# Patient Record
Sex: MALE | Race: BLACK OR AFRICAN AMERICAN | Employment: OTHER | URBAN - METROPOLITAN AREA
[De-identification: names, ages, dates, MRNs, and addresses within clinical notes are randomized per-mention and may not be internally consistent; named-entity substitution may affect disease eponyms.]

---

## 2021-02-24 ENCOUNTER — TRANSCRIBE ORDER (OUTPATIENT)
Dept: SCHEDULING | Age: 56
End: 2021-02-24

## 2021-02-24 DIAGNOSIS — Z12.2 ENCOUNTER FOR SCREENING FOR MALIGNANT NEOPLASM OF RESPIRATORY ORGANS: Primary | ICD-10-CM

## 2021-03-12 ENCOUNTER — HOSPITAL ENCOUNTER (OUTPATIENT)
Dept: CT IMAGING | Age: 56
Discharge: HOME | End: 2021-03-12
Payer: COMMERCIAL

## 2021-03-12 VITALS — WEIGHT: 266 LBS | BODY MASS INDEX: 35.25 KG/M2 | HEIGHT: 73 IN

## 2021-03-12 DIAGNOSIS — Z12.2 ENCOUNTER FOR SCREENING FOR MALIGNANT NEOPLASM OF RESPIRATORY ORGANS: ICD-10-CM

## 2021-03-12 PROCEDURE — 71271 CT THORAX LUNG CANCER SCR C-: CPT

## 2021-03-15 ENCOUNTER — OFFICE VISIT (OUTPATIENT)
Dept: SURGERY | Age: 56
End: 2021-03-15
Payer: COMMERCIAL

## 2021-03-15 VITALS
OXYGEN SATURATION: 95 % | BODY MASS INDEX: 35.52 KG/M2 | DIASTOLIC BLOOD PRESSURE: 74 MMHG | SYSTOLIC BLOOD PRESSURE: 103 MMHG | TEMPERATURE: 97.5 F | WEIGHT: 268 LBS | HEART RATE: 99 BPM | HEIGHT: 73 IN

## 2021-03-15 DIAGNOSIS — M79.606 PAIN OF LOWER EXTREMITY, UNSPECIFIED LATERALITY: Primary | ICD-10-CM

## 2021-03-15 DIAGNOSIS — I73.9 PERIPHERAL VASCULAR DISEASE (HCC): ICD-10-CM

## 2021-03-15 PROCEDURE — 99203 OFFICE O/P NEW LOW 30 MIN: CPT | Performed by: SURGERY

## 2021-03-15 RX ORDER — LISINOPRIL 10 MG/1
TABLET ORAL
COMMUNITY
Start: 2021-02-24 | End: 2021-06-17

## 2021-03-15 RX ORDER — ATORVASTATIN CALCIUM 40 MG/1
40 TABLET, FILM COATED ORAL DAILY
COMMUNITY
Start: 2021-02-24 | End: 2022-10-07 | Stop reason: ALTCHOICE

## 2021-03-15 RX ORDER — CLOPIDOGREL BISULFATE 75 MG/1
75 TABLET ORAL DAILY
COMMUNITY
Start: 2021-02-24

## 2021-03-15 RX ORDER — FAMOTIDINE 20 MG/1
TABLET, FILM COATED ORAL
COMMUNITY
Start: 2021-02-24 | End: 2021-07-02 | Stop reason: ALTCHOICE

## 2021-03-15 RX ORDER — EMTRICITABINE, RILPIVIRINE HYDROCHLORIDE, AND TENOFOVIR ALAFENAMIDE 200; 25; 25 MG/1; MG/1; MG/1
1 TABLET ORAL DAILY
COMMUNITY
Start: 2021-02-17

## 2021-03-15 RX ORDER — GUAIFENESIN 100 MG/5ML
81 LIQUID (ML) ORAL DAILY
COMMUNITY
Start: 2021-01-05

## 2021-03-15 RX ORDER — PENTOXIFYLLINE 400 MG/1
TABLET, EXTENDED RELEASE ORAL
COMMUNITY
Start: 2021-01-05 | End: 2021-04-05 | Stop reason: SDUPTHER

## 2021-03-15 RX ORDER — HYDROCHLOROTHIAZIDE 25 MG/1
TABLET ORAL
COMMUNITY
Start: 2021-02-24 | End: 2021-06-17

## 2021-03-15 RX ORDER — DOLUTEGRAVIR SODIUM 50 MG/1
50 TABLET, FILM COATED ORAL DAILY
COMMUNITY
Start: 2021-02-17

## 2021-03-15 RX ORDER — METFORMIN HYDROCHLORIDE 500 MG/1
TABLET ORAL 2 TIMES DAILY WITH MEALS
COMMUNITY
Start: 2021-03-02 | End: 2021-05-03 | Stop reason: SDUPTHER

## 2021-03-15 RX ORDER — INSULIN GLARGINE 100 [IU]/ML
50 INJECTION, SOLUTION SUBCUTANEOUS
COMMUNITY
End: 2021-05-03 | Stop reason: SDUPTHER

## 2021-03-15 NOTE — PROGRESS NOTES
VASCULAR history and physical      Subjective:   CHIEF COMPLAINTS:  Return of both feet numbness and discomfort. PRESENTATION OF ILLNESS:  Mr. Malgorzata Argueta is a very pleasant 64-year-old gentleman with a longstanding history of peripheral vascular disease. He says that he had right leg bypass surgery had left leg stent placement done. He has previously denied any major vascular radial conditions. Patient has a new onset pain discomfort following with foot both side for some time now. He denies any chest pain shortness breath. Past Medical History:   Diagnosis Date    Diabetes ([address]     Hypercholesterolemia     Hypertension       History reviewed. No pertinent surgical history. History reviewed. No pertinent family history. Social History     Tobacco Use    Smoking status: Current Every Day Smoker     Packs/day: 1.00     Years: 45.00     Pack years: 45.00     Types: Cigarettes    Smokeless tobacco: Never Used   Substance Use Topics    Alcohol use: Never     Frequency: Never       Prior to Admission medications    Medication Sig Start Date End Date Taking?  Authorizing Provider   aspirin 81 mg chewable tablet  1/5/21  Yes Provider, Historical   atorvastatin (LIPITOR) 40 mg tablet  2/24/21  Yes Provider, Historical   clopidogreL (PLAVIX) 75 mg tab  2/24/21  Yes Provider, Historical   Tivicay 50 mg tab tablet  2/17/21  Yes Provider, Historical   Odefsey 200-25-25 mg tab  2/17/21  Yes Provider, Historical   famotidine (PEPCID) 20 mg tablet  2/24/21  Yes Provider, Historical   hydroCHLOROthiazide (HYDRODIURIL) 25 mg tablet  2/24/21  Yes Provider, Historical   lisinopriL (PRINIVIL, ZESTRIL) 10 mg tablet  2/24/21  Yes Provider, Historical   metFORMIN (GLUCOPHAGE) 500 mg tablet  3/2/21  Yes Provider, Historical   pentoxifylline CR (TRENTAL) 400 mg CR tablet  1/5/21  Yes Provider, Historical   insulin glargine (Lantus Solostar U-100 Insulin) 100 unit/mL (3 mL) inpn 50 Units by SubCUTAneous route. Yes Provider, Historical     No Known Allergies     Review of Systems:  I reviewed the rest of organ systems personally and they were negative signed by Dr. Figueroa Friday    Objective:     Visit Vitals  /74 (BP 1 Location: Left upper arm, BP Patient Position: Sitting)   Pulse 99   Temp 97.5 °F (36.4 °C) (Temporal)   Ht 6' 1\" (1.854 m)   Wt 268 lb (121.6 kg)   SpO2 95%   BMI 35.36 kg/m²     VITAL SIGNS REVIEWED. Physical Exam:  Patient is well-nourished pleasant in conversation is appropriate. Head and neck examination atraumatic, normocephalic. Gaze appropriate. Conversation appropriate. Neck examination shows supple. No mass. No obvious carotid bruit. Chest examination shows lungs are clear bilaterally well-expanded, no crackles or wheezes. Cardiovascular system regular rate, no obvious murmur. Skin warm to touch  and moist, no skin lesions. Abdomen is soft ,not tender or distended bowel sounds present. No palpable mass. Neurological examinations, no focal neuro deficits moving all 4 extremities. Cranial nerves intact. Sensation is intact as well. Hematologic: No obvious bruise or swelling or obvious lymphadenopathy. Psychosocial: Appropriate. Has good effect. Musculoskeletal system: No muscle wasting, appropriate movements upper and lower extremity. Vascular examination is nonpalpable pulse. Doppler signal present which are biphasic. Both PT and DP. I reviewed the outside imaging studies done. Those images reviewed. Data Review:   No visits with results within 1 Month(s) from this visit. Latest known visit with results is:   No results found for any previous visit.         Assessment:     Problem List Items Addressed This Visit        Circulatory    RESOLVED: Peripheral vascular disease (HCC)    Relevant Medications    aspirin 81 mg chewable tablet    atorvastatin (LIPITOR) 40 mg tablet    clopidogreL (PLAVIX) 75 mg tab    pentoxifylline CR (TRENTAL) 400 mg CR tablet Other    Pain of lower extremity - Primary    Relevant Orders    LOWER EXT ART PVR MULT LEVEL SEG PRESSURES              Plan:     Patient will need BRIGIDO examination for follow-up. If BRIGIDO results show moderate to severe peripheral vascular disease I will offer the patient possible endovascular examination of the right side. Followed by possible repeat angiographic examination left leg as well.         Gopi Parish MD

## 2021-03-26 ENCOUNTER — HOSPITAL ENCOUNTER (OUTPATIENT)
Dept: NON INVASIVE DIAGNOSTICS | Age: 56
Discharge: HOME | End: 2021-03-26
Attending: SURGERY
Payer: COMMERCIAL

## 2021-03-26 DIAGNOSIS — M79.606 PAIN OF LOWER EXTREMITY, UNSPECIFIED LATERALITY: ICD-10-CM

## 2021-03-26 PROCEDURE — 93922 UPR/L XTREMITY ART 2 LEVELS: CPT

## 2021-04-05 ENCOUNTER — OFFICE VISIT (OUTPATIENT)
Dept: SURGERY | Age: 56
End: 2021-04-05
Payer: COMMERCIAL

## 2021-04-05 VITALS
BODY MASS INDEX: 35.41 KG/M2 | TEMPERATURE: 97.8 F | SYSTOLIC BLOOD PRESSURE: 115 MMHG | HEIGHT: 73 IN | DIASTOLIC BLOOD PRESSURE: 83 MMHG | HEART RATE: 119 BPM | WEIGHT: 267.2 LBS | OXYGEN SATURATION: 96 %

## 2021-04-05 DIAGNOSIS — M79.606 PAIN OF LOWER EXTREMITY, UNSPECIFIED LATERALITY: Primary | ICD-10-CM

## 2021-04-05 PROCEDURE — 99213 OFFICE O/P EST LOW 20 MIN: CPT | Performed by: SURGERY

## 2021-04-05 RX ORDER — PENTOXIFYLLINE 400 MG/1
400 TABLET, EXTENDED RELEASE ORAL 2 TIMES DAILY
Qty: 60 TAB | Refills: 3 | Status: SHIPPED | OUTPATIENT
Start: 2021-04-05 | End: 2021-11-19 | Stop reason: SDUPTHER

## 2021-04-08 NOTE — PROGRESS NOTES
VASCULAR FOLLOW UP      Subjective:   CHIEF COMPLAINTS:  Bilateral leg pain. PRESENTATION OF ILLNESS:    Mr. Andie Tran is here today in follow-up today. He had BRIGIDO examination done as well right ankle index shows 1.1 left ankle index 0.9. Patient currently on 4 different types of the antiplatelet therapy including aspirin, Plavix and Trental and cilostazol. Patient apparently had some bleeding issues. I suggested discontinue cilostazol. Patient is also currently smoking. Patient has not checked her cholesterol levels with some years. Past Medical History:   Diagnosis Date    Diabetes ([address]     Hypercholesterolemia     Hypertension       No past surgical history on file. No family history on file. Social History     Tobacco Use    Smoking status: Current Every Day Smoker     Packs/day: 1.00     Years: 45.00     Pack years: 45.00     Types: Cigarettes    Smokeless tobacco: Never Used   Substance Use Topics    Alcohol use: Never     Frequency: Never       Prior to Admission medications    Medication Sig Start Date End Date Taking? Authorizing Provider   pentoxifylline CR (TRENTAL) 400 mg CR tablet Take 1 Tab by mouth two (2) times a day. 4/5/21  Yes CheleAden MD   aspirin 81 mg chewable tablet  1/5/21  Yes Provider, Historical   atorvastatin (LIPITOR) 40 mg tablet  2/24/21  Yes Provider, Historical   clopidogreL (PLAVIX) 75 mg tab  2/24/21  Yes Provider, Historical   Tivicay 50 mg tab tablet  2/17/21  Yes Provider, Historical   Odefsey 200-25-25 mg tab  2/17/21  Yes Provider, Historical   famotidine (PEPCID) 20 mg tablet  2/24/21  Yes Provider, Historical   hydroCHLOROthiazide (HYDRODIURIL) 25 mg tablet  2/24/21  Yes Provider, Historical   lisinopriL (PRINIVIL, ZESTRIL) 10 mg tablet  2/24/21  Yes Provider, Historical   metFORMIN (GLUCOPHAGE) 500 mg tablet  3/2/21  Yes Provider, Historical   insulin glargine (Lantus Solostar U-100 Insulin) 100 unit/mL (3 mL) inpn 50 Units by SubCUTAneous route.    Yes Provider, Historical     No Known Allergies     Review of Systems:  I reviewed the rest of organ systems personally and they were negative signed by Dr. Frank Tello    Objective:     Visit Vitals  /83 (BP 1 Location: Right upper arm, BP Patient Position: Sitting, BP Cuff Size: Adult)   Pulse (!) 119   Temp 97.8 °F (36.6 °C) (Temporal)   Ht 6' 1\" (1.854 m)   Wt 267 lb 3.2 oz (121.2 kg)   SpO2 96%   BMI 35.25 kg/m²     VITAL SIGNS REVIEWED. Physical Exam:  Patient is well-nourished pleasant in conversation is appropriate. Head and neck examination atraumatic, normocephalic. Gaze appropriate. Conversation appropriate. Neck examination shows supple. No mass. No obvious carotid bruit. Chest examination shows lungs are clear bilaterally well-expanded, no crackles or wheezes. Cardiovascular system regular rate, no obvious murmur. Skin warm to touch  and moist, no skin lesions. Abdomen is soft ,not tender or distended bowel sounds present. No palpable mass. Neurological examinations, no focal neuro deficits moving all 4 extremities. Cranial nerves intact. Sensation is intact as well. Hematologic: No obvious bruise or swelling or obvious lymphadenopathy. Psychosocial: Appropriate. Has good effect. Musculoskeletal system: No muscle wasting, appropriate movements upper and lower extremity. Vascular examination: Vascular examination has not changed. Patient has nonpalpable pedal pulse. But strong Doppler signal noted. Data Review:   No visits with results within 1 Month(s) from this visit. Latest known visit with results is:   No results found for any previous visit. Assessment:     Problem List Items Addressed This Visit        Other    Pain of lower extremity - Primary              Plan:     Patient was encouraged to continue quit smoking. Check cholesterol level checked this year with the primary care physician. Patient blood sugar has been hyperglycemic.   I suggest the patient get a insulin pump for better control. I will reevaluate him again surveillance vascular examination in 4 months follow-up.         Domingo Rush MD

## 2021-05-03 ENCOUNTER — OFFICE VISIT (OUTPATIENT)
Dept: ENDOCRINOLOGY | Age: 56
End: 2021-05-03
Payer: COMMERCIAL

## 2021-05-03 VITALS
RESPIRATION RATE: 24 BRPM | HEART RATE: 82 BPM | SYSTOLIC BLOOD PRESSURE: 128 MMHG | WEIGHT: 262.2 LBS | DIASTOLIC BLOOD PRESSURE: 80 MMHG | BODY MASS INDEX: 34.75 KG/M2 | HEIGHT: 73 IN | OXYGEN SATURATION: 96 %

## 2021-05-03 DIAGNOSIS — I10 ESSENTIAL HYPERTENSION: ICD-10-CM

## 2021-05-03 DIAGNOSIS — E78.2 MIXED HYPERLIPIDEMIA: ICD-10-CM

## 2021-05-03 DIAGNOSIS — E11.65 UNCONTROLLED TYPE 2 DIABETES MELLITUS WITH HYPERGLYCEMIA (HCC): Primary | ICD-10-CM

## 2021-05-03 LAB
ALBUMIN SERPL-MCNC: 4.1 G/DL (ref 3.5–5)
ALBUMIN/GLOB SERPL: 1.1 {RATIO} (ref 1.1–2.2)
ALP SERPL-CCNC: 130 U/L (ref 45–117)
ALT SERPL-CCNC: 24 U/L (ref 12–78)
ANION GAP SERPL CALC-SCNC: 6 MMOL/L (ref 5–15)
AST SERPL-CCNC: 15 U/L (ref 15–37)
BILIRUB SERPL-MCNC: 0.7 MG/DL (ref 0.2–1)
BUN SERPL-MCNC: 20 MG/DL (ref 6–20)
BUN/CREAT SERPL: 17 (ref 12–20)
CALCIUM SERPL-MCNC: 9.6 MG/DL (ref 8.5–10.1)
CHLORIDE SERPL-SCNC: 101 MMOL/L (ref 97–108)
CHOLEST SERPL-MCNC: 127 MG/DL
CO2 SERPL-SCNC: 27 MMOL/L (ref 21–32)
CREAT SERPL-MCNC: 1.19 MG/DL (ref 0.7–1.3)
EST. AVERAGE GLUCOSE BLD GHB EST-MCNC: 246 MG/DL
GLOBULIN SER CALC-MCNC: 3.6 G/DL (ref 2–4)
GLUCOSE SERPL-MCNC: 272 MG/DL (ref 65–100)
HBA1C MFR BLD: 10.2 % (ref 4–5.6)
HDLC SERPL-MCNC: 39 MG/DL
HDLC SERPL: 3.3 {RATIO} (ref 0–5)
LDLC SERPL CALC-MCNC: 71 MG/DL (ref 0–100)
LIPID PROFILE,FLP: NORMAL
POTASSIUM SERPL-SCNC: 4.2 MMOL/L (ref 3.5–5.1)
PROT SERPL-MCNC: 7.7 G/DL (ref 6.4–8.2)
SODIUM SERPL-SCNC: 134 MMOL/L (ref 136–145)
TRIGL SERPL-MCNC: 85 MG/DL (ref ?–150)
VLDLC SERPL CALC-MCNC: 17 MG/DL

## 2021-05-03 PROCEDURE — 99204 OFFICE O/P NEW MOD 45 MIN: CPT | Performed by: INTERNAL MEDICINE

## 2021-05-03 RX ORDER — INSULIN GLARGINE 100 [IU]/ML
50 INJECTION, SOLUTION SUBCUTANEOUS
Qty: 15 ML | Refills: 6 | Status: SHIPPED | OUTPATIENT
Start: 2021-05-03 | End: 2021-08-05 | Stop reason: SDUPTHER

## 2021-05-03 RX ORDER — PEN NEEDLE, DIABETIC 31 GX3/16"
NEEDLE, DISPOSABLE MISCELLANEOUS
Qty: 100 PEN NEEDLE | Refills: 3 | Status: SHIPPED | OUTPATIENT
Start: 2021-05-03 | End: 2021-06-17

## 2021-05-03 RX ORDER — EXENATIDE 2 MG/.65ML
INJECTION, SUSPENSION, EXTENDED RELEASE SUBCUTANEOUS
COMMUNITY
Start: 2021-03-17 | End: 2021-05-03 | Stop reason: ALTCHOICE

## 2021-05-03 RX ORDER — METFORMIN HYDROCHLORIDE 500 MG/1
1000 TABLET ORAL 2 TIMES DAILY WITH MEALS
Qty: 120 TAB | Refills: 6 | Status: SHIPPED | OUTPATIENT
Start: 2021-05-03 | End: 2021-06-17

## 2021-05-03 RX ORDER — DULAGLUTIDE 1.5 MG/.5ML
1.5 INJECTION, SOLUTION SUBCUTANEOUS
Qty: 4 SYRINGE | Refills: 6 | Status: SHIPPED | OUTPATIENT
Start: 2021-05-03 | End: 2021-05-04 | Stop reason: ALTCHOICE

## 2021-05-03 RX ORDER — REPAGLINIDE 2 MG/1
2 TABLET ORAL
Qty: 90 TAB | Refills: 6 | Status: SHIPPED | OUTPATIENT
Start: 2021-05-03 | End: 2021-08-05 | Stop reason: ALTCHOICE

## 2021-05-03 NOTE — PATIENT INSTRUCTIONS
SPECIFIC INSTRUCTIONS BELOW         DRINK water   Do not skip meals  Do not eat in between meals    Reduce carbs- pasta, rice, potatoes, bread   Try to avoid processed bread products like BISCUITS, CROISSANTS, MUFFINS    Do not drink juices or sodas, even if they are calorie zero or diet drinks and especially avoid using powders like crystalloids , JULIETTE-AIDS     Do not eat peanut butter     Do not eat sugar free cookies and cakes   Do not eat peaches, oranges, pineapples, raisins, grapes , canteloupe , honey dew, mangoes , watermelon  and fruit medleys      ----------------------------------------------------------------------------------------------    Check blood sugars only twice a day by rotation as follows    First day - before b-fast and - before lunch  Second day- before dinner and  before bed time    After 2 days go back to same rotation    -------------------------------------------------------------------------------------------------------      STAY ON LANTUS  50 UNITS AT NIGHT       STOP BYDUREON   START  ON TRULICITY  1.5 MG A WEEK ( VOCAROLE AND  PA)       START ON PRANDIN 2 MG RIGHT BEFORE EACH MEAL     Do not take it if you are not eating   Cut the pill to half if eating lesser than normal   Do not avoid lunches       STAY ON METFORMIN 500 MG 2 PILLS TWICE A DAY       PAY ATTENTION TO THESE GENERAL INSTRUCTIONS     -ANY tests other than blood work, which you opt to do  outside [facility] imaging facilities, you are responsible for prior authorizations if  required   - HEALTH MAINTENANCE IS NOT GOING TO BE UP TO DATE ON YOUR AVS- PLEASE IGNORE   - YOUR MED LIST IS NOT UP TO DATE AS SOME CHANGES ARE BEING MADE AFTER THE VISIT - FOLLOW SPECIFIC INSTRUCTIONS  ABOVE     Results     *Normal results will not be notified by a phone call starting January 1 2021   *If you have an upcoming visit, the results will be discussed at the visit   *Please sign up for MY CHART if you want access to your lab and test results  *Abnormal results which require immediate attention will be notified by phone call   *Abnormal results which do not require immediate assistance will be notified in 1-2 weeks       Refills    -    have your pharmacy send us a refill request  Phone calls  -  Allow  24 hrs.  for non-urgent calls to be returned  Prior authorization - It may take 2-4 weeks to process  Forms  -  FMLA, DMV etc., will take up to 2 weeks to process  Cancellations - please notify the office 2 days in advance   Samples  - will only be dispensed at visits     --------------------------------------------------------------------------------------------

## 2021-05-03 NOTE — LETTER
5/11/2021 Patient: Caitlin Griffiths YOB: 1965 Date of Visit: 5/3/2021 Lm Fink NP 
[address] [place] [address] [phone] Via Fax: [phone] Dear Lm Fink NP, Thank you for referring Mr. Armando Lara to [address] [place] [address] for evaluation. My notes for this consultation are attached. If you have questions, please do not hesitate to call me. I look forward to following your patient along with you. Sincerely, Kenton Dao MD

## 2021-05-03 NOTE — PROGRESS NOTES
[facility]  Macie Landaverde MD, Jacob Yee is a 60 y.o. male presents today as a new DM Patient. HPI    Patient here for initial visit of Type 2 diabetes mellitus . Referred : by  [facility]     H/o diabetes for 15  years     Current A1C is 11.6 %    From feb 2021  and symptoms/problems include none     Pt moved to [address]  From [place]   In oct 2020   He stayed without meds for good 10 months , HE HAD FEW MEDS REFILLED on his visit in feb 2021   Per their notes, he used to be on actos 45 and nesina 25 mg    Current diabetic medications include  Metformin, bydureon, lantus 50 units . bydureon was started 2 months ago- not being covered     Current monitoring regimen: home blood tests - NOT INTERESTED   Home blood sugar records: no  Any episodes of hypoglycemia? no    Weight trend: decreasing rapidly  Prior visit with dietician: no  Current diet: well balanced  Current exercise: weightlifting    Known diabetic complications: retinopathy and peripheral neuropathy  Cardiovascular risk factors: dyslipidemia, diabetes mellitus, obesity, male gender, hypertension, stress    Eye exam current (within one year): yes  BRIGIDO: unknown     Past Medical History:   Diagnosis Date    Diabetes ([facility]     HIV (human immunodeficiency virus infection) ([facility]     Hypercholesterolemia     Hypertension      History reviewed. No pertinent surgical history. Current Outpatient Medications   Medication Sig    Bydureon 2 mg/0.65 mL pnij     OneTouch Ultra Blue Test Strip strip     pentoxifylline CR (TRENTAL) 400 mg CR tablet Take 1 Tab by mouth two (2) times a day.     aspirin 81 mg chewable tablet     atorvastatin (LIPITOR) 40 mg tablet     clopidogreL (PLAVIX) 75 mg tab     Tivicay 50 mg tab tablet     Odefsey 200-25-25 mg tab     famotidine (PEPCID) 20 mg tablet     hydroCHLOROthiazide (HYDRODIURIL) 25 mg tablet     lisinopriL (PRINIVIL, ZESTRIL) 10 mg tablet     metFORMIN (GLUCOPHAGE) 500 mg tablet two (2) times daily (with meals). 2 tabs bid    insulin glargine (Lantus Solostar U-100 Insulin) 100 unit/mL (3 mL) inpn 50 Units by SubCUTAneous route. No current facility-administered medications for this visit. Review of Systems   Constitutional: Negative. HENT: Negative. Eyes: Negative. Respiratory: Negative. Cardiovascular: Negative. Gastrointestinal: Negative. Genitourinary: Negative. Musculoskeletal: Negative. Skin: Negative. Neurological: Negative. Endo/Heme/Allergies: Negative. Psychiatric/Behavioral: Negative. Vitals:    05/03/21 0856   BP: 128/80   Pulse: 82   Resp: 24   SpO2: 96%   Weight: 262 lb 3.2 oz (118.9 kg)   Height: 6' 1\" (1.854 m)        Physical Exam  Constitutional:       Appearance: He is well-developed. HENT:      Head: Normocephalic. Eyes:      Conjunctiva/sclera: Conjunctivae normal.      Pupils: Pupils are equal, round, and reactive to light. Neck:      Musculoskeletal: Normal range of motion and neck supple. Cardiovascular:      Rate and Rhythm: Normal rate and regular rhythm. Pulmonary:      Effort: Pulmonary effort is normal.      Breath sounds: Normal breath sounds. Abdominal:      General: Bowel sounds are normal.      Palpations: Abdomen is soft. Musculoskeletal: Normal range of motion. Skin:     General: Skin is warm and dry. Neurological:      Mental Status: He is alert and oriented to person, place, and time. Deep Tendon Reflexes: Reflexes are normal and symmetric. [x] Recent labs have been reviewed from referring provider. From feb 24 2021     [] Recent labs were not available at time of visit. ASSESSMENT AND  PLAN     1.  Type 2 DM uncontrolled :  a1c is 10.2 %    From  Today by POC    Compared to 11.6 %    From feb 2021     Not in control as he had not gotten med refills for 6 plus months .moved here during [place] times     Discussed patho-physiology of diabetes   Stay on lantus ,   Will try trulicity   Start on prandin and he will be on metformin     Patient is advised to check blood sugars 3 times daily by rotation method. reviewed medications and side effects in detail  lab results and schedule of future lab studies reviewed with patient    specific diabetic recommendations: diabetic diet discussed in detail, written exchange diet given, home glucose monitoring emphasized, home glucose monitoring demonstrated and taught and use and side effects of insulin is taught    2. Hypoglycemia :  Educated on treating the hypoglycemia. 3. HTN : continue current care. Patient is educated about importance of compliance with anti-hypertensives especially ARB/ACEI    4. Dyslipidemia : continue current meds. Patient is educated about benefits and adverse effects of statins and explained how benefits outweigh risk. 5. On Trental  And  plavix     6. Diabetic complications :     A. Retinopathy-YES   educated on this complication,  regular f/u with ophthalmologist encouraged    B. Nephropathy - yes, proteinuria noticeable      educated on this disease and indicated stages and its prognosis. C. Peripheral Neuropathy - NO  ,   educated on this disease and indicated improvement with good and stable glycemic control      7. High Hematocrit , and  High hgb  :  Rule out sleep apnea , copd,       8.  HSV 2 positive - saw ID according to [facility] notes       Reviewed results with patient and discussed the labs being ordered today/bnv  Patient voiced understanding of plan of care

## 2021-05-17 ENCOUNTER — HOSPITAL ENCOUNTER (OUTPATIENT)
Age: 56
Discharge: HOME | End: 2021-05-17
Attending: INTERNAL MEDICINE | Admitting: INTERNAL MEDICINE
Payer: COMMERCIAL

## 2021-05-17 VITALS
HEIGHT: 73 IN | BODY MASS INDEX: 35.25 KG/M2 | SYSTOLIC BLOOD PRESSURE: 115 MMHG | HEART RATE: 73 BPM | OXYGEN SATURATION: 96 % | WEIGHT: 266 LBS | TEMPERATURE: 98 F | DIASTOLIC BLOOD PRESSURE: 74 MMHG | RESPIRATION RATE: 18 BRPM

## 2021-05-17 DIAGNOSIS — R94.39 ABNORMAL STRESS TEST: ICD-10-CM

## 2021-05-17 LAB
ALBUMIN SERPL-MCNC: 4 G/DL (ref 3.5–5)
ALBUMIN/GLOB SERPL: 0.9 {RATIO} (ref 1.1–2.2)
ALP SERPL-CCNC: 127 U/L (ref 45–117)
ALT SERPL-CCNC: 30 U/L (ref 12–78)
ANION GAP SERPL CALC-SCNC: 6 MMOL/L (ref 5–15)
APTT PPP: 27.2 SEC (ref 21.2–34.1)
AST SERPL W P-5'-P-CCNC: 17 U/L (ref 15–37)
BILIRUB SERPL-MCNC: 0.9 MG/DL (ref 0.2–1)
BUN SERPL-MCNC: 16 MG/DL (ref 6–20)
BUN/CREAT SERPL: 13 (ref 12–20)
CA-I BLD-MCNC: 9.5 MG/DL (ref 8.5–10.1)
CHLORIDE SERPL-SCNC: 100 MMOL/L (ref 97–108)
CO2 SERPL-SCNC: 26 MMOL/L (ref 21–32)
CREAT SERPL-MCNC: 1.25 MG/DL (ref 0.7–1.3)
ERYTHROCYTE [DISTWIDTH] IN BLOOD BY AUTOMATED COUNT: 13 % (ref 11.5–14.5)
GLOBULIN SER CALC-MCNC: 4.4 G/DL (ref 2–4)
GLUCOSE BLD STRIP.AUTO-MCNC: 205 MG/DL (ref 65–117)
GLUCOSE BLD STRIP.AUTO-MCNC: 262 MG/DL (ref 65–117)
GLUCOSE SERPL-MCNC: 288 MG/DL (ref 65–100)
HCT VFR BLD AUTO: 54 % (ref 36.6–50.3)
HGB BLD-MCNC: 17.9 G/DL (ref 12.1–17)
INR PPP: 0.9 (ref 0.9–1.1)
MCH RBC QN AUTO: 29.2 PG (ref 26–34)
MCHC RBC AUTO-ENTMCNC: 33.1 G/DL (ref 30–36.5)
MCV RBC AUTO: 87.9 FL (ref 80–99)
NRBC # BLD: 0 K/UL (ref 0–0.01)
NRBC BLD-RTO: 0 PER 100 WBC
PERFORMED BY, TECHID: ABNORMAL
PERFORMED BY, TECHID: ABNORMAL
PLATELET # BLD AUTO: 332 K/UL (ref 150–400)
PMV BLD AUTO: 9.5 FL (ref 8.9–12.9)
POTASSIUM SERPL-SCNC: 4 MMOL/L (ref 3.5–5.1)
PROT SERPL-MCNC: 8.4 G/DL (ref 6.4–8.2)
PROTHROMBIN TIME: 12.3 SEC (ref 11.9–14.7)
RBC # BLD AUTO: 6.14 M/UL (ref 4.1–5.7)
SODIUM SERPL-SCNC: 132 MMOL/L (ref 136–145)
THERAPEUTIC RANGE,PTTT: NORMAL SEC (ref 82–109)
WBC # BLD AUTO: 7.2 K/UL (ref 4.1–11.1)

## 2021-05-17 PROCEDURE — 77030029997 HC DEV COM RDL R BND TELE -B: Performed by: INTERNAL MEDICINE

## 2021-05-17 PROCEDURE — 82962 GLUCOSE BLOOD TEST: CPT

## 2021-05-17 PROCEDURE — 80053 COMPREHEN METABOLIC PANEL: CPT

## 2021-05-17 PROCEDURE — C1769 GUIDE WIRE: HCPCS | Performed by: INTERNAL MEDICINE

## 2021-05-17 PROCEDURE — 77030016700 HC CATH ANGI DX INFN2 CARD -B: Performed by: INTERNAL MEDICINE

## 2021-05-17 PROCEDURE — 74011250636 HC RX REV CODE- 250/636: Performed by: INTERNAL MEDICINE

## 2021-05-17 PROCEDURE — 36415 COLL VENOUS BLD VENIPUNCTURE: CPT

## 2021-05-17 PROCEDURE — 93458 L HRT ARTERY/VENTRICLE ANGIO: CPT | Performed by: INTERNAL MEDICINE

## 2021-05-17 PROCEDURE — 85027 COMPLETE CBC AUTOMATED: CPT

## 2021-05-17 PROCEDURE — 85730 THROMBOPLASTIN TIME PARTIAL: CPT

## 2021-05-17 PROCEDURE — 77030003394 HC NDL ART COOK -A: Performed by: INTERNAL MEDICINE

## 2021-05-17 PROCEDURE — C1894 INTRO/SHEATH, NON-LASER: HCPCS | Performed by: INTERNAL MEDICINE

## 2021-05-17 PROCEDURE — 99152 MOD SED SAME PHYS/QHP 5/>YRS: CPT | Performed by: INTERNAL MEDICINE

## 2021-05-17 PROCEDURE — 76210000016 HC OR PH I REC 1 TO 1.5 HR: Performed by: INTERNAL MEDICINE

## 2021-05-17 PROCEDURE — 85610 PROTHROMBIN TIME: CPT

## 2021-05-17 PROCEDURE — 99153 MOD SED SAME PHYS/QHP EA: CPT | Performed by: INTERNAL MEDICINE

## 2021-05-17 PROCEDURE — 74011000636 HC RX REV CODE- 636: Performed by: INTERNAL MEDICINE

## 2021-05-17 PROCEDURE — 77030019698 HC SYR ANGI MDLON MRTM -A: Performed by: INTERNAL MEDICINE

## 2021-05-17 PROCEDURE — 2709999900 HC NON-CHARGEABLE SUPPLY: Performed by: INTERNAL MEDICINE

## 2021-05-17 PROCEDURE — 76210000023 HC REC RM PH II 2 TO 2.5 HR: Performed by: INTERNAL MEDICINE

## 2021-05-17 PROCEDURE — 74011000250 HC RX REV CODE- 250: Performed by: INTERNAL MEDICINE

## 2021-05-17 RX ORDER — LIDOCAINE HYDROCHLORIDE 10 MG/ML
INJECTION INFILTRATION; PERINEURAL AS NEEDED
Status: DISCONTINUED | OUTPATIENT
Start: 2021-05-17 | End: 2021-05-17 | Stop reason: HOSPADM

## 2021-05-17 RX ORDER — SODIUM CHLORIDE 0.9 % (FLUSH) 0.9 %
5-40 SYRINGE (ML) INJECTION AS NEEDED
Status: DISCONTINUED | OUTPATIENT
Start: 2021-05-17 | End: 2021-05-17 | Stop reason: HOSPADM

## 2021-05-17 RX ORDER — MIDAZOLAM HYDROCHLORIDE 1 MG/ML
INJECTION INTRAMUSCULAR; INTRAVENOUS AS NEEDED
Status: DISCONTINUED | OUTPATIENT
Start: 2021-05-17 | End: 2021-05-17 | Stop reason: HOSPADM

## 2021-05-17 RX ORDER — HEPARIN SODIUM 200 [USP'U]/100ML
INJECTION, SOLUTION INTRAVENOUS
Status: COMPLETED | OUTPATIENT
Start: 2021-05-17 | End: 2021-05-17

## 2021-05-17 RX ORDER — HEPARIN SODIUM 1000 [USP'U]/ML
INJECTION, SOLUTION INTRAVENOUS; SUBCUTANEOUS AS NEEDED
Status: DISCONTINUED | OUTPATIENT
Start: 2021-05-17 | End: 2021-05-17 | Stop reason: HOSPADM

## 2021-05-17 RX ORDER — SODIUM CHLORIDE 9 MG/ML
75 INJECTION, SOLUTION INTRAVENOUS CONTINUOUS
Status: DISCONTINUED | OUTPATIENT
Start: 2021-05-17 | End: 2021-05-17 | Stop reason: HOSPADM

## 2021-05-17 RX ORDER — FENTANYL CITRATE 50 UG/ML
INJECTION, SOLUTION INTRAMUSCULAR; INTRAVENOUS AS NEEDED
Status: DISCONTINUED | OUTPATIENT
Start: 2021-05-17 | End: 2021-05-17 | Stop reason: HOSPADM

## 2021-05-17 RX ORDER — SODIUM CHLORIDE 0.9 % (FLUSH) 0.9 %
5-40 SYRINGE (ML) INJECTION EVERY 8 HOURS
Status: DISCONTINUED | OUTPATIENT
Start: 2021-05-17 | End: 2021-05-17 | Stop reason: HOSPADM

## 2021-05-17 NOTE — Clinical Note
TRANSFER - OUT REPORT:     Verbal report given to: Dorothea. Report consisted of patient's Situation, Background, Assessment and   Recommendations(SBAR). Opportunity for questions and clarification was provided. Patient transported with a Registered Nurse.

## 2021-05-17 NOTE — PROGRESS NOTES
Patient IV out at [phone], and no signs of infection, and catheter tip intact. Patient given discharge education verbally and gave back verbal understanding.  Patient earneste, sister Kristina Reyna picked up patient at front of hospital.

## 2021-06-05 ENCOUNTER — APPOINTMENT (OUTPATIENT)
Dept: CT IMAGING | Age: 56
DRG: 442 | End: 2021-06-05
Attending: PHYSICIAN ASSISTANT
Payer: COMMERCIAL

## 2021-06-05 ENCOUNTER — HOSPITAL ENCOUNTER (INPATIENT)
Age: 56
LOS: 11 days | Discharge: HOME HEALTH | DRG: 442 | End: 2021-06-17
Attending: INTERNAL MEDICINE | Admitting: INTERNAL MEDICINE
Payer: COMMERCIAL

## 2021-06-05 DIAGNOSIS — N17.9 AKI (ACUTE KIDNEY INJURY) (HCC): ICD-10-CM

## 2021-06-05 DIAGNOSIS — R31.0 GROSS HEMATURIA: ICD-10-CM

## 2021-06-05 DIAGNOSIS — N13.30 HYDRONEPHROSIS, LEFT: ICD-10-CM

## 2021-06-05 DIAGNOSIS — N28.89 LEFT RENAL MASS: ICD-10-CM

## 2021-06-05 DIAGNOSIS — N28.89 RENAL MASS: ICD-10-CM

## 2021-06-05 DIAGNOSIS — N13.39 OTHER HYDRONEPHROSIS: ICD-10-CM

## 2021-06-05 DIAGNOSIS — R10.9 INTRACTABLE ABDOMINAL PAIN: Primary | ICD-10-CM

## 2021-06-05 DIAGNOSIS — Z79.01 CHRONIC ANTICOAGULATION: ICD-10-CM

## 2021-06-05 LAB
ALBUMIN SERPL-MCNC: 3.6 G/DL (ref 3.5–5)
ALBUMIN/GLOB SERPL: 0.8 {RATIO} (ref 1.1–2.2)
ALP SERPL-CCNC: 103 U/L (ref 45–117)
ALT SERPL-CCNC: 37 U/L (ref 12–78)
ANION GAP SERPL CALC-SCNC: 9 MMOL/L (ref 5–15)
APPEARANCE UR: ABNORMAL
AST SERPL W P-5'-P-CCNC: 26 U/L (ref 15–37)
BACTERIA URNS QL MICRO: NEGATIVE /HPF
BASOPHILS # BLD: 0.1 K/UL (ref 0–0.1)
BASOPHILS NFR BLD: 1 % (ref 0–1)
BILIRUB SERPL-MCNC: 0.5 MG/DL (ref 0.2–1)
BILIRUB UR QL: NEGATIVE
BUN SERPL-MCNC: 21 MG/DL (ref 6–20)
BUN/CREAT SERPL: 13 (ref 12–20)
CA-I BLD-MCNC: 9.3 MG/DL (ref 8.5–10.1)
CHLORIDE SERPL-SCNC: 101 MMOL/L (ref 97–108)
CO2 SERPL-SCNC: 22 MMOL/L (ref 21–32)
COLOR UR: ABNORMAL
CREAT SERPL-MCNC: 1.64 MG/DL (ref 0.7–1.3)
DIFFERENTIAL METHOD BLD: ABNORMAL
EOSINOPHIL # BLD: 0 K/UL (ref 0–0.4)
EOSINOPHIL NFR BLD: 0 % (ref 0–7)
ERYTHROCYTE [DISTWIDTH] IN BLOOD BY AUTOMATED COUNT: 13 % (ref 11.5–14.5)
GLOBULIN SER CALC-MCNC: 4.7 G/DL (ref 2–4)
GLUCOSE SERPL-MCNC: 348 MG/DL (ref 65–100)
GLUCOSE UR STRIP.AUTO-MCNC: >300 MG/DL
HCT VFR BLD AUTO: 52.4 % (ref 36.6–50.3)
HGB BLD-MCNC: 17 G/DL (ref 12.1–17)
HGB UR QL STRIP: ABNORMAL
IMM GRANULOCYTES # BLD AUTO: 0.1 K/UL (ref 0–0.04)
IMM GRANULOCYTES NFR BLD AUTO: 1 % (ref 0–0.5)
KETONES UR QL STRIP.AUTO: NEGATIVE MG/DL
LACTATE SERPL-SCNC: 1.8 MMOL/L (ref 0.4–2)
LEUKOCYTE ESTERASE UR QL STRIP.AUTO: ABNORMAL
LIPASE SERPL-CCNC: 146 U/L (ref 73–393)
LYMPHOCYTES # BLD: 1.6 K/UL (ref 0.8–3.5)
LYMPHOCYTES NFR BLD: 14 % (ref 12–49)
MCH RBC QN AUTO: 28.9 PG (ref 26–34)
MCHC RBC AUTO-ENTMCNC: 32.4 G/DL (ref 30–36.5)
MCV RBC AUTO: 89 FL (ref 80–99)
MONOCYTES # BLD: 1.1 K/UL (ref 0–1)
MONOCYTES NFR BLD: 9 % (ref 5–13)
NEUTS SEG # BLD: 8.5 K/UL (ref 1.8–8)
NEUTS SEG NFR BLD: 75 % (ref 32–75)
NITRITE UR QL STRIP.AUTO: NEGATIVE
NRBC # BLD: 0 K/UL (ref 0–0.01)
NRBC BLD-RTO: 0 PER 100 WBC
PH UR STRIP: 5 [PH] (ref 5–8)
PLATELET # BLD AUTO: 329 K/UL (ref 150–400)
PMV BLD AUTO: 10 FL (ref 8.9–12.9)
POTASSIUM SERPL-SCNC: 3.9 MMOL/L (ref 3.5–5.1)
PROT SERPL-MCNC: 8.3 G/DL (ref 6.4–8.2)
PROT UR STRIP-MCNC: 30 MG/DL
RBC # BLD AUTO: 5.89 M/UL (ref 4.1–5.7)
RBC #/AREA URNS HPF: >100 /HPF (ref 0–5)
SODIUM SERPL-SCNC: 132 MMOL/L (ref 136–145)
SP GR UR REFRACTOMETRY: 1.01 (ref 1–1.03)
TROPONIN I SERPL-MCNC: <0.05 NG/ML
UA: UC IF INDICATED,UAUC: ABNORMAL
UROBILINOGEN UR QL STRIP.AUTO: 0.1 EU/DL (ref 0.1–1)
WBC # BLD AUTO: 11.6 K/UL (ref 4.1–11.1)
WBC URNS QL MICRO: ABNORMAL /HPF (ref 0–4)

## 2021-06-05 PROCEDURE — 93005 ELECTROCARDIOGRAM TRACING: CPT

## 2021-06-05 PROCEDURE — 84484 ASSAY OF TROPONIN QUANT: CPT

## 2021-06-05 PROCEDURE — 74011000636 HC RX REV CODE- 636: Performed by: PHYSICIAN ASSISTANT

## 2021-06-05 PROCEDURE — 74011250636 HC RX REV CODE- 250/636: Performed by: PHYSICIAN ASSISTANT

## 2021-06-05 PROCEDURE — 83690 ASSAY OF LIPASE: CPT

## 2021-06-05 PROCEDURE — 96361 HYDRATE IV INFUSION ADD-ON: CPT

## 2021-06-05 PROCEDURE — 74178 CT ABD&PLV WO CNTR FLWD CNTR: CPT

## 2021-06-05 PROCEDURE — 96374 THER/PROPH/DIAG INJ IV PUSH: CPT

## 2021-06-05 PROCEDURE — 85025 COMPLETE CBC W/AUTO DIFF WBC: CPT

## 2021-06-05 PROCEDURE — 81001 URINALYSIS AUTO W/SCOPE: CPT

## 2021-06-05 PROCEDURE — 99285 EMERGENCY DEPT VISIT HI MDM: CPT

## 2021-06-05 PROCEDURE — 83605 ASSAY OF LACTIC ACID: CPT

## 2021-06-05 PROCEDURE — 80053 COMPREHEN METABOLIC PANEL: CPT

## 2021-06-05 PROCEDURE — 96375 TX/PRO/DX INJ NEW DRUG ADDON: CPT

## 2021-06-05 RX ORDER — MORPHINE SULFATE 4 MG/ML
4 INJECTION INTRAVENOUS ONCE
Status: COMPLETED | OUTPATIENT
Start: 2021-06-05 | End: 2021-06-05

## 2021-06-05 RX ORDER — HYDROMORPHONE HYDROCHLORIDE 1 MG/ML
0.5 INJECTION, SOLUTION INTRAMUSCULAR; INTRAVENOUS; SUBCUTANEOUS ONCE
Status: COMPLETED | OUTPATIENT
Start: 2021-06-05 | End: 2021-06-05

## 2021-06-05 RX ORDER — ONDANSETRON 2 MG/ML
4 INJECTION INTRAMUSCULAR; INTRAVENOUS
Status: COMPLETED | OUTPATIENT
Start: 2021-06-05 | End: 2021-06-05

## 2021-06-05 RX ADMIN — ONDANSETRON 4 MG: 2 INJECTION INTRAMUSCULAR; INTRAVENOUS at 21:44

## 2021-06-05 RX ADMIN — IOPAMIDOL 100 ML: 755 INJECTION, SOLUTION INTRAVENOUS at 23:55

## 2021-06-05 RX ADMIN — HYDROMORPHONE HYDROCHLORIDE 0.5 MG: 1 INJECTION, SOLUTION INTRAMUSCULAR; INTRAVENOUS; SUBCUTANEOUS at 22:09

## 2021-06-05 RX ADMIN — MORPHINE SULFATE 4 MG: 4 INJECTION, SOLUTION INTRAMUSCULAR; INTRAVENOUS at 21:43

## 2021-06-06 ENCOUNTER — ANESTHESIA EVENT (OUTPATIENT)
Dept: SURGERY | Age: 56
DRG: 442 | End: 2021-06-06
Payer: COMMERCIAL

## 2021-06-06 ENCOUNTER — ANESTHESIA (OUTPATIENT)
Dept: SURGERY | Age: 56
DRG: 442 | End: 2021-06-06
Payer: COMMERCIAL

## 2021-06-06 ENCOUNTER — APPOINTMENT (OUTPATIENT)
Dept: GENERAL RADIOLOGY | Age: 56
DRG: 442 | End: 2021-06-06
Attending: UROLOGY
Payer: COMMERCIAL

## 2021-06-06 PROBLEM — N28.89 RENAL MASS: Status: ACTIVE | Noted: 2021-06-06

## 2021-06-06 PROBLEM — R31.9 HEMATURIA: Status: ACTIVE | Noted: 2021-06-06

## 2021-06-06 PROBLEM — N13.30 HYDRONEPHROSIS: Status: ACTIVE | Noted: 2021-06-06

## 2021-06-06 LAB
GLUCOSE BLD STRIP.AUTO-MCNC: 235 MG/DL (ref 65–117)
GLUCOSE BLD STRIP.AUTO-MCNC: 248 MG/DL (ref 65–117)
GLUCOSE BLD STRIP.AUTO-MCNC: 277 MG/DL (ref 65–117)
GLUCOSE BLD STRIP.AUTO-MCNC: 335 MG/DL (ref 65–117)
PERFORMED BY, TECHID: ABNORMAL

## 2021-06-06 PROCEDURE — 76000 FLUOROSCOPY <1 HR PHYS/QHP: CPT

## 2021-06-06 PROCEDURE — 74011000636 HC RX REV CODE- 636: Performed by: UROLOGY

## 2021-06-06 PROCEDURE — 0T778DZ DILATION OF LEFT URETER WITH INTRALUMINAL DEVICE, VIA NATURAL OR ARTIFICIAL OPENING ENDOSCOPIC: ICD-10-PCS | Performed by: UROLOGY

## 2021-06-06 PROCEDURE — 76010000138 HC OR TIME 0.5 TO 1 HR: Performed by: UROLOGY

## 2021-06-06 PROCEDURE — 2709999900 HC NON-CHARGEABLE SUPPLY: Performed by: UROLOGY

## 2021-06-06 PROCEDURE — 74011636637 HC RX REV CODE- 636/637: Performed by: INTERNAL MEDICINE

## 2021-06-06 PROCEDURE — 77030010545: Performed by: UROLOGY

## 2021-06-06 PROCEDURE — 76210000063 HC OR PH I REC FIRST 0.5 HR: Performed by: UROLOGY

## 2021-06-06 PROCEDURE — 99222 1ST HOSP IP/OBS MODERATE 55: CPT | Performed by: UROLOGY

## 2021-06-06 PROCEDURE — 74011250636 HC RX REV CODE- 250/636: Performed by: NURSE ANESTHETIST, CERTIFIED REGISTERED

## 2021-06-06 PROCEDURE — 77030020849 HC CATH URETH FOL 3 WAY  BARD -B: Performed by: UROLOGY

## 2021-06-06 PROCEDURE — 0TCB8ZZ EXTIRPATION OF MATTER FROM BLADDER, VIA NATURAL OR ARTIFICIAL OPENING ENDOSCOPIC: ICD-10-PCS | Performed by: UROLOGY

## 2021-06-06 PROCEDURE — 82962 GLUCOSE BLOOD TEST: CPT

## 2021-06-06 PROCEDURE — 74011250636 HC RX REV CODE- 250/636: Performed by: INTERNAL MEDICINE

## 2021-06-06 PROCEDURE — 76060000032 HC ANESTHESIA 0.5 TO 1 HR: Performed by: UROLOGY

## 2021-06-06 PROCEDURE — 74011250637 HC RX REV CODE- 250/637: Performed by: INTERNAL MEDICINE

## 2021-06-06 PROCEDURE — 96375 TX/PRO/DX INJ NEW DRUG ADDON: CPT

## 2021-06-06 PROCEDURE — 74420 UROGRAPHY RTRGR +-KUB: CPT | Performed by: UROLOGY

## 2021-06-06 PROCEDURE — 74011636637 HC RX REV CODE- 636/637: Performed by: STUDENT IN AN ORGANIZED HEALTH CARE EDUCATION/TRAINING PROGRAM

## 2021-06-06 PROCEDURE — 96376 TX/PRO/DX INJ SAME DRUG ADON: CPT

## 2021-06-06 PROCEDURE — 74011000250 HC RX REV CODE- 250: Performed by: NURSE ANESTHETIST, CERTIFIED REGISTERED

## 2021-06-06 PROCEDURE — 65270000029 HC RM PRIVATE

## 2021-06-06 PROCEDURE — 74011250636 HC RX REV CODE- 250/636: Performed by: PHYSICIAN ASSISTANT

## 2021-06-06 PROCEDURE — C2617 STENT, NON-COR, TEM W/O DEL: HCPCS | Performed by: UROLOGY

## 2021-06-06 PROCEDURE — 52001 CYSTO W/IRRG&EVAC MLT CLOTS: CPT | Performed by: UROLOGY

## 2021-06-06 RX ORDER — SODIUM CHLORIDE 0.9 % (FLUSH) 0.9 %
5-40 SYRINGE (ML) INJECTION EVERY 8 HOURS
Status: DISCONTINUED | OUTPATIENT
Start: 2021-06-06 | End: 2021-06-17 | Stop reason: HOSPADM

## 2021-06-06 RX ORDER — DEXMEDETOMIDINE HYDROCHLORIDE 100 UG/ML
INJECTION, SOLUTION INTRAVENOUS AS NEEDED
Status: DISCONTINUED | OUTPATIENT
Start: 2021-06-06 | End: 2021-06-06 | Stop reason: HOSPADM

## 2021-06-06 RX ORDER — FENTANYL CITRATE 50 UG/ML
25 INJECTION, SOLUTION INTRAMUSCULAR; INTRAVENOUS
Status: DISCONTINUED | OUTPATIENT
Start: 2021-06-06 | End: 2021-06-06

## 2021-06-06 RX ORDER — CEFAZOLIN SODIUM 1 G/3ML
INJECTION, POWDER, FOR SOLUTION INTRAMUSCULAR; INTRAVENOUS AS NEEDED
Status: DISCONTINUED | OUTPATIENT
Start: 2021-06-06 | End: 2021-06-06 | Stop reason: HOSPADM

## 2021-06-06 RX ORDER — HYDROMORPHONE HYDROCHLORIDE 1 MG/ML
0.5 INJECTION, SOLUTION INTRAMUSCULAR; INTRAVENOUS; SUBCUTANEOUS ONCE
Status: COMPLETED | OUTPATIENT
Start: 2021-06-06 | End: 2021-06-06

## 2021-06-06 RX ORDER — FENTANYL CITRATE 50 UG/ML
50 INJECTION, SOLUTION INTRAMUSCULAR; INTRAVENOUS
Status: DISCONTINUED | OUTPATIENT
Start: 2021-06-06 | End: 2021-06-06 | Stop reason: HOSPADM

## 2021-06-06 RX ORDER — LIDOCAINE HYDROCHLORIDE 10 MG/ML
0.1 INJECTION, SOLUTION EPIDURAL; INFILTRATION; INTRACAUDAL; PERINEURAL AS NEEDED
Status: DISCONTINUED | OUTPATIENT
Start: 2021-06-06 | End: 2021-06-06 | Stop reason: HOSPADM

## 2021-06-06 RX ORDER — MIDAZOLAM HYDROCHLORIDE 1 MG/ML
INJECTION, SOLUTION INTRAMUSCULAR; INTRAVENOUS AS NEEDED
Status: DISCONTINUED | OUTPATIENT
Start: 2021-06-06 | End: 2021-06-06 | Stop reason: HOSPADM

## 2021-06-06 RX ORDER — HYDROCHLOROTHIAZIDE 25 MG/1
25 TABLET ORAL DAILY
Status: DISCONTINUED | OUTPATIENT
Start: 2021-06-06 | End: 2021-06-17 | Stop reason: HOSPADM

## 2021-06-06 RX ORDER — SODIUM CHLORIDE 0.9 % (FLUSH) 0.9 %
5-40 SYRINGE (ML) INJECTION EVERY 8 HOURS
Status: DISCONTINUED | OUTPATIENT
Start: 2021-06-06 | End: 2021-06-06 | Stop reason: HOSPADM

## 2021-06-06 RX ORDER — ONDANSETRON 4 MG/1
4 TABLET, ORALLY DISINTEGRATING ORAL
Status: DISCONTINUED | OUTPATIENT
Start: 2021-06-06 | End: 2021-06-17 | Stop reason: HOSPADM

## 2021-06-06 RX ORDER — ACETAMINOPHEN 325 MG/1
650 TABLET ORAL
Status: DISCONTINUED | OUTPATIENT
Start: 2021-06-06 | End: 2021-06-17 | Stop reason: HOSPADM

## 2021-06-06 RX ORDER — ATORVASTATIN CALCIUM 40 MG/1
40 TABLET, FILM COATED ORAL DAILY
Status: DISCONTINUED | OUTPATIENT
Start: 2021-06-06 | End: 2021-06-17 | Stop reason: HOSPADM

## 2021-06-06 RX ORDER — PENTOXIFYLLINE 400 MG/1
400 TABLET, EXTENDED RELEASE ORAL 2 TIMES DAILY
Status: DISCONTINUED | OUTPATIENT
Start: 2021-06-06 | End: 2021-06-17 | Stop reason: HOSPADM

## 2021-06-06 RX ORDER — LIDOCAINE HYDROCHLORIDE 20 MG/ML
INJECTION, SOLUTION EPIDURAL; INFILTRATION; INTRACAUDAL; PERINEURAL AS NEEDED
Status: DISCONTINUED | OUTPATIENT
Start: 2021-06-06 | End: 2021-06-06 | Stop reason: HOSPADM

## 2021-06-06 RX ORDER — SODIUM CHLORIDE 9 MG/ML
INJECTION, SOLUTION INTRAVENOUS
Status: DISCONTINUED | OUTPATIENT
Start: 2021-06-06 | End: 2021-06-06 | Stop reason: HOSPADM

## 2021-06-06 RX ORDER — INSULIN GLARGINE 100 [IU]/ML
50 INJECTION, SOLUTION SUBCUTANEOUS
Status: DISCONTINUED | OUTPATIENT
Start: 2021-06-06 | End: 2021-06-17 | Stop reason: HOSPADM

## 2021-06-06 RX ORDER — SODIUM CHLORIDE 0.9 % (FLUSH) 0.9 %
5-40 SYRINGE (ML) INJECTION AS NEEDED
Status: DISCONTINUED | OUTPATIENT
Start: 2021-06-06 | End: 2021-06-17 | Stop reason: HOSPADM

## 2021-06-06 RX ORDER — FENTANYL CITRATE 50 UG/ML
50 INJECTION, SOLUTION INTRAMUSCULAR; INTRAVENOUS AS NEEDED
Status: DISCONTINUED | OUTPATIENT
Start: 2021-06-06 | End: 2021-06-06 | Stop reason: HOSPADM

## 2021-06-06 RX ORDER — DIPHENHYDRAMINE HYDROCHLORIDE 50 MG/ML
12.5 INJECTION, SOLUTION INTRAMUSCULAR; INTRAVENOUS AS NEEDED
Status: DISCONTINUED | OUTPATIENT
Start: 2021-06-06 | End: 2021-06-06 | Stop reason: HOSPADM

## 2021-06-06 RX ORDER — PROPOFOL 10 MG/ML
INJECTION, EMULSION INTRAVENOUS AS NEEDED
Status: DISCONTINUED | OUTPATIENT
Start: 2021-06-06 | End: 2021-06-06 | Stop reason: HOSPADM

## 2021-06-06 RX ORDER — HYDROMORPHONE HYDROCHLORIDE 1 MG/ML
0.4 INJECTION, SOLUTION INTRAMUSCULAR; INTRAVENOUS; SUBCUTANEOUS
Status: DISCONTINUED | OUTPATIENT
Start: 2021-06-06 | End: 2021-06-06 | Stop reason: HOSPADM

## 2021-06-06 RX ORDER — GUAIFENESIN 100 MG/5ML
81 LIQUID (ML) ORAL DAILY
Status: DISCONTINUED | OUTPATIENT
Start: 2021-06-06 | End: 2021-06-17 | Stop reason: HOSPADM

## 2021-06-06 RX ORDER — ONDANSETRON 2 MG/ML
4 INJECTION INTRAMUSCULAR; INTRAVENOUS
Status: DISCONTINUED | OUTPATIENT
Start: 2021-06-06 | End: 2021-06-17 | Stop reason: HOSPADM

## 2021-06-06 RX ORDER — FAMOTIDINE 20 MG/1
20 TABLET, FILM COATED ORAL DAILY
Status: DISCONTINUED | OUTPATIENT
Start: 2021-06-06 | End: 2021-06-17 | Stop reason: HOSPADM

## 2021-06-06 RX ORDER — DEXAMETHASONE SODIUM PHOSPHATE 4 MG/ML
INJECTION, SOLUTION INTRA-ARTICULAR; INTRALESIONAL; INTRAMUSCULAR; INTRAVENOUS; SOFT TISSUE AS NEEDED
Status: DISCONTINUED | OUTPATIENT
Start: 2021-06-06 | End: 2021-06-06 | Stop reason: HOSPADM

## 2021-06-06 RX ORDER — HYDROMORPHONE HYDROCHLORIDE 1 MG/ML
1 INJECTION, SOLUTION INTRAMUSCULAR; INTRAVENOUS; SUBCUTANEOUS
Status: DISPENSED | OUTPATIENT
Start: 2021-06-06 | End: 2021-06-08

## 2021-06-06 RX ORDER — MAGNESIUM SULFATE 100 %
4 CRYSTALS MISCELLANEOUS AS NEEDED
Status: DISCONTINUED | OUTPATIENT
Start: 2021-06-06 | End: 2021-06-17 | Stop reason: HOSPADM

## 2021-06-06 RX ORDER — REPAGLINIDE 2 MG/1
2 TABLET ORAL
Status: DISCONTINUED | OUTPATIENT
Start: 2021-06-06 | End: 2021-06-17 | Stop reason: HOSPADM

## 2021-06-06 RX ORDER — SODIUM CHLORIDE 0.9 % (FLUSH) 0.9 %
5-40 SYRINGE (ML) INJECTION AS NEEDED
Status: DISCONTINUED | OUTPATIENT
Start: 2021-06-06 | End: 2021-06-06 | Stop reason: HOSPADM

## 2021-06-06 RX ORDER — INSULIN LISPRO 100 [IU]/ML
INJECTION, SOLUTION INTRAVENOUS; SUBCUTANEOUS
Status: DISCONTINUED | OUTPATIENT
Start: 2021-06-06 | End: 2021-06-17 | Stop reason: HOSPADM

## 2021-06-06 RX ORDER — CLOPIDOGREL BISULFATE 75 MG/1
75 TABLET ORAL DAILY
Status: DISCONTINUED | OUTPATIENT
Start: 2021-06-06 | End: 2021-06-17 | Stop reason: HOSPADM

## 2021-06-06 RX ORDER — DEXTROSE 50 % IN WATER (D50W) INTRAVENOUS SYRINGE
25-50 AS NEEDED
Status: DISCONTINUED | OUTPATIENT
Start: 2021-06-06 | End: 2021-06-17 | Stop reason: HOSPADM

## 2021-06-06 RX ORDER — LISINOPRIL 10 MG/1
10 TABLET ORAL DAILY
Status: DISCONTINUED | OUTPATIENT
Start: 2021-06-06 | End: 2021-06-17 | Stop reason: HOSPADM

## 2021-06-06 RX ORDER — ACETAMINOPHEN 650 MG/1
650 SUPPOSITORY RECTAL
Status: DISCONTINUED | OUTPATIENT
Start: 2021-06-06 | End: 2021-06-17 | Stop reason: HOSPADM

## 2021-06-06 RX ORDER — ONDANSETRON 2 MG/ML
INJECTION INTRAMUSCULAR; INTRAVENOUS AS NEEDED
Status: DISCONTINUED | OUTPATIENT
Start: 2021-06-06 | End: 2021-06-06 | Stop reason: HOSPADM

## 2021-06-06 RX ORDER — POLYETHYLENE GLYCOL 3350 17 G/17G
17 POWDER, FOR SOLUTION ORAL DAILY PRN
Status: DISCONTINUED | OUTPATIENT
Start: 2021-06-06 | End: 2021-06-17 | Stop reason: HOSPADM

## 2021-06-06 RX ORDER — FENTANYL CITRATE 50 UG/ML
INJECTION, SOLUTION INTRAMUSCULAR; INTRAVENOUS AS NEEDED
Status: DISCONTINUED | OUTPATIENT
Start: 2021-06-06 | End: 2021-06-06 | Stop reason: HOSPADM

## 2021-06-06 RX ORDER — NORETHINDRONE AND ETHINYL ESTRADIOL 0.5-0.035
5 KIT ORAL AS NEEDED
Status: DISCONTINUED | OUTPATIENT
Start: 2021-06-06 | End: 2021-06-06 | Stop reason: HOSPADM

## 2021-06-06 RX ADMIN — REPAGLINIDE 2 MG: 2 TABLET ORAL at 15:50

## 2021-06-06 RX ADMIN — REPAGLINIDE 2 MG: 2 TABLET ORAL at 12:03

## 2021-06-06 RX ADMIN — SODIUM CHLORIDE: 9 INJECTION, SOLUTION INTRAVENOUS at 09:51

## 2021-06-06 RX ADMIN — REPAGLINIDE 2 MG: 2 TABLET ORAL at 08:14

## 2021-06-06 RX ADMIN — FENTANYL CITRATE 25 MCG: 0.05 INJECTION, SOLUTION INTRAMUSCULAR; INTRAVENOUS at 09:58

## 2021-06-06 RX ADMIN — LISINOPRIL 10 MG: 10 TABLET ORAL at 12:03

## 2021-06-06 RX ADMIN — FENTANYL CITRATE 25 MCG: 0.05 INJECTION, SOLUTION INTRAMUSCULAR; INTRAVENOUS at 10:09

## 2021-06-06 RX ADMIN — DOLUTEGRAVIR SODIUM 50 MG: 50 TABLET, FILM COATED ORAL at 08:14

## 2021-06-06 RX ADMIN — MIDAZOLAM HYDROCHLORIDE 2 MG: 2 INJECTION, SOLUTION INTRAMUSCULAR; INTRAVENOUS at 10:00

## 2021-06-06 RX ADMIN — SODIUM CHLORIDE 1000 ML: 9 INJECTION, SOLUTION INTRAVENOUS at 00:02

## 2021-06-06 RX ADMIN — Medication 10 ML: at 13:46

## 2021-06-06 RX ADMIN — CEFAZOLIN SODIUM 2 G: 1 INJECTION, POWDER, FOR SOLUTION INTRAMUSCULAR; INTRAVENOUS at 10:12

## 2021-06-06 RX ADMIN — HYDROMORPHONE HYDROCHLORIDE 1 MG: 1 INJECTION, SOLUTION INTRAMUSCULAR; INTRAVENOUS; SUBCUTANEOUS at 08:14

## 2021-06-06 RX ADMIN — INSULIN LISPRO 8 UNITS: 100 INJECTION, SOLUTION INTRAVENOUS; SUBCUTANEOUS at 15:50

## 2021-06-06 RX ADMIN — HYDROCHLOROTHIAZIDE 25 MG: 25 TABLET ORAL at 12:03

## 2021-06-06 RX ADMIN — HYDROMORPHONE HYDROCHLORIDE 1 MG: 1 INJECTION, SOLUTION INTRAMUSCULAR; INTRAVENOUS; SUBCUTANEOUS at 03:33

## 2021-06-06 RX ADMIN — DEXAMETHASONE SODIUM PHOSPHATE 4 MG: 4 INJECTION, SOLUTION INTRA-ARTICULAR; INTRALESIONAL; INTRAMUSCULAR; INTRAVENOUS; SOFT TISSUE at 10:09

## 2021-06-06 RX ADMIN — FENTANYL CITRATE 25 MCG: 0.05 INJECTION, SOLUTION INTRAMUSCULAR; INTRAVENOUS at 10:05

## 2021-06-06 RX ADMIN — PHENYLEPHRINE HYDROCHLORIDE 200 MCG: 10 INJECTION INTRAVENOUS at 10:15

## 2021-06-06 RX ADMIN — HYDROMORPHONE HYDROCHLORIDE 0.5 MG: 1 INJECTION, SOLUTION INTRAMUSCULAR; INTRAVENOUS; SUBCUTANEOUS at 02:01

## 2021-06-06 RX ADMIN — Medication 10 ML: at 21:44

## 2021-06-06 RX ADMIN — PROPOFOL 200 MG: 10 INJECTION, EMULSION INTRAVENOUS at 10:03

## 2021-06-06 RX ADMIN — ATORVASTATIN CALCIUM 40 MG: 40 TABLET, FILM COATED ORAL at 08:14

## 2021-06-06 RX ADMIN — DEXMEDETOMIDINE HYDROCHLORIDE 10 MCG: 100 INJECTION, SOLUTION INTRAVENOUS at 10:10

## 2021-06-06 RX ADMIN — LIDOCAINE HYDROCHLORIDE 100 MG: 20 INJECTION, SOLUTION EPIDURAL; INFILTRATION; INTRACAUDAL; PERINEURAL at 10:03

## 2021-06-06 RX ADMIN — INSULIN GLARGINE 50 UNITS: 100 INJECTION, SOLUTION SUBCUTANEOUS at 21:41

## 2021-06-06 RX ADMIN — ONDANSETRON 4 MG: 2 INJECTION INTRAMUSCULAR; INTRAVENOUS at 10:14

## 2021-06-06 RX ADMIN — FENTANYL CITRATE 25 MCG: 0.05 INJECTION, SOLUTION INTRAMUSCULAR; INTRAVENOUS at 09:53

## 2021-06-06 RX ADMIN — INSULIN LISPRO 4 UNITS: 100 INJECTION, SOLUTION INTRAVENOUS; SUBCUTANEOUS at 21:41

## 2021-06-06 NOTE — PROGRESS NOTES
Skin assessment done by this RN with Lisette Mendoza. Patient has very dry skin on bilateral heels. Otherwise, no skin issues noted.

## 2021-06-06 NOTE — OP NOTES
UROLOGY OPERATIVE NOTE    Patient: Mike Maloney MRN: [id number]  SSN: [id number]    YOB: 1965  Age: 60 y.o. Sex: male          Pre-operative Diagnosis:  Hydronephrosis, renal mass, hematuria, frequency  Post-operative Diagnosis: same, clot retention  Procedure: Cystoscopy, Cystoscopy, retrograde pyelograms  LEFT  ureteral stent placement  clot evacuation    Surgeon: Adithya Patel MD  Assistant: none    Anesthesia:  General  Findings: Interpretation of left retrograde pyelogram:   The ureter appeared mildly dilated along its entire length without focal stricturing. There was filling defect in the upper and mid infundibula high with dilation of these. This was suspicious for clot. Estimated Blood Loss:  scant  Drains: 6F x 26cm JJ left ureteral stent, 18F 3W 30cc barrow  Specimens: none  Complications: none           Procedure Details: The patient was seen in the pre-operative area. The risks, benefits, complications, alternative treatment options, and expected outcomes were again discussed with the patient. The possibilities of reaction to medication, pain, infection, bleeding, major cardiovascular event, death, damage to surrounding structures were specifically addressed. Informed consent was obtained. Upon arrival to the operative suite, the patient, procedure, and side were confirmed via a pre-operative \"time-out\". All were in agreement. The patient was carefully positioned and anesthesia was undertaken. Sterile prep and drape was accomplished. The patient was in the lithotomy position. Using a 21 [place] cystoscope with 30 and 70 degree lenses complete cystoscopy was performed. The urethra was unremarkable. Bladder with moderate clot and frankly bloody urine. The bladder mucosa was normal in appearance   without tumors, lesions or trabeculation seen. The ureteral orifices were seen on either side. There was bloody efflux from the left ureteral orifice.      Using a syringe, the clot was aspirated. The bladder was repeatedly irrigated and aspirated to clear. Using an open-ended catheter a gentle left retrograde pyelogram was performed. The findings were as above. Due to the ongoing hematuria and probable upper tract clot I decided to place a ureteral stent. A 0.035 guidewire was passed up to the renal pelvis and the catheter was removed. Over the wire a 6 [place] by 26 cm double-J ureteral stent was passed with a curl in the renal pelvis and the bladder on fluoroscopy and direct visualization. There was continuous bloody drainage from the left ureter stent. An 25 Khmer three-way [place] catheter was placed with 30 cc in the balloon. Continuous flow irrigation started in the operating room and continued. The patient was transported in stable condition to recovery.      Amber Morgan MD

## 2021-06-06 NOTE — CONSULTS
Consult    Subjective:     Caitlin Griffiths is a 60 y.o.  male who is being seen for left renal mass. Pt admitted with abdominal pain and hematuria. CT abdomen and pelvis showed an 7 cm mass in the upper pole of left kidney laterally. Mild left sided hydroureteronephrosis. Small nonobstructive calcifications in the right kidney. Patient seen by urology . S/p Cystoscopy, retrograde pyelograms. S/p left ureteral stent placement and clot evacuation. ureteral stent placement and clot evacuation. Low dose CT chest on 3/12/21 negative. Past smoker. Pt quit recently. No SOB or night sweats.     Past Medical History:   Diagnosis Date    CAD (coronary artery disease)     [facility] [address]     HIV (human immunodeficiency virus infection) ([facility] [address]     Hypercholesterolemia     Hypertension     PVD (peripheral vascular disease) ([facility] [address]       Past Surgical History:   Procedure Laterality Date    VASCULAR SURGERY PROCEDURE UNLIST      right leg bypass     Family History   Problem Relation Age of Onset    Hypertension Mother     Cancer Mother     Hypertension Father     Heart Disease Father       Social History     Tobacco Use    Smoking status: Former Smoker     Packs/day: 1.00     Years: 45.00     Pack years: 45.00     Types: Cigarettes    Smokeless tobacco: Never Used    Tobacco comment: recently quit   Substance Use Topics    Alcohol use: Never       Current Facility-Administered Medications   Medication Dose Route Frequency Provider Last Rate Last Admin    [Held by provider] aspirin chewable tablet 81 mg  81 mg Oral DAILY Dede Ramsey MD        atorvastatin (LIPITOR) tablet 40 mg  40 mg Oral DAILY Dede Ramsey MD   40 mg at 06/06/21 8330    [Held by provider] clopidogreL (PLAVIX) tablet 75 mg  75 mg Oral DAILY Major Freeman MD        famotidine (PEPCID) tablet 20 mg  20 mg Oral DAILY Dede Ramsey MD        hydroCHLOROthiazide (HYDRODIURIL) tablet 25 mg  25 mg Oral DAILY Major Freeman MD   25 mg at 06/06/21 1203    insulin glargine (LANTUS) injection 50 Units  50 Units SubCUTAneous QHS Mary Kay Ramsey MD        lisinopriL (PRINIVIL, ZESTRIL) tablet 10 mg  10 mg Oral DAILY Dede Ramsey MD   10 mg at 06/06/21 1203    [START ON 6/7/2021] [id number]-plvnoc ala [id number] mg tab 1 Tablet (Patient Supplied)  1 Christianne Talamantes MD        [Held by provider] pentoxifylline CR (TRENTAL) tablet 400 mg  400 mg Oral BID Major Freeman MD        repaglinide [facility]) tablet 2 mg  2 mg Oral Calvert Dakins, Ahmed Julene Hoh, MD   2 mg at 06/06/21 1550    dolutegravir (TIVICAY) tablet 50 mg  50 mg Oral DAILY Mary Kay Ramsey MD   50 mg at 06/06/21 5609    sodium chloride (NS) flush 5-40 mL  5-40 mL IntraVENous Pat Mary Kay Connell MD   10 mL at 06/06/21 1346    sodium chloride (NS) flush 5-40 mL  5-40 mL IntraVENous PRN Major Freeman MD        acetaminophen (TYLENOL) tablet 650 mg  650 mg Oral Q6H PRN Major Freeman MD        Or    acetaminophen (TYLENOL) suppository 650 mg  650 mg Rectal Q6H PRN Major Freeman MD        polyethylene glycol [facility]) packet 17 g  17 g Oral DAILY PRN Major Freeman MD        ondansetron (ZOFRAN ODT) tablet 4 mg  4 mg Oral Q8H PRN Major Freeman MD        Or    ondansetron [facility]) injection 4 mg  4 mg IntraVENous Q6H PRGIULIANA Freeman MD        HYDROmorphone (DILAUDID) injection 1 mg  1 mg IntraVENous Q4H PRN Major Freeman MD   1 mg at 06/06/21 3330    insulin lispro (HUMALOG) injection   SubCUTAneous AC&HS Lul Fish PA-C   8 Units at 06/06/21 1550    glucose chewable tablet 16 g  4 Tablet Oral PRN Lul Fish PA-C        glucagon (GLUCAGEN) injection 1 mg  1 mg IntraMUSCular PRN Lul Fish PA-C        dextrose (D50W) injection syrg 12.5-25 g  25-50 mL IntraVENous PRN Romana Likens, PA-C            No Known Allergies     Review of Systems:  Other than mentioned in HPI 12 point review of systems negative    Objective: Intake and Output:    06/06 0701 - 06/06 1900  In: 61777 [P.O.:800; I.V.:400]  Out: 53303 [Urine:59094]  06/04 1901 - 06/06 0700  In: 1000 [I.V.:1000]  Out: -   Blood pressure 131/82, pulse 80, temperature 97.8 °F (36.6 °C), resp. rate 18, height 6' 1\" (1.854 m), weight 115.7 kg (255 lb), SpO2 97 %. Physical Exam:   General:  Alert, cooperative, no distress, appears stated age. Lungs:   Clear to auscultation bilaterally. Chest wall:  No tenderness or deformity. Heart:  Regular rate and rhythm, S1, S2 normal   Abdomen:   Soft, non-tender. Bowel sounds normal.    Extremities: Extremities normal, atraumatic, no cyanosis or edema. Pulses: 2+ and symmetric all extremities. Skin: Skin color, texture, turgor normal. No rashes or lesions   Neurologic: CNII-XII intact. No gross sensory or motor deficits       Images:   XR FLUOROSCOPY UNDER 60 MINUTES   Final Result      CT ABD PELV W WO CONT   Final Result      Left renal mass is worrisome for malignancy. Left-sided hydroureteronephrosis   without stone. Apparent urinary bladder wall thickening. Recommend clinical   correlation and follow-up. Please see full report.                Data Review:   Recent Results (from the past 24 hour(s))   URINALYSIS W/ REFLEX CULTURE    Collection Time: 06/05/21  8:52 PM    Specimen: Urine   Result Value Ref Range    Color Red      Appearance Turbid (A) Clear      Specific gravity 1.006 1.003 - 1.030      pH (UA) 5.0 5.0 - 8.0      Protein 30 (A) Negative mg/dL    Glucose >300 (A) Negative mg/dL    Ketone Negative Negative mg/dL    Bilirubin Negative Negative      Blood Large (A) Negative      Urobilinogen 0.1 0.1 - 1.0 EU/dL    Nitrites Negative Negative      Leukocyte Esterase Small (A) Negative UA: UC IF INDICATED AUTOMATED      WBC 20-50 0 - 4 /hpf    RBC >100 (H) 0 - 5 /hpf    Bacteria Negative Negative /hpf   LACTIC ACID    Collection Time: 06/05/21  9:00 PM   Result Value Ref Range    Lactic acid 1.8 0.4 - 2.0 mmol/L   LIPASE    Collection Time: 06/05/21  9:00 PM   Result Value Ref Range    Lipase 146 73 - 393 U/L   TROPONIN I    Collection Time: 06/05/21  9:15 PM   Result Value Ref Range    Troponin-I, Qt. <0.05 <0.05 ng/mL   CBC WITH AUTOMATED DIFF    Collection Time: 06/05/21  9:30 PM   Result Value Ref Range    WBC 11.6 (H) 4.1 - 11.1 K/uL    RBC 5.89 (H) 4.10 - 5.70 M/uL    HGB 17.0 12.1 - 17.0 g/dL    HCT 52.4 (H) 36.6 - 50.3 %    MCV 89.0 80.0 - 99.0 FL    MCH 28.9 26.0 - 34.0 PG    MCHC 32.4 30.0 - 36.5 g/dL    RDW 13.0 11.5 - 14.5 %    PLATELET 710 318 - 477 K/uL    MPV 10.0 8.9 - 12.9 FL    NRBC 0.0 0.0  WBC    ABSOLUTE NRBC 0.00 0.00 - 0.01 K/uL    NEUTROPHILS 75 32 - 75 %    LYMPHOCYTES 14 12 - 49 %    MONOCYTES 9 5 - 13 %    EOSINOPHILS 0 0 - 7 %    BASOPHILS 1 0 - 1 %    IMMATURE GRANULOCYTES 1 (H) 0 - 0.5 %    ABS. NEUTROPHILS 8.5 (H) 1.8 - 8.0 K/UL    ABS. LYMPHOCYTES 1.6 0.8 - 3.5 K/UL    ABS. MONOCYTES 1.1 (H) 0.0 - 1.0 K/UL    ABS. EOSINOPHILS 0.0 0.0 - 0.4 K/UL    ABS. BASOPHILS 0.1 0.0 - 0.1 K/UL    ABS. IMM. GRANS. 0.1 (H) 0.00 - 0.04 K/UL    DF AUTOMATED     METABOLIC PANEL, COMPREHENSIVE    Collection Time: 06/05/21  9:45 PM   Result Value Ref Range    Sodium 132 (L) 136 - 145 mmol/L    Potassium 3.9 3.5 - 5.1 mmol/L    Chloride 101 97 - 108 mmol/L    CO2 22 21 - 32 mmol/L    Anion gap 9 5 - 15 mmol/L    Glucose 348 (H) 65 - 100 mg/dL    BUN 21 (H) 6 - 20 mg/dL    Creatinine 1.64 (H) 0.70 - 1.30 mg/dL    BUN/Creatinine ratio 13 12 - 20      GFR est AA 53 (L) >60 ml/min/1.73m2    GFR est non-AA 44 (L) >60 ml/min/1.73m2    Calcium 9.3 8.5 - 10.1 mg/dL    Bilirubin, total 0.5 0.2 - 1.0 mg/dL    AST (SGOT) 26 15 - 37 U/L    ALT (SGPT) 37 12 - 78 U/L    Alk.  phosphatase 103 45 - 117 U/L    Protein, total 8.3 (H) 6.4 - 8.2 g/dL    Albumin 3.6 3.5 - 5.0 g/dL    Globulin 4.7 (H) 2.0 - 4.0 g/dL    A-G Ratio 0.8 (L) 1.1 - 2.2     GLUCOSE, POC    Collection Time: 06/06/21  8:18 AM   Result Value Ref Range    Glucose (POC) 248 (H) 65 - 117 mg/dL    Performed by REYES SANDRA    GLUCOSE, POC    Collection Time: 06/06/21 12:47 PM   Result Value Ref Range    Glucose (POC) 277 (H) 65 - 117 mg/dL    Performed by Gayatri Dodd    GLUCOSE, POC    Collection Time: 06/06/21  3:45 PM   Result Value Ref Range    Glucose (POC) 335 (H) 65 - 117 mg/dL    Performed by Elva Sandoval         Assessment:   Left renal mass:Highly suspicious for primary renal cell carcinoma. Pt seen by urology. No obvious metastases. Left hydronephrosis:Pt had cystoscopy . S/p stent placement. PHILIP:Monitor. Continue IV fluids. s/p left ureteral stent placement     Erythrocytosis:mild. Most likely secondary. Pt has renal mass. Past smoker. Hyponatremia:Possible dehydration. R/o SIADH. HIV    Leukocytosis:mild. Possible reactive. R/o infection. Plan:     Discussed with pt in detail. Urology consult noted. Left renal mass very superior abutting the splenic vessels and pancreas. Will discuss with urology. After creatinine better consider MRI abdomen and repeat CT chest with IV contrast.    Check Epo level. Monitor H/H. Thank you for the consult.

## 2021-06-06 NOTE — ED TRIAGE NOTES
2 days of hematuria and abd pain. Hx of frequent uti. Pt endorses n/v/d. Decreased appetite x 3 days.

## 2021-06-06 NOTE — CONSULTS
UROLOGY CONSULT    Melo Hung, [id number] [facility] Office    Patient: Tressa Bowling MRN: [id number]  SSN: [id number]    YOB: 1965  Age: 60 y.o. Sex: male          Date of Encounter:  June 6, 2021  ADMITTED: 6/5/2021 to Ken Hurlye MD by Gracy Neumann MD for Hematuria [R31.9]; Hydronephrosis [N13.30]; Renal mass [N28.89]  Chief Complaint:  Abdominal pain  Reason for consult: hematuria, retention           History of Present Illness:  Patient is a 60 y.o. male admitted 6/5/2021 to the hospital for Hematuria [R31.9]; Hydronephrosis [N13.30]; Renal mass [N28.89]. His past medical history includes coronary artery disease, peripheral vascular disease, diabetes, hypertension, HIV and dyslipidemia. He is on anticoagulation for his peripheral artery disease. He notes he has had gross hematuria for over 1 year. Has been intermittent. He has been on multiple blood thinners. He has had some urgency and small volume voiding. He could not characterize his urine flow. He had ignored his hematuria. He was told he had a urine infection at one time. He has had intermittent left testicular pains. He presented  To the emergency room with gradual worsening of lower abdominal pain in the midline suprapubic and left area. He had associated nausea. He denies fevers chills vomiting dizziness chest pain palpitations shortness of breath or leg pain. He denies focal weakness or numbness. He had a CT scan of the abdomen pelvis which revealed a left upper pole renal mass, measuring 7 cm. I reviewed the images. There are some slight fullness of the adrenal gland. The renal vein appears patent. The renal mass is very superior, abutting the splenic vessels and pancreas, and partially exophytic. He has left hydroureter and hydronephrosis with poor uptake of contrast in the left kidney. His pain is controlled at this time. His diabetes not well controlled.   Last month his hemoglobin A1c was 10.2. Creatinine is 1.6 up from 1.2 at baseline. Past Medical History:  No Known Allergies   Prior to Admission medications    Medication Sig Start Date End Date Taking? Authorizing Provider   liraglutide (VICTOZA) 0.6 mg/0.1 mL (18 mg/3 mL) pnij Inject 1.2mg daily. Stop Bydureon/Trulicity 6/3/34  Yes Tasha Larios MD   OneTouch Ultra Blue Test Strip strip  4/18/21  Yes Provider, Historical   insulin glargine (Lantus Solostar U-100 Insulin) 100 unit/mL (3 mL) inpn 50 Units by SubCUTAneous route nightly. 5/3/21  Yes Tasha Larios MD   repaglinide (PRANDIN) 2 mg tablet Take 1 Tab by mouth Before breakfast, lunch, and dinner. 5/3/21  Yes Tasha Larios MD   metFORMIN (GLUCOPHAGE) 500 mg tablet Take 2 Tabs by mouth two (2) times daily (with meals). 5/3/21  Yes Tasha Larios MD   Insulin Needles, Disposable, (Funmilayo Pen Needle) 32 gauge x 5/32\" ndle Use to inject insulin at bedtime. Dx.COde E11.65 5/3/21  Yes Tasha Larios MD   pentoxifylline CR (TRENTAL) 400 mg CR tablet Take 1 Tab by mouth two (2) times a day. 4/5/21  Yes Diana Elaine MD   aspirin 81 mg chewable tablet  1/5/21  Yes Provider, Historical   atorvastatin (LIPITOR) 40 mg tablet  2/24/21  Yes Provider, Historical   clopidogreL (PLAVIX) 75 mg tab  2/24/21  Yes Provider, Historical   Tivicay 50 mg tab tablet  2/17/21  Yes Provider, Historical   Odefsey 200-25-25 mg tab  2/17/21  Yes Provider, Historical   famotidine (PEPCID) 20 mg tablet  2/24/21  Yes Provider, Historical   hydroCHLOROthiazide (HYDRODIURIL) 25 mg tablet  2/24/21  Yes Provider, Historical   lisinopriL (PRINIVIL, ZESTRIL) 10 mg tablet  2/24/21  Yes Provider, Historical      PMHx:  has a past medical history of CAD (coronary artery disease), Diabetes ([facility] [address] HIV (human immunodeficiency virus infection) ([facility] [address] Hypercholesterolemia, Hypertension, and PVD (peripheral vascular disease) ([facility] [address]    PSurgHx:  has a past surgical history that includes vascular surgery procedure unlist.  PSocHx:  reports that he has quit smoking. His smoking use included cigarettes. He has a 45.00 pack-year smoking history. He has never used smokeless tobacco. He reports that he does not drink alcohol and does not use drugs. ROS:  Admission ROS by Melissa Mensah MD from 2021 were reviewed with the patient and changes (other than per HPI) include: none. All 12 systems were reviewed and were otherwise negative. Physical Exam:            Vitals[de-identified]    Temp (24hrs), Av.2 °F (36.8 °C), Min:97.9 °F (36.6 °C), Max:98.7 °F (37.1 °C)   Blood pressure 108/69, pulse 94, temperature 98.1 °F (36.7 °C), resp. rate 20, height 6' 1\" (1.854 m), weight 255 lb (115.7 kg), SpO2 98 %. Estimated body mass index is 33.64 kg/m² as calculated from the following:    Height as of this encounter: 6' 1\" (1.854 m). Weight as of this encounter: 255 lb (115.7 kg). I&O's:    No intake/output data recorded.    General Well developed, in NAD   Conjunctiva/Lids Normal without gross defects   Pupil/Iris Pupils equal, round, reactive, anicteric   External Ears/Nose No lesions or deformities   Hearing  Grossly intact   Neck Supple without obvious, masses   Lymphatic No obvious supraclavicular or cervical adenopathy   Respiratory Effort Breathing easily, no audible wheezing, rhonchi, stridor   CV RRR   Abdomen / Flank Soft, non tender without guarding or rebound, without obvious masses, no CVA tenderness   Digits/ Nails No clubbing, cyanosis, petechiae     mild suprapubic discomfort     Skin Inspection Warm and dry, no obvious rashes   Neurologic Grossly normal without focal deficits   Judgement / Insight intact   Mood / Affect normal       Lab Results   Component Value Date/Time    WBC 11.6 (H) 2021 09:30 PM    HCT 52.4 (H) 2021 09:30 PM    PLATELET 713  09:30 PM    Sodium 132 (L) 2021 09:45 PM    Potassium 3.9 2021 09:45 PM    Chloride 101 2021 09:45 PM CO2 22 06/05/2021 09:45 PM    BUN 21 (H) 06/05/2021 09:45 PM    Creatinine 1.64 (H) 06/05/2021 09:45 PM    Glucose 348 (H) 06/05/2021 09:45 PM    Calcium 9.3 06/05/2021 09:45 PM    INR 0.9 05/17/2021 10:06 AM       UA:   Lab Results   Component Value Date/Time    Color Red 06/05/2021 08:52 PM    Appearance Turbid (A) 06/05/2021 08:52 PM    Specific gravity 1.006 06/05/2021 08:52 PM    pH (UA) 5.0 06/05/2021 08:52 PM    Protein 30 (A) 06/05/2021 08:52 PM    Glucose >300 (A) 06/05/2021 08:52 PM    Ketone Negative 06/05/2021 08:52 PM    Bilirubin Negative 06/05/2021 08:52 PM    Urobilinogen 0.1 06/05/2021 08:52 PM    Nitrites Negative 06/05/2021 08:52 PM    Leukocyte Esterase Small (A) 06/05/2021 08:52 PM    Bacteria Negative 06/05/2021 08:52 PM    WBC 20-50 06/05/2021 08:52 PM    RBC >100 (H) 06/05/2021 08:52 PM       Xrays:       Diagnoses and all orders for this visit:    1. Intractable abdominal pain    2. Gross hematuria    3. Left renal mass    4. Hydronephrosis, left    5.  PHILIP (acute kidney injury) ([facility] [address]    Other orders  -     URINALYSIS W/ REFLEX CULTURE; Standing  -     CBC WITH AUTOMATED DIFF; Standing  -     LACTIC ACID; Standing  -     LIPASE; Standing  -     TROPONIN I; Standing  -     EKG, 12 LEAD, INITIAL; Standing  -     morphine injection 4 mg  -     ondansetron (ZOFRAN) injection 4 mg  -     METABOLIC PANEL, COMPREHENSIVE; Standing  -     HYDROmorphone (DILAUDID) syringe 0.5 mg  -     sodium chloride 0.9 % bolus infusion 1,000 mL  -     CT ABD PELV W WO CONT; Standing  -     iopamidoL (ISOVUE-370) 76 % injection 100 mL  -     IP CONSULT TO UROLOGY; Standing  -     HYDROmorphone (DILAUDID) syringe 0.5 mg  -     IP CONSULT TO HOSPITALIST; Standing  -     INITIAL PHYSICIAN ORDER: INPATIENT; Standing  -     aspirin chewable tablet 81 mg  -     atorvastatin (LIPITOR) tablet 40 mg  -     clopidogreL (PLAVIX) tablet 75 mg  -     famotidine (PEPCID) tablet 20 mg  -     hydroCHLOROthiazide (HYDRODIURIL) tablet 25 mg  -     . PHARMACY TO SUBSTITUTE PER PROTOCOL (Reordered from: insulin glargine (Lantus Solostar U-100 Insulin) 100 unit/mL (3 mL) inpn)  -     . PHARMACY TO SUBSTITUTE PER PROTOCOL (Reordered from: liraglutide (VICTOZA) 0.6 mg/0.1 mL (18 mg/3 mL) pnij)  -     lisinopriL (PRINIVIL, ZESTRIL) tablet 10 mg  -     . PHARMACY TO SUBSTITUTE PER PROTOCOL (Reordered from: Odefsey 200-25-25 mg tab)  -     pentoxifylline CR (TRENTAL) tablet 400 mg  -     repaglinide (PRANDIN) tablet 2 mg  -     dolutegravir (TIVICAY) tablet 50 mg  -     VITAL SIGNS; Standing  -     sodium chloride (NS) flush 5-40 mL  -     sodium chloride (NS) flush 5-40 mL  -     acetaminophen (TYLENOL) tablet 650 mg  -     acetaminophen (TYLENOL) suppository 650 mg  -     polyethylene glycol (MIRALAX) packet 17 g  -     ondansetron (ZOFRAN ODT) tablet 4 mg  -     ondansetron (ZOFRAN) injection 4 mg  -     MECHANICAL PROPHYLAXIS IS CONTRAINDICATED; Standing  -     FULL CODE; Standing  -     NOTIFY PROVIDER: SPECIFY; Standing  -     ACTIVITY AS TOLERATED W/ASSIST; Standing  -     DIET NPO; Standing  -     WEIGH PATIENT; Standing  -     INTAKE AND OUTPUT; Standing  -     ELEVATE HEAD OF BED; Standing  -     METABOLIC PANEL, BASIC; Standing  -     MAGNESIUM; Standing  -     CBC WITH AUTOMATED DIFF; Standing  -     HYDROmorphone (DILAUDID) injection 1 mg  -     IP CONSULT TO ONCOLOGY; Standing  -     GLUCOSE, POC; Standing             Assessment/Plan:  Abdominal pain, left hydronephrosis, left renal tumor, gross hematuria, urgency and frequency. Unclear if he has chronic clot retention. Diabetes can certainly exacerbate his urgency. Hydronephrosis and acute kidney injury    He appears to have a left renal mass, at least T2. Most likely a renal cell carcinoma. No obvious visceral or pulmonary metastases on imaging so far. He has hydronephrosis and likely hematuria from this mass, exacerbated by anticoagulation.   With acute kidney injury, acute pain and hydronephrosis, he has an urgent need for intervention. We discussed cystoscopy and ureteral stenting with clot evacuation to help manage this.      Surgery planned: Cystourethroscopy  LEFT  ureteral stent placement  clot evacuation    Signed By: Brendon Boo MD  - June 6, 2021

## 2021-06-06 NOTE — ANESTHESIA PREPROCEDURE EVALUATION
Relevant Problems   RENAL FAILURE   (+) Hydronephrosis       Anesthetic History   No history of anesthetic complications            Review of Systems / Medical History  Patient summary reviewed, nursing notes reviewed and pertinent labs reviewed    Pulmonary        Sleep apnea (??)  Smoker         Neuro/Psych             Comments: ?? Cardiovascular    Hypertension          CAD, PAD (S/P STENTS IN LEG. ) and hyperlipidemia         GI/Hepatic/Renal               Comments: PAIN ABDOMEN. HIV Endo/Other    Diabetes (BUN/Cr 21/1.64)    Obesity     Other Findings   Comments: HYDRONEPHROSIS  RENAL MASS  HEMATURIA  PLAVIX: LAST DOSE ON 6-5-21  Ch. BACK PAIN. Physical Exam    Airway  Mallampati: IV  TM Distance: > 6 cm  Neck ROM: normal range of motion   Mouth opening: Normal    Comments: LARGE MANDIBLE.   Cardiovascular    Rhythm: regular  Rate: normal         Dental    Dentition: Poor dentition     Pulmonary      Decreased breath sounds           Abdominal         Other Findings            Anesthetic Plan    ASA: 3, emergent  Anesthesia type: general          Induction: Intravenous  Anesthetic plan and risks discussed with: Patient
Normal rate, regular rhythm, normal S1, S2 heart sounds heard.

## 2021-06-06 NOTE — ED PROVIDER NOTES
EMERGENCY DEPARTMENT HISTORY AND PHYSICAL EXAM      Date: 6/5/2021  Patient Name: Robin Najjar    History of Presenting Illness     Chief Complaint   Patient presents with    Abdominal Pain    Blood in Urine       History Provided By: Patient    HPI: Robin Najjar, 60 y.o. male with a past medical history significant for diabetes, HIV, CAD, hypertension, hyperlipidemia who presents to the ED with cc of gradual onset/worsening, constant suprapubic abd pain x 2 days. Associated symptoms include dark red hematuria, nausea, 1 episode of emesis, multiple episodes of diarrhea, decreased appetite x 3 days as well. Symptoms exacerbated to palpation and movement. No alleviating factors. History of frequent UTIs and states current symptoms feel similar. Patient denies fever, chills, chest pain, shortness of breath, urinary frequency. There are no other complaints, changes, or physical findings at this time. PCP: Ajay Meyer, NP    No current facility-administered medications on file prior to encounter. Current Outpatient Medications on File Prior to Encounter   Medication Sig Dispense Refill    liraglutide (VICTOZA) 0.6 mg/0.1 mL (18 mg/3 mL) pnij Inject 1.2mg daily. Stop Bydureon/Trulicity 6 mL 6    OneTouch Ultra Blue Test Strip strip       insulin glargine (Lantus Solostar U-100 Insulin) 100 unit/mL (3 mL) inpn 50 Units by SubCUTAneous route nightly. 15 mL 6    repaglinide (PRANDIN) 2 mg tablet Take 1 Tab by mouth Before breakfast, lunch, and dinner. 90 Tab 6    metFORMIN (GLUCOPHAGE) 500 mg tablet Take 2 Tabs by mouth two (2) times daily (with meals). 120 Tab 6    Insulin Needles, Disposable, (Funmilayo Pen Needle) 32 gauge x 5/32\" ndle Use to inject insulin at bedtime. Dx.COde E11.65 100 Pen Needle 3    pentoxifylline CR (TRENTAL) 400 mg CR tablet Take 1 Tab by mouth two (2) times a day.  60 Tab 3    aspirin 81 mg chewable tablet       atorvastatin (LIPITOR) 40 mg tablet       clopidogreL (PLAVIX) 75 mg tab       Tivicay 50 mg tab tablet       Odefsey 200-25-25 mg tab       famotidine (PEPCID) 20 mg tablet       hydroCHLOROthiazide (HYDRODIURIL) 25 mg tablet       lisinopriL (PRINIVIL, ZESTRIL) 10 mg tablet          Past History     Past Medical History:  Past Medical History:   Diagnosis Date    CAD (coronary artery disease)     Diabetes ([facility] [address]     [facility]     Hypercholesterolemia     Hypertension     PVD [facility]peripheral vascular disease) ([facility]        Past Surgical History:  Past Surgical History:   Procedure Laterality Date    VASCULAR SURGERY PROCEDURE UNLIST      right leg bypass       Family History:  Family History   Problem Relation Age of Onset    Hypertension Mother     Cancer Mother     Hypertension Father     Heart Disease Father        Social History:  Social History     Tobacco Use    Smoking status: Former Smoker     Packs/day: 1.00     Years: 45.00     Pack years: 45.00     Types: Cigarettes    Smokeless tobacco: Never Used    Tobacco comment: recently quit   Vaping Use    Vaping Use: Never used   Substance Use Topics    Alcohol use: Never    Drug use: Never       Allergies:  No Known Allergies      Review of Systems     Review of Systems   Constitutional: Negative for chills, fatigue and fever. HENT: Negative. Respiratory: Negative for cough, chest tightness, shortness of breath and wheezing. Cardiovascular: Negative for chest pain and palpitations. Gastrointestinal: Positive for abdominal pain (suprapubic), diarrhea, nausea and vomiting. Genitourinary: Positive for hematuria. Negative for decreased urine volume, difficulty urinating, discharge, dysuria, enuresis, flank pain, frequency, genital sores, penile pain, penile swelling, scrotal swelling, testicular pain and urgency. Musculoskeletal: Negative for back pain, neck pain and neck stiffness. Skin: Negative for rash.    Neurological: Negative for dizziness, weakness, light-headedness and headaches. Psychiatric/Behavioral: Negative. All other systems reviewed and are negative. Physical Exam     Physical Exam  Vitals and nursing note reviewed. Constitutional:       General: He is not in acute distress. Appearance: Normal appearance. He is well-developed. He is obese. He is diaphoretic. He is not ill-appearing or toxic-appearing. HENT:      Head: Normocephalic and atraumatic. Nose: Nose normal. No congestion or rhinorrhea. Mouth/Throat:      Mouth: Mucous membranes are moist.      Pharynx: Oropharynx is clear. No oropharyngeal exudate or posterior oropharyngeal erythema. Eyes:      General: No scleral icterus. Conjunctiva/sclera: Conjunctivae normal.      Pupils: Pupils are equal, round, and reactive to light. Cardiovascular:      Rate and Rhythm: Regular rhythm. Tachycardia present. Pulses:           Radial pulses are 2+ on the right side and 2+ on the left side. Dorsalis pedis pulses are 2+ on the right side and 2+ on the left side. Heart sounds: No murmur heard. No friction rub. No gallop. Pulmonary:      Effort: Pulmonary effort is normal. No tachypnea, accessory muscle usage, respiratory distress or retractions. Breath sounds: Normal breath sounds. No stridor. No decreased breath sounds, wheezing, rhonchi or rales. Chest:      Chest wall: No tenderness. Abdominal:      General: Bowel sounds are normal. There is no distension. Palpations: Abdomen is soft. There is no mass. Tenderness: There is abdominal tenderness in the suprapubic area. There is guarding. There is no right CVA tenderness, left CVA tenderness or rebound. Musculoskeletal:         General: No deformity. Normal range of motion. Cervical back: Normal range of motion and neck supple. No rigidity. No muscular tenderness. Right lower leg: No edema. Left lower leg: No edema. Skin:     General: Skin is warm. Capillary Refill: Capillary refill takes less than 2 seconds. Coloration: Skin is not jaundiced or pale. Findings: No bruising, erythema or rash. Neurological:      General: No focal deficit present. Mental Status: He is alert and oriented to person, place, and time. Mental status is at baseline. Sensory: Sensation is intact. Motor: Motor function is intact. Psychiatric:         Mood and Affect: Mood normal.         Behavior: Behavior normal. Behavior is cooperative. Thought Content:  Thought content normal.         Judgment: Judgment normal.         Lab and Diagnostic Study Results     Labs -     Recent Results (from the past 12 hour(s))   URINALYSIS W/ REFLEX CULTURE    Collection Time: 06/05/21  8:52 PM    Specimen: Urine   Result Value Ref Range    Color Red      Appearance Turbid (A) Clear      Specific gravity 1.006 1.003 - 1.030      pH (UA) 5.0 5.0 - 8.0      Protein 30 (A) Negative mg/dL    Glucose >300 (A) Negative mg/dL    Ketone Negative Negative mg/dL    Bilirubin Negative Negative      Blood Large (A) Negative      Urobilinogen 0.1 0.1 - 1.0 EU/dL    Nitrites Negative Negative      Leukocyte Esterase Small (A) Negative      UA:UC IF INDICATED AUTOMATED      WBC 20-50 0 - 4 /hpf    RBC >100 (H) 0 - 5 /hpf    Bacteria Negative Negative /hpf   LACTIC ACID    Collection Time: 06/05/21  9:00 PM   Result Value Ref Range    Lactic acid 1.8 0.4 - 2.0 mmol/L   LIPASE    Collection Time: 06/05/21  9:00 PM   Result Value Ref Range    Lipase 146 73 - 393 U/L   TROPONIN I    Collection Time: 06/05/21  9:15 PM   Result Value Ref Range    Troponin-I, Qt. <0.05 <0.05 ng/mL   CBC WITH AUTOMATED DIFF    Collection Time: 06/05/21  9:30 PM   Result Value Ref Range    WBC 11.6 (H) 4.1 - 11.1 K/uL    RBC 5.89 (H) 4.10 - 5.70 M/uL    HGB 17.0 12.1 - 17.0 g/dL    HCT 52.4 (H) 36.6 - 50.3 %    MCV 89.0 80.0 - 99.0 FL    MCH 28.9 26.0 - 34.0 PG    MCHC 32.4 30.0 - 36.5 g/dL    RDW 13.0 11.5 - 14.5 %    PLATELET 282 319 - 195 K/uL    MPV 10.0 8.9 - 12.9 FL    NRBC 0.0 0.0  WBC    ABSOLUTE NRBC 0.00 0.00 - 0.01 K/uL    NEUTROPHILS 75 32 - 75 %    LYMPHOCYTES 14 12 - 49 %    MONOCYTES 9 5 - 13 %    EOSINOPHILS 0 0 - 7 %    BASOPHILS 1 0 - 1 %    IMMATURE GRANULOCYTES 1 (H) 0 - 0.5 %    ABS. NEUTROPHILS 8.5 (H) 1.8 - 8.0 K/UL    ABS. LYMPHOCYTES 1.6 0.8 - 3.5 K/UL    ABS. MONOCYTES 1.1 (H) 0.0 - 1.0 K/UL    ABS. EOSINOPHILS 0.0 0.0 - 0.4 K/UL    ABS. BASOPHILS 0.1 0.0 - 0.1 K/UL    ABS. IMM. GRANS. 0.1 (H) 0.00 - 0.04 K/UL    DF AUTOMATED     METABOLIC PANEL, COMPREHENSIVE    Collection Time: 06/05/21  9:45 PM   Result Value Ref Range    Sodium 132 (L) 136 - 145 mmol/L    Potassium 3.9 3.5 - 5.1 mmol/L    Chloride 101 97 - 108 mmol/L    CO2 22 21 - 32 mmol/L    Anion gap 9 5 - 15 mmol/L    Glucose 348 (H) 65 - 100 mg/dL    BUN 21 (H) 6 - 20 mg/dL    Creatinine 1.64 (H) 0.70 - 1.30 mg/dL    BUN/Creatinine ratio 13 12 - 20      GFR est AA 53 (L) >60 ml/min/1.73m2    GFR est non-AA 44 (L) >60 ml/min/1.73m2    Calcium 9.3 8.5 - 10.1 mg/dL    Bilirubin, total 0.5 0.2 - 1.0 mg/dL    AST (SGOT) 26 15 - 37 U/L    ALT (SGPT) 37 12 - 78 U/L    Alk. phosphatase 103 45 - 117 U/L    Protein, total 8.3 (H) 6.4 - 8.2 g/dL    Albumin 3.6 3.5 - 5.0 g/dL    Globulin 4.7 (H) 2.0 - 4.0 g/dL    A-G Ratio 0.8 (L) 1.1 - 2.2         Radiologic Studies -   CT Results  (Last 48 hours)               06/05/21 2354  CT ABD PELV W WO CONT Final result    Impression:      Left renal mass is worrisome for malignancy. Left-sided hydroureteronephrosis   without stone. Apparent urinary bladder wall thickening. Recommend clinical   correlation and follow-up. Please see full report.        Narrative:  CT abdomen and pelvis without and with intravenous contrast, 100 cc [id number]       Dose reduction: All CT scans at this facility are performed using dose reduction   optimization techniques as appropriate to a performed exam including the following: Automated exposure control, adjustments of the mA and/or kV according   to patient size, or use of iterative reconstruction technique. INDICATION: Abdominal pain, hematuria       FINDINGS:       No airspace disease in the lung bases. Liver, spleen and pancreas are   unremarkable. Gallbladder is present without pericholecystic stranding. . No   aneurysm of abdominal aorta. Atherosclerosis. There is an approximately 7 cm mass in the upper pole of left kidney laterally. Mild left sided hydroureteronephrosis down to the level of urinary bladder   without stone. Small nonobstructive calcifications in the right kidney. Excretion of contrast is seen from the right kidney on the delayed images. No   contrast excretion is identified from the left kidney. Mild thickening of the   left adrenal gland. No bowel obstruction. Colonic stool and underdistention limit the evaluation for   lesion/mass. Colonic diverticula without acute diverticulitis. Normal appendix. No abscess. No free intraperitoneal air. Prostate measures about 4 x 3.5 cm in   diameter. No free fluid in the pelvis. Apparent urinary bladder wall thickening   may be due to underdistention, cystitis or other. Right femoral stent. No acute   fracture in the visualized bony structures. Degenerative changes spine. Areas of   central canal and neural foraminal narrowing in the lumbar spine. Medical Decision Making   - I am the first provider for this patient. - I reviewed the vital signs, available nursing notes, past medical history, past surgical history, family history and social history. - Initial assessment performed. The patients presenting problems have been discussed, and they are in agreement with the care plan formulated and outlined with them. I have encouraged them to ask questions as they arise throughout their visit. Vital Signs-Reviewed the patient's vital signs.   Patient Vitals for the past 12 hrs:   Temp Pulse Resp BP SpO2   06/05/21 2212  (!) 101 27 117/74 94 %   06/05/21 2055     97 %   06/05/21 2021 98.7 °F (37.1 °C) (!) 101 22 (!) 142/90 97 %       EKG interpretation: (Preliminary) 2106  Rhythm: sinus tachycardia; and regular . Rate (approx.): 103; Axis: LAD; P wave: normal; QRS interval: normal ; ST/T wave: normal; , QTc 453      Records Reviewed: Nursing Notes and Old Medical Records    The patient presents with suprapubic abd pain, hematuria with a differential diagnosis of UTI, acute abdomen, anemia      ED Course:     ED Course as of Jun 06 0225   Sun Jun 06, 2021   0131 Discussed with radiology delay in CT scan read. They state they are having issues with PACS and images crossing over to radiologist and are working on it currently. [NO]   [id number] CONSULT NOTE:  Consultant: Dr. Flores Edcouch  Specialty: Urology  Discussed pt's history, disposition, and available diagnostic and imaging results. Reviewed care plans. Consultant would like patient NPO for stent placement, hospitalist admission. QUIRINO Jim    [NO]   [id number] CONSULT NOTE:  Consultant: Dr. Marsha James  Specialty: Hospitalist  Discussed pt's history, disposition, and available diagnostic and imaging results. Reviewed care plans. Patient will be admitted to the hospital for further management. QUIRINO Jim    [NO]      ED Course User Index  [NO] QUIRINO An       Provider Notes (Medical Decision Making):     MDM  Number of Diagnoses or Management Options  PHILIP (acute kidney injury) ([facility]  Gross hematuria  Hydronephrosis, left  Intractable abdominal pain  Left renal mass  Diagnosis management comments:     57-year-old male with gross hematuria and suprapubic abdominal pain. CBC within normal limits. CMP with creatinine elevated 1.64 compared to 1.252 weeks ago and 1.191-month ago. Troponin negative. Lactic acid within normal limits.   UA with gross blood and small leukocyte esterase otherwise negative for bacteria. CT scan abdomen with left renal mass which patient was already aware of but also with hydronephrosis. Patient having intractable pain throughout ED course. Discussed with urology who recommended keeping patient n.p.o. for stent placement tomorrow. Discussed with hospitalist for admission. Patient agreeable plan of care. Amount and/or Complexity of Data Reviewed  Clinical lab tests: ordered and reviewed  Tests in the radiology section of CPT®: ordered and reviewed  Review and summarize past medical records: yes  Discuss the patient with other providers: yes    Patient Progress  Patient progress: stable             Disposition   Disposition: Admit to hospital with urology consultation    Admitted      Diagnosis     Clinical Impression:   1. Intractable abdominal pain    2. Gross hematuria    3. Left renal mass    4. Hydronephrosis, left    5. PHILIP (acute kidney injury) ([facility] [address]        Attestations:    QUIRINO Swartz    Please note that this dictation was completed with Boxed, the computer voice recognition software. Quite often unanticipated grammatical, syntax, homophones, and other interpretive errors are inadvertently transcribed by the computer software. Please disregard these errors. Please excuse any errors that have escaped final proofreading. Thank you.

## 2021-06-06 NOTE — ED NOTES
Assumed care of patient. A&Ox4; no acute distress; no respiratory distress. Patient c/o abdominal pain and is requesting more pain medication.

## 2021-06-06 NOTE — PROGRESS NOTES
Problem: Falls - Risk of  Goal: *Absence of Falls  Description: Document Authur Senters Fall Risk and appropriate interventions in the flowsheet.   Outcome: Progressing Towards Goal  Note: Fall Risk Interventions:            Medication Interventions: Bed/chair exit alarm, Patient to call before getting OOB, Teach patient to arise slowly         History of Falls Interventions: Bed/chair exit alarm

## 2021-06-06 NOTE — ED NOTES
Bedside shift change report given to Clayton Guillory RN (oncoming nurse) by Winston Tyler RN (offgoing nurse). Report included the following information SBAR, Kardex, ED Summary, [facility] and Recent Results.

## 2021-06-06 NOTE — H&P
GENERAL GENERIC H&P/CONSULT  Presenting complaint: Abdominal pain/hematuria    Subjective: 49-year-old male with past medical history multiple comorbidities presents [facility] with complaints of abdominal pain. Patient states he was in his usual state of health until a few days back when he started experiencing gradual onset persistent progressive lower abdominal pain associated with hematuria as well as nausea following which patient presented to the ED. Patient denies any fevers chills vomiting lightheadedness dizziness dyspnea orthopnea paroxysmal nocturnal dyspnea chest pain palpitations headache focal weakness loss sensation auditory or visual symptoms stool complaints or any other associated symptoms. Past Medical History:   Diagnosis Date    CAD (coronary artery disease)     Diabetes ([facility] [address]     [facility]     Hypercholesterolemia     Hypertension     [facility]peripheral vascular disease[facility]       Past Surgical History:   Procedure Laterality Date    VASCULAR SURGERY PROCEDURE UNLIST      right leg bypass      Prior to Admission medications    Medication Sig Start Date End Date Taking? Authorizing Provider   liraglutide (VICTOZA) 0.6 mg/0.1 mL (18 mg/3 mL) pnij Inject 1.2mg daily. Stop Bydureon/Trulicity 6/0/92  Yes Eduard Javier MD   OneTouch Ultra Blue Test Strip strip  4/18/21  Yes Provider, Historical   insulin glargine (Lantus Solostar U-100 Insulin) 100 unit/mL (3 mL) inpn 50 Units by SubCUTAneous route nightly. 5/3/21  Yes Eduard Javier MD   repaglinide (PRANDIN) 2 mg tablet Take 1 Tab by mouth Before breakfast, lunch, and dinner. 5/3/21  Yes Eduard Javier MD   metFORMIN (GLUCOPHAGE) 500 mg tablet Take 2 Tabs by mouth two (2) times daily (with meals). 5/3/21  Yes Eduard Javier MD   Insulin Needles, Disposable, (Funmilayo Pen Needle) 32 gauge x 5/32\" ndle Use to inject insulin at bedtime.  Dx.COde E11.65 5/3/21  Yes Mary Chema Ron MD   pentoxifylline CR (TRENTAL) 400 mg CR tablet Take 1 Tab by mouth two (2) times a day. 4/5/21  Yes Chele, Lakeshia Enriquez MD   aspirin 81 mg chewable tablet  1/5/21  Yes Provider, Historical   atorvastatin (LIPITOR) 40 mg tablet  2/24/21  Yes Provider, Historical   clopidogreL (PLAVIX) 75 mg tab  2/24/21  Yes Provider, Historical   Tivicay 50 mg tab tablet  2/17/21  Yes Provider, Historical   Odefsey 200-25-25 mg tab  2/17/21  Yes Provider, Historical   famotidine (PEPCID) 20 mg tablet  2/24/21  Yes Provider, Historical   hydroCHLOROthiazide (HYDRODIURIL) 25 mg tablet  2/24/21  Yes Provider, Historical   lisinopriL (PRINIVIL, ZESTRIL) 10 mg tablet  2/24/21  Yes Provider, Historical     No Known Allergies   Social History     Tobacco Use    Smoking status: Former Smoker     Packs/day: 1.00     Years: 45.00     Pack years: 45.00     Types: Cigarettes    Smokeless tobacco: Never Used    Tobacco comment: recently quit   Substance Use Topics    Alcohol use: Never      Family History   Problem Relation Age of Onset    Hypertension Mother     Cancer Mother     Hypertension Father     Heart Disease Father       Review of Systems   Constitutional: Positive for activity change, appetite change and fatigue. Negative for chills, diaphoresis, fever and unexpected weight change. HENT: Negative for congestion, dental problem, drooling, ear discharge, ear pain, facial swelling, hearing loss, mouth sores, nosebleeds, postnasal drip, rhinorrhea, sinus pressure, sinus pain, sneezing, sore throat, tinnitus, trouble swallowing and voice change. Eyes: Negative for photophobia, pain, discharge, redness, itching and visual disturbance. Respiratory: Negative for apnea, cough, choking, chest tightness, shortness of breath, wheezing and stridor. Cardiovascular: Negative for chest pain, palpitations and leg swelling. Gastrointestinal: Positive for abdominal distention, abdominal pain and nausea.  Negative for anal bleeding, blood in stool, constipation, diarrhea, rectal pain and vomiting. Endocrine: Negative for cold intolerance, heat intolerance, polydipsia, polyphagia and polyuria. Genitourinary: Positive for hematuria. Negative for decreased urine volume, difficulty urinating, discharge, dysuria, enuresis, flank pain, frequency, genital sores, penile pain, penile swelling, scrotal swelling, testicular pain and urgency. Musculoskeletal: Negative for arthralgias, back pain, gait problem, joint swelling, myalgias, neck pain and neck stiffness. Skin: Negative for color change, pallor, rash and wound. Allergic/Immunologic: Negative for environmental allergies, food allergies and immunocompromised state. Neurological: Positive for weakness. Negative for dizziness, tremors, seizures, syncope, facial asymmetry, speech difficulty, light-headedness, numbness and headaches. Hematological: Negative for adenopathy. Does not bruise/bleed easily. Psychiatric/Behavioral: Negative for agitation, behavioral problems, confusion, decreased concentration, dysphoric mood, hallucinations, self-injury, sleep disturbance and suicidal ideas. The patient is not nervous/anxious and is not hyperactive. Objective:    06/05 1901 - 06/06 0700  In: 1000 [I.V.:1000]  Out: -   No intake/output data recorded. Patient Vitals for the past 8 hrs:   BP Temp Pulse Resp SpO2 Height Weight   06/05/21 2212 117/74  (!) 101 27 94 %     06/05/21 2055     97 %     06/05/21 2021 (!) 142/90 98.7 °F (37.1 °C) (!) 101 22 97 % 6' 1\" (1.854 m) 115.7 kg (255 lb)     Physical Exam  Vitals reviewed. Constitutional:       General: He is not in acute distress. Appearance: Normal appearance. He is normal weight. He is ill-appearing. He is not toxic-appearing or diaphoretic. HENT:      Head: Normocephalic and atraumatic. Nose: Nose normal. No congestion or rhinorrhea.       Mouth/Throat:      Mouth: Mucous membranes are moist. Pharynx: Oropharynx is clear. No oropharyngeal exudate or posterior oropharyngeal erythema. Eyes:      General: No scleral icterus. Right eye: No discharge. Left eye: No discharge. Extraocular Movements: Extraocular movements intact. Conjunctiva/sclera: Conjunctivae normal.      Pupils: Pupils are equal, round, and reactive to light. Neck:      Vascular: No carotid bruit. Cardiovascular:      Rate and Rhythm: Normal rate and regular rhythm. Pulses: Normal pulses. Heart sounds: Normal heart sounds. No murmur heard. No friction rub. No gallop. Pulmonary:      Effort: Pulmonary effort is normal. No respiratory distress. Breath sounds: Normal breath sounds. No stridor. No wheezing, rhonchi or rales. Chest:      Chest wall: No tenderness. Abdominal:      General: Abdomen is flat. Bowel sounds are normal. There is no distension. Palpations: Abdomen is soft. There is no mass. Tenderness: There is abdominal tenderness. There is no right CVA tenderness, left CVA tenderness, guarding or rebound. Hernia: No hernia is present. Musculoskeletal:         General: No swelling, tenderness, deformity or signs of injury. Normal range of motion. Cervical back: Normal range of motion and neck supple. No rigidity or tenderness. Right lower leg: No edema. Left lower leg: No edema. Lymphadenopathy:      Cervical: No cervical adenopathy. Skin:     General: Skin is warm. Capillary Refill: Capillary refill takes less than 2 seconds. Coloration: Skin is not jaundiced or pale. Findings: No bruising, erythema, lesion or rash. Neurological:      General: No focal deficit present. Mental Status: He is alert and oriented to person, place, and time. Mental status is at baseline. Cranial Nerves: No cranial nerve deficit. Sensory: No sensory deficit. Motor: No weakness.       Coordination: Coordination normal.      Gait: Gait normal.      Deep Tendon Reflexes: Reflexes normal.   Psychiatric:         Mood and Affect: Mood normal.         Behavior: Behavior normal.         Thought Content: Thought content normal.         Judgment: Judgment normal.          Labs:    Recent Results (from the past 24 hour(s))   URINALYSIS W/ REFLEX CULTURE    Collection Time: 06/05/21  8:52 PM    Specimen: Urine   Result Value Ref Range    Color Red      Appearance Turbid (A) Clear      Specific gravity 1.006 1.003 - 1.030      pH (UA) 5.0 5.0 - 8.0      Protein 30 (A) Negative mg/dL    Glucose >300 (A) Negative mg/dL    Ketone Negative Negative mg/dL    Bilirubin Negative Negative      Blood Large (A) Negative      Urobilinogen 0.1 0.1 - 1.0 EU/dL    Nitrites Negative Negative      Leukocyte Esterase Small (A) Negative      UA:UC IF INDICATED AUTOMATED      WBC 20-50 0 - 4 /hpf    RBC >100 (H) 0 - 5 /hpf    Bacteria Negative Negative /hpf   LACTIC ACID    Collection Time: 06/05/21  9:00 PM   Result Value Ref Range    Lactic acid 1.8 0.4 - 2.0 mmol/L   LIPASE    Collection Time: 06/05/21  9:00 PM   Result Value Ref Range    Lipase 146 73 - 393 U/L   TROPONIN I    Collection Time: 06/05/21  9:15 PM   Result Value Ref Range    Troponin-I, Qt. <0.05 <0.05 ng/mL   CBC WITH AUTOMATED DIFF    Collection Time: 06/05/21  9:30 PM   Result Value Ref Range    WBC 11.6 (H) 4.1 - 11.1 K/uL    RBC 5.89 (H) 4.10 - 5.70 M/uL    HGB 17.0 12.1 - 17.0 g/dL    HCT 52.4 (H) 36.6 - 50.3 %    MCV 89.0 80.0 - 99.0 FL    MCH 28.9 26.0 - 34.0 PG    MCHC 32.4 30.0 - 36.5 g/dL    RDW 13.0 11.5 - 14.5 %    PLATELET 695 545 - 511 K/uL    MPV 10.0 8.9 - 12.9 FL    NRBC 0.0 0.0  WBC    ABSOLUTE NRBC 0.00 0.00 - 0.01 K/uL    NEUTROPHILS 75 32 - 75 %    LYMPHOCYTES 14 12 - 49 %    MONOCYTES 9 5 - 13 %    EOSINOPHILS 0 0 - 7 %    BASOPHILS 1 0 - 1 %    IMMATURE GRANULOCYTES 1 (H) 0 - 0.5 %    ABS. NEUTROPHILS 8.5 (H) 1.8 - 8.0 K/UL    ABS. LYMPHOCYTES 1.6 0.8 - 3.5 K/UL    ABS. MONOCYTES 1.1 (H) 0.0 - 1.0 K/UL    ABS. EOSINOPHILS 0.0 0.0 - 0.4 K/UL    ABS. BASOPHILS 0.1 0.0 - 0.1 K/UL    ABS. IMM. GRANS. 0.1 (H) 0.00 - 0.04 K/UL    DF AUTOMATED     METABOLIC PANEL, COMPREHENSIVE    Collection Time: 06/05/21  9:45 PM   Result Value Ref Range    Sodium 132 (L) 136 - 145 mmol/L    Potassium 3.9 3.5 - 5.1 mmol/L    Chloride 101 97 - 108 mmol/L    CO2 22 21 - 32 mmol/L    Anion gap 9 5 - 15 mmol/L    Glucose 348 (H) 65 - 100 mg/dL    BUN 21 (H) 6 - 20 mg/dL    Creatinine 1.64 (H) 0.70 - 1.30 mg/dL    BUN/Creatinine ratio 13 12 - 20      GFR est AA 53 (L) >60 ml/min/1.73m2    GFR est non-AA 44 (L) >60 ml/min/1.73m2    Calcium 9.3 8.5 - 10.1 mg/dL    Bilirubin, total 0.5 0.2 - 1.0 mg/dL    AST (SGOT) 26 15 - 37 U/L    ALT (SGPT) 37 12 - 78 U/L    Alk. phosphatase 103 45 - 117 U/L    Protein, total 8.3 (H) 6.4 - 8.2 g/dL    Albumin 3.6 3.5 - 5.0 g/dL    Globulin 4.7 (H) 2.0 - 4.0 g/dL    A-G Ratio 0.8 (L) 1.1 - 2.2         ECG: nonspecific ST and T waves changes   CT abdomen/pelvis:IMPRESSION     Left renal mass is worrisome for malignancy. Left-sided hydroureteronephrosis  without stone. Apparent urinary bladder wall thickening. Recommend clinical  correlation and follow-up. Please see full report. Assessment:  Active Problems:    Hydronephrosis (6/6/2021)      Renal mass (6/6/2021)      Hematuria (6/6/2021)        Plan:    Left-sided hydronephrosispatient presents with above-mentioned symptomatology and based on patient CT abdomen pelvis concerns for left-sided hydronephrosis, could be secondary to left renal mass, remains hemodynamically stable at this time  Keep n.p.o. Obtain preoperative labs  Urology has been consulted, patient will likely go for cystoscopy in a.m.     Hematuria/renal masspatient presents with above-mentioned symptomatology with hematuria found to have a left renal mass concerning for malignancy  Dilaudid as needed for pain relief at this time  Follow-up neurology recommendations  Obtain oncology consult further evaluation    Hypertensioncontinue home antihypertensive medications    History of HIVcontinue home medications    Hyperlipidemiacontinue statin    ProphylaxisSCDs  FENn.p.o., replete potassium and magnesium  Full code, will clarify about surrogate decision-maker, admitted to surgical unit further management    Signed:   Anthony Shah MD 6/6/2021

## 2021-06-06 NOTE — ROUTINE PROCESS
TRANSFER - OUT REPORT: 
 
Verbal report given to Dacia Wise RN on Anahi Jones  being transferred to [address] [place] [address] for routine progression of care Report consisted of patients Situation, Background, Assessment and  
Recommendations(SBAR). Information from the following report(s) SBAR and ED Summary was reviewed with the receiving nurse. Lines:  
Peripheral IV 06/05/21 Anterior;Left;Proximal Forearm (Active) Opportunity for questions and clarification was provided. Patient transported with: 
 [employer]

## 2021-06-06 NOTE — PROGRESS NOTES
Hospitalist Progress Note    Subjective:   Daily Progress Note: 6/6/2021 9:16 AM    Hospital Course:  Natalie Chowdhury is a 61-year-old obese -American male with a past medical history of CAD, hypertension, hypercholesterolemia, diabetes mellitus, PVD, and HIV who is presenting to the emergency department with a primary complaint of abdominal pain. Patient reports she was in her normal state of health until few days ago when he started to experience gradually worsening lower abdominal pain. He reports associated hematuria and nausea. He denies any fevers, chills, vomiting, dizziness, chest pain, palpitations, shortness of breath, swelling of extremities, or focal weakness. In the ED, vitals temp 98.7, pulse 101 bpm, /90, and SPO2 97% on room air. Lab work-up showing WBC 11.6, glucose 348, BUN 21, creatinine 1.64. CT of abdomen/pelvis showing left renal mass concerning for malignancy, also noted left-sided hydroureteronephrosis and urinary bladder wall thickening. Urology and oncology consult. NPO. Patient admitted to hospitalist services for hydroureteronephrosis. Subjective:    Patient seen and examined at bedside. He reports improvement in abdominal pain. Asking for food. No new complaints at this time. Current Facility-Administered Medications   Medication Dose Route Frequency    aspirin chewable tablet 81 mg  81 mg Oral DAILY    atorvastatin (LIPITOR) tablet 40 mg  40 mg Oral DAILY    clopidogreL (PLAVIX) tablet 75 mg  75 mg Oral DAILY    famotidine (PEPCID) tablet 20 mg  20 mg Oral DAILY    hydroCHLOROthiazide (HYDRODIURIL) tablet 25 mg  25 mg Oral DAILY    . PHARMACY TO SUBSTITUTE PER PROTOCOL (Reordered from: insulin glargine (Lantus Solostar U-100 Insulin) 100 unit/mL (3 mL) inpn)    Per Protocol    . PHARMACY TO SUBSTITUTE PER PROTOCOL (Reordered from: liraglutide (VICTOZA) 0.6 mg/0.1 mL (18 mg/3 mL) pnij)    Per Protocol    lisinopriL (PRINIVIL, ZESTRIL) tablet 10 mg  10 mg Oral DAILY    . PHARMACY TO SUBSTITUTE PER PROTOCOL (Reordered from: Simran Johnson 200-25-25 mg tab)    Per Protocol    pentoxifylline CR (TRENTAL) tablet 400 mg  400 mg Oral BID    repaglinide (PRANDIN) tablet 2 mg  2 mg Oral TIDAC    dolutegravir (TIVICAY) tablet 50 mg  50 mg Oral DAILY    sodium chloride (NS) flush 5-40 mL  5-40 mL IntraVENous Q8H    sodium chloride (NS) flush 5-40 mL  5-40 mL IntraVENous PRN    acetaminophen (TYLENOL) tablet 650 mg  650 mg Oral Q6H PRN    Or    acetaminophen (TYLENOL) suppository 650 mg  650 mg Rectal Q6H PRN    polyethylene glycol (MIRALAX) packet 17 g  17 g Oral DAILY PRN    ondansetron (ZOFRAN ODT) tablet 4 mg  4 mg Oral Q8H PRN    Or    ondansetron (ZOFRAN) injection 4 mg  4 mg IntraVENous Q6H PRN    HYDROmorphone (DILAUDID) injection 1 mg  1 mg IntraVENous Q4H PRN        Review of Systems  Constitutional: Positive for activity change, appetite change and fatigue. No fevers, No chills. Respiratory: No Shortness of Breath, No cough, No wheezing  Cardiovascular: No chest pain, No palpitations, No extremity edema  Gastrointestinal: No nausea, No vomiting, No diarrhea, No abdominal pain  Genitourinary: Positive for hematuria. No frequency. Integument/breast: No skin lesion(s)   Neurological: Positive for weakness. No Confusion, No headaches, No dizziness      Objective:     Visit Vitals  /69 (BP 1 Location: Right lower arm, BP Patient Position: At rest)   Pulse 94   Temp 98.1 °F (36.7 °C)   Resp 20   Ht 6' 1\" (1.854 m)   Wt 115.7 kg (255 lb)   SpO2 98%   BMI 33.64 kg/m²      O2 Device: None (Room air)    Temp (24hrs), Av.2 °F (36.8 °C), Min:97.9 °F (36.6 °C), Max:98.7 °F (37.1 °C)      No intake/output data recorded.  1901 -  0700  In: 1000 [I.V.:1000]  Out: -     PHYSICAL EXAM:  Constitutional: Obese -American male. No acute distress  Skin: Extremities and face reveal no rashes. HEENT: Sclerae anicteric. Extra-occular muscles are intact.  No oral ulcers. The neck is supple and no masses. Cardiovascular: Regular rate and rhythm. Normal S1/S2. No murmur, gallop, click, rub. No JVD. Respiratory:  Clear breath sounds bilaterally with no wheezes, rales, or rhonchi. GI: Abdomen mildly distended. Positive for generalized abdominal tenderness. No CVA tenderness. No guarding or rebound. . Normal active bowel sounds. Rectal: Deferred   Musculoskeletal: No pitting edema of the lower legs. Able to move all ext  Neurological:  Patient is alert and oriented.  Cranial nerves II-XII grossly intact  Psychiatric: Mood appears appropriate       Data Review    Recent Results (from the past 24 hour(s))   URINALYSIS W/ REFLEX CULTURE    Collection Time: 06/05/21  8:52 PM    Specimen: Urine   Result Value Ref Range    Color Red      Appearance Turbid (A) Clear      Specific gravity 1.006 1.003 - 1.030      pH (UA) 5.0 5.0 - 8.0      Protein 30 (A) Negative mg/dL    Glucose >300 (A) Negative mg/dL    Ketone Negative Negative mg/dL    Bilirubin Negative Negative      Blood Large (A) Negative      Urobilinogen 0.1 0.1 - 1.0 EU/dL    Nitrites Negative Negative      Leukocyte Esterase Small (A) Negative      UA:UC IF INDICATED AUTOMATED      WBC 20-50 0 - 4 /hpf    RBC >100 (H) 0 - 5 /hpf    Bacteria Negative Negative /hpf   LACTIC ACID    Collection Time: 06/05/21  9:00 PM   Result Value Ref Range    Lactic acid 1.8 0.4 - 2.0 mmol/L   LIPASE    Collection Time: 06/05/21  9:00 PM   Result Value Ref Range    Lipase 146 73 - 393 U/L   TROPONIN I    Collection Time: 06/05/21  9:15 PM   Result Value Ref Range    Troponin-I, Qt. <0.05 <0.05 ng/mL   CBC WITH AUTOMATED DIFF    Collection Time: 06/05/21  9:30 PM   Result Value Ref Range    WBC 11.6 (H) 4.1 - 11.1 K/uL    RBC 5.89 (H) 4.10 - 5.70 M/uL    HGB 17.0 12.1 - 17.0 g/dL    HCT 52.4 (H) 36.6 - 50.3 %    MCV 89.0 80.0 - 99.0 FL    MCH 28.9 26.0 - 34.0 PG    MCHC 32.4 30.0 - 36.5 g/dL    RDW 13.0 11.5 - 14.5 %    PLATELET 329 150 - 400 K/uL    MPV 10.0 8.9 - 12.9 FL    NRBC 0.0 0.0  WBC    ABSOLUTE NRBC 0.00 0.00 - 0.01 K/uL    NEUTROPHILS 75 32 - 75 %    LYMPHOCYTES 14 12 - 49 %    MONOCYTES 9 5 - 13 %    EOSINOPHILS 0 0 - 7 %    BASOPHILS 1 0 - 1 %    IMMATURE GRANULOCYTES 1 (H) 0 - 0.5 %    ABS. NEUTROPHILS 8.5 (H) 1.8 - 8.0 K/UL    ABS. LYMPHOCYTES 1.6 0.8 - 3.5 K/UL    ABS. MONOCYTES 1.1 (H) 0.0 - 1.0 K/UL    ABS. EOSINOPHILS 0.0 0.0 - 0.4 K/UL    ABS. BASOPHILS 0.1 0.0 - 0.1 K/UL    ABS. IMM. GRANS. 0.1 (H) 0.00 - 0.04 K/UL    DF AUTOMATED     METABOLIC PANEL, COMPREHENSIVE    Collection Time: 06/05/21  9:45 PM   Result Value Ref Range    Sodium 132 (L) 136 - 145 mmol/L    Potassium 3.9 3.5 - 5.1 mmol/L    Chloride 101 97 - 108 mmol/L    CO2 22 21 - 32 mmol/L    Anion gap 9 5 - 15 mmol/L    Glucose 348 (H) 65 - 100 mg/dL    BUN 21 (H) 6 - 20 mg/dL    Creatinine 1.64 (H) 0.70 - 1.30 mg/dL    BUN/Creatinine ratio 13 12 - 20      GFR est AA 53 (L) >60 ml/min/1.73m2    GFR est non-AA 44 (L) >60 ml/min/1.73m2    Calcium 9.3 8.5 - 10.1 mg/dL    Bilirubin, total 0.5 0.2 - 1.0 mg/dL    AST (SGOT) 26 15 - 37 U/L    ALT (SGPT) 37 12 - 78 U/L    Alk. phosphatase 103 45 - 117 U/L    Protein, total 8.3 (H) 6.4 - 8.2 g/dL    Albumin 3.6 3.5 - 5.0 g/dL    Globulin 4.7 (H) 2.0 - 4.0 g/dL    A-G Ratio 0.8 (L) 1.1 - 2.2     GLUCOSE, POC    Collection Time: 06/06/21  8:18 AM   Result Value Ref Range    Glucose (POC) 248 (H) 65 - 117 mg/dL    Performed by Adarsh Carrillo        CT ABD PELV W WO CONT   Final Result      Left renal mass is worrisome for malignancy. Left-sided hydroureteronephrosis   without stone. Apparent urinary bladder wall thickening. Recommend clinical   correlation and follow-up. Please see full report.           Active Problems:    Hydronephrosis (6/6/2021)      Renal mass (6/6/2021)      Hematuria (6/6/2021)        Assessment/Plan:     Left-sided hydronephrosis     CT of abdomen/pelvis showing left renal mass concerning for malignancy, also noted left-sided hydroureteronephrosis and urinary bladder wall thickening.    - Urology consult. May go for cystoscopy in the AM.  - Oncology consult. - Keep n.p.o.  - Obtain preoperative labs     2. Hematuria/renal mass   Sx hematuria found to have a left renal mass concerning for malignancy  - Dilaudid as needed for pain relief at this time  - Follow-up urology recommendations  - Obtain oncology consult further evaluation     3. Hypertension continue home antihypertensive medications     4. History of HIV continue home medications     5. Hyperlipidemia continue statin      DVT Prophylaxis: SCDs  Code Status: Full  POA:    Care Plan discussed with: patient and nursing    Total time spent with patient: >35 minutes.

## 2021-06-07 ENCOUNTER — APPOINTMENT (OUTPATIENT)
Dept: CT IMAGING | Age: 56
DRG: 442 | End: 2021-06-07
Attending: INTERNAL MEDICINE
Payer: COMMERCIAL

## 2021-06-07 LAB
ANION GAP SERPL CALC-SCNC: 7 MMOL/L (ref 5–15)
BASOPHILS # BLD: 0.1 K/UL (ref 0–0.1)
BASOPHILS NFR BLD: 0 % (ref 0–1)
BUN SERPL-MCNC: 21 MG/DL (ref 6–20)
BUN/CREAT SERPL: 15 (ref 12–20)
CA-I BLD-MCNC: 8.5 MG/DL (ref 8.5–10.1)
CEA SERPL-MCNC: 2.2 NG/ML
CHLORIDE SERPL-SCNC: 101 MMOL/L (ref 97–108)
CO2 SERPL-SCNC: 26 MMOL/L (ref 21–32)
CREAT SERPL-MCNC: 1.4 MG/DL (ref 0.7–1.3)
DIFFERENTIAL METHOD BLD: ABNORMAL
EOSINOPHIL # BLD: 0 K/UL (ref 0–0.4)
EOSINOPHIL NFR BLD: 0 % (ref 0–7)
ERYTHROCYTE [DISTWIDTH] IN BLOOD BY AUTOMATED COUNT: 13.2 % (ref 11.5–14.5)
GLUCOSE BLD STRIP.AUTO-MCNC: 152 MG/DL (ref 65–117)
GLUCOSE BLD STRIP.AUTO-MCNC: 169 MG/DL (ref 65–117)
GLUCOSE BLD STRIP.AUTO-MCNC: 175 MG/DL (ref 65–117)
GLUCOSE BLD STRIP.AUTO-MCNC: 272 MG/DL (ref 65–117)
GLUCOSE SERPL-MCNC: 194 MG/DL (ref 65–100)
HCT VFR BLD AUTO: 45.1 % (ref 36.6–50.3)
HGB BLD-MCNC: 15.1 G/DL (ref 12.1–17)
IMM GRANULOCYTES # BLD AUTO: 0.1 K/UL (ref 0–0.04)
IMM GRANULOCYTES NFR BLD AUTO: 1 % (ref 0–0.5)
LYMPHOCYTES # BLD: 1.9 K/UL (ref 0.8–3.5)
LYMPHOCYTES NFR BLD: 14 % (ref 12–49)
MAGNESIUM SERPL-MCNC: 1.7 MG/DL (ref 1.6–2.4)
MCH RBC QN AUTO: 29.2 PG (ref 26–34)
MCHC RBC AUTO-ENTMCNC: 33.5 G/DL (ref 30–36.5)
MCV RBC AUTO: 87.1 FL (ref 80–99)
MONOCYTES # BLD: 1.2 K/UL (ref 0–1)
MONOCYTES NFR BLD: 9 % (ref 5–13)
NEUTS SEG # BLD: 10.4 K/UL (ref 1.8–8)
NEUTS SEG NFR BLD: 76 % (ref 32–75)
NRBC # BLD: 0 K/UL (ref 0–0.01)
NRBC BLD-RTO: 0 PER 100 WBC
PERFORMED BY, TECHID: ABNORMAL
PLATELET # BLD AUTO: 330 K/UL (ref 150–400)
PMV BLD AUTO: 9.4 FL (ref 8.9–12.9)
POTASSIUM SERPL-SCNC: 3.6 MMOL/L (ref 3.5–5.1)
RBC # BLD AUTO: 5.18 M/UL (ref 4.1–5.7)
SODIUM SERPL-SCNC: 134 MMOL/L (ref 136–145)
WBC # BLD AUTO: 13.6 K/UL (ref 4.1–11.1)

## 2021-06-07 PROCEDURE — 74011250637 HC RX REV CODE- 250/637: Performed by: INTERNAL MEDICINE

## 2021-06-07 PROCEDURE — 71250 CT THORAX DX C-: CPT

## 2021-06-07 PROCEDURE — 80048 BASIC METABOLIC PNL TOTAL CA: CPT

## 2021-06-07 PROCEDURE — 99024 POSTOP FOLLOW-UP VISIT: CPT | Performed by: UROLOGY

## 2021-06-07 PROCEDURE — 85025 COMPLETE CBC W/AUTO DIFF WBC: CPT

## 2021-06-07 PROCEDURE — 82668 ASSAY OF ERYTHROPOIETIN: CPT

## 2021-06-07 PROCEDURE — 83735 ASSAY OF MAGNESIUM: CPT

## 2021-06-07 PROCEDURE — 74011636637 HC RX REV CODE- 636/637: Performed by: INTERNAL MEDICINE

## 2021-06-07 PROCEDURE — 82962 GLUCOSE BLOOD TEST: CPT

## 2021-06-07 PROCEDURE — 65270000029 HC RM PRIVATE

## 2021-06-07 PROCEDURE — 36415 COLL VENOUS BLD VENIPUNCTURE: CPT

## 2021-06-07 PROCEDURE — 74011636637 HC RX REV CODE- 636/637: Performed by: STUDENT IN AN ORGANIZED HEALTH CARE EDUCATION/TRAINING PROGRAM

## 2021-06-07 PROCEDURE — 82378 CARCINOEMBRYONIC ANTIGEN: CPT

## 2021-06-07 RX ADMIN — ACETAMINOPHEN 650 MG: 325 TABLET ORAL at 22:58

## 2021-06-07 RX ADMIN — INSULIN LISPRO 2 UNITS: 100 INJECTION, SOLUTION INTRAVENOUS; SUBCUTANEOUS at 12:16

## 2021-06-07 RX ADMIN — INSULIN LISPRO 2 UNITS: 100 INJECTION, SOLUTION INTRAVENOUS; SUBCUTANEOUS at 15:55

## 2021-06-07 RX ADMIN — POLYETHYLENE GLYCOL 3350 17 G: 17 POWDER, FOR SOLUTION ORAL at 12:08

## 2021-06-07 RX ADMIN — HYDROCHLOROTHIAZIDE 25 MG: 25 TABLET ORAL at 12:08

## 2021-06-07 RX ADMIN — DOLUTEGRAVIR SODIUM 50 MG: 50 TABLET, FILM COATED ORAL at 09:03

## 2021-06-07 RX ADMIN — Medication 10 ML: at 13:41

## 2021-06-07 RX ADMIN — Medication 10 ML: at 13:40

## 2021-06-07 RX ADMIN — REPAGLINIDE 2 MG: 2 TABLET ORAL at 09:03

## 2021-06-07 RX ADMIN — Medication 10 ML: at 22:59

## 2021-06-07 RX ADMIN — INSULIN GLARGINE 50 UNITS: 100 INJECTION, SOLUTION SUBCUTANEOUS at 22:58

## 2021-06-07 RX ADMIN — INSULIN LISPRO 6 UNITS: 100 INJECTION, SOLUTION INTRAVENOUS; SUBCUTANEOUS at 09:03

## 2021-06-07 RX ADMIN — ATORVASTATIN CALCIUM 40 MG: 40 TABLET, FILM COATED ORAL at 09:03

## 2021-06-07 RX ADMIN — REPAGLINIDE 2 MG: 2 TABLET ORAL at 16:30

## 2021-06-07 RX ADMIN — LISINOPRIL 10 MG: 10 TABLET ORAL at 12:08

## 2021-06-07 NOTE — PROGRESS NOTES
Spiritual Care Assessment/Progress Note  [facility]      NAME: Ricardo Wang      MRN: [id number]  AGE: 60 y.o.  SEX: male  Anabaptism Affiliation: No preference   Language: English     6/7/2021     Total Time (in minutes): 30     Spiritual Assessment begun in [facility] through conversation with:         [x]Patient        [] Family    [] Friend(s)        Reason for Consult: Request by staff, Initial/Spiritual assessment, patient floor     Spiritual beliefs: (Please include comment if needed)     [] Identifies with a romulo tradition:         [] Supported by a romulo community:            [] Claims no spiritual orientation:           [] Seeking spiritual identity:                [x] Adheres to an individual form of spirituality:           [] Not able to assess:                           Identified resources for coping:      [x] Prayer                               [] Music                  [x] Guided Imagery     [x] Family/friends                 [] Pet visits     [] Devotional reading                         [] Unknown     [] Other:                                              Interventions offered during this visit: (See comments for more details)    Patient Interventions: Affirmation of emotions/emotional suffering, Affirmation of romulo, Catharsis/review of pertinent events in supportive environment, Bridging, Coping skills reviewed/reinforced, Guidance concerning next steps/process to be expected, Iconic (affirming the presence of God/Higher Power), Initial/Spiritual assessment, patient floor, Issues around forgiveness/closure, Prayer (assurance of), Reframing, Anabaptism beliefs/image of God discussed           Plan of Care:     [] Support spiritual and/or cultural needs    [] Support AMD and/or advance care planning process      [] Support grieving process   [] Coordinate Rites and/or Rituals    [] Coordination with community clergy   [] No spiritual needs identified at this time   [] Detailed Plan of Care below (See Comments)  [] Make referral to Music Therapy  [] Make referral to Pet Therapy     [] Make referral to Addiction services  [] Make referral to [facility]  [] Make referral to Spiritual Care Partner  [] No future visits requested        [x] Follow up upon further referrals     Comments: The purpose of the visit was in response to a request from the patient's nurse. The patient was resting in bed, and being attended to by his nurse however he welcomed the visit. He mentioned being in despair over the death of his wife, mother, and daughter. The patient expressed remorse and grief over the death of his loved ones. He mentioned that he wondered werther or not he his life had purpose or meaning. He expressed feeling remorse about his life since even now his health falling. The  provided the ministry of presence and the comfort of [facility]. [address] [facility] [address] [place].    can be reached by calling the  at [facility]  [phone]

## 2021-06-07 NOTE — PROGRESS NOTES
UROLOGY Progress Note         [phone]      Daily Progress Note: 6/7/2021      Subjective: The patient is seen for UROLOGIC follow up for hydronephrosis, renal mass, hematuria, urinary frequency. He is s/p cystourethroscopy, clot evacuation, bilateral RPG, and left ureteral stent placement. The ureter appeared mildly dilated along its entire length without focal stricturing. There was filling defect in the upper and mid infundibula high with dilation of these. This was suspicious for clot. CBI ongoing, slow flow rate. Patient notes slight increase in suprapubic pain since rate was slowed. However, urine remains clear, yellow. He is concerned with his genital warts.   Thinks they are worse and is wondering if they are contributory to his current state of health.  Cassandra Imonomi has had them for years    Problem List:  Patient Active Problem List   Diagnosis Code    Pain of lower extremity M79.606    Abnormal stress test R94.39    Hydronephrosis N13.30    Renal mass N28.89    Hematuria R31.9         Medications reviewed  Current Facility-Administered Medications   Medication Dose Route Frequency    [Held by provider] aspirin chewable tablet 81 mg  81 mg Oral DAILY    atorvastatin (LIPITOR) tablet 40 mg  40 mg Oral DAILY    [Held by provider] clopidogreL (PLAVIX) tablet 75 mg  75 mg Oral DAILY    famotidine (PEPCID) tablet 20 mg  20 mg Oral DAILY    hydroCHLOROthiazide (HYDRODIURIL) tablet 25 mg  25 mg Oral DAILY    insulin glargine (LANTUS) injection 50 Units  50 Units SubCUTAneous QHS    lisinopriL (PRINIVIL, ZESTRIL) tablet 10 mg  10 mg Oral DAILY    xyujruwqbf-rxjucklt-wzoqhn ala 200-25-25 mg tab 1 Tablet (Patient Supplied)  1 Tablet Oral DAILY    [Held by provider] pentoxifylline CR (TRENTAL) tablet 400 mg  400 mg Oral BID    repaglinide (PRANDIN) tablet 2 mg  2 mg Oral TIDAC    dolutegravir (TIVICAY) tablet 50 mg  50 mg Oral DAILY    sodium chloride (NS) flush 5-40 mL  5-40 mL IntraVENous Q8H    sodium chloride (NS) flush 5-40 mL  5-40 mL IntraVENous PRN    acetaminophen (TYLENOL) tablet 650 mg  650 mg Oral Q6H PRN    Or    acetaminophen (TYLENOL) suppository 650 mg  650 mg Rectal Q6H PRN    polyethylene glycol (MIRALAX) packet 17 g  17 g Oral DAILY PRN    ondansetron (ZOFRAN ODT) tablet 4 mg  4 mg Oral Q8H PRN    Or    ondansetron (ZOFRAN) injection 4 mg  4 mg IntraVENous Q6H PRN    HYDROmorphone (DILAUDID) injection 1 mg  1 mg IntraVENous Q4H PRN    insulin lispro (HUMALOG) injection   SubCUTAneous AC&HS    glucose chewable tablet 16 g  4 Tablet Oral PRN    glucagon (GLUCAGEN) injection 1 mg  1 mg IntraMUSCular PRN    dextrose (D50W) injection syrg 12.5-25 g  25-50 mL IntraVENous PRN       Review of Systems:   Review of Systems   Constitutional: Negative for chills and fever. Gastrointestinal: Positive for abdominal pain. Negative for nausea and vomiting. Suprapubic discomfort, mild   Genitourinary: Positive for hematuria. Negative for dysuria. Hematuria improved, on CBI           Objective:   Physical Exam  Vitals and nursing note reviewed. Constitutional:       General: He is not in acute distress. HENT:      Head: Normocephalic and atraumatic. Eyes:      Extraocular Movements: Extraocular movements intact. Cardiovascular:      Rate and Rhythm: Normal rate. Pulmonary:      Effort: Pulmonary effort is normal. No respiratory distress. Breath sounds: No wheezing. Abdominal:      Palpations: Abdomen is soft. Genitourinary:     Comments: CBI; clear yellow urine draining with irrigant. Small palpable bumps along the left side of the shaft of the penis. Flattened, c/w condyloma  Left high groin with two polypoid skin tags 8-10mm each    Musculoskeletal:      Cervical back: Normal range of motion. Skin:     General: Skin is warm and dry. Neurological:      Mental Status: He is alert and oriented to person, place, and time.    Psychiatric: Behavior: Behavior normal.          Visit Vitals  /66 (BP 1 Location: Right lower arm, BP Patient Position: At rest)   Pulse (!) 101   Temp 99.1 °F (37.3 °C)   Resp 18   Ht 6' 1\" (1.854 m)   Wt 255 lb (115.7 kg)   SpO2 95%   BMI 33.64 kg/m²         Data Review:       Recent Days:  Recent Labs     06/07/21 0227 06/05/21  2130   WBC 13.6* 11.6*   HGB 15.1 17.0   HCT 45.1 52.4*    329     Recent Labs     06/07/21 0227 06/05/21  2145   * 132*   K 3.6 3.9    101   CO2 26 22   * 348*   BUN 21* 21*   CREA 1.40* 1.64*   CA 8.5 9.3   MG 1.7  --    ALB  --  3.6   TBILI  --  0.5   ALT  --  37       Assessment/     Patient Active Problem List   Diagnosis Code    Pain of lower extremity M79.606    Abnormal stress test R94.39    Hydronephrosis N13.30    Renal mass N28.89    Hematuria R31.9       Plan:  LEFT SIDED HYDRONEPHROSIS: he is s/p left ureteral stent placement on 6/6/21. Creatinine improved at 1.40 from 1.64. RETAINED URETERAL STENT: left ureteral stent placed on 6/6/21. LEFT RENAL MASS: Seen on admission CT scan, at least T2, likely a renal cell carcinoma. No obvious metastasis on imaging to this point. Anticoagulation on hold at this time. Will need left nephrectomy. Will plan this on Wed evening. The patient was counseled on the risks, benefits and expected course of surgery. Surgery has risks of bleeding, infection, injury, pain, death or other consequences. Perioperative medications and antibiotic use were discussed including the potential for reactions and side effects. Due to recent anticoagulation he is at increased risk of hemorrhage. GROSS HEMATURIA: Possibly long term clot retention, likely from the renal mass and exacerbated by anticoagulation (currently on hold). Continue CBI as there was notable bloody efflux from the left ureteral orifice. LUTS: Frequency and urgency. Now with CBI infusion in place. Keep slow rate to prevent clots.      Genital skin warts - chronic. Topical therapy recommended as an outpatient. I personally had a face to face encounter with the patient and performed the history, physical, assessment and plan.    MD Marjan Garcia NP

## 2021-06-07 NOTE — PROGRESS NOTES
Physician Progress Note      Peri Dalton  [facility] #:                  [id number]  :                       1965  ADMIT DATE:       2021 8:23 PM  [address] [place] DATE:  RESPONDING  PROVIDER #:        Jhonny Rogers MD          QUERY TEXT:    Patient admitted with gross hematuria. Noted documentation of Acute Kidney Injury in [place] [address] dated  and [address]avier  dated . In order to support the diagnosis of PHILIP, please include additional clinical indicators in your documentation. Or please document if the diagnosis of PHILIP has been ruled out after further study. The medical record reflects the following:  Risk Factors: renal call carcinoma    Clinical Indicators:    Cr 1.64-> 1.40 (Cr 1.25 on  and Cr 1.19 on 5/3)  GFR 53-> >60  BUN 21-> 21 (BUN 16 on  and BUN 20 on 5/3)    Taisha  -  He has hydronephrosis and likely hematuria from this mass, exacerbated by anticoagulation. With acute kidney injury, acute pain and hydronephrosis, he has an urgent need for intervention    Donhal  -  PHILIP:Monitor. Continue IV fluids. s/p left ureteral stent placement    Treatment: Urology consult; ureteral stent placement; 0.9% NS 1L bolus      Defined by Kidney Disease Improving Global Outcomes (KDIGO) clinical practice guideline for acute kidney injury:  -Increase in SCr by greater than or equal to 0.3 mg/dl within 48 hours; or  -Increase or decrease in SCr to greater than or equal to 1.5 times baseline, which is known or presumed to have occurred within the prior 7 days; or  -Urine volume < 0.5ml/kg/h for 6 hours      Thank you,  Hammad Tee, RN, BSN, [place], [place]  Clinical Documentation  [phone]  Options provided:  -- Acute kidney injury evidenced by, Please document evidence as well as baseline creatinine, if known. -- Currently resolved acute kidney injury was evidenced by, Please document evidence as well as baseline creatinine, if known.   -- PHILIP ruled out  -- Other - I will add my own diagnosis  -- Disagree - Not applicable / Not valid  -- Disagree - Clinically unable to determine / Unknown  -- Refer to Clinical Documentation Reviewer    PROVIDER RESPONSE TEXT:    This patient has an acute kidney injury as evidenced by rise in creatinine    Query created by: Chelsie Arellano on 6/7/2021 10:41 AM      Electronically signed by:  Jenelle Gregg MD 6/7/2021 10:55 AM

## 2021-06-07 NOTE — PROGRESS NOTES
Hematology Oncology Progress Note     Interval History :  60 yr old admitted with abdominal pain and hematuria with hydrouretero nephrosis. CT scans  Showed 7cmmass in the left upper pole of left kidney. S/p cystoscopy and retrograde pyelograms. Hydroureteronephrosis is due to clot retention from hematuria in bladder and ureters. S/p evacuation and stents. Subjective:     HE is feeling better now. [place]  Has mild blood tinged urine clearing to yellow now. Reports pain is improved. Surgery is discussed by Dr. Winnie Floyd and he is anxious about it. No other complaints.      Current Facility-Administered Medications   Medication Dose Route Frequency    [Held by provider] aspirin chewable tablet 81 mg  81 mg Oral DAILY    atorvastatin (LIPITOR) tablet 40 mg  40 mg Oral DAILY    [Held by provider] clopidogreL (PLAVIX) tablet 75 mg  75 mg Oral DAILY    famotidine (PEPCID) tablet 20 mg  20 mg Oral DAILY    hydroCHLOROthiazide (HYDRODIURIL) tablet 25 mg  25 mg Oral DAILY    insulin glargine (LANTUS) injection 50 Units  50 Units SubCUTAneous QHS    lisinopriL (PRINIVIL, ZESTRIL) tablet 10 mg  10 mg Oral DAILY    qgjmwsextq-kvfnjokd-cckeat ala 200-25-25 mg tab 1 Tablet (Patient Supplied)  1 Tablet Oral DAILY    [Held by provider] pentoxifylline CR (TRENTAL) tablet 400 mg  400 mg Oral BID    repaglinide (PRANDIN) tablet 2 mg  2 mg Oral TIDAC    dolutegravir (TIVICAY) tablet 50 mg  50 mg Oral DAILY    sodium chloride (NS) flush 5-40 mL  5-40 mL IntraVENous Q8H    sodium chloride (NS) flush 5-40 mL  5-40 mL IntraVENous PRN    acetaminophen (TYLENOL) tablet 650 mg  650 mg Oral Q6H PRN    Or    acetaminophen (TYLENOL) suppository 650 mg  650 mg Rectal Q6H PRN    polyethylene glycol (MIRALAX) packet 17 g  17 g Oral DAILY PRN    ondansetron (ZOFRAN ODT) tablet 4 mg  4 mg Oral Q8H PRN    Or    ondansetron (ZOFRAN) injection 4 mg  4 mg IntraVENous Q6H PRN    HYDROmorphone (DILAUDID) injection 1 mg  1 mg IntraVENous Q4H PRN    insulin lispro (HUMALOG) injection   SubCUTAneous AC&HS    glucose chewable tablet 16 g  4 Tablet Oral PRN    glucagon (GLUCAGEN) injection 1 mg  1 mg IntraMUSCular PRN    dextrose (D50W) injection syrg 12.5-25 g  25-50 mL IntraVENous PRN        Review of Systems:    Constitutional No fevers, chills, night sweats, excessive fatigue or weight loss. Allergic/Immunologic No recent allergic reactions   Eyes No significant visual difficulties. No diplopia. ENMT No problems with hearing, no sore throat, no sinus drainage. Endocrine No hot flashes or night sweats. No cold intolerance, polyuria, or polydipsia   Hematologic/Lymphatic No easy bruising or bleeding. The patient denies any tender or palpable lymph nodes   Breasts No abnormal masses of breast, nipple discharge or pain. Respiratory No dyspnea on exertion, orthopnea, chest pain, cough or hemoptysis. Cardiovascular No anginal chest pain, irregular heart beat, tachycardia, palpitations or orthopnea. Gastrointestinal No nausea, vomiting, diarrhea, constipation, cramping, dysphagia, reflux, heartburn, GI bleeding, or early satiety. No change in bowel habits. Genitourinary (M) + hematuria, dysuria, increased frequency, urgency, hesitancy. Musculoskeletal No joint pain, swelling or redness. No decreased range of motion. Integumentary No chronic rashes, inflammation, ulcerations, pruritus, petechiae, purpura, ecchymoses, or skin changes. Neurologic No headache, blurred vision, and no areas of focal weakness or numbness. Normal gait. No sensory problems. Psychiatric No insomnia, depression, sharla or mood swings. No psychotropic drugs.      Objective:     Patient Vitals for the past 8 hrs:   BP Temp Pulse Resp SpO2   21 1205 123/78 98.5 °F (36.9 °C) (!) 103 18 98 %   21 0740 100/66 99.1 °F (37.3 °C) (!) 101 18 95 %       Temp (24hrs), Av.5 °F (36.9 °C), Min:97.4 °F (36.3 °C), Max:100.3 °F (37.9 °C)      Physical Exam:    Constitutional  Bertha male Alert, cooperative, oriented. Mood and affect appropriate. Appears close to chronological age. Well nourished. Well developed. Head Normocephalic; no scars   Eyes Conjunctivae and sclerae are clear and without icterus. Pupils are reactive and equal.   ENMT Sinuses are nontender. No oral exudates, ulcers, masses, thrush or mucositis. Oropharynx clear. Tongue normal.   Neck Supple without masses or thyromegaly. No jugular venous distension. Hematologic/Lymphatic No petechiae or purpura. No tender or palpable lymph nodes in the cervical, supraclavicular, axillary or inguinal area. Respiratory Lungs are clear to auscultation without rhonchi or wheezing. Cardiovascular Regular rate and rhythm of heart without murmurs, gallops or rubs. Chest / Line Site Chest is symmetric with no chest wall deformities. Abdomen Non-tender, non-distended, no masses, ascites or hepatosplenomegaly. Good bowel sounds. No guarding or rebound tenderness. No pulsatile masses. Musculoskeletal No tenderness or swelling, normal range of motion without obvious weakness. Extremities No visible deformities, no cyanosis, clubbing or edema. Skin No rashes, scars, or lesions suggestive of malignancy. No petechiae, purpura, or ecchymoses. No excoriations. Neurologic No sensory or motor deficits, normal cerebellar function, normal gait, cranial nerves intact. Psychiatric Alert and oriented times three. Coherent speech. Verbalizes understanding of our discussions today.      Lab/Data Review:  Recent Labs     06/07/21 0227 06/05/21  2130   WBC 13.6* 11.6*   HGB 15.1 17.0   HCT 45.1 52.4*    329     Recent Labs     06/07/21 0227 06/05/21  2145   * 132*   K 3.6 3.9    101   CO2 26 22   * 348*   BUN 21* 21*   CREA 1.40* 1.64*   CA 8.5 9.3   MG 1.7  --    ALB  --  3.6   TBILI  --  0.5   ALT  --  37     No results for input(s): PH, PCO2, PO2, HCO3, FIO2 in the last 72 hours. Radiology:   CT ABD PELV W WO CONT    Result Date: 6/6/2021  Left renal mass is worrisome for malignancy. Left-sided hydroureteronephrosis without stone. Apparent urinary bladder wall thickening. Recommend clinical correlation and follow-up. Please see full report. Assessment /Plan:     Active Problems:    Hydronephrosis (6/6/2021)      Renal mass (6/6/2021)      Hematuria (6/6/2021)      1) 60 yr old male with PMH of HIV, hypertension , pvd, hyperlipidemia ,DM and cad with h/o left leg bypass. 2) Left renal mass: 7cm mass seen in the upper pole of the left kidney. Highly suspicious for primary renal cell carcinoma. No other obvious mets. -3/2021- low dose lung ct negative for mets. But would repeat a non contrast ct chest now.   -Noted plans by dr. Melissa Jones for surgery later this week. 3) Hydroureteronephrosis: secondary to clot retention. Creatinine is improving today. 4) luecocytosis: wbc coutn of 13.6k. monitor. 5) hematuria is subsided. Aspirin and plavix held now. H/h is ok.            Bennett Hernandez MD  6/7/2021

## 2021-06-07 NOTE — PROGRESS NOTES
Hospitalist Progress Note    Subjective:   Daily Progress Note: 6/7/2021 9:16 AM    Hospital Course:  Ida Schmidt is a 47-year-old obese -American male with a past medical history of CAD, hypertension, hypercholesterolemia, diabetes mellitus, PVD, and HIV who is presenting to the emergency department with a primary complaint of abdominal pain. Patient reports she was in her normal state of health until few days ago when he started to experience gradually worsening lower abdominal pain. He reports associated hematuria and nausea. He denies any fevers, chills, vomiting, dizziness, chest pain, palpitations, shortness of breath, swelling of extremities, or focal weakness. In the ED, vitals temp 98.7, pulse 101 bpm, /90, and SPO2 97% on room air. Lab work-up showing WBC 11.6, glucose 348, BUN 21, creatinine 1.64. CT of abdomen/pelvis showing left renal mass concerning for malignancy, also noted left-sided hydroureteronephrosis and urinary bladder wall thickening. Urology and oncology consult. NPO. Patient admitted to hospitalist services for hydroureteronephrosis. Subjective:    Patient seen and examined at bedside. He reports improvement in abdominal pain. Postoperative day #1 status post cystoscopy with left ureteral stent placement.   No further hematuria    Current Facility-Administered Medications   Medication Dose Route Frequency    [Held by provider] aspirin chewable tablet 81 mg  81 mg Oral DAILY    atorvastatin (LIPITOR) tablet 40 mg  40 mg Oral DAILY    [Held by provider] clopidogreL (PLAVIX) tablet 75 mg  75 mg Oral DAILY    famotidine (PEPCID) tablet 20 mg  20 mg Oral DAILY    hydroCHLOROthiazide (HYDRODIURIL) tablet 25 mg  25 mg Oral DAILY    insulin glargine (LANTUS) injection 50 Units  50 Units SubCUTAneous QHS    lisinopriL (PRINIVIL, ZESTRIL) tablet 10 mg  10 mg Oral DAILY    jmftuaxfvd-zjbqwdir-bybylh ala 200-25-25 mg tab 1 Tablet (Patient Supplied)  1 Tablet Oral DAILY  [Held by provider] pentoxifylline CR (TRENTAL) tablet 400 mg  400 mg Oral BID    repaglinide (PRANDIN) tablet 2 mg  2 mg Oral TIDAC    dolutegravir (TIVICAY) tablet 50 mg  50 mg Oral DAILY    sodium chloride (NS) flush 5-40 mL  5-40 mL IntraVENous Q8H    sodium chloride (NS) flush 5-40 mL  5-40 mL IntraVENous PRN    acetaminophen (TYLENOL) tablet 650 mg  650 mg Oral Q6H PRN    Or    acetaminophen (TYLENOL) suppository 650 mg  650 mg Rectal Q6H PRN    polyethylene glycol (MIRALAX) packet 17 g  17 g Oral DAILY PRN    ondansetron (ZOFRAN ODT) tablet 4 mg  4 mg Oral Q8H PRN    Or    ondansetron (ZOFRAN) injection 4 mg  4 mg IntraVENous Q6H PRN    HYDROmorphone (DILAUDID) injection 1 mg  1 mg IntraVENous Q4H PRN    insulin lispro (HUMALOG) injection   SubCUTAneous AC&HS    glucose chewable tablet 16 g  4 Tablet Oral PRN    glucagon (GLUCAGEN) injection 1 mg  1 mg IntraMUSCular PRN    dextrose (D50W) injection syrg 12.5-25 g  25-50 mL IntraVENous PRN        Review of Systems  Constitutional: Positive for activity change, appetite change and fatigue. No fevers, No chills. Respiratory: No Shortness of Breath, No cough, No wheezing  Cardiovascular: No chest pain, No palpitations, No extremity edema  Gastrointestinal: No nausea, No vomiting, No diarrhea, No abdominal pain  Genitourinary:  No frequency. Integument/breast: No skin lesion(s)   Neurological: Positive for weakness. No Confusion, No headaches, No dizziness      Objective:     Visit Vitals  /66 (BP 1 Location: Right lower arm, BP Patient Position: At rest)   Pulse (!) 101   Temp 99.1 °F (37.3 °C)   Resp 18   Ht 6' 1\" (1.854 m)   Wt 115.7 kg (255 lb)   SpO2 95%   BMI 33.64 kg/m²      O2 Device: None (Room air)    Temp (24hrs), Av.1 °F (36.7 °C), Min:97.4 °F (36.3 °C), Max:100.3 °F (37.9 °C)      701 - 1900  In: 3400 [P.O.:400]  Out: 2300 [Urine:2300]  1901 - 700  In: 75300 [P.O.:800;  I.V.:1400]  Out: 66048 [ADZR]    PHYSICAL EXAM:  Constitutional: Obese -American male. No acute distress  Skin: Extremities and face reveal no rashes. HEENT: Sclerae anicteric. Extra-occular muscles are intact. No oral ulcers. The neck is supple and no masses. Cardiovascular: Regular rate and rhythm. Normal S1/S2. No murmur, gallop, click, rub. No JVD. Respiratory:  Clear breath sounds bilaterally with no wheezes, rales, or rhonchi. GI: Abdomen mildly distended. Positive for generalized abdominal tenderness. No CVA tenderness. No guarding or rebound. . Normal active bowel sounds. Rectal: Deferred   Musculoskeletal: No pitting edema of the lower legs. Able to move all ext  Neurological:  Patient is alert and oriented.  Cranial nerves II-XII grossly intact  Psychiatric: Mood appears appropriate       Data Review    Recent Results (from the past 24 hour(s))   GLUCOSE, POC    Collection Time: 21 12:47 PM   Result Value Ref Range    Glucose (POC) 277 (H) 65 - 117 mg/dL    Performed by Jose Elias Jeff    GLUCOSE, POC    Collection Time: 21  3:45 PM   Result Value Ref Range    Glucose (POC) 335 (H) 65 - 117 mg/dL    Performed by Sonal Khan    GLUCOSE, POC    Collection Time: 21  8:04 PM   Result Value Ref Range    Glucose (POC) 235 (H) 65 - 117 mg/dL    Performed by Lorne Smith    METABOLIC PANEL, BASIC    Collection Time: 21  2:27 AM   Result Value Ref Range    Sodium 134 (L) 136 - 145 mmol/L    Potassium 3.6 3.5 - 5.1 mmol/L    Chloride 101 97 - 108 mmol/L    CO2 26 21 - 32 mmol/L    Anion gap 7 5 - 15 mmol/L    Glucose 194 (H) 65 - 100 mg/dL    BUN 21 (H) 6 - 20 mg/dL    Creatinine 1.40 (H) 0.70 - 1.30 mg/dL    BUN/Creatinine ratio 15 12 - 20      GFR est AA >60 >60 ml/min/1.73m2    GFR est non-AA 52 (L) >60 ml/min/1.73m2    Calcium 8.5 8.5 - 10.1 mg/dL   MAGNESIUM    Collection Time: 21  2:27 AM   Result Value Ref Range    Magnesium 1.7 1.6 - 2.4 mg/dL   CBC WITH AUTOMATED DIFF Collection Time: 06/07/21  2:27 AM   Result Value Ref Range    WBC 13.6 (H) 4.1 - 11.1 K/uL    RBC 5.18 4.10 - 5.70 M/uL    HGB 15.1 12.1 - 17.0 g/dL    HCT 45.1 36.6 - 50.3 %    MCV 87.1 80.0 - 99.0 FL    MCH 29.2 26.0 - 34.0 PG    MCHC 33.5 30.0 - 36.5 g/dL    RDW 13.2 11.5 - 14.5 %    PLATELET 180 337 - 335 K/uL    MPV 9.4 8.9 - 12.9 FL    NRBC 0.0 0.0  WBC    ABSOLUTE NRBC 0.00 0.00 - 0.01 K/uL    NEUTROPHILS 76 (H) 32 - 75 %    LYMPHOCYTES 14 12 - 49 %    MONOCYTES 9 5 - 13 %    EOSINOPHILS 0 0 - 7 %    BASOPHILS 0 0 - 1 %    IMMATURE GRANULOCYTES 1 (H) 0 - 0.5 %    ABS. NEUTROPHILS 10.4 (H) 1.8 - 8.0 K/UL    ABS. LYMPHOCYTES 1.9 0.8 - 3.5 K/UL    ABS. MONOCYTES 1.2 (H) 0.0 - 1.0 K/UL    ABS. EOSINOPHILS 0.0 0.0 - 0.4 K/UL    ABS. BASOPHILS 0.1 0.0 - 0.1 K/UL    ABS. IMM. GRANS. 0.1 (H) 0.00 - 0.04 K/UL    DF AUTOMATED     GLUCOSE, POC    Collection Time: 06/07/21  8:54 AM   Result Value Ref Range    Glucose (POC) 272 (H) 65 - 117 mg/dL    Performed by Ila Hodge        XR FLUOROSCOPY UNDER 60 MINUTES   Final Result      CT ABD PELV W WO CONT   Final Result      Left renal mass is worrisome for malignancy. Left-sided hydroureteronephrosis   without stone. Apparent urinary bladder wall thickening. Recommend clinical   correlation and follow-up. Please see full report. Active Problems:    Hydronephrosis (6/6/2021)      Renal mass (6/6/2021)      Hematuria (6/6/2021)        Assessment/Plan:     Left-sided hydronephrosis     CT of abdomen/pelvis showing left renal mass concerning for malignancy, also noted left-sided hydroureteronephrosis and urinary bladder wall thickening.    -Status post left-sided ureteral stent placement    2. Hematuria/renal mass   Sx hematuria found to have a left renal mass concerning for malignancy  - Dilaudid as needed for pain relief at this time  - Follow-up urology recommendations     3. Hypertension continue home antihypertensive medications     4.  History of HIV continue home medications     5. Hyperlipidemia continue statin      DVT Prophylaxis: SCDs  Code Status: Full  POA:    Care Plan discussed with: patient and nursing    Total time spent with patient: >35 minutes.

## 2021-06-07 NOTE — PROGRESS NOTES
Problem: Falls - Risk of  Goal: *Absence of Falls  Description: Document Wiliam Guzman Fall Risk and appropriate interventions in the flowsheet.   Outcome: Progressing Towards Goal  Note: Fall Risk Interventions:  Mobility Interventions: Patient to call before getting OOB, Bed/chair exit alarm         Medication Interventions: Bed/chair exit alarm, Patient to call before getting OOB, Teach patient to arise slowly    Elimination Interventions: Call light in reach, Bed/chair exit alarm, Patient to call for help with toileting needs    History of Falls Interventions: Bed/chair exit alarm, Door open when patient unattended

## 2021-06-08 ENCOUNTER — APPOINTMENT (OUTPATIENT)
Dept: GENERAL RADIOLOGY | Age: 56
DRG: 442 | End: 2021-06-08
Attending: PHYSICIAN ASSISTANT
Payer: COMMERCIAL

## 2021-06-08 ENCOUNTER — ANESTHESIA EVENT (OUTPATIENT)
Dept: SURGERY | Age: 56
DRG: 442 | End: 2021-06-08
Payer: COMMERCIAL

## 2021-06-08 ENCOUNTER — APPOINTMENT (OUTPATIENT)
Dept: ULTRASOUND IMAGING | Age: 56
DRG: 442 | End: 2021-06-08
Attending: PHYSICIAN ASSISTANT
Payer: COMMERCIAL

## 2021-06-08 LAB
APPEARANCE UR: CLEAR
BACTERIA URNS QL MICRO: NEGATIVE /HPF
BILIRUB UR QL: NEGATIVE
COLOR UR: ABNORMAL
EPO SERPL-ACNC: 12.6 MIU/ML (ref 2.6–18.5)
GLUCOSE BLD STRIP.AUTO-MCNC: 195 MG/DL (ref 65–117)
GLUCOSE BLD STRIP.AUTO-MCNC: 267 MG/DL (ref 65–117)
GLUCOSE BLD STRIP.AUTO-MCNC: 315 MG/DL (ref 65–117)
GLUCOSE BLD STRIP.AUTO-MCNC: 339 MG/DL (ref 65–117)
GLUCOSE UR STRIP.AUTO-MCNC: NEGATIVE MG/DL
HGB UR QL STRIP: ABNORMAL
KETONES UR QL STRIP.AUTO: NEGATIVE MG/DL
LEUKOCYTE ESTERASE UR QL STRIP.AUTO: ABNORMAL
MUCOUS THREADS URNS QL MICRO: ABNORMAL /LPF
NITRITE UR QL STRIP.AUTO: NEGATIVE
PERFORMED BY, TECHID: ABNORMAL
PH UR STRIP: 5 [PH] (ref 5–8)
PROT UR STRIP-MCNC: NEGATIVE MG/DL
RBC #/AREA URNS HPF: >100 /HPF (ref 0–5)
SP GR UR REFRACTOMETRY: 1 (ref 1–1.03)
UA: UC IF INDICATED,UAUC: ABNORMAL
UROBILINOGEN UR QL STRIP.AUTO: 0.1 EU/DL (ref 0.1–1)
WBC URNS QL MICRO: ABNORMAL /HPF (ref 0–4)

## 2021-06-08 PROCEDURE — 74011000250 HC RX REV CODE- 250: Performed by: PHYSICIAN ASSISTANT

## 2021-06-08 PROCEDURE — 87186 SC STD MICRODIL/AGAR DIL: CPT

## 2021-06-08 PROCEDURE — 65270000029 HC RM PRIVATE

## 2021-06-08 PROCEDURE — 81001 URINALYSIS AUTO W/SCOPE: CPT

## 2021-06-08 PROCEDURE — 74011636637 HC RX REV CODE- 636/637: Performed by: STUDENT IN AN ORGANIZED HEALTH CARE EDUCATION/TRAINING PROGRAM

## 2021-06-08 PROCEDURE — 82962 GLUCOSE BLOOD TEST: CPT

## 2021-06-08 PROCEDURE — 87040 BLOOD CULTURE FOR BACTERIA: CPT

## 2021-06-08 PROCEDURE — 74011250637 HC RX REV CODE- 250/637: Performed by: INTERNAL MEDICINE

## 2021-06-08 PROCEDURE — 87086 URINE CULTURE/COLONY COUNT: CPT

## 2021-06-08 PROCEDURE — 36415 COLL VENOUS BLD VENIPUNCTURE: CPT

## 2021-06-08 PROCEDURE — 76870 US EXAM SCROTUM: CPT

## 2021-06-08 PROCEDURE — 87077 CULTURE AEROBIC IDENTIFY: CPT

## 2021-06-08 PROCEDURE — 74011250636 HC RX REV CODE- 250/636: Performed by: PHYSICIAN ASSISTANT

## 2021-06-08 PROCEDURE — 71045 X-RAY EXAM CHEST 1 VIEW: CPT

## 2021-06-08 RX ADMIN — AZITHROMYCIN DIHYDRATE 500 MG: 500 INJECTION, POWDER, LYOPHILIZED, FOR SOLUTION INTRAVENOUS at 15:59

## 2021-06-08 RX ADMIN — ATORVASTATIN CALCIUM 40 MG: 40 TABLET, FILM COATED ORAL at 09:44

## 2021-06-08 RX ADMIN — ACETAMINOPHEN 650 MG: 325 TABLET ORAL at 21:12

## 2021-06-08 RX ADMIN — INSULIN LISPRO 8 UNITS: 100 INJECTION, SOLUTION INTRAVENOUS; SUBCUTANEOUS at 13:45

## 2021-06-08 RX ADMIN — REPAGLINIDE 2 MG: 2 TABLET ORAL at 15:59

## 2021-06-08 RX ADMIN — REPAGLINIDE 2 MG: 2 TABLET ORAL at 12:11

## 2021-06-08 RX ADMIN — Medication 10 ML: at 13:52

## 2021-06-08 RX ADMIN — INSULIN LISPRO 6 UNITS: 100 INJECTION, SOLUTION INTRAVENOUS; SUBCUTANEOUS at 15:59

## 2021-06-08 RX ADMIN — Medication 10 ML: at 21:12

## 2021-06-08 RX ADMIN — REPAGLINIDE 2 MG: 2 TABLET ORAL at 09:44

## 2021-06-08 RX ADMIN — CEFTRIAXONE SODIUM 1 G: 1 INJECTION, POWDER, FOR SOLUTION INTRAMUSCULAR; INTRAVENOUS at 16:00

## 2021-06-08 RX ADMIN — DOLUTEGRAVIR SODIUM 50 MG: 50 TABLET, FILM COATED ORAL at 09:46

## 2021-06-08 RX ADMIN — INSULIN LISPRO 8 UNITS: 100 INJECTION, SOLUTION INTRAVENOUS; SUBCUTANEOUS at 09:44

## 2021-06-08 RX ADMIN — INSULIN LISPRO 2 UNITS: 100 INJECTION, SOLUTION INTRAVENOUS; SUBCUTANEOUS at 21:19

## 2021-06-08 NOTE — PROGRESS NOTES
Hematology Oncology Progress Note     Interval History :  60 yr old admitted with abdominal pain and hematuria with hydrouretero nephrosis. CT scans  Showed 7cmmass in the left upper pole of left kidney. S/p cystoscopy and retrograde pyelograms. Hydroureteronephrosis is due to clot retention from hematuria in bladder and ureters. S/p evacuation and stents. Subjective:     HE is feeling better now. [place]  Has mild blood tinged urine clearing to yellow now. Reports pain is improved. He had ct chest for staging and it shows small foci of cortical disruption  Of several lower ribs with soft tissue components,? Early bone mets.       Current Facility-Administered Medications   Medication Dose Route Frequency    cefTRIAXone (ROCEPHIN) 1 g in sterile water (preservative free) 10 mL IV syringe  1 g IntraVENous Q24H    azithromycin (ZITHROMAX) 500 mg in 0.9% sodium chloride 250 mL (VIAL-MATE)  500 mg IntraVENous Q24H    [Held by provider] aspirin chewable tablet 81 mg  81 mg Oral DAILY    atorvastatin (LIPITOR) tablet 40 mg  40 mg Oral DAILY    [Held by provider] clopidogreL (PLAVIX) tablet 75 mg  75 mg Oral DAILY    famotidine (PEPCID) tablet 20 mg  20 mg Oral DAILY    hydroCHLOROthiazide (HYDRODIURIL) tablet 25 mg  25 mg Oral DAILY    insulin glargine (LANTUS) injection 50 Units  50 Units SubCUTAneous QHS    lisinopriL (PRINIVIL, ZESTRIL) tablet 10 mg  10 mg Oral DAILY    nfubytnagx-mtskoeln-ezaioq ala 200-25-25 mg tab 1 Tablet (Patient Supplied)  1 Tablet Oral DAILY    [Held by provider] pentoxifylline CR (TRENTAL) tablet 400 mg  400 mg Oral BID    repaglinide (PRANDIN) tablet 2 mg  2 mg Oral TIDAC    dolutegravir (TIVICAY) tablet 50 mg  50 mg Oral DAILY    sodium chloride (NS) flush 5-40 mL  5-40 mL IntraVENous Q8H    sodium chloride (NS) flush 5-40 mL  5-40 mL IntraVENous PRN    acetaminophen (TYLENOL) tablet 650 mg  650 mg Oral Q6H PRN    Or    acetaminophen (TYLENOL) suppository 650 mg  650 mg Rectal Q6H PRN    polyethylene glycol (MIRALAX) packet 17 g  17 g Oral DAILY PRN    ondansetron (ZOFRAN ODT) tablet 4 mg  4 mg Oral Q8H PRN    Or    ondansetron (ZOFRAN) injection 4 mg  4 mg IntraVENous Q6H PRN    insulin lispro (HUMALOG) injection   SubCUTAneous AC&HS    glucose chewable tablet 16 g  4 Tablet Oral PRN    glucagon (GLUCAGEN) injection 1 mg  1 mg IntraMUSCular PRN    dextrose (D50W) injection syrg 12.5-25 g  25-50 mL IntraVENous PRN        Review of Systems:    Constitutional No fevers, chills, night sweats, excessive fatigue or weight loss. Allergic/Immunologic No recent allergic reactions   Eyes No significant visual difficulties. No diplopia. ENMT No problems with hearing, no sore throat, no sinus drainage. Endocrine No hot flashes or night sweats. No cold intolerance, polyuria, or polydipsia   Hematologic/Lymphatic No easy bruising or bleeding. The patient denies any tender or palpable lymph nodes   Breasts No abnormal masses of breast, nipple discharge or pain. Respiratory No dyspnea on exertion, orthopnea, chest pain, cough or hemoptysis. Cardiovascular No anginal chest pain, irregular heart beat, tachycardia, palpitations or orthopnea. Gastrointestinal No nausea, vomiting, diarrhea, constipation, cramping, dysphagia, reflux, heartburn, GI bleeding, or early satiety. No change in bowel habits. Genitourinary (M) + hematuria, dysuria, increased frequency, urgency, hesitancy. Musculoskeletal No joint pain, swelling or redness. No decreased range of motion. Integumentary No chronic rashes, inflammation, ulcerations, pruritus, petechiae, purpura, ecchymoses, or skin changes. Neurologic No headache, blurred vision, and no areas of focal weakness or numbness. Normal gait. No sensory problems. Psychiatric No insomnia, depression, sharla or mood swings. No psychotropic drugs.      Objective:     Patient Vitals for the past 8 hrs:   BP Temp Pulse Resp SpO2   06/08/21 1527 113/70 98.5 °F (36.9 °C) 97 18 98 %   21 1157 93/67 100 °F (37.8 °C) (!) 108 18 96 %       Temp (24hrs), Av.4 °F (37.4 °C), Min:98.5 °F (36.9 °C), Max:100.6 °F (38.1 °C)      Physical Exam:    Constitutional  [place] male Alert, cooperative, oriented. Mood and affect appropriate. Appears close to chronological age. Well nourished. Well developed. Head Normocephalic; no scars   Eyes Conjunctivae and sclerae are clear and without icterus. Pupils are reactive and equal.   ENMT Sinuses are nontender. No oral exudates, ulcers, masses, thrush or mucositis. Oropharynx clear. Tongue normal.   Neck Supple without masses or thyromegaly. No jugular venous distension. Hematologic/Lymphatic No petechiae or purpura. No tender or palpable lymph nodes in the cervical, supraclavicular, axillary or inguinal area. Respiratory Lungs are clear to auscultation without rhonchi or wheezing. Cardiovascular Regular rate and rhythm of heart without murmurs, gallops or rubs. Chest / Line Site Chest is symmetric with no chest wall deformities. Abdomen Non-tender, non-distended, no masses, ascites or hepatosplenomegaly. Good bowel sounds. No guarding or rebound tenderness. No pulsatile masses. Musculoskeletal No tenderness or swelling, normal range of motion without obvious weakness. Extremities No visible deformities, no cyanosis, clubbing or edema. Skin No rashes, scars, or lesions suggestive of malignancy. No petechiae, purpura, or ecchymoses. No excoriations. Neurologic No sensory or motor deficits, normal cerebellar function, normal gait, cranial nerves intact. Psychiatric Alert and oriented times three. Coherent speech. Verbalizes understanding of our discussions today.      Lab/Data Review:  Recent Labs     21   WBC 13.6* 11.6*   HGB 15.1 17.0   HCT 45.1 52.4*    329     Recent Labs     215   * 132*   K 3.6 3.9    101   CO2 26 22   * 348*   BUN 21* 21*   CREA 1.40* 1.64*   CA 8.5 9.3   MG 1.7  --    ALB  --  3.6   TBILI  --  0.5   ALT  --  37     No results for input(s): PH, PCO2, PO2, HCO3, FIO2 in the last 72 hours. Radiology:   CT CHEST WO CONT    Result Date: 6/7/2021  Early bone metastases. No evidence of lung metastasis    CT ABD PELV W WO CONT    Result Date: 6/6/2021  Left renal mass is worrisome for malignancy. Left-sided hydroureteronephrosis without stone. Apparent urinary bladder wall thickening. Recommend clinical correlation and follow-up. Please see full report. XR CHEST PORT    Result Date: 6/8/2021  The cardiomediastinal silhouette is appropriate for age, technique, and lung expansion. Pulmonary vasculature is not congested. The lungs are essentially clear. No effusion or pneumothorax is seen. US SCROTUM/TESTICLES W DOPPLER    Result Date: 6/8/2021  Negative for torsion. Assessment /Plan:     Active Problems:    Hydronephrosis (6/6/2021)      Renal mass (6/6/2021)      Hematuria (6/6/2021)      1) 60 yr old male with PMH of HIV, hypertension , pvd, hyperlipidemia ,DM and cad with h/o left leg bypass. 2) Left renal mass: 7cm mass seen in the upper pole of the left kidney. Highly suspicious for primary renal cell carcinoma. No other obvious mets in the abdomen. - non contrast ct chest shows some rib lesions suspcious for early mets, but need to r/o other etiologies . Lungs do not show mets. He will need bone scan/pet scan. Check labs. -Noted plans by dr. Manuel De León for surgery later this week. D/w urology about ct findiings. 3) Hydroureteronephrosis: secondary to clot retention. Creatinine is improving today. 4) luecocytosis: wbc coutn of 13.6k. monitor. 5) hematuria is subsided. On CBI. Aspirin and plavix held now. H/h is ok.            Agnieszka Menendez MD  6/8/2021

## 2021-06-08 NOTE — PROGRESS NOTES
Problem: Falls - Risk of  Goal: *Absence of Falls  Description: Document Wiliam Guzman Fall Risk and appropriate interventions in the flowsheet.   Outcome: Progressing Towards Goal  Note: Fall Risk Interventions:  Mobility Interventions: Bed/chair exit alarm, Patient to call before getting OOB, Utilize walker, cane, or other assistive device         Medication Interventions: Bed/chair exit alarm, Patient to call before getting OOB, Teach patient to arise slowly    Elimination Interventions: Bed/chair exit alarm, Call light in reach, Patient to call for help with toileting needs    History of Falls Interventions: Assess for delayed presentation/identification of injury for 48 hrs (comment for end date)

## 2021-06-08 NOTE — PROGRESS NOTES
Hospitalist Progress Note    Subjective:   Daily Progress Note: 6/8/2021     Hospital Course:  Armando Lara is a 57-year-old obese -American male with a past medical history of CAD, hypertension, hypercholesterolemia, diabetes mellitus, PVD, and HIV who is presenting to the emergency department with a primary complaint of abdominal pain. Patient reports she was in her normal state of health until few days ago when he started to experience gradually worsening lower abdominal pain. He reports associated hematuria and nausea. He denies any fevers, chills, vomiting, dizziness, chest pain, palpitations, shortness of breath, swelling of extremities, or focal weakness. In the ED, vitals temp 98.7, pulse 101 bpm, /90, and SPO2 97% on room air. Lab work-up showing WBC 11.6, glucose 348, BUN 21, creatinine 1.64. CT of abdomen/pelvis showing left renal mass concerning for malignancy, also noted left-sided hydroureteronephrosis and urinary bladder wall thickening. Urology and oncology consult. .  Patient admitted to hospitalist services for hydroureteronephrosis. Scheduled for left nephrectomy on 6/9/2021. Left testicular pain and ultrasound pending. Prophylactically started Rocephin and azithromycin    Subjective:    Patient seen and examined at bedside. He reports improvement in abdominal pain.    Left testicular pain    Current Facility-Administered Medications   Medication Dose Route Frequency    cefTRIAXone (ROCEPHIN) 1 g in sterile water (preservative free) 10 mL IV syringe  1 g IntraVENous Q24H    azithromycin (ZITHROMAX) 500 mg in 0.9% sodium chloride 250 mL (VIAL-MATE)  500 mg IntraVENous Q24H    [Held by provider] aspirin chewable tablet 81 mg  81 mg Oral DAILY    atorvastatin (LIPITOR) tablet 40 mg  40 mg Oral DAILY    [Held by provider] clopidogreL (PLAVIX) tablet 75 mg  75 mg Oral DAILY    famotidine (PEPCID) tablet 20 mg  20 mg Oral DAILY    hydroCHLOROthiazide (HYDRODIURIL) tablet 25 mg  25 mg Oral DAILY    insulin glargine (LANTUS) injection 50 Units  50 Units SubCUTAneous QHS    lisinopriL (PRINIVIL, ZESTRIL) tablet 10 mg  10 mg Oral DAILY    hunjwbnqed-plxemfgu-wgdlht ala 200-25-25 mg tab 1 Tablet (Patient Supplied)  1 Tablet Oral DAILY    [Held by provider] pentoxifylline CR (TRENTAL) tablet 400 mg  400 mg Oral BID    repaglinide (PRANDIN) tablet 2 mg  2 mg Oral TIDAC    dolutegravir (TIVICAY) tablet 50 mg  50 mg Oral DAILY    sodium chloride (NS) flush 5-40 mL  5-40 mL IntraVENous Q8H    sodium chloride (NS) flush 5-40 mL  5-40 mL IntraVENous PRN    acetaminophen (TYLENOL) tablet 650 mg  650 mg Oral Q6H PRN    Or    acetaminophen (TYLENOL) suppository 650 mg  650 mg Rectal Q6H PRN    polyethylene glycol (MIRALAX) packet 17 g  17 g Oral DAILY PRN    ondansetron (ZOFRAN ODT) tablet 4 mg  4 mg Oral Q8H PRN    Or    ondansetron (ZOFRAN) injection 4 mg  4 mg IntraVENous Q6H PRN    insulin lispro (HUMALOG) injection   SubCUTAneous AC&HS    glucose chewable tablet 16 g  4 Tablet Oral PRN    glucagon (GLUCAGEN) injection 1 mg  1 mg IntraMUSCular PRN    dextrose (D50W) injection syrg 12.5-25 g  25-50 mL IntraVENous PRN        Review of Systems  Review of Systems   Constitutional: Positive for fever. Negative for chills. Eyes: Negative. Respiratory: Negative for cough and shortness of breath. Cardiovascular: Negative for chest pain and leg swelling. Gastrointestinal: Negative for abdominal pain, nausea and vomiting. Genitourinary:        Left testicular pain   Musculoskeletal: Negative. Skin: Negative. Neurological: Negative. Psychiatric/Behavioral: Negative.           Objective:     Visit Vitals  BP 93/67   Pulse (!) 108   Temp 100 °F (37.8 °C)   Resp 18   Ht 6' 1\" (1.854 m)   Wt 115.7 kg (255 lb)   SpO2 96%   BMI 33.64 kg/m²      O2 Device: None (Room air)    Temp (24hrs), Av.5 °F (37.5 °C), Min:98.6 °F (37 °C), Max:100.6 °F (38.1 °C)      701 -  1900  In: 3000   Out: 3300 [Urine:3300]  06/06 1901 - 06/08 0700  In: 99901 [P.O.:2040]  Out: 97408 [Urine:20581]    PHYSICAL EXAM:  Physical Exam  Vitals reviewed. Constitutional:       General: He is not in acute distress. Appearance: He is not ill-appearing. HENT:      Head: Normocephalic and atraumatic. Mouth/Throat:      Mouth: Mucous membranes are moist.      Pharynx: Oropharynx is clear. Eyes:      Conjunctiva/sclera: Conjunctivae normal.   Cardiovascular:      Rate and Rhythm: Normal rate and regular rhythm. Heart sounds: Normal heart sounds. Pulmonary:      Effort: Pulmonary effort is normal.      Breath sounds: Normal breath sounds. Abdominal:      General: Abdomen is flat. Bowel sounds are normal.      Palpations: Abdomen is soft. Genitourinary:     Comments: Left testicular pain possible epididymitis  Musculoskeletal:         General: Normal range of motion. Cervical back: Normal range of motion and neck supple. Skin:     General: Skin is warm and dry. Neurological:      General: No focal deficit present. Mental Status: He is alert and oriented to person, place, and time. Psychiatric:         Mood and Affect: Mood normal.           Data Review    Recent Results (from the past 24 hour(s))   GLUCOSE, POC    Collection Time: 06/07/21  3:00 PM   Result Value Ref Range    Glucose (POC) 169 (H) 65 - 117 mg/dL    Performed by [facility], POC    Collection Time: 06/07/21 10:47 PM   Result Value Ref Range    Glucose (POC) 175 (H) 65 - 117 mg/dL    Performed by [address] [place] [address], POC    Collection Time: 06/08/21  8:19 AM   Result Value Ref Range    Glucose (POC) 315 (H) 65 - 117 mg/dL    Performed by Katherine Chen    GLUCOSE, POC    Collection Time: 06/08/21 12:45 PM   Result Value Ref Range    Glucose (POC) 339 (H) 65 - 117 mg/dL    Performed by Jerry Higgins        CT CHEST WO CONT   Final Result   Early bone metastases.  No evidence of lung metastasis      XR FLUOROSCOPY UNDER 60 MINUTES   Final Result      CT ABD PELV W WO CONT   Final Result      Left renal mass is worrisome for malignancy. Left-sided hydroureteronephrosis   without stone. Apparent urinary bladder wall thickening. Recommend clinical   correlation and follow-up. Please see full report. Myron    (Results Pending)       Active Problems:    Hydronephrosis (6/6/2021)      Renal mass (6/6/2021)      Hematuria (6/6/2021)        Assessment/Plan:     1. Left-sided hydronephrosis     CT of abdomen/pelvis showing left renal mass concerning for malignancy, also noted left-sided hydroureteronephrosis and urinary bladder wall thickening.    -Status post left-sided ureteral stent placement    2. Hematuria/renal mass   Sx hematuria found to have a left renal mass concerning for malignancy  - Dilaudid as needed for pain relief at this time  - Follow-up urology recommendations  -Left nephrectomy scheduled for 6/9/2021     3. Hypertension continue home antihypertensive medications     4. History of HIV continue home medications     5. Hyperlipidemia continue statin    6. Left testicular pain  Ultrasound of the scrotum pending  Prophylactically started Rocephin and azithromycin    7. Febrile illness  Chest x-ray  Blood cultures  urinalysis  Urine culture  Procal in AM      DVT Prophylaxis: SCDs  Code Status: Full  POA:    Care Plan discussed with: patient and nursing    Total time spent with patient: >35 minutes.

## 2021-06-08 NOTE — PROGRESS NOTES
Nutrition Education    · Verbally reviewed information with Patient  · Educated on CHF/DM MNT  · Written educational materials provided-Heart failure nutrition therapy, Carb counting for ppl with DM, MyPlate handout  · Contact name and number provided. · Refer to Patient Education activity for more details. RD spoke to pt at bedside. Pt reports trying to follow DM diet PTA. Reports he skips breakfast L- salads, meat D- meat, fruits/vegetables. Reports he wants to limit his meat intake and is interested in vegetarianism. Reports checking BG PTA and was running roughly 220 mg/dL. Current -335 mg/dL. Reports he has lost 80 lbs over the past year. Reports eats SFM since his stomach has been bothering him. RD discussed importance of following DM diet to manage BG and reduce risk of neuropathy. RD reviewed cho foods and BG impacts. Reviewed protein impacts on BG and sources. Discussed importance of pairing protein and cho with all meals for glucose management. Encouraged pt to look into CGM and check BG daily once d/c. Reviewed importance of listening to body's cues to for signs of hypo/hyperglycemia. Encouraged pt to have OJ w/ hypoglycemia and wait to have protein rich foods until BG improves. RD reviewed foods to increase and limit for optimal BG. Additionally advised pt to have more lean proteins (reviewed sources), low fat dairy, healthy fats (fish, nuts/seeds, olive oil, avocados), variety of f/v etc. Reviewed foods to limit including: whole fat dairy, SSB to no more than 8oz/d (preferably sugar free), processed and refined goods, cured/deli meats etc. Reviewed balanced meals as evidenced by MyPlate. Discussed having each meal contain half plate filled with f/v and other half lean proteins and complex chos. Told pt to increase whole grains for fullness and optimal BG control, discussed food sources. Encouraged pt to limit fruit juice and pick whole fruit/foods instead.  RD gave pt contact info and encouraged to reach out with further questions or concerns. RD reviewed heart healthy nutrition therapy. Discussed importance of sodium restriction and food sources. Reviewed importance of weighing self daily to assess weight changes and be aware of possible fluid retention. If edema present once d/c encouraged to contact PCP. Reviewed heart healthy foods to increase including: whole grains, low fat dairy, lean proteins, fruits/veggies, healthy fats (olive oil, nuts/seeds, fish, avocados) etc. Discussed fiber and heart health impacts/benefits as well as decreased constipation incidence. Reviewed foods to avoid for heart health including: fried foods, whole fat dairy, cured/deli meats, trans and sat fats, ultra-processed goods, refined grains and sugar etc. Discussed health impacts of daily activity for heart. RD gave pt contact info and encouraged to reach out with further questions or concerns.     Lab Results   Component Value Date/Time    Hemoglobin A1c 10.2 (H) 05/03/2021 10:05 AM           Electronically signed by Amanda Wilder RD on 6/8/2021 at 12:43 PM    Contact Number: Ext [phone]

## 2021-06-08 NOTE — ROUTINE PROCESS
Bedside shift report given by primary care nurse, Jennifer Ken RN to oncoming nurse, Janelle De Leon RN. Report to include SBAR, plan of care, recent results, discharge planning, and patient's questions and concerns.

## 2021-06-08 NOTE — PROGRESS NOTES
UROLOGY Progress Note         [phone]      Daily Progress Note: 6/8/2021      Subjective: The patient is seen for UROLOGIC follow up for hydronephrosis, renal mass, hematuria, urinary frequency. He is s/p cystourethroscopy, clot evacuation, bilateral RPG, and left ureteral stent placement. The ureter appeared mildly dilated along its entire length without focal stricturing. There was filling defect in the upper and mid infundibula high with dilation of these. This was suspicious for clot. CBI ongoing, slow flow rate. Urine remains clear, yellow. He has scrotal pain, L>R. He tells me it's been like that for 7-8 years. Dull ache, mildly bothersome. No abnormality on visual inspection or palpation.     Problem List:  Patient Active Problem List   Diagnosis Code    Pain of lower extremity M79.606    Abnormal stress test R94.39    Hydronephrosis N13.30    Renal mass N28.89    Hematuria R31.9         Medications reviewed  Current Facility-Administered Medications   Medication Dose Route Frequency    [Held by provider] aspirin chewable tablet 81 mg  81 mg Oral DAILY    atorvastatin (LIPITOR) tablet 40 mg  40 mg Oral DAILY    [Held by provider] clopidogreL (PLAVIX) tablet 75 mg  75 mg Oral DAILY    famotidine (PEPCID) tablet 20 mg  20 mg Oral DAILY    hydroCHLOROthiazide (HYDRODIURIL) tablet 25 mg  25 mg Oral DAILY    insulin glargine (LANTUS) injection 50 Units  50 Units SubCUTAneous QHS    lisinopriL (PRINIVIL, ZESTRIL) tablet 10 mg  10 mg Oral DAILY    ktffzampkk-bqnxwiso-eoytsd ala 200-25-25 mg tab 1 Tablet (Patient Supplied)  1 Tablet Oral DAILY    [Held by provider] pentoxifylline CR (TRENTAL) tablet 400 mg  400 mg Oral BID    repaglinide (PRANDIN) tablet 2 mg  2 mg Oral TIDAC    dolutegravir (TIVICAY) tablet 50 mg  50 mg Oral DAILY    sodium chloride (NS) flush 5-40 mL  5-40 mL IntraVENous Q8H    sodium chloride (NS) flush 5-40 mL  5-40 mL IntraVENous PRN    acetaminophen (TYLENOL) tablet 650 mg  650 mg Oral Q6H PRN    Or    acetaminophen (TYLENOL) suppository 650 mg  650 mg Rectal Q6H PRN    polyethylene glycol (MIRALAX) packet 17 g  17 g Oral DAILY PRN    ondansetron (ZOFRAN ODT) tablet 4 mg  4 mg Oral Q8H PRN    Or    ondansetron (ZOFRAN) injection 4 mg  4 mg IntraVENous Q6H PRN    HYDROmorphone (DILAUDID) injection 1 mg  1 mg IntraVENous Q4H PRN    insulin lispro (HUMALOG) injection   SubCUTAneous AC&HS    glucose chewable tablet 16 g  4 Tablet Oral PRN    glucagon (GLUCAGEN) injection 1 mg  1 mg IntraMUSCular PRN    dextrose (D50W) injection syrg 12.5-25 g  25-50 mL IntraVENous PRN       Review of Systems:   Review of Systems   Constitutional: Negative for chills and fever. Gastrointestinal: Positive for abdominal pain. Negative for nausea and vomiting. Suprapubic discomfort, mild   Genitourinary: Positive for hematuria. Negative for dysuria. Hematuria improved, on CBI           Objective:   Physical Exam  Vitals and nursing note reviewed. Constitutional:       General: He is not in acute distress. HENT:      Head: Normocephalic and atraumatic. Eyes:      Extraocular Movements: Extraocular movements intact. Cardiovascular:      Rate and Rhythm: Normal rate. Pulmonary:      Effort: Pulmonary effort is normal. No respiratory distress. Breath sounds: No wheezing. Abdominal:      Palpations: Abdomen is soft. Genitourinary:     Comments: CBI; clear yellow urine draining with irrigant. Small palpable bumps along the left side of the shaft of the penis. Flattened, c/w condyloma  Left high groin with two polypoid skin tags 8-10mm each    Musculoskeletal:      Cervical back: Normal range of motion. Skin:     General: Skin is warm and dry. Neurological:      Mental Status: He is alert and oriented to person, place, and time.    Psychiatric:         Behavior: Behavior normal.          Visit Vitals  BP 97/60 (BP 1 Location: Right upper arm, BP Patient Position: At rest)   Pulse 91   Temp 99.1 °F (37.3 °C)   Resp 18   Ht 6' 1\" (1.854 m)   Wt 255 lb (115.7 kg)   SpO2 96%   BMI 33.64 kg/m²         Data Review:       Recent Days:  Recent Labs     06/07/21 0227 06/05/21  2130   WBC 13.6* 11.6*   HGB 15.1 17.0   HCT 45.1 52.4*    329     Recent Labs     06/07/21 0227 06/05/21  2145   * 132*   K 3.6 3.9    101   CO2 26 22   * 348*   BUN 21* 21*   CREA 1.40* 1.64*   CA 8.5 9.3   MG 1.7  --    ALB  --  3.6   TBILI  --  0.5   ALT  --  37       Assessment/     Patient Active Problem List   Diagnosis Code    Pain of lower extremity M79.606    Abnormal stress test R94.39    Hydronephrosis N13.30    Renal mass N28.89    Hematuria R31.9       Plan:  LEFT SIDED HYDRONEPHROSIS: he is s/p left ureteral stent placement on 6/6/21. Creatinine improved at 1.40 from 1.64. RETAINED URETERAL STENT: left ureteral stent placed on 6/6/21. Tolerating without discomfort. LEFT RENAL MASS: Seen on admission CT scan, at least T2, likely a renal cell carcinoma. No obvious metastasis on imaging to this point. Anticoagulation on hold at this time. Will need left nephrectomy. Will plan this on Wed evening. The patient was counseled on the risks, benefits and expected course of surgery. Surgery has risks of bleeding, infection, injury, pain, death or other consequences. Perioperative medications and antibiotic use were discussed including the potential for reactions and side effects. Due to recent anticoagulation he is at increased risk of hemorrhage. NPO p MIDNIGHT    GROSS HEMATURIA: Possibly long term clot retention, likely from the renal mass and exacerbated by anticoagulation (currently on hold). Continue CBI as there was notable bloody efflux from the left ureteral orifice. LUTS: Frequency and urgency. Now with CBI infusion in place. Keep slow rate to prevent clots. GENITAL WARTS: chronic.   Topical therapy recommended as an outpatient. SCROTAL/TESTICULAR PAIN: chronic. No abnormalities on examination. Mildly bothersome. Scrotal US if pain continues or worsens. I personally had a face to face encounter with the patient and performed the history, physical, assessment and plan.    MD Sakina Caldwell, NP

## 2021-06-08 NOTE — ANESTHESIA PREPROCEDURE EVALUATION
Relevant Problems   RENAL FAILURE   (+) Hydronephrosis       Anesthetic History   No history of anesthetic complications            Review of Systems / Medical History  Patient summary reviewed, nursing notes reviewed and pertinent labs reviewed    Pulmonary        Sleep apnea           Neuro/Psych   Within defined limits           Cardiovascular    Hypertension          CAD and hyperlipidemia      Comments: Pt states recent cardiac clearance for a different procedure  No Hx of CABG or cardiac stents  H/O peripheral vascular disease with stents in legs   GI/Hepatic/Renal         Renal disease: CRI       Endo/Other    Diabetes: poorly controlled, type 2, using insulin    Obesity     Other Findings   Comments: Allergies  No Known Allergies  Ht: 6' 1\" (185.4 cm)  Weight: 115.7 kg (255 lb)  BMI: 33.64 kg/m²  ASA:   Watch meds found  CrCl:   77.6 mL/min (A)  Procedure  LAPAROSCOPIC LEFT RADICAL NEPHRECTOMY-Left   In [facility] MAIN OR       Medical History  Diabetes ([facility] [address]  Hypertension  Hypercholesterolemia  HIV (human immunodeficiency virus infection) ([facility] [address]  PVD (peripheral vascular disease) ([facility] [address]  CAD (coronary artery disease)         Physical Exam    Airway  Mallampati: IV  TM Distance: 4 - 6 cm  Neck ROM: normal range of motion   Mouth opening: Normal     Cardiovascular  Regular rate and rhythm,  S1 and S2 normal,  no murmur, click, rub, or gallop             Dental    Dentition: Poor dentition  Comments: Multiple missing   Pulmonary  Breath sounds clear to auscultation               Abdominal         Other Findings   Comments: Results for Raul Barreto (MRN [id number]) as of 6/9/2021 16:05    6/7/2021 02:27  Sodium: 134 (L)  Potassium: 3.6  Chloride: 101  CO2: 26  Anion gap: 7  Glucose: 194 (H)  BUN: 21 (H)  Creatinine: 1.40 (H)  BUN/Creatinine ratio: 15  Calcium: 8.5  Magnesium: 1.7  GFR est non-AA: 52 (L)  GFR est AA: >60    Results for Raul Barreto (MRN [id number]) as of 6/9/2021 16:05    6/5/2021 21:30  WBC: 11.6 (H)  NRBC: 0.0  RBC: 5.89 (H)  HGB: 17.0  HCT: 52.4 (H)  MCV: 89.0  MCH: 28.9  MCHC: 32.4  RDW: 13.0  PLATELET: 410  MPV: 89.3  NEUTROPHILS: 75  LYMPHOCYTES: 14  MONOCYTES: 9  EOSINOPHILS: 0  BASOPHILS: 1  IMMATURE GRANULOCYTES: 1 (H)  DF: AUTOMATED  ABSOLUTE NRBC: 0.00  ABS. NEUTROPHILS: 8.5 (H)  ABS. IMM. GRANS.: 0.1 (H)  ABS. LYMPHOCYTES: 1.6  ABS. MONOCYTES: 1.1 (H)  ABS. EOSINOPHILS: 0.0  ABS.  BASOPHILS: 0.1           Anesthetic Plan    ASA: 3  Anesthesia type: general          Induction: Intravenous  Anesthetic plan and risks discussed with: Patient

## 2021-06-08 NOTE — PROGRESS NOTES
Reason for Admission:  Hydronephrosis                      RUR Score:   12%                  Plan for utilizing home health:      No need    PCP: First and Last name:  Chadwick Ruby NP     Name of Practice:    Are you a current patient: Yes/No: yes   Approximate date of last visit: \"last month\"   Can you participate in a virtual visit with your PCP: yes                    Current Advanced Directive/Advance Care Plan: Full Code      Healthcare Decision Maker:  Pati Garrison, [phone]  Click here to [address] including selection of the Healthcare Decision Maker Relationship (ie \"Primary\")                             Transition of Care Plan:                  Return home self care     CM met with patient in room to complete DCP assessment. Patient reports that he rents a room in a house on the first floor and has a roommate, however he is not particularly close to his roommate. Patient was completely independent in his ADL's and IADL's prior to admission and reports no issues with getting to doctors appts (utilizes insurance transport) or obtaining his medications. Patient has a brother and sister in the area, however they do work and patient is unsure how much help they will be able to provide to him on d/c.  CM discussed CM role, patient reports no needs at this time.     CM continues to follow, current DCP is for patient to return home self care

## 2021-06-09 ENCOUNTER — ANESTHESIA (OUTPATIENT)
Dept: SURGERY | Age: 56
DRG: 442 | End: 2021-06-09
Payer: COMMERCIAL

## 2021-06-09 LAB
CEA SERPL-MCNC: 2.2 NG/ML
ERYTHROCYTE [SEDIMENTATION RATE] IN BLOOD: 55 MM/HR
GLUCOSE BLD STRIP.AUTO-MCNC: 167 MG/DL (ref 65–117)
GLUCOSE BLD STRIP.AUTO-MCNC: 213 MG/DL (ref 65–117)
GLUCOSE BLD STRIP.AUTO-MCNC: 246 MG/DL (ref 65–117)
GLUCOSE BLD STRIP.AUTO-MCNC: 280 MG/DL (ref 65–117)
IGA SERPL-MCNC: 401 MG/DL (ref 70–400)
IGG SERPL-MCNC: 1070 MG/DL (ref 700–1600)
IGM SERPL-MCNC: 137 MG/DL (ref 40–230)
LDH SERPL L TO P-CCNC: 294 U/L (ref 85–241)
PERFORMED BY, TECHID: ABNORMAL
PROCALCITONIN SERPL-MCNC: 0.34 NG/ML
PSA SERPL-MCNC: 1.5 NG/ML (ref 0.01–4)

## 2021-06-09 PROCEDURE — 76060000035 HC ANESTHESIA 2 TO 2.5 HR: Performed by: UROLOGY

## 2021-06-09 PROCEDURE — 82378 CARCINOEMBRYONIC ANTIGEN: CPT

## 2021-06-09 PROCEDURE — 74011250636 HC RX REV CODE- 250/636: Performed by: NURSE ANESTHETIST, CERTIFIED REGISTERED

## 2021-06-09 PROCEDURE — 85652 RBC SED RATE AUTOMATED: CPT

## 2021-06-09 PROCEDURE — 74011250636 HC RX REV CODE- 250/636: Performed by: ANESTHESIOLOGY

## 2021-06-09 PROCEDURE — 2709999900 HC NON-CHARGEABLE SUPPLY: Performed by: UROLOGY

## 2021-06-09 PROCEDURE — 83615 LACTATE (LD) (LDH) ENZYME: CPT

## 2021-06-09 PROCEDURE — 74011000250 HC RX REV CODE- 250: Performed by: NURSE ANESTHETIST, CERTIFIED REGISTERED

## 2021-06-09 PROCEDURE — 77030038160 HC SHR COAG HARM J&J -F: Performed by: UROLOGY

## 2021-06-09 PROCEDURE — 83883 ASSAY NEPHELOMETRY NOT SPEC: CPT

## 2021-06-09 PROCEDURE — 77030040361 HC SLV COMPR DVT MDII -B: Performed by: UROLOGY

## 2021-06-09 PROCEDURE — 82784 ASSAY IGA/IGD/IGG/IGM EACH: CPT

## 2021-06-09 PROCEDURE — 74011636637 HC RX REV CODE- 636/637: Performed by: STUDENT IN AN ORGANIZED HEALTH CARE EDUCATION/TRAINING PROGRAM

## 2021-06-09 PROCEDURE — 77030002996 HC SUT SLK J&J -A: Performed by: UROLOGY

## 2021-06-09 PROCEDURE — 77030016151 HC PROTCTR LNS DFOG COVD -B: Performed by: UROLOGY

## 2021-06-09 PROCEDURE — 77030018842 HC SOL IRR SOD CL 9% BAXT -A: Performed by: UROLOGY

## 2021-06-09 PROCEDURE — 74011250636 HC RX REV CODE- 250/636: Performed by: PHYSICIAN ASSISTANT

## 2021-06-09 PROCEDURE — 84153 ASSAY OF PSA TOTAL: CPT

## 2021-06-09 PROCEDURE — 86301 IMMUNOASSAY TUMOR CA 19-9: CPT

## 2021-06-09 PROCEDURE — 77030002966 HC SUT PDS J&J -A: Performed by: UROLOGY

## 2021-06-09 PROCEDURE — 88307 TISSUE EXAM BY PATHOLOGIST: CPT

## 2021-06-09 PROCEDURE — 77030008518 HC TBNG INSUF ENDO STRY -B: Performed by: UROLOGY

## 2021-06-09 PROCEDURE — 77030037834: Performed by: UROLOGY

## 2021-06-09 PROCEDURE — 84145 PROCALCITONIN (PCT): CPT

## 2021-06-09 PROCEDURE — 77030008606 HC TRCR ENDOSC KII AMR -B: Performed by: UROLOGY

## 2021-06-09 PROCEDURE — 77030002933 HC SUT MCRYL J&J -A: Performed by: UROLOGY

## 2021-06-09 PROCEDURE — 77030010935 HC CLP LIG ABSRB TELE -B: Performed by: UROLOGY

## 2021-06-09 PROCEDURE — 36415 COLL VENOUS BLD VENIPUNCTURE: CPT

## 2021-06-09 PROCEDURE — 52310 CYSTOSCOPY AND TREATMENT: CPT | Performed by: UROLOGY

## 2021-06-09 PROCEDURE — 0TP98DZ REMOVAL OF INTRALUMINAL DEVICE FROM URETER, VIA NATURAL OR ARTIFICIAL OPENING ENDOSCOPIC: ICD-10-PCS | Performed by: UROLOGY

## 2021-06-09 PROCEDURE — 74011000250 HC RX REV CODE- 250: Performed by: PHYSICIAN ASSISTANT

## 2021-06-09 PROCEDURE — 77030010507 HC ADH SKN DERMBND J&J -B: Performed by: UROLOGY

## 2021-06-09 PROCEDURE — 74011250637 HC RX REV CODE- 250/637: Performed by: INTERNAL MEDICINE

## 2021-06-09 PROCEDURE — 77030005515 HC CATH URETH FOL14 BARD -B: Performed by: UROLOGY

## 2021-06-09 PROCEDURE — 77030009527 HC GEL PRT SYS AMR -E: Performed by: UROLOGY

## 2021-06-09 PROCEDURE — 77030009967 HC RELD STPLR ENDOSC J&J -C: Performed by: UROLOGY

## 2021-06-09 PROCEDURE — 77030011810 HC STPLR ENDOSC J&J -G: Performed by: UROLOGY

## 2021-06-09 PROCEDURE — 82962 GLUCOSE BLOOD TEST: CPT

## 2021-06-09 PROCEDURE — 77030008756 HC TU IRR SUC STRY -B: Performed by: UROLOGY

## 2021-06-09 PROCEDURE — 84155 ASSAY OF PROTEIN SERUM: CPT

## 2021-06-09 PROCEDURE — 74011000250 HC RX REV CODE- 250: Performed by: UROLOGY

## 2021-06-09 PROCEDURE — 0TT14ZZ RESECTION OF LEFT KIDNEY, PERCUTANEOUS ENDOSCOPIC APPROACH: ICD-10-PCS | Performed by: UROLOGY

## 2021-06-09 PROCEDURE — 77030018813 HC SCIS LAPSCP EPIX DISP AMR -B: Performed by: UROLOGY

## 2021-06-09 PROCEDURE — 99232 SBSQ HOSP IP/OBS MODERATE 35: CPT | Performed by: UROLOGY

## 2021-06-09 PROCEDURE — 76210000016 HC OR PH I REC 1 TO 1.5 HR: Performed by: UROLOGY

## 2021-06-09 PROCEDURE — 65270000029 HC RM PRIVATE

## 2021-06-09 PROCEDURE — 76010000131 HC OR TIME 2 TO 2.5 HR: Performed by: UROLOGY

## 2021-06-09 PROCEDURE — 50545 LAPARO RADICAL NEPHRECTOMY: CPT | Performed by: UROLOGY

## 2021-06-09 DEVICE — CLIP LIG ABSRB HEM-LOK LG PUR --: Type: IMPLANTABLE DEVICE | Status: FUNCTIONAL

## 2021-06-09 RX ORDER — NEOSTIGMINE METHYLSULFATE 1 MG/ML
INJECTION INTRAVENOUS AS NEEDED
Status: DISCONTINUED | OUTPATIENT
Start: 2021-06-09 | End: 2021-06-09 | Stop reason: HOSPADM

## 2021-06-09 RX ORDER — MIDAZOLAM HYDROCHLORIDE 1 MG/ML
INJECTION, SOLUTION INTRAMUSCULAR; INTRAVENOUS AS NEEDED
Status: DISCONTINUED | OUTPATIENT
Start: 2021-06-09 | End: 2021-06-09 | Stop reason: HOSPADM

## 2021-06-09 RX ORDER — SODIUM CHLORIDE, SODIUM LACTATE, POTASSIUM CHLORIDE, CALCIUM CHLORIDE 600; 310; 30; 20 MG/100ML; MG/100ML; MG/100ML; MG/100ML
INJECTION, SOLUTION INTRAVENOUS
Status: DISCONTINUED | OUTPATIENT
Start: 2021-06-09 | End: 2021-06-09 | Stop reason: HOSPADM

## 2021-06-09 RX ORDER — METOCLOPRAMIDE HYDROCHLORIDE 5 MG/ML
INJECTION INTRAMUSCULAR; INTRAVENOUS AS NEEDED
Status: DISCONTINUED | OUTPATIENT
Start: 2021-06-09 | End: 2021-06-09 | Stop reason: HOSPADM

## 2021-06-09 RX ORDER — SODIUM CHLORIDE 0.9 % (FLUSH) 0.9 %
5-40 SYRINGE (ML) INJECTION EVERY 8 HOURS
Status: CANCELLED | OUTPATIENT
Start: 2021-06-09

## 2021-06-09 RX ORDER — HYDROMORPHONE HYDROCHLORIDE 1 MG/ML
0.2 INJECTION, SOLUTION INTRAMUSCULAR; INTRAVENOUS; SUBCUTANEOUS
Status: DISCONTINUED | OUTPATIENT
Start: 2021-06-09 | End: 2021-06-09 | Stop reason: HOSPADM

## 2021-06-09 RX ORDER — SODIUM CHLORIDE 0.9 % (FLUSH) 0.9 %
5-40 SYRINGE (ML) INJECTION AS NEEDED
Status: CANCELLED | OUTPATIENT
Start: 2021-06-09

## 2021-06-09 RX ORDER — FENTANYL CITRATE 50 UG/ML
INJECTION, SOLUTION INTRAMUSCULAR; INTRAVENOUS AS NEEDED
Status: DISCONTINUED | OUTPATIENT
Start: 2021-06-09 | End: 2021-06-09 | Stop reason: HOSPADM

## 2021-06-09 RX ORDER — DEXAMETHASONE SODIUM PHOSPHATE 4 MG/ML
INJECTION, SOLUTION INTRA-ARTICULAR; INTRALESIONAL; INTRAMUSCULAR; INTRAVENOUS; SOFT TISSUE AS NEEDED
Status: DISCONTINUED | OUTPATIENT
Start: 2021-06-09 | End: 2021-06-09 | Stop reason: HOSPADM

## 2021-06-09 RX ORDER — SODIUM CHLORIDE 0.9 % (FLUSH) 0.9 %
5-40 SYRINGE (ML) INJECTION AS NEEDED
Status: DISCONTINUED | OUTPATIENT
Start: 2021-06-09 | End: 2021-06-09 | Stop reason: HOSPADM

## 2021-06-09 RX ORDER — KETAMINE HYDROCHLORIDE 10 MG/ML
INJECTION, SOLUTION INTRAMUSCULAR; INTRAVENOUS AS NEEDED
Status: DISCONTINUED | OUTPATIENT
Start: 2021-06-09 | End: 2021-06-09 | Stop reason: HOSPADM

## 2021-06-09 RX ORDER — HYDROMORPHONE HYDROCHLORIDE 2 MG/ML
INJECTION, SOLUTION INTRAMUSCULAR; INTRAVENOUS; SUBCUTANEOUS AS NEEDED
Status: DISCONTINUED | OUTPATIENT
Start: 2021-06-09 | End: 2021-06-09 | Stop reason: HOSPADM

## 2021-06-09 RX ORDER — DEXMEDETOMIDINE HYDROCHLORIDE 100 UG/ML
INJECTION, SOLUTION INTRAVENOUS AS NEEDED
Status: DISCONTINUED | OUTPATIENT
Start: 2021-06-09 | End: 2021-06-09 | Stop reason: HOSPADM

## 2021-06-09 RX ORDER — PROPOFOL 10 MG/ML
INJECTION, EMULSION INTRAVENOUS AS NEEDED
Status: DISCONTINUED | OUTPATIENT
Start: 2021-06-09 | End: 2021-06-09 | Stop reason: HOSPADM

## 2021-06-09 RX ORDER — LIDOCAINE HYDROCHLORIDE 20 MG/ML
INJECTION, SOLUTION EPIDURAL; INFILTRATION; INTRACAUDAL; PERINEURAL AS NEEDED
Status: DISCONTINUED | OUTPATIENT
Start: 2021-06-09 | End: 2021-06-09 | Stop reason: HOSPADM

## 2021-06-09 RX ORDER — SODIUM CHLORIDE 0.9 % (FLUSH) 0.9 %
5-40 SYRINGE (ML) INJECTION EVERY 8 HOURS
Status: DISCONTINUED | OUTPATIENT
Start: 2021-06-09 | End: 2021-06-09 | Stop reason: HOSPADM

## 2021-06-09 RX ORDER — BUPIVACAINE HYDROCHLORIDE 2.5 MG/ML
INJECTION, SOLUTION EPIDURAL; INFILTRATION; INTRACAUDAL AS NEEDED
Status: DISCONTINUED | OUTPATIENT
Start: 2021-06-09 | End: 2021-06-09 | Stop reason: HOSPADM

## 2021-06-09 RX ORDER — GLYCOPYRROLATE 0.2 MG/ML
INJECTION INTRAMUSCULAR; INTRAVENOUS AS NEEDED
Status: DISCONTINUED | OUTPATIENT
Start: 2021-06-09 | End: 2021-06-09 | Stop reason: HOSPADM

## 2021-06-09 RX ORDER — ROCURONIUM BROMIDE 10 MG/ML
INJECTION, SOLUTION INTRAVENOUS AS NEEDED
Status: DISCONTINUED | OUTPATIENT
Start: 2021-06-09 | End: 2021-06-09 | Stop reason: HOSPADM

## 2021-06-09 RX ORDER — SUCCINYLCHOLINE CHLORIDE 20 MG/ML
INJECTION INTRAMUSCULAR; INTRAVENOUS AS NEEDED
Status: DISCONTINUED | OUTPATIENT
Start: 2021-06-09 | End: 2021-06-09 | Stop reason: HOSPADM

## 2021-06-09 RX ORDER — ONDANSETRON 2 MG/ML
4 INJECTION INTRAMUSCULAR; INTRAVENOUS AS NEEDED
Status: DISCONTINUED | OUTPATIENT
Start: 2021-06-09 | End: 2021-06-09 | Stop reason: HOSPADM

## 2021-06-09 RX ORDER — FENTANYL CITRATE 50 UG/ML
25 INJECTION, SOLUTION INTRAMUSCULAR; INTRAVENOUS
Status: DISCONTINUED | OUTPATIENT
Start: 2021-06-09 | End: 2021-06-09 | Stop reason: HOSPADM

## 2021-06-09 RX ADMIN — METOCLOPRAMIDE HYDROCHLORIDE 10 MG: 5 INJECTION INTRAMUSCULAR; INTRAVENOUS at 19:17

## 2021-06-09 RX ADMIN — DEXAMETHASONE SODIUM PHOSPHATE 8 MG: 4 INJECTION, SOLUTION INTRA-ARTICULAR; INTRALESIONAL; INTRAMUSCULAR; INTRAVENOUS; SOFT TISSUE at 18:21

## 2021-06-09 RX ADMIN — KETAMINE HYDROCHLORIDE 50 MG: 10 INJECTION INTRAMUSCULAR; INTRAVENOUS at 18:56

## 2021-06-09 RX ADMIN — PHENYLEPHRINE HYDROCHLORIDE 200 MCG: 10 INJECTION INTRAVENOUS at 18:36

## 2021-06-09 RX ADMIN — LIDOCAINE HYDROCHLORIDE 100 MG: 20 INJECTION, SOLUTION EPIDURAL; INFILTRATION; INTRACAUDAL; PERINEURAL at 18:16

## 2021-06-09 RX ADMIN — PROPOFOL 200 MG: 10 INJECTION, EMULSION INTRAVENOUS at 18:16

## 2021-06-09 RX ADMIN — HYDROMORPHONE HYDROCHLORIDE 0.2 MG: 1 INJECTION, SOLUTION INTRAMUSCULAR; INTRAVENOUS; SUBCUTANEOUS at 21:02

## 2021-06-09 RX ADMIN — DEXMEDETOMIDINE HYDROCHLORIDE 10 MCG: 100 INJECTION, SOLUTION INTRAVENOUS at 18:16

## 2021-06-09 RX ADMIN — SUCCINYLCHOLINE CHLORIDE 200 MG: 20 INJECTION, SOLUTION INTRAMUSCULAR; INTRAVENOUS at 18:17

## 2021-06-09 RX ADMIN — Medication 10 ML: at 22:17

## 2021-06-09 RX ADMIN — Medication 10 ML: at 13:44

## 2021-06-09 RX ADMIN — FENTANYL CITRATE 50 MCG: 0.05 INJECTION, SOLUTION INTRAMUSCULAR; INTRAVENOUS at 18:16

## 2021-06-09 RX ADMIN — HYDROMORPHONE HYDROCHLORIDE 0.5 MG: 2 INJECTION INTRAMUSCULAR; INTRAVENOUS; SUBCUTANEOUS at 20:25

## 2021-06-09 RX ADMIN — HYDROCHLOROTHIAZIDE 25 MG: 25 TABLET ORAL at 12:40

## 2021-06-09 RX ADMIN — KETAMINE HYDROCHLORIDE 50 MG: 10 INJECTION INTRAMUSCULAR; INTRAVENOUS at 18:24

## 2021-06-09 RX ADMIN — LISINOPRIL 10 MG: 10 TABLET ORAL at 15:59

## 2021-06-09 RX ADMIN — GLYCOPYRROLATE 0.6 MG: 0.2 INJECTION, SOLUTION INTRAMUSCULAR; INTRAVENOUS at 19:56

## 2021-06-09 RX ADMIN — HYDROMORPHONE HYDROCHLORIDE 0.2 MG: 1 INJECTION, SOLUTION INTRAMUSCULAR; INTRAVENOUS; SUBCUTANEOUS at 21:24

## 2021-06-09 RX ADMIN — DOLUTEGRAVIR SODIUM 50 MG: 50 TABLET, FILM COATED ORAL at 09:00

## 2021-06-09 RX ADMIN — HYDROMORPHONE HYDROCHLORIDE 0.2 MG: 1 INJECTION, SOLUTION INTRAMUSCULAR; INTRAVENOUS; SUBCUTANEOUS at 21:37

## 2021-06-09 RX ADMIN — PHENYLEPHRINE HYDROCHLORIDE 100 MCG: 10 INJECTION INTRAVENOUS at 18:41

## 2021-06-09 RX ADMIN — SODIUM CHLORIDE, POTASSIUM CHLORIDE, SODIUM LACTATE AND CALCIUM CHLORIDE: 600; 310; 30; 20 INJECTION, SOLUTION INTRAVENOUS at 17:30

## 2021-06-09 RX ADMIN — DEXMEDETOMIDINE HYDROCHLORIDE 10 MCG: 100 INJECTION, SOLUTION INTRAVENOUS at 18:29

## 2021-06-09 RX ADMIN — DEXMEDETOMIDINE HYDROCHLORIDE 10 MCG: 100 INJECTION, SOLUTION INTRAVENOUS at 18:21

## 2021-06-09 RX ADMIN — SUGAMMADEX 230 MG: 100 INJECTION, SOLUTION INTRAVENOUS at 20:12

## 2021-06-09 RX ADMIN — PHENYLEPHRINE HYDROCHLORIDE 100 MCG: 10 INJECTION INTRAVENOUS at 18:43

## 2021-06-09 RX ADMIN — SODIUM CHLORIDE, POTASSIUM CHLORIDE, SODIUM LACTATE AND CALCIUM CHLORIDE: 600; 310; 30; 20 INJECTION, SOLUTION INTRAVENOUS at 18:39

## 2021-06-09 RX ADMIN — INSULIN LISPRO 6 UNITS: 100 INJECTION, SOLUTION INTRAVENOUS; SUBCUTANEOUS at 12:40

## 2021-06-09 RX ADMIN — Medication 10 ML: at 04:20

## 2021-06-09 RX ADMIN — MIDAZOLAM HYDROCHLORIDE 2 MG: 2 INJECTION, SOLUTION INTRAMUSCULAR; INTRAVENOUS at 18:10

## 2021-06-09 RX ADMIN — FENTANYL CITRATE 100 MCG: 0.05 INJECTION, SOLUTION INTRAMUSCULAR; INTRAVENOUS at 19:24

## 2021-06-09 RX ADMIN — PHENYLEPHRINE HYDROCHLORIDE 200 MCG: 10 INJECTION INTRAVENOUS at 19:58

## 2021-06-09 RX ADMIN — CEFTRIAXONE SODIUM 1 G: 1 INJECTION, POWDER, FOR SOLUTION INTRAMUSCULAR; INTRAVENOUS at 15:00

## 2021-06-09 RX ADMIN — Medication 10 ML: at 05:16

## 2021-06-09 RX ADMIN — NEOSTIGMINE METHYLSULFATE 5 MG: 1 INJECTION INTRAVENOUS at 19:56

## 2021-06-09 RX ADMIN — ROCURONIUM BROMIDE 40 MG: 50 INJECTION, SOLUTION INTRAVENOUS at 18:21

## 2021-06-09 RX ADMIN — REPAGLINIDE 2 MG: 2 TABLET ORAL at 08:47

## 2021-06-09 RX ADMIN — ROCURONIUM BROMIDE 30 MG: 50 INJECTION, SOLUTION INTRAVENOUS at 19:01

## 2021-06-09 RX ADMIN — FENTANYL CITRATE 50 MCG: 0.05 INJECTION, SOLUTION INTRAMUSCULAR; INTRAVENOUS at 19:12

## 2021-06-09 RX ADMIN — ATORVASTATIN CALCIUM 40 MG: 40 TABLET, FILM COATED ORAL at 08:47

## 2021-06-09 RX ADMIN — AZITHROMYCIN DIHYDRATE 500 MG: 500 INJECTION, POWDER, LYOPHILIZED, FOR SOLUTION INTRAVENOUS at 16:01

## 2021-06-09 NOTE — PROGRESS NOTES
A dual skin assessment was performed by primary nurse, Gregoria Preciado RN and Yousuf Bob RN, the following were noted: dry skin to both feet and surgical scar below left axilla.

## 2021-06-09 NOTE — PROGRESS NOTES
Problem: Falls - Risk of  Goal: *Absence of Falls  Description: Document Manjula Sparks Fall Risk and appropriate interventions in the flowsheet.   Outcome: Progressing Towards Goal  Note: Fall Risk Interventions:  Mobility Interventions: Patient to call before getting OOB, Utilize walker, cane, or other assistive device         Medication Interventions: Patient to call before getting OOB, Teach patient to arise slowly    Elimination Interventions: Call light in reach, Patient to call for help with toileting needs    History of Falls Interventions: Vital signs minimum Q4HRs X 24 hrs (comment for end date)

## 2021-06-09 NOTE — ROUTINE PROCESS
Bedside shift report given to Sandy Reyes, RN  (oncoming nurse) by Lauren Collazo RN (off going nurse).  Report to include SBAR, plan of care, recent results, discharge planning, and patient's questions and concerns.

## 2021-06-09 NOTE — PROGRESS NOTES
Hospitalist Progress Note    Subjective:   Daily Progress Note: 6/9/2021     Hospital Course:  Yoselyn Balderas is a 68-year-old obese -American male with a past medical history of CAD, hypertension, hypercholesterolemia, diabetes mellitus, PVD, and HIV who is presenting to the emergency department with a primary complaint of abdominal pain. Patient reports she was in her normal state of health until few days ago when he started to experience gradually worsening lower abdominal pain. He reports associated hematuria and nausea. He denies any fevers, chills, vomiting, dizziness, chest pain, palpitations, shortness of breath, swelling of extremities, or focal weakness. In the ED, vitals temp 98.7, pulse 101 bpm, /90, and SPO2 97% on room air. Lab work-up showing WBC 11.6, glucose 348, BUN 21, creatinine 1.64. CT of abdomen/pelvis showing left renal mass concerning for malignancy, also noted left-sided hydroureteronephrosis and urinary bladder wall thickening. Urology and oncology consult. .  Patient admitted to hospitalist services for hydroureteronephrosis. Scheduled for left nephrectomy on 6/9/2021. Left testicular pain and ultrasound pending. Prophylactically started Rocephin and azithromycin    Subjective:    Patient seen and examined at bedside. He reports improvement in abdominal pain.    Left testicular pain    Current Facility-Administered Medications   Medication Dose Route Frequency    cefTRIAXone (ROCEPHIN) 1 g in sterile water (preservative free) 10 mL IV syringe  1 g IntraVENous Q24H    azithromycin (ZITHROMAX) 500 mg in 0.9% sodium chloride 250 mL (VIAL-MATE)  500 mg IntraVENous Q24H    [Held by provider] aspirin chewable tablet 81 mg  81 mg Oral DAILY    atorvastatin (LIPITOR) tablet 40 mg  40 mg Oral DAILY    [Held by provider] clopidogreL (PLAVIX) tablet 75 mg  75 mg Oral DAILY    famotidine (PEPCID) tablet 20 mg  20 mg Oral DAILY    hydroCHLOROthiazide (HYDRODIURIL) tablet 25 mg  25 mg Oral DAILY    insulin glargine (LANTUS) injection 50 Units  50 Units SubCUTAneous QHS    lisinopriL (PRINIVIL, ZESTRIL) tablet 10 mg  10 mg Oral DAILY    bgffxyirdw-evkhaivq-znxldm ala 200-25-25 mg tab 1 Tablet (Patient Supplied)  1 Tablet Oral DAILY    [Held by provider] pentoxifylline CR (TRENTAL) tablet 400 mg  400 mg Oral BID    repaglinide (PRANDIN) tablet 2 mg  2 mg Oral TIDAC    dolutegravir (TIVICAY) tablet 50 mg  50 mg Oral DAILY    sodium chloride (NS) flush 5-40 mL  5-40 mL IntraVENous Q8H    sodium chloride (NS) flush 5-40 mL  5-40 mL IntraVENous PRN    acetaminophen (TYLENOL) tablet 650 mg  650 mg Oral Q6H PRN    Or    acetaminophen (TYLENOL) suppository 650 mg  650 mg Rectal Q6H PRN    polyethylene glycol (MIRALAX) packet 17 g  17 g Oral DAILY PRN    ondansetron (ZOFRAN ODT) tablet 4 mg  4 mg Oral Q8H PRN    Or    ondansetron (ZOFRAN) injection 4 mg  4 mg IntraVENous Q6H PRN    insulin lispro (HUMALOG) injection   SubCUTAneous AC&HS    glucose chewable tablet 16 g  4 Tablet Oral PRN    glucagon (GLUCAGEN) injection 1 mg  1 mg IntraMUSCular PRN    dextrose (D50W) injection syrg 12.5-25 g  25-50 mL IntraVENous PRN        Review of Systems  Review of Systems   Constitutional: Positive for fever. Negative for chills. Eyes: Negative. Respiratory: Negative for cough and shortness of breath. Cardiovascular: Negative for chest pain and leg swelling. Gastrointestinal: Negative for abdominal pain, nausea and vomiting. Genitourinary:        Left testicular pain improved     Musculoskeletal: Negative. Skin: Negative. Neurological: Negative. Psychiatric/Behavioral: Negative.           Objective:     Visit Vitals  /75 (BP 1 Location: Right upper arm, BP Patient Position: At rest)   Pulse 85   Temp 98.1 °F (36.7 °C)   Resp 18   Ht 6' 1\" (1.854 m)   Wt 115.7 kg (255 lb)   SpO2 96%   BMI 33.64 kg/m²      O2 Device: None (Room air)    Temp (24hrs), Av.3 °F (36.8 °C), Min:97.8 °F (36.6 °C), Max:98.9 °F (37.2 °C)      06/09 0701 - 06/09 1900  In: 6000   Out: Alyson Escalera [[facility]:[id number]]  06/07 1901 - 06/09 0700  In: 18082 [P.O.:1740; I.V.:250]  Out: [facility] [Urine:[id number]]    PHYSICAL EXAM:  Physical Exam  Vitals reviewed. Constitutional:       General: He is not in acute distress. Appearance: He is not ill-appearing. HENT:      Head: Normocephalic and atraumatic. Mouth/Throat:      Mouth: Mucous membranes are moist.      Pharynx: Oropharynx is clear. Eyes:      Conjunctiva/sclera: Conjunctivae normal.   Cardiovascular:      Rate and Rhythm: Normal rate and regular rhythm. Heart sounds: Normal heart sounds. Pulmonary:      Effort: Pulmonary effort is normal.      Breath sounds: Normal breath sounds. Abdominal:      General: Abdomen is flat. Bowel sounds are normal.      Palpations: Abdomen is soft. Genitourinary:     Comments: Left testicular pain improved    Musculoskeletal:         General: Normal range of motion. Cervical back: Normal range of motion and neck supple. Skin:     General: Skin is warm and dry. Neurological:      General: No focal deficit present. Mental Status: He is alert and oriented to person, place, and time.    Psychiatric:         Mood and Affect: Mood normal.           Data Review    Recent Results (from the past 24 hour(s))   GLUCOSE, POC    Collection Time: 06/08/21  3:36 PM   Result Value Ref Range    Glucose (POC) 267 (H) 65 - 117 mg/dL    Performed by [address] [place] [address], BLOOD    Collection Time: 06/08/21  4:50 PM    Specimen: Blood   Result Value Ref Range    Special Requests: No Special Requests      Culture result: No growth after 1 hour     URINALYSIS W/ REFLEX CULTURE    Collection Time: 06/08/21  6:00 PM    Specimen: Urine   Result Value Ref Range    Color Yellow/Straw      Appearance Clear Clear      Specific gravity 1.005 1.003 - 1.030      pH (UA) 5.0 5.0 - 8.0      Protein Negative Negative mg/dL    Glucose Negative Negative mg/dL    Ketone Negative Negative mg/dL    Bilirubin Negative Negative      Blood Large (A) Negative      Urobilinogen 0.1 0.1 - 1.0 EU/dL    Nitrites Negative Negative      Leukocyte Esterase Moderate (A) Negative      UA:UC IF INDICATED Urine Culture Ordered (A) Culture not indicated by UA result      WBC 20-50 0 - 4 /hpf    RBC >100 (H) 0 - 5 /hpf    Bacteria Negative Negative /hpf    Mucus Trace /lpf   GLUCOSE, POC    Collection Time: 06/08/21  9:11 PM   Result Value Ref Range    Glucose (POC) 195 (H) 65 - 117 mg/dL    Performed by Isela Diaz ([place])    PROCALCITONIN    Collection Time: 06/09/21  5:07 AM   Result Value Ref Range    Procalcitonin 0.34 (H) 0 ng/mL   IMMUNOGLOBULINS, G/A/M, QT. Collection Time: 06/09/21  5:07 AM   Result Value Ref Range    Immunoglobulin A 401 (H) 70 - 400 mg/dL    Immunoglobulin G 1,070 700 - 1,600 mg/dL    Immunoglobulin M 137 40 - 230 mg/dL   LD    Collection Time: 06/09/21  5:07 AM   Result Value Ref Range     (H) 85 - 241 U/L   CEA    Collection Time: 06/09/21  5:07 AM   Result Value Ref Range    CEA 2.2 ng/mL   SED RATE (ESR)    Collection Time: 06/09/21  5:07 AM   Result Value Ref Range    Sed rate, automated 55 mm/hr   PSA SCREENING (SCREENING)    Collection Time: 06/09/21  5:07 AM   Result Value Ref Range    Prostate Specific Ag 1.5 0.01 - 4.0 ng/mL   GLUCOSE, POC    Collection Time: 06/09/21  8:17 AM   Result Value Ref Range    Glucose (POC) 246 (H) 65 - 117 mg/dL    Performed by Ai Stokes, POC    Collection Time: 06/09/21 11:42 AM   Result Value Ref Range    Glucose (POC) 280 (H) 65 - 117 mg/dL    Performed by Master Guerra   Final Result   Negative for torsion. XR CHEST PORT   Final Result   The cardiomediastinal silhouette is appropriate for age, technique,   and lung expansion. Pulmonary vasculature is not congested. The lungs are   essentially clear.  No effusion or pneumothorax is seen. CT CHEST WO CONT   Final Result   Early bone metastases. No evidence of lung metastasis      XR FLUOROSCOPY UNDER 60 MINUTES   Final Result      CT ABD PELV W WO CONT   Final Result      Left renal mass is worrisome for malignancy. Left-sided hydroureteronephrosis   without stone. Apparent urinary bladder wall thickening. Recommend clinical   correlation and follow-up. Please see full report. Active Problems:    Hydronephrosis (6/6/2021)      Renal mass (6/6/2021)      Hematuria (6/6/2021)        Assessment/Plan:     1. Left-sided hydronephrosis     CT of abdomen/pelvis showing left renal mass concerning for malignancy, also noted left-sided hydroureteronephrosis and urinary bladder wall thickening.    -Status post left-sided ureteral stent placement    2. Hematuria/renal mass   Sx hematuria found to have a left renal mass concerning for malignancy  - Dilaudid as needed for pain relief at this time  - Follow-up urology recommendations  -Left nephrectomy scheduled for 6/9/2021     3. Hypertension continue home antihypertensive medications     4. History of HIV continue home medications     5. Hyperlipidemia continue statin    6. Left testicular pain  Ultrasound of the scrotum normal, small hydrocele  Prophylactically started Rocephin and azithromycin    7. Febrile illness  Chest x-ray normal  Blood cultures  urinalysis  Urine culture pending  Procal slightly elevated    Discharge disposition: Left nephrectomy    DVT Prophylaxis: SCDs  Code Status: Full    Care Plan discussed with: patient and nursing    Total time spent with patient: >35 minutes.

## 2021-06-09 NOTE — PROGRESS NOTES
UROLOGY Progress Note         [phone]      Daily Progress Note: 6/9/2021      Subjective: The patient is seen for UROLOGIC follow up for hydronephrosis, renal mass, hematuria, urinary frequency. He is s/p cystourethroscopy, clot evacuation, bilateral RPG, and left ureteral stent placement. The ureter appeared mildly dilated along its entire length without focal stricturing. There was filling defect in the upper and mid infundibula high with dilation of these. This was suspicious for clot. CBI ongoing, slow flow rate. Urine remains clear, yellow. He notes some leakage when he was having a BM this morning.       Problem List:  Patient Active Problem List   Diagnosis Code    Pain of lower extremity M79.606    Abnormal stress test R94.39    Hydronephrosis N13.30    Renal mass N28.89    Hematuria R31.9         Medications reviewed  Current Facility-Administered Medications   Medication Dose Route Frequency    cefTRIAXone (ROCEPHIN) 1 g in sterile water (preservative free) 10 mL IV syringe  1 g IntraVENous Q24H    azithromycin (ZITHROMAX) 500 mg in 0.9% sodium chloride 250 mL (VIAL-MATE)  500 mg IntraVENous Q24H    [Held by provider] aspirin chewable tablet 81 mg  81 mg Oral DAILY    atorvastatin (LIPITOR) tablet 40 mg  40 mg Oral DAILY    [Held by provider] clopidogreL (PLAVIX) tablet 75 mg  75 mg Oral DAILY    famotidine (PEPCID) tablet 20 mg  20 mg Oral DAILY    hydroCHLOROthiazide (HYDRODIURIL) tablet 25 mg  25 mg Oral DAILY    insulin glargine (LANTUS) injection 50 Units  50 Units SubCUTAneous QHS    lisinopriL (PRINIVIL, ZESTRIL) tablet 10 mg  10 mg Oral DAILY    qtbanxsehj-ewcjvbgd-mhyzfa ala 200-25-25 mg tab 1 Tablet (Patient Supplied)  1 Tablet Oral DAILY    [Held by provider] pentoxifylline CR (TRENTAL) tablet 400 mg  400 mg Oral BID    repaglinide (PRANDIN) tablet 2 mg  2 mg Oral TIDAC    dolutegravir (TIVICAY) tablet 50 mg  50 mg Oral DAILY    sodium chloride (NS) flush 5-40 mL  5-40 mL IntraVENous Q8H    sodium chloride (NS) flush 5-40 mL  5-40 mL IntraVENous PRN    acetaminophen (TYLENOL) tablet 650 mg  650 mg Oral Q6H PRN    Or    acetaminophen (TYLENOL) suppository 650 mg  650 mg Rectal Q6H PRN    polyethylene glycol (MIRALAX) packet 17 g  17 g Oral DAILY PRN    ondansetron (ZOFRAN ODT) tablet 4 mg  4 mg Oral Q8H PRN    Or    ondansetron (ZOFRAN) injection 4 mg  4 mg IntraVENous Q6H PRN    insulin lispro (HUMALOG) injection   SubCUTAneous AC&HS    glucose chewable tablet 16 g  4 Tablet Oral PRN    glucagon (GLUCAGEN) injection 1 mg  1 mg IntraMUSCular PRN    dextrose (D50W) injection syrg 12.5-25 g  25-50 mL IntraVENous PRN       Review of Systems:   Review of Systems   Constitutional: Negative for chills and fever. Gastrointestinal: Positive for abdominal pain. Negative for nausea and vomiting. Suprapubic discomfort, mild   Genitourinary: Negative for dysuria. Hematuria improved, on CBI           Objective:   Physical Exam  Vitals and nursing note reviewed. Constitutional:       General: He is not in acute distress. HENT:      Head: Normocephalic and atraumatic. Eyes:      Extraocular Movements: Extraocular movements intact. Cardiovascular:      Rate and Rhythm: Normal rate. Pulmonary:      Effort: Pulmonary effort is normal. No respiratory distress. Breath sounds: No wheezing. Abdominal:      Palpations: Abdomen is soft. Genitourinary:     Comments: CBI; clear yellow urine draining with irrigant. Small palpable bumps along the left side of the shaft of the penis. Flattened, c/w condyloma  Left high groin with two polypoid skin tags 8-10mm each    Musculoskeletal:      Cervical back: Normal range of motion. Skin:     General: Skin is warm and dry. Neurological:      Mental Status: He is alert and oriented to person, place, and time.    Psychiatric:         Behavior: Behavior normal.          Visit Vitals  /74 (BP 1 Location: Right upper arm, BP Patient Position: At rest;Lying;Supine)   Pulse 93   Temp 98.9 °F (37.2 °C)   Resp 20   Ht 6' 1\" (1.854 m)   Wt 255 lb (115.7 kg)   SpO2 95%   BMI 33.64 kg/m²         Data Review:       Recent Days:  Recent Labs     06/07/21 0227   WBC 13.6*   HGB 15.1   HCT 45.1        Recent Labs     06/07/21 0227   *   K 3.6      CO2 26   *   BUN 21*   CREA 1.40*   CA 8.5   MG 1.7       Assessment/     Patient Active Problem List   Diagnosis Code    Pain of lower extremity M79.606    Abnormal stress test R94.39    Hydronephrosis N13.30    Renal mass N28.89    Hematuria R31.9       Plan:  LEFT SIDED HYDRONEPHROSIS: he is s/p left ureteral stent placement on 6/6/21. Creatinine improved at 1.40 from 1.64. RETAINED URETERAL STENT: left ureteral stent placed on 6/6/21. Tolerating without discomfort. LEFT RENAL MASS: Seen on admission CT scan, at least T2, likely a renal cell carcinoma. Anticoagulation on hold at this time. Left nephrectomy today/evening for bleeding and tumor. Due to recent anticoagulation he is at increased risk of postop hemorrhage. Oncology recommends PET scan. Renal carcinoma may not be evident on PET. GROSS HEMATURIA:  Improved off Plavix. CBI clear for now. LUTS: Frequency and urgency. Now with CBI infusion in place. Keep slow rate to prevent clots. GENITAL WARTS: chronic. Topical therapy recommended as an outpatient. SCROTAL/TESTICULAR PAIN: chronic. No abnormalities on examination. Mildly bothersome. Small left hydrocele on US, otherwise normal imaging. I personally had a face to face encounter with the patient and performed the history, physical, assessment and plan.    MD Sean Mendoza NP

## 2021-06-09 NOTE — PROGRESS NOTES
Hematology Oncology Progress Note     Interval History :  60 yr old admitted with abdominal pain and hematuria with hydrouretero nephrosis. CT scans  Showed 7cmmass in the left upper pole of left kidney. S/p cystoscopy and retrograde pyelograms. Hydroureteronephrosis is due to clot retention from hematuria in bladder and ureters. S/p evacuation and stents. Subjective:     HE is feeling better now. On CBI with clear urine output. He is scheduled for left nephrectomy today. He had ct chest for staging and it shows small foci of cortical disruption  Of several lower ribs with soft tissue components,? Early bone mets vs. Other etiology.       Current Facility-Administered Medications   Medication Dose Route Frequency    cefTRIAXone (ROCEPHIN) 1 g in sterile water (preservative free) 10 mL IV syringe  1 g IntraVENous Q24H    azithromycin (ZITHROMAX) 500 mg in 0.9% sodium chloride 250 mL (VIAL-MATE)  500 mg IntraVENous Q24H    [Held by provider] aspirin chewable tablet 81 mg  81 mg Oral DAILY    atorvastatin (LIPITOR) tablet 40 mg  40 mg Oral DAILY    [Held by provider] clopidogreL (PLAVIX) tablet 75 mg  75 mg Oral DAILY    famotidine (PEPCID) tablet 20 mg  20 mg Oral DAILY    hydroCHLOROthiazide (HYDRODIURIL) tablet 25 mg  25 mg Oral DAILY    insulin glargine (LANTUS) injection 50 Units  50 Units SubCUTAneous QHS    lisinopriL (PRINIVIL, ZESTRIL) tablet 10 mg  10 mg Oral DAILY    cjgprhyehs-mmczmpza-oqohrh ala 200-25-25 mg tab 1 Tablet (Patient Supplied)  1 Tablet Oral DAILY    [Held by provider] pentoxifylline CR (TRENTAL) tablet 400 mg  400 mg Oral BID    repaglinide (PRANDIN) tablet 2 mg  2 mg Oral TIDAC    dolutegravir (TIVICAY) tablet 50 mg  50 mg Oral DAILY    sodium chloride (NS) flush 5-40 mL  5-40 mL IntraVENous Q8H    sodium chloride (NS) flush 5-40 mL  5-40 mL IntraVENous PRN    acetaminophen (TYLENOL) tablet 650 mg  650 mg Oral Q6H PRN    Or    acetaminophen (TYLENOL) suppository 650 mg  650 mg Rectal Q6H PRN    polyethylene glycol (MIRALAX) packet 17 g  17 g Oral DAILY PRN    ondansetron (ZOFRAN ODT) tablet 4 mg  4 mg Oral Q8H PRN    Or    ondansetron (ZOFRAN) injection 4 mg  4 mg IntraVENous Q6H PRN    insulin lispro (HUMALOG) injection   SubCUTAneous AC&HS    glucose chewable tablet 16 g  4 Tablet Oral PRN    glucagon (GLUCAGEN) injection 1 mg  1 mg IntraMUSCular PRN    dextrose (D50W) injection syrg 12.5-25 g  25-50 mL IntraVENous PRN        Review of Systems:    Constitutional No fevers, chills, night sweats, excessive fatigue or weight loss. Allergic/Immunologic No recent allergic reactions   Eyes No significant visual difficulties. No diplopia. ENMT No problems with hearing, no sore throat, no sinus drainage. Endocrine No hot flashes or night sweats. No cold intolerance, polyuria, or polydipsia   Hematologic/Lymphatic No easy bruising or bleeding. The patient denies any tender or palpable lymph nodes   Breasts No abnormal masses of breast, nipple discharge or pain. Respiratory No dyspnea on exertion, orthopnea, chest pain, cough or hemoptysis. Cardiovascular No anginal chest pain, irregular heart beat, tachycardia, palpitations or orthopnea. Gastrointestinal No nausea, vomiting, diarrhea, constipation, cramping, dysphagia, reflux, heartburn, GI bleeding, or early satiety. No change in bowel habits. Genitourinary (M) + hematuria, dysuria, increased frequency, urgency, hesitancy. Musculoskeletal No joint pain, swelling or redness. No decreased range of motion. Integumentary No chronic rashes, inflammation, ulcerations, pruritus, petechiae, purpura, ecchymoses, or skin changes. Neurologic No headache, blurred vision, and no areas of focal weakness or numbness. Normal gait. No sensory problems. Psychiatric No insomnia, depression, sharla or mood swings. No psychotropic drugs.      Objective:     Patient Vitals for the past 8 hrs:   BP Temp Pulse Resp SpO2 21 1458 (!) 157/84 98.1 °F (36.7 °C) 92 18 95 %   21 1139 127/75 98.1 °F (36.7 °C) 85 18 96 %       Temp (24hrs), Av.2 °F (36.8 °C), Min:97.8 °F (36.6 °C), Max:98.9 °F (37.2 °C)      Physical Exam:    Constitutional  [place] male Alert, cooperative, oriented. Mood and affect appropriate. Appears close to chronological age. Well nourished. Well developed. Head Normocephalic; no scars   Eyes Conjunctivae and sclerae are clear and without icterus. Pupils are reactive and equal.   ENMT Sinuses are nontender. No oral exudates, ulcers, masses, thrush or mucositis. Oropharynx clear. Tongue normal.   Neck Supple without masses or thyromegaly. No jugular venous distension. Hematologic/Lymphatic No petechiae or purpura. No tender or palpable lymph nodes in the cervical, supraclavicular, axillary or inguinal area. Respiratory Lungs are clear to auscultation without rhonchi or wheezing. Cardiovascular Regular rate and rhythm of heart without murmurs, gallops or rubs. Chest / Line Site Chest is symmetric with no chest wall deformities. Abdomen Non-tender, non-distended, no masses, ascites or hepatosplenomegaly. Good bowel sounds. No guarding or rebound tenderness. No pulsatile masses. Musculoskeletal No tenderness or swelling, normal range of motion without obvious weakness. Extremities No visible deformities, no cyanosis, clubbing or edema. Skin No rashes, scars, or lesions suggestive of malignancy. No petechiae, purpura, or ecchymoses. No excoriations. Neurologic No sensory or motor deficits, normal cerebellar function, normal gait, cranial nerves intact. Psychiatric Alert and oriented times three. Coherent speech. Verbalizes understanding of our discussions today.      Lab/Data Review:  Recent Labs     21   WBC 13.6*   HGB 15.1   HCT 45.1        Recent Labs     21   *   K 3.6      CO2 26   *   BUN 21*   CREA 1.40*   CA 8.5 MG 1.7     No results for input(s): PH, PCO2, PO2, HCO3, FIO2 in the last 72 hours. Radiology:   CT CHEST WO CONT    Result Date: 6/7/2021  Early bone metastases. No evidence of lung metastasis    CT ABD PELV W WO CONT    Result Date: 6/6/2021  Left renal mass is worrisome for malignancy. Left-sided hydroureteronephrosis without stone. Apparent urinary bladder wall thickening. Recommend clinical correlation and follow-up. Please see full report. XR CHEST PORT    Result Date: 6/8/2021  The cardiomediastinal silhouette is appropriate for age, technique, and lung expansion. Pulmonary vasculature is not congested. The lungs are essentially clear. No effusion or pneumothorax is seen. US SCROTUM/TESTICLES W DOPPLER    Result Date: 6/8/2021  Negative for torsion. Assessment /Plan:     Active Problems:    Hydronephrosis (6/6/2021)      Renal mass (6/6/2021)      Hematuria (6/6/2021)      1) 60 yr old male with PMH of HIV, hypertension , pvd, hyperlipidemia ,DM and cad with h/o left leg bypass. 2) Left renal mass: 7cm mass seen in the upper pole of the left kidney. Highly suspicious for primary renal cell carcinoma. No other obvious mets in the abdomen. - non contrast ct chest shows some rib lesions suspcious for early mets, but need to r/o other etiologies . Lungs do not show mets. He will need bone scan/pet scan. Check labs. -Noted plans by dr. Hernan Kaplan for left nephrectomy today. 3) Hydroureteronephrosis: secondary to clot retention. Creatinine is improving today. 1.4    4) luecocytosis: wbc coutn of 13.6k. monitor. 5) hematuria is subsided. On CBI. Aspirin and plavix held now. H/h is ok.            Ismael Christianson MD  6/9/2021

## 2021-06-10 LAB
ALBUMIN SERPL ELPH-MCNC: 2.7 G/DL (ref 2.9–4.4)
ALBUMIN/GLOB SERPL: 0.8 {RATIO} (ref 0.7–1.7)
ALPHA1 GLOB SERPL ELPH-MCNC: 0.4 G/DL (ref 0–0.4)
ALPHA2 GLOB SERPL ELPH-MCNC: 1 G/DL (ref 0.4–1)
ANION GAP SERPL CALC-SCNC: 7 MMOL/L (ref 5–15)
B-GLOBULIN SERPL ELPH-MCNC: 1 G/DL (ref 0.7–1.3)
BASOPHILS # BLD: 0 K/UL (ref 0–0.1)
BASOPHILS NFR BLD: 0 % (ref 0–1)
BUN SERPL-MCNC: 30 MG/DL (ref 6–20)
BUN/CREAT SERPL: 18 (ref 12–20)
CA-I BLD-MCNC: 9.2 MG/DL (ref 8.5–10.1)
CANCER AG19-9 SERPL-ACNC: 2 U/ML (ref 0–35)
CHLORIDE SERPL-SCNC: 99 MMOL/L (ref 97–108)
CO2 SERPL-SCNC: 28 MMOL/L (ref 21–32)
CREAT SERPL-MCNC: 1.7 MG/DL (ref 0.7–1.3)
DIFFERENTIAL METHOD BLD: ABNORMAL
EOSINOPHIL # BLD: 0 K/UL (ref 0–0.4)
EOSINOPHIL NFR BLD: 0 % (ref 0–7)
ERYTHROCYTE [DISTWIDTH] IN BLOOD BY AUTOMATED COUNT: 13.3 % (ref 11.5–14.5)
GAMMA GLOB SERPL ELPH-MCNC: 1.1 G/DL (ref 0.4–1.8)
GLOBULIN SER CALC-MCNC: 3.5 G/DL (ref 2.2–3.9)
GLUCOSE BLD STRIP.AUTO-MCNC: 198 MG/DL (ref 65–117)
GLUCOSE BLD STRIP.AUTO-MCNC: 224 MG/DL (ref 65–117)
GLUCOSE BLD STRIP.AUTO-MCNC: 224 MG/DL (ref 65–117)
GLUCOSE BLD STRIP.AUTO-MCNC: 256 MG/DL (ref 65–117)
GLUCOSE SERPL-MCNC: 227 MG/DL (ref 65–100)
HCT VFR BLD AUTO: 50.6 % (ref 36.6–50.3)
HGB BLD-MCNC: 16.5 G/DL (ref 12.1–17)
IMM GRANULOCYTES # BLD AUTO: 0 K/UL (ref 0–0.04)
IMM GRANULOCYTES NFR BLD AUTO: 0 % (ref 0–0.5)
KAPPA LC FREE SER-MCNC: 50 MG/L (ref 3.3–19.4)
KAPPA LC FREE/LAMBDA FREE SER: 1.45 {RATIO} (ref 0.26–1.65)
LAMBDA LC FREE SERPL-MCNC: 34.4 MG/L (ref 5.7–26.3)
LYMPHOCYTES # BLD: 1.9 K/UL (ref 0.8–3.5)
LYMPHOCYTES NFR BLD: 18 % (ref 12–49)
M PROTEIN SERPL ELPH-MCNC: ABNORMAL G/DL
MCH RBC QN AUTO: 28.6 PG (ref 26–34)
MCHC RBC AUTO-ENTMCNC: 32.6 G/DL (ref 30–36.5)
MCV RBC AUTO: 87.7 FL (ref 80–99)
MONOCYTES # BLD: 1.1 K/UL (ref 0–1)
MONOCYTES NFR BLD: 10 % (ref 5–13)
NEUTS SEG # BLD: 7.8 K/UL (ref 1.8–8)
NEUTS SEG NFR BLD: 72 % (ref 32–75)
NRBC # BLD: 0 K/UL (ref 0–0.01)
NRBC BLD-RTO: 0 PER 100 WBC
PERFORMED BY, TECHID: ABNORMAL
PLATELET # BLD AUTO: 374 K/UL (ref 150–400)
PMV BLD AUTO: 9.5 FL (ref 8.9–12.9)
POTASSIUM SERPL-SCNC: 4.2 MMOL/L (ref 3.5–5.1)
PROT SERPL-MCNC: 6.2 G/DL (ref 6–8.5)
RBC # BLD AUTO: 5.77 M/UL (ref 4.1–5.7)
SODIUM SERPL-SCNC: 134 MMOL/L (ref 136–145)
WBC # BLD AUTO: 10.9 K/UL (ref 4.1–11.1)

## 2021-06-10 PROCEDURE — 74011250636 HC RX REV CODE- 250/636: Performed by: PHYSICIAN ASSISTANT

## 2021-06-10 PROCEDURE — 74011636637 HC RX REV CODE- 636/637: Performed by: INTERNAL MEDICINE

## 2021-06-10 PROCEDURE — 36415 COLL VENOUS BLD VENIPUNCTURE: CPT

## 2021-06-10 PROCEDURE — 85025 COMPLETE CBC W/AUTO DIFF WBC: CPT

## 2021-06-10 PROCEDURE — 65270000029 HC RM PRIVATE

## 2021-06-10 PROCEDURE — 74011636637 HC RX REV CODE- 636/637: Performed by: STUDENT IN AN ORGANIZED HEALTH CARE EDUCATION/TRAINING PROGRAM

## 2021-06-10 PROCEDURE — 82962 GLUCOSE BLOOD TEST: CPT

## 2021-06-10 PROCEDURE — 74011250636 HC RX REV CODE- 250/636: Performed by: HOSPITALIST

## 2021-06-10 PROCEDURE — 74011000250 HC RX REV CODE- 250: Performed by: PHYSICIAN ASSISTANT

## 2021-06-10 PROCEDURE — 74011250637 HC RX REV CODE- 250/637: Performed by: INTERNAL MEDICINE

## 2021-06-10 PROCEDURE — 74011250637 HC RX REV CODE- 250/637: Performed by: PHYSICIAN ASSISTANT

## 2021-06-10 PROCEDURE — 80048 BASIC METABOLIC PNL TOTAL CA: CPT

## 2021-06-10 RX ORDER — MORPHINE SULFATE 2 MG/ML
2 INJECTION, SOLUTION INTRAMUSCULAR; INTRAVENOUS
Status: DISCONTINUED | OUTPATIENT
Start: 2021-06-10 | End: 2021-06-10

## 2021-06-10 RX ORDER — OXYCODONE HYDROCHLORIDE 5 MG/1
5 TABLET ORAL
Status: DISCONTINUED | OUTPATIENT
Start: 2021-06-10 | End: 2021-06-11

## 2021-06-10 RX ORDER — HYDROMORPHONE HYDROCHLORIDE 1 MG/ML
1 INJECTION, SOLUTION INTRAMUSCULAR; INTRAVENOUS; SUBCUTANEOUS
Status: DISPENSED | OUTPATIENT
Start: 2021-06-10 | End: 2021-06-12

## 2021-06-10 RX ADMIN — INSULIN LISPRO 4 UNITS: 100 INJECTION, SOLUTION INTRAVENOUS; SUBCUTANEOUS at 22:27

## 2021-06-10 RX ADMIN — HYDROMORPHONE HYDROCHLORIDE 1 MG: 1 INJECTION, SOLUTION INTRAMUSCULAR; INTRAVENOUS; SUBCUTANEOUS at 21:39

## 2021-06-10 RX ADMIN — ATORVASTATIN CALCIUM 40 MG: 40 TABLET, FILM COATED ORAL at 09:29

## 2021-06-10 RX ADMIN — INSULIN LISPRO 2 UNITS: 100 INJECTION, SOLUTION INTRAVENOUS; SUBCUTANEOUS at 12:54

## 2021-06-10 RX ADMIN — MORPHINE SULFATE 2 MG: 2 INJECTION, SOLUTION INTRAMUSCULAR; INTRAVENOUS at 05:56

## 2021-06-10 RX ADMIN — AZITHROMYCIN DIHYDRATE 500 MG: 500 INJECTION, POWDER, LYOPHILIZED, FOR SOLUTION INTRAVENOUS at 15:52

## 2021-06-10 RX ADMIN — Medication 10 ML: at 15:42

## 2021-06-10 RX ADMIN — OXYCODONE 5 MG: 5 TABLET ORAL at 22:28

## 2021-06-10 RX ADMIN — Medication 10 ML: at 05:02

## 2021-06-10 RX ADMIN — REPAGLINIDE 2 MG: 2 TABLET ORAL at 12:54

## 2021-06-10 RX ADMIN — CEFTRIAXONE SODIUM 1 G: 1 INJECTION, POWDER, FOR SOLUTION INTRAMUSCULAR; INTRAVENOUS at 15:54

## 2021-06-10 RX ADMIN — OXYCODONE 5 MG: 5 TABLET ORAL at 12:58

## 2021-06-10 RX ADMIN — LISINOPRIL 10 MG: 10 TABLET ORAL at 09:30

## 2021-06-10 RX ADMIN — MORPHINE SULFATE 2 MG: 2 INJECTION, SOLUTION INTRAMUSCULAR; INTRAVENOUS at 02:05

## 2021-06-10 RX ADMIN — INSULIN LISPRO 4 UNITS: 100 INJECTION, SOLUTION INTRAVENOUS; SUBCUTANEOUS at 09:29

## 2021-06-10 RX ADMIN — INSULIN GLARGINE 50 UNITS: 100 INJECTION, SOLUTION SUBCUTANEOUS at 22:28

## 2021-06-10 RX ADMIN — HYDROMORPHONE HYDROCHLORIDE 1 MG: 1 INJECTION, SOLUTION INTRAMUSCULAR; INTRAVENOUS; SUBCUTANEOUS at 15:42

## 2021-06-10 RX ADMIN — DOLUTEGRAVIR SODIUM 50 MG: 50 TABLET, FILM COATED ORAL at 09:00

## 2021-06-10 RX ADMIN — REPAGLINIDE 2 MG: 2 TABLET ORAL at 15:54

## 2021-06-10 RX ADMIN — Medication 10 ML: at 22:29

## 2021-06-10 RX ADMIN — HYDROMORPHONE HYDROCHLORIDE 1 MG: 1 INJECTION, SOLUTION INTRAMUSCULAR; INTRAVENOUS; SUBCUTANEOUS at 10:45

## 2021-06-10 RX ADMIN — ACETAMINOPHEN 650 MG: 325 TABLET ORAL at 09:38

## 2021-06-10 RX ADMIN — HYDROCHLOROTHIAZIDE 25 MG: 25 TABLET ORAL at 09:29

## 2021-06-10 RX ADMIN — OXYCODONE 5 MG: 5 TABLET ORAL at 18:17

## 2021-06-10 RX ADMIN — INSULIN LISPRO 6 UNITS: 100 INJECTION, SOLUTION INTRAVENOUS; SUBCUTANEOUS at 18:03

## 2021-06-10 NOTE — PROGRESS NOTES
Hospitalist Progress Note    Subjective:   Daily Progress Note: 6/10/2021     Hospital Course:  Tyler Penn is a 49-year-old obese -American male with a past medical history of CAD, hypertension, hypercholesterolemia, diabetes mellitus, PVD, and HIV who is presenting to the emergency department with a primary complaint of abdominal pain. Patient reports she was in her normal state of health until few days ago when he started to experience gradually worsening lower abdominal pain. He reports associated hematuria and nausea. He denies any fevers, chills, vomiting, dizziness, chest pain, palpitations, shortness of breath, swelling of extremities, or focal weakness. CT of abdomen/pelvis showing left renal mass concerning for malignancy, also noted left-sided hydroureteronephrosis and urinary bladder wall thickening. Urology and oncology consult. .  Patient admitted to hospitalist services for hydroureteronephrosis. Left nephrectomy on 6/9/2021. Left testicular pain and ultrasound normal.  Prophylactically started Rocephin and azithromycin. Subjective:    Patient seen and examined at bedside. He reports improvement in abdominal pain.    Left testicular pain    Current Facility-Administered Medications   Medication Dose Route Frequency    HYDROmorphone (DILAUDID) injection 1 mg  1 mg IntraVENous Q4H PRN    oxyCODONE IR (ROXICODONE) tablet 5 mg  5 mg Oral Q4H PRN    cefTRIAXone (ROCEPHIN) 1 g in sterile water (preservative free) 10 mL IV syringe  1 g IntraVENous Q24H    azithromycin (ZITHROMAX) 500 mg in 0.9% sodium chloride 250 mL (VIAL-MATE)  500 mg IntraVENous Q24H    [Held by provider] aspirin chewable tablet 81 mg  81 mg Oral DAILY    atorvastatin (LIPITOR) tablet 40 mg  40 mg Oral DAILY    [Held by provider] clopidogreL (PLAVIX) tablet 75 mg  75 mg Oral DAILY    famotidine (PEPCID) tablet 20 mg  20 mg Oral DAILY    hydroCHLOROthiazide (HYDRODIURIL) tablet 25 mg  25 mg Oral DAILY    insulin glargine (LANTUS) injection 50 Units  50 Units SubCUTAneous QHS    lisinopriL (PRINIVIL, ZESTRIL) tablet 10 mg  10 mg Oral DAILY    rtezorzsxz-xpqdlsek-xsbafn ala 200-25-25 mg tab 1 Tablet (Patient Supplied)  1 Tablet Oral DAILY    [Held by provider] pentoxifylline CR (TRENTAL) tablet 400 mg  400 mg Oral BID    repaglinide (PRANDIN) tablet 2 mg  2 mg Oral TIDAC    dolutegravir (TIVICAY) tablet 50 mg  50 mg Oral DAILY    sodium chloride (NS) flush 5-40 mL  5-40 mL IntraVENous Q8H    sodium chloride (NS) flush 5-40 mL  5-40 mL IntraVENous PRN    acetaminophen (TYLENOL) tablet 650 mg  650 mg Oral Q6H PRN    Or    acetaminophen (TYLENOL) suppository 650 mg  650 mg Rectal Q6H PRN    polyethylene glycol (MIRALAX) packet 17 g  17 g Oral DAILY PRN    ondansetron (ZOFRAN ODT) tablet 4 mg  4 mg Oral Q8H PRN    Or    ondansetron (ZOFRAN) injection 4 mg  4 mg IntraVENous Q6H PRN    insulin lispro (HUMALOG) injection   SubCUTAneous AC&HS    glucose chewable tablet 16 g  4 Tablet Oral PRN    glucagon (GLUCAGEN) injection 1 mg  1 mg IntraMUSCular PRN    dextrose (D50W) injection syrg 12.5-25 g  25-50 mL IntraVENous PRN        Review of Systems  Review of Systems   Constitutional: Positive for fever. Negative for chills. Eyes: Negative. Respiratory: Negative for cough and shortness of breath. Cardiovascular: Negative for chest pain and leg swelling. Gastrointestinal: Negative for abdominal pain, nausea and vomiting. Genitourinary:        Left testicular pain improved     Musculoskeletal: Negative. Skin: Negative. Neurological: Negative. Psychiatric/Behavioral: Negative.           Objective:     Visit Vitals  BP (!) 141/98 (BP 1 Location: Right upper arm, BP Patient Position: At rest)   Pulse 92   Temp 98 °F (36.7 °C)   Resp 18   Ht 6' 1\" (1.854 m)   Wt 115.7 kg (255 lb)   SpO2 96%   BMI 33.64 kg/m²    O2 Flow Rate (L/min): 2 l/min O2 Device: None (Room air)    Temp (24hrs), Av °F (36.7 °C), Min:97.2 °F (36.2 °C), Max:99.1 °F (37.3 °C)      No intake/output data recorded. 06/08 1901 - 06/10 0700  In: 47448 [P.O.:380; I.V.:1300]  Out: 30249 [Urine:26884]    PHYSICAL EXAM:  Physical Exam  Vitals reviewed. Constitutional:       General: He is not in acute distress. Appearance: He is not ill-appearing. HENT:      Head: Normocephalic and atraumatic. Mouth/Throat:      Mouth: Mucous membranes are moist.      Pharynx: Oropharynx is clear. Eyes:      Conjunctiva/sclera: Conjunctivae normal.   Cardiovascular:      Rate and Rhythm: Normal rate and regular rhythm. Heart sounds: Normal heart sounds. Pulmonary:      Effort: Pulmonary effort is normal.      Breath sounds: Normal breath sounds. Abdominal:      General: Abdomen is flat. Bowel sounds are normal.      Palpations: Abdomen is soft. Tenderness: There is abdominal tenderness. Comments: Tenderness at incision sites   Genitourinary:     Comments: Left testicular pain improved    Musculoskeletal:         General: Normal range of motion. Cervical back: Normal range of motion and neck supple. Skin:     General: Skin is warm and dry. Neurological:      General: No focal deficit present. Mental Status: He is alert and oriented to person, place, and time.    Psychiatric:         Mood and Affect: Mood normal.           Data Review    Recent Results (from the past 24 hour(s))   GLUCOSE, POC    Collection Time: 06/09/21  3:02 PM   Result Value Ref Range    Glucose (POC) 167 (H) 65 - 117 mg/dL    Performed by Kiet Garnica, POC    Collection Time: 06/09/21  8:35 PM   Result Value Ref Range    Glucose (POC) 213 (H) 65 - 117 mg/dL    Performed by Marcus Zuniga, POC    Collection Time: 06/10/21  7:46 AM   Result Value Ref Range    Glucose (POC) 224 (H) 65 - 117 mg/dL    Performed by Elgin Silverman    CBC WITH AUTOMATED DIFF    Collection Time: 06/10/21 10:15 AM   Result Value Ref Range    WBC 10.9 4.1 - 11.1 K/uL    RBC 5.77 (H) 4.10 - 5.70 M/uL    HGB 16.5 12.1 - 17.0 g/dL    HCT 50.6 (H) 36.6 - 50.3 %    MCV 87.7 80.0 - 99.0 FL    MCH 28.6 26.0 - 34.0 PG    MCHC 32.6 30.0 - 36.5 g/dL    RDW 13.3 11.5 - 14.5 %    PLATELET 204 801 - 598 K/uL    MPV 9.5 8.9 - 12.9 FL    NRBC 0.0 0.0  WBC    ABSOLUTE NRBC 0.00 0.00 - 0.01 K/uL    NEUTROPHILS 72 32 - 75 %    LYMPHOCYTES 18 12 - 49 %    MONOCYTES 10 5 - 13 %    EOSINOPHILS 0 0 - 7 %    BASOPHILS 0 0 - 1 %    IMMATURE GRANULOCYTES 0 0 - 0.5 %    ABS. NEUTROPHILS 7.8 1.8 - 8.0 K/UL    ABS. LYMPHOCYTES 1.9 0.8 - 3.5 K/UL    ABS. MONOCYTES 1.1 (H) 0.0 - 1.0 K/UL    ABS. EOSINOPHILS 0.0 0.0 - 0.4 K/UL    ABS. BASOPHILS 0.0 0.0 - 0.1 K/UL    ABS. IMM. GRANS. 0.0 0.00 - 0.04 K/UL    DF AUTOMATED     METABOLIC PANEL, BASIC    Collection Time: 06/10/21 10:15 AM   Result Value Ref Range    Sodium 134 (L) 136 - 145 mmol/L    Potassium 4.2 3.5 - 5.1 mmol/L    Chloride 99 97 - 108 mmol/L    CO2 28 21 - 32 mmol/L    Anion gap 7 5 - 15 mmol/L    Glucose 227 (H) 65 - 100 mg/dL    BUN 30 (H) 6 - 20 mg/dL    Creatinine 1.70 (H) 0.70 - 1.30 mg/dL    BUN/Creatinine ratio 18 12 - 20      GFR est AA 51 (L) >60 ml/min/1.73m2    GFR est non-AA 42 (L) >60 ml/min/1.73m2    Calcium 9.2 8.5 - 10.1 mg/dL   GLUCOSE, POC    Collection Time: 06/10/21 12:12 PM   Result Value Ref Range    Glucose (POC) 198 (H) 65 - 117 mg/dL    Performed by [address]   Final Result   Negative for torsion. XR CHEST PORT   Final Result   The cardiomediastinal silhouette is appropriate for age, technique,   and lung expansion. Pulmonary vasculature is not congested. The lungs are   essentially clear. No effusion or pneumothorax is seen. CT CHEST WO CONT   Final Result   Early bone metastases.  No evidence of lung metastasis      XR FLUOROSCOPY UNDER 60 MINUTES   Final Result      CT ABD PELV W WO CONT   Final Result      Left renal mass is worrisome for malignancy. Left-sided hydroureteronephrosis   without stone. Apparent urinary bladder wall thickening. Recommend clinical   correlation and follow-up. Please see full report. Active Problems:    Hydronephrosis (6/6/2021)      Renal mass (6/6/2021)      Hematuria (6/6/2021)        Assessment/Plan:     1. Left-sided hydronephrosis   CT of abdomen/pelvis showing left renal mass concerning for malignancy, also noted left-sided hydroureteronephrosis and urinary bladder wall thickening. Status post left-sided ureteral stent placement  Left nephrectomy, adrenalectomy and partial ureter removal on 6/9/2021    2. Hematuria/renal mass  Sx hematuria found to have a left renal mass concerning for malignancy  Dilaudid as needed for pain relief at this time  Follow-up urology recommendations  Left nephrectomy scheduled for 6/9/2021  Left nephrectomy, adrenalectomy and partial ureter removal on 6/9/2021     3. Hypertension continue home antihypertensive medications     4. History of HIV continue home medications     5. Hyperlipidemia continue statin    6. Left testicular pain  Ultrasound of the scrotum normal, small hydrocele  Prophylactically started Rocephin and azithromycin    7. Febrile illness/acute cystitis  Chest x-ray normal  Blood cultures, pending  urinalysis  Urine culture gram negative rods  Procal slightly elevated  Continue Rocephin and azithromycin    Discharge disposition: Monitor creatinine for worsening signs of renal failure. Recovery from left nephrectomy. DVT Prophylaxis: SCDs  Code Status: Full    Care Plan discussed with: patient and nursing    Total time spent with patient: >35 minutes.

## 2021-06-10 NOTE — PROGRESS NOTES
UROLOGY Progress Note         [phone]      Daily Progress Note: 6/10/2021      Subjective: The patient is seen for UROLOGIC follow up for hydronephrosis, renal mass, hematuria, urinary frequency. He is s/p cystourethroscopy, clot evacuation, bilateral RPG, and left ureteral stent placement. The ureter appeared mildly dilated along its entire length without focal stricturing. There was filling defect in the upper and mid infundibula high with dilation of these. This was suspicious for clot. He is s/p left laparoscopic radical nephrectomy and ureteral stent removal on 6/9/21. He is sore today, hungry. His incision sites are clean, dry and intact. Vill[place], dark yellow urine draining.     Problem List:  Patient Active Problem List   Diagnosis Code    Pain of lower extremity M79.606    Abnormal stress test R94.39    Hydronephrosis N13.30    Renal mass N28.89    Hematuria R31.9         Medications reviewed  Current Facility-Administered Medications   Medication Dose Route Frequency    morphine injection 2 mg  2 mg IntraVENous Q4H PRN    cefTRIAXone (ROCEPHIN) 1 g in sterile water (preservative free) 10 mL IV syringe  1 g IntraVENous Q24H    azithromycin (ZITHROMAX) 500 mg in 0.9% sodium chloride 250 mL (VIAL-MATE)  500 mg IntraVENous Q24H    [Held by provider] aspirin chewable tablet 81 mg  81 mg Oral DAILY    atorvastatin (LIPITOR) tablet 40 mg  40 mg Oral DAILY    [Held by provider] clopidogreL (PLAVIX) tablet 75 mg  75 mg Oral DAILY    famotidine (PEPCID) tablet 20 mg  20 mg Oral DAILY    hydroCHLOROthiazide (HYDRODIURIL) tablet 25 mg  25 mg Oral DAILY    insulin glargine (LANTUS) injection 50 Units  50 Units SubCUTAneous QHS    lisinopriL (PRINIVIL, ZESTRIL) tablet 10 mg  10 mg Oral DAILY    xqekjlkbkz-pvwbspmm-cqwvnn ala 200-25-25 mg tab 1 Tablet (Patient Supplied)  1 Tablet Oral DAILY    [Held by provider] pentoxifylline CR (TRENTAL) tablet 400 mg  400 mg Oral BID    repaglinide (PRANDIN) tablet 2 mg  2 mg Oral TIDAC    dolutegravir (TIVICAY) tablet 50 mg  50 mg Oral DAILY    sodium chloride (NS) flush 5-40 mL  5-40 mL IntraVENous Q8H    sodium chloride (NS) flush 5-40 mL  5-40 mL IntraVENous PRN    acetaminophen (TYLENOL) tablet 650 mg  650 mg Oral Q6H PRN    Or    acetaminophen (TYLENOL) suppository 650 mg  650 mg Rectal Q6H PRN    polyethylene glycol (MIRALAX) packet 17 g  17 g Oral DAILY PRN    ondansetron (ZOFRAN ODT) tablet 4 mg  4 mg Oral Q8H PRN    Or    ondansetron (ZOFRAN) injection 4 mg  4 mg IntraVENous Q6H PRN    insulin lispro (HUMALOG) injection   SubCUTAneous AC&HS    glucose chewable tablet 16 g  4 Tablet Oral PRN    glucagon (GLUCAGEN) injection 1 mg  1 mg IntraMUSCular PRN    dextrose (D50W) injection syrg 12.5-25 g  25-50 mL IntraVENous PRN       Review of Systems:   Review of Systems   Constitutional: Negative for chills and fever. Gastrointestinal: Positive for abdominal pain. Negative for nausea and vomiting. Abdominal soreness post-operatively   Genitourinary: Negative for dysuria. Dark yellow urine via barrow catheter. Objective:   Physical Exam  Vitals and nursing note reviewed. Constitutional:       General: He is not in acute distress. HENT:      Head: Normocephalic and atraumatic. Eyes:      Extraocular Movements: Extraocular movements intact. Cardiovascular:      Rate and Rhythm: Normal rate. Pulmonary:      Effort: Pulmonary effort is normal. No respiratory distress. Breath sounds: No wheezing. Abdominal:      Palpations: Abdomen is soft. Genitourinary:     Comments: Barrow with dark yellow urine. Small palpable bumps along the left side of the shaft of the penis. Flattened, c/w condyloma  Left high groin with two polypoid skin tags 8-10mm each    Musculoskeletal:      Cervical back: Normal range of motion. Skin:     General: Skin is warm and dry.    Neurological:      Mental Status: He is alert and oriented to person, place, and time. Psychiatric:         Behavior: Behavior normal.          Visit Vitals  BP (!) 142/96 (BP 1 Location: Right upper arm, BP Patient Position: At rest)   Pulse 84   Temp 98.3 °F (36.8 °C)   Resp 18   Ht 6' 1\" (1.854 m)   Wt 255 lb (115.7 kg)   SpO2 96%   BMI 33.64 kg/m²         Data Review:    LABS ORDERED AND PENDING. Assessment/     Patient Active Problem List   Diagnosis Code    Pain of lower extremity M79.606    Abnormal stress test R94.39    Hydronephrosis N13.30    Renal mass N28.89    Hematuria R31.9       Plan:  LEFT SIDED HYDRONEPHROSIS: he is s/p left ureteral stent placement on 6/6/21. Now s/p left nephrectomy on 6/9/21. Stent removed. RETAINED URETERAL STENT: left ureteral stent placed on 6/6/21. Removed with nephrectomy on 6/9/2021. LEFT RENAL MASS: Seen on admission CT scan, at least T2, likely a renal cell carcinoma. Anticoagulation on hold at this time. Left nephrectomy on 6/9/21. CBC pending. No gross hematuria in Villalp[place]. Oncology recommends PET scan. Renal carcinoma may not be evident on PET. GROSS HEMATURIA:  Improved off Plavix. He is s/p nephrectomy and Plavix is on hold. LUTS: Frequency and urgency. Now with Villalpando. GENITAL WARTS: chronic. Topical therapy recommended as an outpatient. SCROTAL/TESTICULAR PAIN: chronic. No abnormalities on examination. Mildly bothersome. Small left hydrocele on US, otherwise normal imaging.       Paris Price, PRIYA

## 2021-06-10 NOTE — OP NOTES
Patient: Lambert Hylton MRN: [id number]  SSN: [id number]    YOB: 1965  Age: 60 y.o. Sex: male              UROLOGY OPERATIVE NOTE    Pre-operative Diagnosis: left renal tumor, hematuria  Post-operative Diagnosis: same    Procedure: left laparoscopic radical nephrectomy, ureteral stent removal.     Surgeon: Natalia Guido MD  Assistant: Charlene Keller  Anesthesia:  Per anesthesia  Findings: left upper pole renal mass  Estimated Blood Loss:    scant  Drains: 16F barrow  Specimens: left kidney, adrenal and partial ureter  Complications:  none           Procedure Details: The patient was seen in the pre-operative area. The risks, benefits, complications, alternative treatment options, and expected outcomes were again discussed with the patient. The possibilities of reaction to medication, pain, infection, bleeding, major cardiovascular event, death, damage to surrounding structures were specifically addressed. Informed consent was then obtained. Upon arrival to the operative suite, the patient, procedure, and side were confirmed via a pre-operative \"time-out\". All were in agreement. The patient was carefully positioned and anesthesia was undertaken. His three-way Barrow catheter changed out to a 16 [employer] two-way catheter. Sterile prep and drape was accomplished. Patient was supine with a bump under the left flank. All his pressure points were padded and he was secured to the table. His abdomen was prepped and draped in usual sterile manner. A 7 cm midline incision was made in the epigastrium supraumbilically. Dissection continued through the subcutaneous tissues to the fascia which was opened in the midline. The bellies of the rectus muscle were divided and the peritoneum was opened. The underlying bowel had a slight serosal tear and this was reinforced with 3-0 silk suture. There were no significant abdominal adhesions except for the left upper quadrant, directly over the kidney. A 12 mm trocar was placed in the left abdomen and a 5 mm left upper quadrant trocar was placed. A gel port was placed in the midline and CO2 insufflation was started to 15 mm water pressure. .    The left upper quadrant adhesions were taken down. The white line of Toldt was incised laterally and the colon was affected medially. The renal hilum was identified. The ureter and gonadal vein were identified dissected up to the renal hilum. The adrenal vein was notified and superiorly. The renal artery and vein were dissected out. Using an Endo [place] stapler with a vascular staple load, the renal artery staple ligated. Using a separate staple load the renal vein was staple ligated. Using harmonic scalpel the aortic branches of the adrenal vessels were ligated. This was dissected up toward the phrenic vessels and these were ligated as well. The pancreas was carefully dissect away from the adrenal gland and upper pole renal mass. The spleen was carefully dissected away from the upper pole as well. The lower pole and lateral renal patch was taken down outside of Gerota's fascia. The superior dissection was carefully dissected and the kidney was mobilized. The ureter was identified and incised. The ureteral stent was grasped and withdrawn intact. The ends of the ureter were clipped with hemolock clips. A specimen was then completely free. A 17 cm Von bag was placed within the abdomen and the specimen was entrapped within the bag. This was withdrawn out the GelPort and passed off for specimen. CO2 insufflation was then reestablished and careful inspection of the renal fossa was performed. There was no significant bleeding at all. The ports were removed under direct vision. The GelPort was removed. The midline fascia was anesthetized with quarter percent Marcaine. The fascia is closed with 0 looped PDS in running fashion and interrupted figure-of-eight Prolene suture.   The subcutaneous tissues were approximated with 3-0 Vicryl suture. The skin was closed with 4-0 Monocryl subcuticular suture and Dermabond skin glue. At the end the case all the counts counts were correct. The patient tolerated the procedure well and was transported stable to recovery.   Nathanael Meier MD

## 2021-06-10 NOTE — ROUTINE PROCESS
TRANSFER - OUT REPORT: 
 
Verbal report given to Rui  on Kyra Queen  being transferred to  for routine post - op Report consisted of patients Situation, Background, Assessment and  
Recommendations(SBAR). Information from the following report(s) SBAR, OR Summary, Procedure Summary, Intake/Output, Recent Results and Dual Neuro Assessment was reviewed with the receiving nurse. Opportunity for questions and clarification was provided. Patient transported with: 
 O2 @ 2 liters Registered Nurse Luther Nance

## 2021-06-11 LAB
BACTERIA SPEC CULT: ABNORMAL
COLONY COUNT,CNT: ABNORMAL
GLUCOSE BLD STRIP.AUTO-MCNC: 182 MG/DL (ref 65–117)
GLUCOSE BLD STRIP.AUTO-MCNC: 190 MG/DL (ref 65–117)
GLUCOSE BLD STRIP.AUTO-MCNC: 209 MG/DL (ref 65–117)
GLUCOSE BLD STRIP.AUTO-MCNC: 209 MG/DL (ref 65–117)
PERFORMED BY, TECHID: ABNORMAL
SPECIAL REQUESTS,SREQ: ABNORMAL

## 2021-06-11 PROCEDURE — 99024 POSTOP FOLLOW-UP VISIT: CPT | Performed by: NURSE PRACTITIONER

## 2021-06-11 PROCEDURE — 74011636637 HC RX REV CODE- 636/637: Performed by: INTERNAL MEDICINE

## 2021-06-11 PROCEDURE — 74011250637 HC RX REV CODE- 250/637: Performed by: INTERNAL MEDICINE

## 2021-06-11 PROCEDURE — 74011250636 HC RX REV CODE- 250/636: Performed by: PHYSICIAN ASSISTANT

## 2021-06-11 PROCEDURE — 74011250637 HC RX REV CODE- 250/637: Performed by: NURSE PRACTITIONER

## 2021-06-11 PROCEDURE — 74011000250 HC RX REV CODE- 250: Performed by: PHYSICIAN ASSISTANT

## 2021-06-11 PROCEDURE — 74011636637 HC RX REV CODE- 636/637: Performed by: STUDENT IN AN ORGANIZED HEALTH CARE EDUCATION/TRAINING PROGRAM

## 2021-06-11 PROCEDURE — 65270000029 HC RM PRIVATE

## 2021-06-11 PROCEDURE — 82962 GLUCOSE BLOOD TEST: CPT

## 2021-06-11 PROCEDURE — 74011250637 HC RX REV CODE- 250/637: Performed by: PHYSICIAN ASSISTANT

## 2021-06-11 RX ORDER — DOCUSATE SODIUM 100 MG/1
100 CAPSULE, LIQUID FILLED ORAL 2 TIMES DAILY
Status: DISCONTINUED | OUTPATIENT
Start: 2021-06-11 | End: 2021-06-17 | Stop reason: HOSPADM

## 2021-06-11 RX ORDER — SIMETHICONE 80 MG
80 TABLET,CHEWABLE ORAL
Status: DISCONTINUED | OUTPATIENT
Start: 2021-06-11 | End: 2021-06-17 | Stop reason: HOSPADM

## 2021-06-11 RX ORDER — FACIAL-BODY WIPES
10 EACH TOPICAL DAILY
Status: DISCONTINUED | OUTPATIENT
Start: 2021-06-11 | End: 2021-06-17 | Stop reason: HOSPADM

## 2021-06-11 RX ORDER — OXYCODONE HYDROCHLORIDE 5 MG/1
7.5 TABLET ORAL
Status: DISCONTINUED | OUTPATIENT
Start: 2021-06-11 | End: 2021-06-17 | Stop reason: HOSPADM

## 2021-06-11 RX ADMIN — REPAGLINIDE 2 MG: 2 TABLET ORAL at 16:08

## 2021-06-11 RX ADMIN — INSULIN GLARGINE 50 UNITS: 100 INJECTION, SOLUTION SUBCUTANEOUS at 22:01

## 2021-06-11 RX ADMIN — CEFTRIAXONE SODIUM 1 G: 1 INJECTION, POWDER, FOR SOLUTION INTRAMUSCULAR; INTRAVENOUS at 15:59

## 2021-06-11 RX ADMIN — SIMETHICONE 80 MG: 80 TABLET, CHEWABLE ORAL at 22:01

## 2021-06-11 RX ADMIN — INSULIN LISPRO 2 UNITS: 100 INJECTION, SOLUTION INTRAVENOUS; SUBCUTANEOUS at 22:01

## 2021-06-11 RX ADMIN — INSULIN LISPRO 4 UNITS: 100 INJECTION, SOLUTION INTRAVENOUS; SUBCUTANEOUS at 16:30

## 2021-06-11 RX ADMIN — ATORVASTATIN CALCIUM 40 MG: 40 TABLET, FILM COATED ORAL at 08:42

## 2021-06-11 RX ADMIN — HYDROMORPHONE HYDROCHLORIDE 1 MG: 1 INJECTION, SOLUTION INTRAMUSCULAR; INTRAVENOUS; SUBCUTANEOUS at 08:43

## 2021-06-11 RX ADMIN — HYDROMORPHONE HYDROCHLORIDE 1 MG: 1 INJECTION, SOLUTION INTRAMUSCULAR; INTRAVENOUS; SUBCUTANEOUS at 22:01

## 2021-06-11 RX ADMIN — DOCUSATE SODIUM 100 MG: 100 CAPSULE, LIQUID FILLED ORAL at 22:01

## 2021-06-11 RX ADMIN — INSULIN LISPRO 2 UNITS: 100 INJECTION, SOLUTION INTRAVENOUS; SUBCUTANEOUS at 08:42

## 2021-06-11 RX ADMIN — REPAGLINIDE 2 MG: 2 TABLET ORAL at 12:52

## 2021-06-11 RX ADMIN — OXYCODONE 5 MG: 5 TABLET ORAL at 12:56

## 2021-06-11 RX ADMIN — DOLUTEGRAVIR SODIUM 50 MG: 50 TABLET, FILM COATED ORAL at 08:42

## 2021-06-11 RX ADMIN — REPAGLINIDE 2 MG: 2 TABLET ORAL at 08:42

## 2021-06-11 RX ADMIN — OXYCODONE 5 MG: 5 TABLET ORAL at 06:21

## 2021-06-11 RX ADMIN — LISINOPRIL 10 MG: 10 TABLET ORAL at 08:42

## 2021-06-11 RX ADMIN — HYDROMORPHONE HYDROCHLORIDE 1 MG: 1 INJECTION, SOLUTION INTRAMUSCULAR; INTRAVENOUS; SUBCUTANEOUS at 16:45

## 2021-06-11 RX ADMIN — DOCUSATE SODIUM 100 MG: 100 CAPSULE, LIQUID FILLED ORAL at 12:52

## 2021-06-11 RX ADMIN — Medication 10 ML: at 13:25

## 2021-06-11 RX ADMIN — INSULIN LISPRO 4 UNITS: 100 INJECTION, SOLUTION INTRAVENOUS; SUBCUTANEOUS at 12:52

## 2021-06-11 RX ADMIN — AZITHROMYCIN DIHYDRATE 500 MG: 500 INJECTION, POWDER, LYOPHILIZED, FOR SOLUTION INTRAVENOUS at 16:00

## 2021-06-11 RX ADMIN — OXYCODONE 5 MG: 5 TABLET ORAL at 02:50

## 2021-06-11 RX ADMIN — Medication 10 ML: at 22:01

## 2021-06-11 RX ADMIN — HYDROCHLOROTHIAZIDE 25 MG: 25 TABLET ORAL at 08:42

## 2021-06-11 RX ADMIN — HYDROMORPHONE HYDROCHLORIDE 1 MG: 1 INJECTION, SOLUTION INTRAMUSCULAR; INTRAVENOUS; SUBCUTANEOUS at 04:28

## 2021-06-11 RX ADMIN — LACTULOSE 45 ML: 20 SOLUTION ORAL at 16:02

## 2021-06-11 RX ADMIN — Medication 10 ML: at 06:22

## 2021-06-11 NOTE — PROGRESS NOTES
Alert and oriented x 4 able to make needs known. C/o pain to abdomen 3 incision sites. Teaching on how to splint abdomen with pillow. Tried to get up to ambulate not able to D/T pain medicated. Attempted to use the bedpan not successful. Also c/o nausea medicated. Presently resting in semi brower position low bed call bell in reach.

## 2021-06-11 NOTE — PROGRESS NOTES
Hematology Oncology Progress Note     Interval History :  60 yr old admitted with abdominal pain and hematuria with hydrouretero nephrosis. CT scans  Showed 7cmmass in the left upper pole of left kidney. S/p cystoscopy and retrograde pyelograms. Hydroureteronephrosis is due to clot retention from hematuria in bladder and ureters. S/p evacuation and stents. S/p Left radical nephrectomy on 6/9/21      Subjective:     HE is c/o pain at the surgical site. Urology managing his pain. Hematuria is resolved. Urinal shows yellow tinged urine. No dysuria reported.      Current Facility-Administered Medications   Medication Dose Route Frequency    docusate sodium (COLACE) capsule 100 mg  100 mg Oral BID    bisacodyL (DULCOLAX) suppository 10 mg  10 mg Rectal DAILY    simethicone (MYLICON) tablet 80 mg  80 mg Oral QID PRN    lactulose (CHRONULAC) 10 gram/15 mL solution 45 mL  30 g Oral ONCE    oxyCODONE IR (ROXICODONE) tablet 7.5 mg  7.5 mg Oral Q4H PRN    HYDROmorphone (DILAUDID) injection 1 mg  1 mg IntraVENous Q4H PRN    cefTRIAXone (ROCEPHIN) 1 g in sterile water (preservative free) 10 mL IV syringe  1 g IntraVENous Q24H    azithromycin (ZITHROMAX) 500 mg in 0.9% sodium chloride 250 mL (VIAL-MATE)  500 mg IntraVENous Q24H    [Held by provider] aspirin chewable tablet 81 mg  81 mg Oral DAILY    atorvastatin (LIPITOR) tablet 40 mg  40 mg Oral DAILY    [Held by provider] clopidogreL (PLAVIX) tablet 75 mg  75 mg Oral DAILY    famotidine (PEPCID) tablet 20 mg  20 mg Oral DAILY    hydroCHLOROthiazide (HYDRODIURIL) tablet 25 mg  25 mg Oral DAILY    insulin glargine (LANTUS) injection 50 Units  50 Units SubCUTAneous QHS    lisinopriL (PRINIVIL, ZESTRIL) tablet 10 mg  10 mg Oral DAILY    dghijeqgyc-gsijdiaj-zawvqz ala 200-25-25 mg tab 1 Tablet (Patient Supplied)  1 Tablet Oral DAILY    [Held by provider] pentoxifylline CR (TRENTAL) tablet 400 mg  400 mg Oral BID    repaglinide (PRANDIN) tablet 2 mg  2 mg Oral TIDAC    dolutegravir (TIVICAY) tablet 50 mg  50 mg Oral DAILY    sodium chloride (NS) flush 5-40 mL  5-40 mL IntraVENous Q8H    sodium chloride (NS) flush 5-40 mL  5-40 mL IntraVENous PRN    acetaminophen (TYLENOL) tablet 650 mg  650 mg Oral Q6H PRN    Or    acetaminophen (TYLENOL) suppository 650 mg  650 mg Rectal Q6H PRN    polyethylene glycol (MIRALAX) packet 17 g  17 g Oral DAILY PRN    ondansetron (ZOFRAN ODT) tablet 4 mg  4 mg Oral Q8H PRN    Or    ondansetron (ZOFRAN) injection 4 mg  4 mg IntraVENous Q6H PRN    insulin lispro (HUMALOG) injection   SubCUTAneous AC&HS    glucose chewable tablet 16 g  4 Tablet Oral PRN    glucagon (GLUCAGEN) injection 1 mg  1 mg IntraMUSCular PRN    dextrose (D50W) injection syrg 12.5-25 g  25-50 mL IntraVENous PRN        Review of Systems:    Constitutional No fevers, chills, night sweats, excessive fatigue or weight loss. Allergic/Immunologic No recent allergic reactions   Eyes No significant visual difficulties. No diplopia. ENMT No problems with hearing, no sore throat, no sinus drainage. Endocrine No hot flashes or night sweats. No cold intolerance, polyuria, or polydipsia   Hematologic/Lymphatic No easy bruising or bleeding. The patient denies any tender or palpable lymph nodes   Breasts No abnormal masses of breast, nipple discharge or pain. Respiratory No dyspnea on exertion, orthopnea, chest pain, cough or hemoptysis. Cardiovascular No anginal chest pain, irregular heart beat, tachycardia, palpitations or orthopnea. Gastrointestinal No nausea, vomiting, diarrhea, constipation, cramping, dysphagia, reflux, heartburn, GI bleeding, or early satiety. No change in bowel habits. Genitourinary (M) + hematuria, dysuria, increased frequency, urgency, hesitancy. Musculoskeletal No joint pain, swelling or redness. No decreased range of motion.    Integumentary No chronic rashes, inflammation, ulcerations, pruritus, petechiae, purpura, ecchymoses, or skin changes. Neurologic No headache, blurred vision, and no areas of focal weakness or numbness. Normal gait. No sensory problems. Psychiatric No insomnia, depression, sharla or mood swings. No psychotropic drugs. Objective:     Patient Vitals for the past 8 hrs:   BP Temp Pulse Resp SpO2   21 1101 (!) 133/92 98.2 °F (36.8 °C) 98 18 98 %       Temp (24hrs), Av.1 °F (36.7 °C), Min:97.9 °F (36.6 °C), Max:98.2 °F (36.8 °C)      Physical Exam:    Constitutional  [place] male Alert, cooperative, oriented. Mood and affect appropriate. Appears close to chronological age. Well nourished. Well developed. Head Normocephalic; no scars   Eyes Conjunctivae and sclerae are clear and without icterus. Pupils are reactive and equal.   ENMT Sinuses are nontender. No oral exudates, ulcers, masses, thrush or mucositis. Oropharynx clear. Tongue normal.   Neck Supple without masses or thyromegaly. No jugular venous distension. Hematologic/Lymphatic No petechiae or purpura. No tender or palpable lymph nodes in the cervical, supraclavicular, axillary or inguinal area. Respiratory Lungs are clear to auscultation without rhonchi or wheezing. Cardiovascular Regular rate and rhythm of heart without murmurs, gallops or rubs. Chest / Line Site Chest is symmetric with no chest wall deformities. Abdomen Non-tender, non-distended, no masses, ascites or hepatosplenomegaly. Good bowel sounds. No guarding or rebound tenderness. No pulsatile masses. Musculoskeletal No tenderness or swelling, normal range of motion without obvious weakness. Extremities No visible deformities, no cyanosis, clubbing or edema. Skin No rashes, scars, or lesions suggestive of malignancy. No petechiae, purpura, or ecchymoses. No excoriations. Neurologic No sensory or motor deficits, normal cerebellar function, normal gait, cranial nerves intact. Psychiatric Alert and oriented times three. Coherent speech.  Verbalizes understanding of our discussions today. Lab/Data Review:  Recent Labs     06/10/21  1015   WBC 10.9   HGB 16.5   HCT 50.6*        Recent Labs     06/10/21  1015   *   K 4.2   CL 99   CO2 28   *   BUN 30*   CREA 1.70*   CA 9.2     No results for input(s): PH, PCO2, PO2, HCO3, FIO2 in the last 72 hours. Radiology:   CT CHEST WO CONT    Result Date: 6/7/2021  Early bone metastases. No evidence of lung metastasis    CT ABD PELV W WO CONT    Result Date: 6/6/2021  Left renal mass is worrisome for malignancy. Left-sided hydroureteronephrosis without stone. Apparent urinary bladder wall thickening. Recommend clinical correlation and follow-up. Please see full report. XR CHEST PORT    Result Date: 6/8/2021  The cardiomediastinal silhouette is appropriate for age, technique, and lung expansion. Pulmonary vasculature is not congested. The lungs are essentially clear. No effusion or pneumothorax is seen. US SCROTUM/TESTICLES W DOPPLER    Result Date: 6/8/2021  Negative for torsion. Assessment /Plan:     Active Problems:    Hydronephrosis (6/6/2021)      Renal mass (6/6/2021)      Hematuria (6/6/2021)      1) 60 yr old male with PMH of HIV, hypertension , pvd, hyperlipidemia ,DM and cad with h/o left leg bypass. 2) Left renal mass: 7cm mass seen in the upper pole of the left kidney. Highly suspicious for primary renal cell carcinoma. No other obvious mets in the abdomen. - non contrast ct chest shows some rib lesions suspcious for early mets, but need to r/o other etiologies . Lungs do not show mets. He will need bone scan/pet scan.   -Other tumor markers negative . Normal ca 19-9, cea , psa. Normal spep and darryl.   -s/p radical left nephrectomy by Dr. Aurelio Raygoza on 6/9/21 pathology pending. 3) Hydroureteronephrosis: secondary to clot retention. Creatinine is 1.7    4) luecocytosis: wbc coutn of 10.9k. monitor. 5) hematuria is subsided. On CBI.  Aspirin and plavix held now. H/h is ok. Will sign off. Patient should follow up with fatou in 3 weeks after discharge. Please call for questions.            Remigio Gonzalez MD  6/11/2021

## 2021-06-11 NOTE — PROGRESS NOTES
Hospitalist Progress Note    Subjective:   Daily Progress Note: 6/11/2021     Hospital Course:  Shay Diaz is a 22-year-old obese -American male with a past medical history of CAD, hypertension, hypercholesterolemia, diabetes mellitus, PVD, and HIV who is presenting to the emergency department with a primary complaint of abdominal pain. Patient reports she was in her normal state of health until few days ago when he started to experience gradually worsening lower abdominal pain. He reports associated hematuria and nausea. He denies any fevers, chills, vomiting, dizziness, chest pain, palpitations, shortness of breath, swelling of extremities, or focal weakness. CT of abdomen/pelvis showing left renal mass concerning for malignancy, also noted left-sided hydroureteronephrosis and urinary bladder wall thickening. Urology and oncology consult. .  Patient admitted to hospitalist services for hydroureteronephrosis. Left nephrectomy on 6/9/2021. Left testicular pain and ultrasound normal.  Prophylactically started Rocephin and azithromycin. Subjective:    Patient seen and examined at bedside. He reports improvement in abdominal pain.    Left testicular pain improving     Current Facility-Administered Medications   Medication Dose Route Frequency    docusate sodium (COLACE) capsule 100 mg  100 mg Oral BID    bisacodyL (DULCOLAX) suppository 10 mg  10 mg Rectal DAILY    simethicone (MYLICON) tablet 80 mg  80 mg Oral QID PRN    HYDROmorphone (DILAUDID) injection 1 mg  1 mg IntraVENous Q4H PRN    oxyCODONE IR (ROXICODONE) tablet 5 mg  5 mg Oral Q4H PRN    cefTRIAXone (ROCEPHIN) 1 g in sterile water (preservative free) 10 mL IV syringe  1 g IntraVENous Q24H    azithromycin (ZITHROMAX) 500 mg in 0.9% sodium chloride 250 mL (VIAL-MATE)  500 mg IntraVENous Q24H    [Held by provider] aspirin chewable tablet 81 mg  81 mg Oral DAILY    atorvastatin (LIPITOR) tablet 40 mg  40 mg Oral DAILY    [Held by provider] clopidogreL (PLAVIX) tablet 75 mg  75 mg Oral DAILY    famotidine (PEPCID) tablet 20 mg  20 mg Oral DAILY    hydroCHLOROthiazide (HYDRODIURIL) tablet 25 mg  25 mg Oral DAILY    insulin glargine (LANTUS) injection 50 Units  50 Units SubCUTAneous QHS    lisinopriL (PRINIVIL, ZESTRIL) tablet 10 mg  10 mg Oral DAILY    jrpnuzzemb-fcllysqv-cdodks ala 200-25-25 mg tab 1 Tablet (Patient Supplied)  1 Tablet Oral DAILY    [Held by provider] pentoxifylline CR (TRENTAL) tablet 400 mg  400 mg Oral BID    repaglinide (PRANDIN) tablet 2 mg  2 mg Oral TIDAC    dolutegravir (TIVICAY) tablet 50 mg  50 mg Oral DAILY    sodium chloride (NS) flush 5-40 mL  5-40 mL IntraVENous Q8H    sodium chloride (NS) flush 5-40 mL  5-40 mL IntraVENous PRN    acetaminophen (TYLENOL) tablet 650 mg  650 mg Oral Q6H PRN    Or    acetaminophen (TYLENOL) suppository 650 mg  650 mg Rectal Q6H PRN    polyethylene glycol (MIRALAX) packet 17 g  17 g Oral DAILY PRN    ondansetron (ZOFRAN ODT) tablet 4 mg  4 mg Oral Q8H PRN    Or    ondansetron (ZOFRAN) injection 4 mg  4 mg IntraVENous Q6H PRN    insulin lispro (HUMALOG) injection   SubCUTAneous AC&HS    glucose chewable tablet 16 g  4 Tablet Oral PRN    glucagon (GLUCAGEN) injection 1 mg  1 mg IntraMUSCular PRN    dextrose (D50W) injection syrg 12.5-25 g  25-50 mL IntraVENous PRN        Review of Systems  Review of Systems   Constitutional: Negative for chills and fever. Eyes: Negative. Respiratory: Negative for cough and shortness of breath. Cardiovascular: Negative for chest pain and leg swelling. Gastrointestinal: Negative for abdominal pain, nausea and vomiting. Genitourinary:        Left testicular pain improved     Musculoskeletal: Negative. Skin: Negative. Neurological: Negative. Psychiatric/Behavioral: Negative.           Objective:     Visit Vitals  BP (!) 133/92 (BP 1 Location: Right upper arm, BP Patient Position: At rest)   Pulse 98   Temp 98.2 °F (36.8 °C)   Resp 18   Ht 6' 1\" (1.854 m)   Wt 115.7 kg (255 lb)   SpO2 98%   BMI 33.64 kg/m²    O2 Flow Rate (L/min): 2 l/min O2 Device: None (Room air)    Temp (24hrs), Av.1 °F (36.7 °C), Min:97.9 °F (36.6 °C), Max:98.2 °F (36.8 °C)      No intake/output data recorded.  1901 -  0700  In: 1300 [I.V.:1300]  Out: 105 [Urine:100]    PHYSICAL EXAM:  Physical Exam  Vitals reviewed. Constitutional:       General: He is not in acute distress. Appearance: He is not ill-appearing. HENT:      Head: Normocephalic and atraumatic. Mouth/Throat:      Mouth: Mucous membranes are moist.      Pharynx: Oropharynx is clear. Eyes:      Conjunctiva/sclera: Conjunctivae normal.   Cardiovascular:      Rate and Rhythm: Normal rate and regular rhythm. Heart sounds: Normal heart sounds. Pulmonary:      Effort: Pulmonary effort is normal.      Breath sounds: Normal breath sounds. Abdominal:      General: Abdomen is flat. Bowel sounds are normal.      Palpations: Abdomen is soft. Tenderness: There is abdominal tenderness. Comments: Tenderness at incision sites   Genitourinary:     Comments: Left testicular pain improved    Musculoskeletal:         General: Normal range of motion. Cervical back: Normal range of motion and neck supple. Skin:     General: Skin is warm and dry. Neurological:      General: No focal deficit present. Mental Status: He is alert and oriented to person, place, and time.    Psychiatric:         Mood and Affect: Mood normal.           Data Review    Recent Results (from the past 24 hour(s))   GLUCOSE, POC    Collection Time: 06/10/21  3:42 PM   Result Value Ref Range    Glucose (POC) 256 (H) 65 - 117 mg/dL    Performed by Meena Roman, POC    Collection Time: 06/10/21  9:42 PM   Result Value Ref Range    Glucose (POC) 224 (H) 65 - 117 mg/dL    Performed by [address] [place] [address], POC    Collection Time: 21  7:20 AM   Result Value Ref Range    Glucose (POC) 190 (H) 65 - 117 mg/dL    Performed by Charity Contreras, POC    Collection Time: 06/11/21 10:59 AM   Result Value Ref Range    Glucose (POC) 209 (H) 65 - 117 mg/dL    Performed by Tasha Adame        US SCROTUM/TESTICLES W DOPPLER   Final Result   Negative for torsion. XR CHEST PORT   Final Result   The cardiomediastinal silhouette is appropriate for age, technique,   and lung expansion. Pulmonary vasculature is not congested. The lungs are   essentially clear. No effusion or pneumothorax is seen. CT CHEST WO CONT   Final Result   Early bone metastases. No evidence of lung metastasis      XR FLUOROSCOPY UNDER 60 MINUTES   Final Result      CT ABD PELV W WO CONT   Final Result      Left renal mass is worrisome for malignancy. Left-sided hydroureteronephrosis   without stone. Apparent urinary bladder wall thickening. Recommend clinical   correlation and follow-up. Please see full report. CT ABD PELV WO CONT    (Results Pending)       Active Problems:    Hydronephrosis (6/6/2021)      Renal mass (6/6/2021)      Hematuria (6/6/2021)        Assessment/Plan:     1. Left-sided hydronephrosis   CT of abdomen/pelvis showing left renal mass concerning for malignancy, also noted left-sided hydroureteronephrosis and urinary bladder wall thickening. Status post left-sided ureteral stent placement  Left nephrectomy, adrenalectomy and partial ureter removal on 6/9/2021    2. Hematuria/renal mass  Sx hematuria found to have a left renal mass concerning for malignancy  Dilaudid as needed for pain relief at this time  Follow-up urology recommendations  Left nephrectomy scheduled for 6/9/2021  Left nephrectomy, adrenalectomy and partial ureter removal on 6/9/2021     3. Hypertension continue home antihypertensive medications     4. History of HIV continue home medications     5. Hyperlipidemia continue statin    6.   Left testicular pain  Ultrasound of the scrotum normal, small hydrocele  Prophylactically started Rocephin and azithromycin    7. Febrile illness/acute cystitis  Chest x-ray normal  Blood cultures, pending  Urinalysis  Urine culture gram negative rods  Procal slightly elevated  Continue Rocephin and azithromycin    8. Diabetes Mellitus, type 2   Continue Lantus  Continue sliding scale, Humalog   Continue repaglinide    Discharge disposition: Monitor creatinine for worsening signs of renal failure. Recovery from left nephrectomy. DVT Prophylaxis: SCDs  Ulcer Prophylaxis: Pepcid    Code Status: Full    Care Plan discussed with: patient and nursing    Total time spent with patient: >35 minutes.

## 2021-06-11 NOTE — PROGRESS NOTES
UROLOGY Progress Note         [phone]      Daily Progress Note: 6/11/2021      Subjective: The patient is seen for UROLOGIC follow up for hydronephrosis, renal mass, hematuria, urinary frequency. He is s/p cystourethroscopy, clot evacuation, bilateral RPG, and left ureteral stent placement. The ureter appeared mildly dilated along its entire length without focal stricturing. There was filling defect in the upper and mid infundibula high with dilation of these. This was suspicious for clot. He is s/p left laparoscopic radical nephrectomy and ureteral stent removal on 6/9/21. He is sore today. He tells me his pain was much worse after ambulation. No BM. No flatus. Tolerating full liquids without N/V. He is requiring narcotic pain management which can make the abdominal pain/constipation worse.       Problem List:  Patient Active Problem List   Diagnosis Code    Pain of lower extremity M79.606    Abnormal stress test R94.39    Hydronephrosis N13.30    Renal mass N28.89    Hematuria R31.9         Medications reviewed  Current Facility-Administered Medications   Medication Dose Route Frequency    docusate sodium (COLACE) capsule 100 mg  100 mg Oral BID    bisacodyL (DULCOLAX) suppository 10 mg  10 mg Rectal DAILY    simethicone (MYLICON) tablet 80 mg  80 mg Oral QID PRN    HYDROmorphone (DILAUDID) injection 1 mg  1 mg IntraVENous Q4H PRN    oxyCODONE IR (ROXICODONE) tablet 5 mg  5 mg Oral Q4H PRN    cefTRIAXone (ROCEPHIN) 1 g in sterile water (preservative free) 10 mL IV syringe  1 g IntraVENous Q24H    azithromycin (ZITHROMAX) 500 mg in 0.9% sodium chloride 250 mL (VIAL-MATE)  500 mg IntraVENous Q24H    [Held by provider] aspirin chewable tablet 81 mg  81 mg Oral DAILY    atorvastatin (LIPITOR) tablet 40 mg  40 mg Oral DAILY    [Held by provider] clopidogreL (PLAVIX) tablet 75 mg  75 mg Oral DAILY    famotidine (PEPCID) tablet 20 mg  20 mg Oral DAILY    hydroCHLOROthiazide (HYDRODIURIL) tablet 25 mg  25 mg Oral DAILY    insulin glargine (LANTUS) injection 50 Units  50 Units SubCUTAneous QHS    lisinopriL (PRINIVIL, ZESTRIL) tablet 10 mg  10 mg Oral DAILY    yxuodxqcjc-mvxvbryb-zbveoq ala 200-25-25 mg tab 1 Tablet (Patient Supplied)  1 Tablet Oral DAILY    [Held by provider] pentoxifylline CR (TRENTAL) tablet 400 mg  400 mg Oral BID    repaglinide (PRANDIN) tablet 2 mg  2 mg Oral TIDAC    dolutegravir (TIVICAY) tablet 50 mg  50 mg Oral DAILY    sodium chloride (NS) flush 5-40 mL  5-40 mL IntraVENous Q8H    sodium chloride (NS) flush 5-40 mL  5-40 mL IntraVENous PRN    acetaminophen (TYLENOL) tablet 650 mg  650 mg Oral Q6H PRN    Or    acetaminophen (TYLENOL) suppository 650 mg  650 mg Rectal Q6H PRN    polyethylene glycol (MIRALAX) packet 17 g  17 g Oral DAILY PRN    ondansetron (ZOFRAN ODT) tablet 4 mg  4 mg Oral Q8H PRN    Or    ondansetron (ZOFRAN) injection 4 mg  4 mg IntraVENous Q6H PRN    insulin lispro (HUMALOG) injection   SubCUTAneous AC&HS    glucose chewable tablet 16 g  4 Tablet Oral PRN    glucagon (GLUCAGEN) injection 1 mg  1 mg IntraMUSCular PRN    dextrose (D50W) injection syrg 12.5-25 g  25-50 mL IntraVENous PRN       Review of Systems:   Review of Systems   Constitutional: Negative for chills and fever. Gastrointestinal: Positive for abdominal pain. Negative for nausea and vomiting. Genitourinary: Negative for dysuria. Voiding           Objective:   Physical Exam  Vitals and nursing note reviewed. Constitutional:       General: He is not in acute distress. HENT:      Head: Normocephalic and atraumatic. Eyes:      Extraocular Movements: Extraocular movements intact. Cardiovascular:      Rate and Rhythm: Normal rate. Pulmonary:      Effort: Pulmonary effort is normal. No respiratory distress. Breath sounds: No wheezing. Abdominal:      General: There is distension. Palpations: Abdomen is soft. Tenderness: There is guarding. Genitourinary:     Comments: Voiding    Musculoskeletal:      Cervical back: Normal range of motion. Skin:     General: Skin is warm and dry. Neurological:      Mental Status: He is alert and oriented to person, place, and time. Psychiatric:         Behavior: Behavior normal.          Visit Vitals  BP (!) 150/97 (BP 1 Location: Right arm, BP Patient Position: At rest;Supine)   Pulse 84   Temp 97.9 °F (36.6 °C)   Resp 18   Ht 6' 1\" (1.854 m)   Wt 255 lb (115.7 kg)   SpO2 100%   BMI 33.64 kg/m²         Data Review:    LABS ORDERED AND PENDING. Assessment/     Patient Active Problem List   Diagnosis Code    Pain of lower extremity M79.606    Abnormal stress test R94.39    Hydronephrosis N13.30    Renal mass N28.89    Hematuria R31.9       Plan:  LEFT SIDED HYDRONEPHROSIS: he is s/p left ureteral stent placement on 6/6/21. Now s/p left nephrectomy on 6/9/21. Stent removed. LEFT RENAL MASS: Seen on admission CT scan, at least T2, likely a renal cell carcinoma. Anticoagulation on hold at this time. Left nephrectomy on 6/9/21. Pathology pending. Oncology recommends PET scan. GROSS HEMATURIA:  Improved off Plavix. He is s/p nephrectomy and Plavix is on hold. Recommend holding 4 days post-operatively. LUTS: Frequency and urgency at baseline. GENITAL WARTS: chronic. Topical therapy recommended as an outpatient. SCROTAL/TESTICULAR PAIN: chronic. No abnormalities on examination. Mildly bothersome. Small left hydrocele on US, otherwise normal imaging. He is requiring narcotic analgesia, which can ultimately make his abdominal discomfort worse after surgery. I've discussed this with the patient. He needs to ambulate TID. OOB as tolerated otherwise. I've added stool softeners and suppositories to the regimen. CT scan to r/o hematoma.     Ruby Christianson NP

## 2021-06-12 ENCOUNTER — APPOINTMENT (OUTPATIENT)
Dept: CT IMAGING | Age: 56
DRG: 442 | End: 2021-06-12
Attending: NURSE PRACTITIONER
Payer: COMMERCIAL

## 2021-06-12 LAB
ALBUMIN SERPL-MCNC: 2.6 G/DL (ref 3.5–5)
ALBUMIN/GLOB SERPL: 0.5 {RATIO} (ref 1.1–2.2)
ALP SERPL-CCNC: 109 U/L (ref 45–117)
ALT SERPL-CCNC: 35 U/L (ref 12–78)
ANION GAP SERPL CALC-SCNC: 9 MMOL/L (ref 5–15)
AST SERPL W P-5'-P-CCNC: 23 U/L (ref 15–37)
BASOPHILS # BLD: 0 K/UL (ref 0–0.1)
BASOPHILS NFR BLD: 0 % (ref 0–1)
BILIRUB SERPL-MCNC: 1 MG/DL (ref 0.2–1)
BUN SERPL-MCNC: 28 MG/DL (ref 6–20)
BUN/CREAT SERPL: 16 (ref 12–20)
CA-I BLD-MCNC: 9.6 MG/DL (ref 8.5–10.1)
CHLORIDE SERPL-SCNC: 94 MMOL/L (ref 97–108)
CO2 SERPL-SCNC: 26 MMOL/L (ref 21–32)
CREAT SERPL-MCNC: 1.8 MG/DL (ref 0.7–1.3)
DIFFERENTIAL METHOD BLD: ABNORMAL
EOSINOPHIL # BLD: 0.1 K/UL (ref 0–0.4)
EOSINOPHIL NFR BLD: 2 % (ref 0–7)
ERYTHROCYTE [DISTWIDTH] IN BLOOD BY AUTOMATED COUNT: 13.1 % (ref 11.5–14.5)
GLOBULIN SER CALC-MCNC: 5.6 G/DL (ref 2–4)
GLUCOSE BLD STRIP.AUTO-MCNC: 129 MG/DL (ref 65–117)
GLUCOSE BLD STRIP.AUTO-MCNC: 140 MG/DL (ref 65–117)
GLUCOSE BLD STRIP.AUTO-MCNC: 161 MG/DL (ref 65–117)
GLUCOSE BLD STRIP.AUTO-MCNC: 162 MG/DL (ref 65–117)
GLUCOSE SERPL-MCNC: 155 MG/DL (ref 65–100)
HCT VFR BLD AUTO: 49.4 % (ref 36.6–50.3)
HGB BLD-MCNC: 16.4 G/DL (ref 12.1–17)
IMM GRANULOCYTES # BLD AUTO: 0.1 K/UL (ref 0–0.04)
IMM GRANULOCYTES NFR BLD AUTO: 1 % (ref 0–0.5)
LYMPHOCYTES # BLD: 1.5 K/UL (ref 0.8–3.5)
LYMPHOCYTES NFR BLD: 17 % (ref 12–49)
MCH RBC QN AUTO: 28.9 PG (ref 26–34)
MCHC RBC AUTO-ENTMCNC: 33.2 G/DL (ref 30–36.5)
MCV RBC AUTO: 87.1 FL (ref 80–99)
MONOCYTES # BLD: 1 K/UL (ref 0–1)
MONOCYTES NFR BLD: 10 % (ref 5–13)
NEUTS SEG # BLD: 6.5 K/UL (ref 1.8–8)
NEUTS SEG NFR BLD: 70 % (ref 32–75)
NRBC # BLD: 0 K/UL (ref 0–0.01)
NRBC BLD-RTO: 0 PER 100 WBC
PERFORMED BY, TECHID: ABNORMAL
PLATELET # BLD AUTO: 353 K/UL (ref 150–400)
PMV BLD AUTO: 9.6 FL (ref 8.9–12.9)
POTASSIUM SERPL-SCNC: 3.9 MMOL/L (ref 3.5–5.1)
PROT SERPL-MCNC: 8.2 G/DL (ref 6.4–8.2)
RBC # BLD AUTO: 5.67 M/UL (ref 4.1–5.7)
SODIUM SERPL-SCNC: 129 MMOL/L (ref 136–145)
WBC # BLD AUTO: 9.3 K/UL (ref 4.1–11.1)

## 2021-06-12 PROCEDURE — 82962 GLUCOSE BLOOD TEST: CPT

## 2021-06-12 PROCEDURE — 74011636637 HC RX REV CODE- 636/637: Performed by: STUDENT IN AN ORGANIZED HEALTH CARE EDUCATION/TRAINING PROGRAM

## 2021-06-12 PROCEDURE — 74011636637 HC RX REV CODE- 636/637: Performed by: INTERNAL MEDICINE

## 2021-06-12 PROCEDURE — 74176 CT ABD & PELVIS W/O CONTRAST: CPT

## 2021-06-12 PROCEDURE — 74011250637 HC RX REV CODE- 250/637: Performed by: PHYSICIAN ASSISTANT

## 2021-06-12 PROCEDURE — 36415 COLL VENOUS BLD VENIPUNCTURE: CPT

## 2021-06-12 PROCEDURE — 65270000029 HC RM PRIVATE

## 2021-06-12 PROCEDURE — 74011250636 HC RX REV CODE- 250/636: Performed by: PHYSICIAN ASSISTANT

## 2021-06-12 PROCEDURE — 74011000250 HC RX REV CODE- 250: Performed by: PHYSICIAN ASSISTANT

## 2021-06-12 PROCEDURE — 80053 COMPREHEN METABOLIC PANEL: CPT

## 2021-06-12 PROCEDURE — 74011250637 HC RX REV CODE- 250/637: Performed by: INTERNAL MEDICINE

## 2021-06-12 PROCEDURE — 85025 COMPLETE CBC W/AUTO DIFF WBC: CPT

## 2021-06-12 PROCEDURE — 74011250637 HC RX REV CODE- 250/637: Performed by: NURSE PRACTITIONER

## 2021-06-12 RX ORDER — HYDROMORPHONE HYDROCHLORIDE 1 MG/ML
1 INJECTION, SOLUTION INTRAMUSCULAR; INTRAVENOUS; SUBCUTANEOUS
Status: DISCONTINUED | OUTPATIENT
Start: 2021-06-12 | End: 2021-06-17 | Stop reason: HOSPADM

## 2021-06-12 RX ORDER — SODIUM CHLORIDE 9 MG/ML
75 INJECTION, SOLUTION INTRAVENOUS CONTINUOUS
Status: DISCONTINUED | OUTPATIENT
Start: 2021-06-12 | End: 2021-06-16

## 2021-06-12 RX ORDER — SODIUM CHLORIDE 9 MG/ML
75 INJECTION, SOLUTION INTRAVENOUS CONTINUOUS
Status: DISCONTINUED | OUTPATIENT
Start: 2021-06-12 | End: 2021-06-13

## 2021-06-12 RX ADMIN — Medication 10 ML: at 13:04

## 2021-06-12 RX ADMIN — SODIUM CHLORIDE 75 ML/HR: 9 INJECTION, SOLUTION INTRAVENOUS at 12:58

## 2021-06-12 RX ADMIN — DOCUSATE SODIUM 100 MG: 100 CAPSULE, LIQUID FILLED ORAL at 22:02

## 2021-06-12 RX ADMIN — HYDROMORPHONE HYDROCHLORIDE 1 MG: 1 INJECTION, SOLUTION INTRAMUSCULAR; INTRAVENOUS; SUBCUTANEOUS at 08:18

## 2021-06-12 RX ADMIN — INSULIN LISPRO 2 UNITS: 100 INJECTION, SOLUTION INTRAVENOUS; SUBCUTANEOUS at 08:31

## 2021-06-12 RX ADMIN — HYDROCHLOROTHIAZIDE 25 MG: 25 TABLET ORAL at 08:14

## 2021-06-12 RX ADMIN — DOLUTEGRAVIR SODIUM 50 MG: 50 TABLET, FILM COATED ORAL at 08:14

## 2021-06-12 RX ADMIN — LACTULOSE 45 ML: 20 SOLUTION ORAL at 12:58

## 2021-06-12 RX ADMIN — Medication 10 ML: at 21:59

## 2021-06-12 RX ADMIN — LACTULOSE 45 ML: 20 SOLUTION ORAL at 22:02

## 2021-06-12 RX ADMIN — REPAGLINIDE 2 MG: 2 TABLET ORAL at 17:12

## 2021-06-12 RX ADMIN — LISINOPRIL 10 MG: 10 TABLET ORAL at 08:14

## 2021-06-12 RX ADMIN — DOCUSATE SODIUM 100 MG: 100 CAPSULE, LIQUID FILLED ORAL at 08:14

## 2021-06-12 RX ADMIN — OXYCODONE 7.5 MG: 5 TABLET ORAL at 22:08

## 2021-06-12 RX ADMIN — HYDROMORPHONE HYDROCHLORIDE 1 MG: 1 INJECTION, SOLUTION INTRAMUSCULAR; INTRAVENOUS; SUBCUTANEOUS at 02:03

## 2021-06-12 RX ADMIN — Medication 10 ML: at 05:16

## 2021-06-12 RX ADMIN — SIMETHICONE 80 MG: 80 TABLET, CHEWABLE ORAL at 05:16

## 2021-06-12 RX ADMIN — ATORVASTATIN CALCIUM 40 MG: 40 TABLET, FILM COATED ORAL at 08:14

## 2021-06-12 RX ADMIN — BISACODYL 10 MG: 10 SUPPOSITORY RECTAL at 13:08

## 2021-06-12 RX ADMIN — INSULIN LISPRO 2 UNITS: 100 INJECTION, SOLUTION INTRAVENOUS; SUBCUTANEOUS at 22:07

## 2021-06-12 RX ADMIN — INSULIN GLARGINE 50 UNITS: 100 INJECTION, SOLUTION SUBCUTANEOUS at 22:02

## 2021-06-12 RX ADMIN — OXYCODONE 7.5 MG: 5 TABLET ORAL at 12:57

## 2021-06-12 RX ADMIN — HYDROMORPHONE HYDROCHLORIDE 1 MG: 1 INJECTION, SOLUTION INTRAMUSCULAR; INTRAVENOUS; SUBCUTANEOUS at 19:19

## 2021-06-12 RX ADMIN — REPAGLINIDE 2 MG: 2 TABLET ORAL at 05:16

## 2021-06-12 RX ADMIN — CEFTRIAXONE SODIUM 1 G: 1 INJECTION, POWDER, FOR SOLUTION INTRAMUSCULAR; INTRAVENOUS at 16:30

## 2021-06-12 RX ADMIN — AZITHROMYCIN DIHYDRATE 500 MG: 500 INJECTION, POWDER, LYOPHILIZED, FOR SOLUTION INTRAVENOUS at 16:30

## 2021-06-12 RX ADMIN — OXYCODONE 7.5 MG: 5 TABLET ORAL at 06:02

## 2021-06-12 RX ADMIN — REPAGLINIDE 2 MG: 2 TABLET ORAL at 12:58

## 2021-06-12 NOTE — PROGRESS NOTES
Hospitalist Progress Note    Subjective:   Daily Progress Note: 6/12/2021     Hospital Course:  Waldemar Hartman is a 40-year-old obese -American male with a past medical history of CAD, hypertension, hypercholesterolemia, diabetes mellitus, PVD, and HIV who is presenting to the emergency department with a primary complaint of abdominal pain. Patient reports she was in her normal state of health until few days ago when he started to experience gradually worsening lower abdominal pain. He reports associated hematuria and nausea. He denies any fevers, chills, vomiting, dizziness, chest pain, palpitations, shortness of breath, swelling of extremities, or focal weakness. CT of abdomen/pelvis showing left renal mass concerning for malignancy, also noted left-sided hydroureteronephrosis and urinary bladder wall thickening. Urology and oncology consult. .  Patient admitted to hospitalist services for hydroureteronephrosis. Left nephrectomy on 6/9/2021. Left testicular pain and ultrasound normal.  Prophylactically started Rocephin and azithromycin. Subjective:    Patient seen and examined at bedside. He reports improvement in abdominal pain.    Left testicular pain improving     Current Facility-Administered Medications   Medication Dose Route Frequency    0.9% sodium chloride infusion  75 mL/hr IntraVENous CONTINUOUS    0.9% sodium chloride infusion  75 mL/hr IntraVENous CONTINUOUS    docusate sodium (COLACE) capsule 100 mg  100 mg Oral BID    bisacodyL (DULCOLAX) suppository 10 mg  10 mg Rectal DAILY    simethicone (MYLICON) tablet 80 mg  80 mg Oral QID PRN    oxyCODONE IR (ROXICODONE) tablet 7.5 mg  7.5 mg Oral Q4H PRN    cefTRIAXone (ROCEPHIN) 1 g in sterile water (preservative free) 10 mL IV syringe  1 g IntraVENous Q24H    azithromycin (ZITHROMAX) 500 mg in 0.9% sodium chloride 250 mL (VIAL-MATE)  500 mg IntraVENous Q24H    [Held by provider] aspirin chewable tablet 81 mg  81 mg Oral DAILY    atorvastatin (LIPITOR) tablet 40 mg  40 mg Oral DAILY    [Held by provider] clopidogreL (PLAVIX) tablet 75 mg  75 mg Oral DAILY    famotidine (PEPCID) tablet 20 mg  20 mg Oral DAILY    hydroCHLOROthiazide (HYDRODIURIL) tablet 25 mg  25 mg Oral DAILY    insulin glargine (LANTUS) injection 50 Units  50 Units SubCUTAneous QHS    lisinopriL (PRINIVIL, ZESTRIL) tablet 10 mg  10 mg Oral DAILY    plkyafwcwn-zbuihlvs-crbyih ala 200-25-25 mg tab 1 Tablet (Patient Supplied)  1 Tablet Oral DAILY    [Held by provider] pentoxifylline CR (TRENTAL) tablet 400 mg  400 mg Oral BID    repaglinide (PRANDIN) tablet 2 mg  2 mg Oral TIDAC    dolutegravir (TIVICAY) tablet 50 mg  50 mg Oral DAILY    sodium chloride (NS) flush 5-40 mL  5-40 mL IntraVENous Q8H    sodium chloride (NS) flush 5-40 mL  5-40 mL IntraVENous PRN    acetaminophen (TYLENOL) tablet 650 mg  650 mg Oral Q6H PRN    Or    acetaminophen (TYLENOL) suppository 650 mg  650 mg Rectal Q6H PRN    polyethylene glycol (MIRALAX) packet 17 g  17 g Oral DAILY PRN    ondansetron (ZOFRAN ODT) tablet 4 mg  4 mg Oral Q8H PRN    Or    ondansetron (ZOFRAN) injection 4 mg  4 mg IntraVENous Q6H PRN    insulin lispro (HUMALOG) injection   SubCUTAneous AC&HS    glucose chewable tablet 16 g  4 Tablet Oral PRN    glucagon (GLUCAGEN) injection 1 mg  1 mg IntraMUSCular PRN    dextrose (D50W) injection syrg 12.5-25 g  25-50 mL IntraVENous PRN        Review of Systems  Review of Systems   Constitutional: Negative for chills and fever. Eyes: Negative. Respiratory: Negative for cough and shortness of breath. Cardiovascular: Negative for chest pain and leg swelling. Gastrointestinal: Negative for abdominal pain, nausea and vomiting. Genitourinary:        Left testicular pain improved     Musculoskeletal: Negative. Skin: Negative. Neurological: Negative. Psychiatric/Behavioral: Negative.           Objective:     Visit Vitals  BP (!) 151/104 (BP Patient Position: At rest;Supine)   Pulse (!) 103   Temp 97.9 °F (36.6 °C)   Resp 18   Ht 6' 1\" (1.854 m)   Wt 115.7 kg (255 lb)   SpO2 93%   BMI 33.64 kg/m²    O2 Flow Rate (L/min): 2 l/min O2 Device: None (Room air)    Temp (24hrs), Av.3 °F (36.8 °C), Min:97.9 °F (36.6 °C), Max:98.6 °F (37 °C)      No intake/output data recorded. 06/10 1901 -  0700  In: -   Out: 1000 [Urine:1000]    PHYSICAL EXAM:  Physical Exam  Vitals reviewed. Constitutional:       General: He is not in acute distress. Appearance: He is not ill-appearing. HENT:      Head: Normocephalic and atraumatic. Mouth/Throat:      Mouth: Mucous membranes are moist.      Pharynx: Oropharynx is clear. Eyes:      Conjunctiva/sclera: Conjunctivae normal.   Cardiovascular:      Rate and Rhythm: Normal rate and regular rhythm. Heart sounds: Normal heart sounds. Pulmonary:      Effort: Pulmonary effort is normal.      Breath sounds: Normal breath sounds. Abdominal:      General: Abdomen is flat. Bowel sounds are normal.      Palpations: Abdomen is soft. Tenderness: There is abdominal tenderness. Comments: Tenderness at incision sites   Genitourinary:     Comments: Left testicular pain improved    Musculoskeletal:         General: Normal range of motion. Cervical back: Normal range of motion and neck supple. Skin:     General: Skin is warm and dry. Neurological:      General: No focal deficit present. Mental Status: He is alert and oriented to person, place, and time.    Psychiatric:         Mood and Affect: Mood normal.           Data Review    Recent Results (from the past 24 hour(s))   GLUCOSE, POC    Collection Time: 21  6:26 PM   Result Value Ref Range    Glucose (POC) 209 (H) 65 - 117 mg/dL    Performed by [address], POC    Collection Time: 21  9:49 PM   Result Value Ref Range    Glucose (POC) 182 (H) 65 - 117 mg/dL    Performed by VIRGILIO KUNZ    CBC WITH AUTOMATED DIFF    Collection Time: 06/12/21  6:49 AM   Result Value Ref Range    WBC 9.3 4.1 - 11.1 K/uL    RBC 5.67 4.10 - 5.70 M/uL    HGB 16.4 12.1 - 17.0 g/dL    HCT 49.4 36.6 - 50.3 %    MCV 87.1 80.0 - 99.0 FL    MCH 28.9 26.0 - 34.0 PG    MCHC 33.2 30.0 - 36.5 g/dL    RDW 13.1 11.5 - 14.5 %    PLATELET 085 697 - 224 K/uL    MPV 9.6 8.9 - 12.9 FL    NRBC 0.0 0.0  WBC    ABSOLUTE NRBC 0.00 0.00 - 0.01 K/uL    NEUTROPHILS 70 32 - 75 %    LYMPHOCYTES 17 12 - 49 %    MONOCYTES 10 5 - 13 %    EOSINOPHILS 2 0 - 7 %    BASOPHILS 0 0 - 1 %    IMMATURE GRANULOCYTES 1 (H) 0 - 0.5 %    ABS. NEUTROPHILS 6.5 1.8 - 8.0 K/UL    ABS. LYMPHOCYTES 1.5 0.8 - 3.5 K/UL    ABS. MONOCYTES 1.0 0.0 - 1.0 K/UL    ABS. EOSINOPHILS 0.1 0.0 - 0.4 K/UL    ABS. BASOPHILS 0.0 0.0 - 0.1 K/UL    ABS. IMM. GRANS. 0.1 (H) 0.00 - 0.04 K/UL    DF AUTOMATED     METABOLIC PANEL, COMPREHENSIVE    Collection Time: 06/12/21  6:49 AM   Result Value Ref Range    Sodium 129 (L) 136 - 145 mmol/L    Potassium 3.9 3.5 - 5.1 mmol/L    Chloride 94 (L) 97 - 108 mmol/L    CO2 26 21 - 32 mmol/L    Anion gap 9 5 - 15 mmol/L    Glucose 155 (H) 65 - 100 mg/dL    BUN 28 (H) 6 - 20 mg/dL    Creatinine 1.80 (H) 0.70 - 1.30 mg/dL    BUN/Creatinine ratio 16 12 - 20      GFR est AA 47 (L) >60 ml/min/1.73m2    GFR est non-AA 39 (L) >60 ml/min/1.73m2    Calcium 9.6 8.5 - 10.1 mg/dL    Bilirubin, total 1.0 0.2 - 1.0 mg/dL    AST (SGOT) 23 15 - 37 U/L    ALT (SGPT) 35 12 - 78 U/L    Alk. phosphatase 109 45 - 117 U/L    Protein, total 8.2 6.4 - 8.2 g/dL    Albumin 2.6 (L) 3.5 - 5.0 g/dL    Globulin 5.6 (H) 2.0 - 4.0 g/dL    A-G Ratio 0.5 (L) 1.1 - 2.2     GLUCOSE, POC    Collection Time: 06/12/21  8:27 AM   Result Value Ref Range    Glucose (POC) 162 (H) 65 - 117 mg/dL    Performed by [address]    Final Result   Negative for torsion. XR CHEST PORT   Final Result   The cardiomediastinal silhouette is appropriate for age, technique,   and lung expansion. Pulmonary vasculature is not congested. The lungs are   essentially clear. No effusion or pneumothorax is seen. CT CHEST WO CONT   Final Result   Early bone metastases. No evidence of lung metastasis      XR FLUOROSCOPY UNDER 60 MINUTES   Final Result      CT ABD PELV W WO CONT   Final Result      Left renal mass is worrisome for malignancy. Left-sided hydroureteronephrosis   without stone. Apparent urinary bladder wall thickening. Recommend clinical   correlation and follow-up. Please see full report. CT ABD PELV WO CONT    (Results Pending)       Active Problems:    Hydronephrosis (6/6/2021)      Renal mass (6/6/2021)      Hematuria (6/6/2021)        Assessment/Plan:     1. Left-sided hydronephrosis   CT of abdomen/pelvis showing left renal mass concerning for malignancy, also noted left-sided hydroureteronephrosis and urinary bladder wall thickening. Status post left-sided ureteral stent placement  Left nephrectomy, adrenalectomy and partial ureter removal on 6/9/2021, postoperative bleeding      2. Hematuria/renal mass  Sx hematuria found to have a left renal mass concerning for malignancy  Dilaudid as needed for pain relief at this time  Follow-up urology recommendations  Left nephrectomy scheduled for 6/9/2021  Left nephrectomy, adrenalectomy and partial ureter removal on 6/9/2021     3. Hypertension continue home antihypertensive medications     4. History of HIV continue home medications     5. Hyperlipidemia continue statin    6. Left testicular pain  Ultrasound of the scrotum normal, small hydrocele  Rocephin and azithromycin    7. Febrile illness/acute cystitis  Chest x-ray normal  Blood cultures, pending  Urinalysis  Urine culture gram negative rods, Klebsiella sensitive to Rocephin  Continue Rocephin and azithromycin    8. Diabetes Mellitus, type 2   Continue Lantus  Continue sliding scale, Humalog   Continue repaglinide    9.   Constipation  Continue lactulose    Discharge disposition: Monitor creatinine for worsening signs of renal failure. Recovery from left nephrectomy. DVT Prophylaxis: SCDs  Ulcer Prophylaxis: Pepcid    Code Status: Full    Care Plan discussed with: patient and nursing    Total time spent with patient: >35 minutes.

## 2021-06-12 NOTE — PROGRESS NOTES
UROLOGY Progress Note         [phone]      Daily Progress Note: 6/12/2021      Subjective: The patient is seen for UROLOGIC follow up for hydronephrosis, renal mass, hematuria, urinary frequency. He is s/p cystourethroscopy, clot evacuation, bilateral RPG, and left ureteral stent placement. The ureter appeared mildly dilated along its entire length without focal stricturing. There was filling defect in the upper and mid infundibula high with dilation of these. This was suspicious for clot. He is s/p left laparoscopic radical nephrectomy and ureteral stent removal on 6/9/21. He is sore today, c/o generalized abdominal pain. Abdomen is soft, non-distended. He has some nausea with intake. Clear liquids or full liquids if patient still with nausea. His incision sites are clean, dry and intact. He has had some reflux. Voiding dark yellow urine. Cr 1.8  Report passing flatus. CT scan today: No retroperitoneal, intraperitoneal or  abdominal wall hematoma. Trace water attenuation fluid posterior to the nephrectomy bed. No otherwise free or loculated fluid. Diffuse ileus.       Problem List:  Patient Active Problem List   Diagnosis Code    Pain of lower extremity M79.606    Abnormal stress test R94.39    Hydronephrosis N13.30    Renal mass N28.89    Hematuria R31.9         Medications reviewed  Current Facility-Administered Medications   Medication Dose Route Frequency    docusate sodium (COLACE) capsule 100 mg  100 mg Oral BID    bisacodyL (DULCOLAX) suppository 10 mg  10 mg Rectal DAILY    simethicone (MYLICON) tablet 80 mg  80 mg Oral QID PRN    oxyCODONE IR (ROXICODONE) tablet 7.5 mg  7.5 mg Oral Q4H PRN    cefTRIAXone (ROCEPHIN) 1 g in sterile water (preservative free) 10 mL IV syringe  1 g IntraVENous Q24H    azithromycin (ZITHROMAX) 500 mg in 0.9% sodium chloride 250 mL (VIAL-MATE)  500 mg IntraVENous Q24H    [Held by provider] aspirin chewable tablet 81 mg  81 mg Oral DAILY atorvastatin (LIPITOR) tablet 40 mg  40 mg Oral DAILY    [Held by provider] clopidogreL (PLAVIX) tablet 75 mg  75 mg Oral DAILY    famotidine (PEPCID) tablet 20 mg  20 mg Oral DAILY    hydroCHLOROthiazide (HYDRODIURIL) tablet 25 mg  25 mg Oral DAILY    insulin glargine (LANTUS) injection 50 Units  50 Units SubCUTAneous QHS    lisinopriL (PRINIVIL, ZESTRIL) tablet 10 mg  10 mg Oral DAILY    asjoiczrxr-wgmgybeo-cxqlqc ala 200-25-25 mg tab 1 Tablet (Patient Supplied)  1 Tablet Oral DAILY    [Held by provider] pentoxifylline CR (TRENTAL) tablet 400 mg  400 mg Oral BID    repaglinide (PRANDIN) tablet 2 mg  2 mg Oral TIDAC    dolutegravir (TIVICAY) tablet 50 mg  50 mg Oral DAILY    sodium chloride (NS) flush 5-40 mL  5-40 mL IntraVENous Q8H    sodium chloride (NS) flush 5-40 mL  5-40 mL IntraVENous PRN    acetaminophen (TYLENOL) tablet 650 mg  650 mg Oral Q6H PRN    Or    acetaminophen (TYLENOL) suppository 650 mg  650 mg Rectal Q6H PRN    polyethylene glycol (MIRALAX) packet 17 g  17 g Oral DAILY PRN    ondansetron (ZOFRAN ODT) tablet 4 mg  4 mg Oral Q8H PRN    Or    ondansetron (ZOFRAN) injection 4 mg  4 mg IntraVENous Q6H PRN    insulin lispro (HUMALOG) injection   SubCUTAneous AC&HS    glucose chewable tablet 16 g  4 Tablet Oral PRN    glucagon (GLUCAGEN) injection 1 mg  1 mg IntraMUSCular PRN    dextrose (D50W) injection syrg 12.5-25 g  25-50 mL IntraVENous PRN       Review of Systems:   Review of Systems   Constitutional: Negative for chills and fever. Gastrointestinal: Positive for abdominal pain and nausea. Negative for vomiting. Abdominal soreness post-operatively   Genitourinary: Negative for dysuria, frequency, hematuria and urgency. Objective:   Physical Exam  Vitals and nursing note reviewed. Constitutional:       General: He is not in acute distress. HENT:      Head: Normocephalic and atraumatic. Eyes:      Extraocular Movements: Extraocular movements intact.    Cardiovascular:      Rate and Rhythm: Normal rate. Pulmonary:      Effort: Pulmonary effort is normal. No respiratory distress. Breath sounds: No wheezing. Abdominal:      General: There is no distension. Palpations: Abdomen is soft. Tenderness: There is no abdominal tenderness. Hernia: No hernia is present. Genitourinary:     Comments: Voiding dark, yellow urine    Musculoskeletal:      Cervical back: Normal range of motion. Skin:     General: Skin is warm and dry. Neurological:      Mental Status: He is alert and oriented to person, place, and time. Psychiatric:         Behavior: Behavior normal.          Visit Vitals  BP (!) 151/104 (BP Patient Position: At rest;Supine)   Pulse (!) 103   Temp 97.9 °F (36.6 °C)   Resp 18   Ht 6' 1\" (1.854 m)   Wt 255 lb (115.7 kg)   SpO2 93%   BMI 33.64 kg/m²         Data Review:    LABS ORDERED AND PENDING. Assessment/     Patient Active Problem List   Diagnosis Code    Pain of lower extremity M79.606    Abnormal stress test R94.39    Hydronephrosis N13.30    Renal mass N28.89    Hematuria R31.9       Plan:  LEFT SIDED HYDRONEPHROSIS: he is s/p left ureteral stent placement on 6/6/21. Now s/p left nephrectomy on 6/9/21. Stent removed. RETAINED URETERAL STENT: left ureteral stent placed on 6/6/21. Removed with nephrectomy on 6/9/2021. LEFT RENAL MASS: Seen on admission CT scan, at least T2, likely a renal cell carcinoma. Anticoagulation on hold at this time. Left nephrectomy on 6/9/21. Pathology pending. No gross hematuria. Cr 1.8  CT pending. Oncology recommends PET scan. Ok to get however PET not clinically reliable with many renal cancers. GROSS HEMATURIA:  Improved off Plavix. He is s/p nephrectomy and Plavix is on hold. No gross hematuria. LUTS: Frequency and urgency. GENITAL WARTS: chronic. Topical therapy recommended as an outpatient. SCROTAL/TESTICULAR PAIN: chronic. No abnormalities on examination. Mildly bothersome. Small left hydrocele on US, otherwise normal imaging.       John Montemayor, NP

## 2021-06-13 ENCOUNTER — APPOINTMENT (OUTPATIENT)
Dept: GENERAL RADIOLOGY | Age: 56
DRG: 442 | End: 2021-06-13
Attending: NURSE PRACTITIONER
Payer: COMMERCIAL

## 2021-06-13 LAB
ALBUMIN SERPL-MCNC: 2.6 G/DL (ref 3.5–5)
ALBUMIN/GLOB SERPL: 0.5 {RATIO} (ref 1.1–2.2)
ALP SERPL-CCNC: 113 U/L (ref 45–117)
ALT SERPL-CCNC: 36 U/L (ref 12–78)
ANION GAP SERPL CALC-SCNC: 7 MMOL/L (ref 5–15)
AST SERPL W P-5'-P-CCNC: 30 U/L (ref 15–37)
BASOPHILS # BLD: 0 K/UL (ref 0–0.1)
BASOPHILS NFR BLD: 1 % (ref 0–1)
BILIRUB SERPL-MCNC: 0.9 MG/DL (ref 0.2–1)
BUN SERPL-MCNC: 40 MG/DL (ref 6–20)
BUN/CREAT SERPL: 17 (ref 12–20)
CA-I BLD-MCNC: 9.2 MG/DL (ref 8.5–10.1)
CHLORIDE SERPL-SCNC: 96 MMOL/L (ref 97–108)
CO2 SERPL-SCNC: 25 MMOL/L (ref 21–32)
CREAT SERPL-MCNC: 2.31 MG/DL (ref 0.7–1.3)
DIFFERENTIAL METHOD BLD: ABNORMAL
EOSINOPHIL # BLD: 0.2 K/UL (ref 0–0.4)
EOSINOPHIL NFR BLD: 3 % (ref 0–7)
ERYTHROCYTE [DISTWIDTH] IN BLOOD BY AUTOMATED COUNT: 13.3 % (ref 11.5–14.5)
GLOBULIN SER CALC-MCNC: 5.4 G/DL (ref 2–4)
GLUCOSE BLD STRIP.AUTO-MCNC: 102 MG/DL (ref 65–117)
GLUCOSE BLD STRIP.AUTO-MCNC: 162 MG/DL (ref 65–117)
GLUCOSE BLD STRIP.AUTO-MCNC: 249 MG/DL (ref 65–117)
GLUCOSE BLD STRIP.AUTO-MCNC: 82 MG/DL (ref 65–117)
GLUCOSE SERPL-MCNC: 129 MG/DL (ref 65–100)
HCT VFR BLD AUTO: 50.2 % (ref 36.6–50.3)
HGB BLD-MCNC: 16.5 G/DL (ref 12.1–17)
IMM GRANULOCYTES # BLD AUTO: 0 K/UL (ref 0–0.04)
IMM GRANULOCYTES NFR BLD AUTO: 1 % (ref 0–0.5)
LYMPHOCYTES # BLD: 2 K/UL (ref 0.8–3.5)
LYMPHOCYTES NFR BLD: 25 % (ref 12–49)
MCH RBC QN AUTO: 28.7 PG (ref 26–34)
MCHC RBC AUTO-ENTMCNC: 32.9 G/DL (ref 30–36.5)
MCV RBC AUTO: 87.5 FL (ref 80–99)
MONOCYTES # BLD: 0.8 K/UL (ref 0–1)
MONOCYTES NFR BLD: 10 % (ref 5–13)
NEUTS SEG # BLD: 5 K/UL (ref 1.8–8)
NEUTS SEG NFR BLD: 60 % (ref 32–75)
NRBC # BLD: 0 K/UL (ref 0–0.01)
NRBC BLD-RTO: 0 PER 100 WBC
PERFORMED BY, TECHID: ABNORMAL
PERFORMED BY, TECHID: ABNORMAL
PERFORMED BY, TECHID: NORMAL
PERFORMED BY, TECHID: NORMAL
PLATELET # BLD AUTO: 395 K/UL (ref 150–400)
PMV BLD AUTO: 9.5 FL (ref 8.9–12.9)
POTASSIUM SERPL-SCNC: 4.1 MMOL/L (ref 3.5–5.1)
PROT SERPL-MCNC: 8 G/DL (ref 6.4–8.2)
RBC # BLD AUTO: 5.74 M/UL (ref 4.1–5.7)
SODIUM SERPL-SCNC: 128 MMOL/L (ref 136–145)
WBC # BLD AUTO: 8.2 K/UL (ref 4.1–11.1)

## 2021-06-13 PROCEDURE — 74011250637 HC RX REV CODE- 250/637: Performed by: INTERNAL MEDICINE

## 2021-06-13 PROCEDURE — 74018 RADEX ABDOMEN 1 VIEW: CPT

## 2021-06-13 PROCEDURE — 74011250636 HC RX REV CODE- 250/636: Performed by: PHYSICIAN ASSISTANT

## 2021-06-13 PROCEDURE — 82962 GLUCOSE BLOOD TEST: CPT

## 2021-06-13 PROCEDURE — 85025 COMPLETE CBC W/AUTO DIFF WBC: CPT

## 2021-06-13 PROCEDURE — 65270000029 HC RM PRIVATE

## 2021-06-13 PROCEDURE — 80053 COMPREHEN METABOLIC PANEL: CPT

## 2021-06-13 PROCEDURE — 74011250637 HC RX REV CODE- 250/637: Performed by: PHYSICIAN ASSISTANT

## 2021-06-13 PROCEDURE — 36415 COLL VENOUS BLD VENIPUNCTURE: CPT

## 2021-06-13 PROCEDURE — 74011250637 HC RX REV CODE- 250/637: Performed by: NURSE PRACTITIONER

## 2021-06-13 PROCEDURE — 74011636637 HC RX REV CODE- 636/637: Performed by: STUDENT IN AN ORGANIZED HEALTH CARE EDUCATION/TRAINING PROGRAM

## 2021-06-13 RX ORDER — GUAIFENESIN 100 MG/5ML
81 LIQUID (ML) ORAL DAILY
Status: CANCELLED | OUTPATIENT
Start: 2021-06-13

## 2021-06-13 RX ORDER — FENTANYL 50 UG/1
1 PATCH TRANSDERMAL
Status: DISCONTINUED | OUTPATIENT
Start: 2021-06-13 | End: 2021-06-17 | Stop reason: HOSPADM

## 2021-06-13 RX ADMIN — Medication 10 ML: at 15:24

## 2021-06-13 RX ADMIN — ASPIRIN 81 MG: 81 TABLET, CHEWABLE ORAL at 20:23

## 2021-06-13 RX ADMIN — REPAGLINIDE 2 MG: 2 TABLET ORAL at 13:49

## 2021-06-13 RX ADMIN — FAMOTIDINE 20 MG: 20 TABLET ORAL at 10:52

## 2021-06-13 RX ADMIN — INSULIN LISPRO 4 UNITS: 100 INJECTION, SOLUTION INTRAVENOUS; SUBCUTANEOUS at 13:45

## 2021-06-13 RX ADMIN — SODIUM CHLORIDE 75 ML/HR: 9 INJECTION, SOLUTION INTRAVENOUS at 20:48

## 2021-06-13 RX ADMIN — REPAGLINIDE 2 MG: 2 TABLET ORAL at 10:49

## 2021-06-13 RX ADMIN — DOCUSATE SODIUM 100 MG: 100 CAPSULE, LIQUID FILLED ORAL at 10:52

## 2021-06-13 RX ADMIN — HYDROMORPHONE HYDROCHLORIDE 1 MG: 1 INJECTION, SOLUTION INTRAMUSCULAR; INTRAVENOUS; SUBCUTANEOUS at 20:48

## 2021-06-13 RX ADMIN — LACTULOSE 45 ML: 20 SOLUTION ORAL at 10:55

## 2021-06-13 RX ADMIN — REPAGLINIDE 2 MG: 2 TABLET ORAL at 20:24

## 2021-06-13 RX ADMIN — DOCUSATE SODIUM 100 MG: 100 CAPSULE, LIQUID FILLED ORAL at 20:48

## 2021-06-13 RX ADMIN — Medication 10 ML: at 07:38

## 2021-06-13 RX ADMIN — DOLUTEGRAVIR SODIUM 50 MG: 50 TABLET, FILM COATED ORAL at 11:13

## 2021-06-13 RX ADMIN — Medication 10 ML: at 20:52

## 2021-06-13 RX ADMIN — SODIUM CHLORIDE 1000 ML: 9 INJECTION, SOLUTION INTRAVENOUS at 15:27

## 2021-06-13 RX ADMIN — ATORVASTATIN CALCIUM 40 MG: 40 TABLET, FILM COATED ORAL at 10:52

## 2021-06-13 RX ADMIN — ACETAMINOPHEN 650 MG: 325 TABLET ORAL at 11:18

## 2021-06-13 NOTE — PROGRESS NOTES
Alert and oriented x 4 able to make needs known. Up Ambulating to bathroom with walker. Had BM assisted 1 person back to bed. Tolerated well C/O pain 7/10. Medicated tolerated all meds and treatments. Presently resting quietly HOB up, low bed, and call bell in reach.

## 2021-06-13 NOTE — PROGRESS NOTES
1250 Patient left unit by w/c for abdominal xray. No c/o pain or discomfort and no acute distress noted. 1336 Rec'vd back to floor.

## 2021-06-13 NOTE — PROGRESS NOTES
Hospitalist Progress Note    Subjective:   Daily Progress Note: 6/13/2021     Hospital Course:  Stephanie Alfaro is a 13-year-old obese -American male with a past medical history of CAD, hypertension, hypercholesterolemia, diabetes mellitus, PVD, and HIV who is presenting to the emergency department with a primary complaint of abdominal pain. Patient reports she was in her normal state of health until few days ago when he started to experience gradually worsening lower abdominal pain. He reports associated hematuria and nausea. He denies any fevers, chills, vomiting, dizziness, chest pain, palpitations, shortness of breath, swelling of extremities, or focal weakness. CT of abdomen/pelvis showing left renal mass concerning for malignancy, also noted left-sided hydroureteronephrosis and urinary bladder wall thickening. Urology and oncology consult. .  Patient admitted to hospitalist services for hydroureteronephrosis. Left nephrectomy on 6/9/2021. Left testicular pain and ultrasound normal.  Prophylactically started Rocephin and azithromycin. Subjective:    Patient seen and examined at bedside. He reports improvement in abdominal pain.    Left testicular pain improving     Current Facility-Administered Medications   Medication Dose Route Frequency    fentaNYL (DURAGESIC) 50 mcg/hr patch 1 Patch  1 Patch TransDERmal Q72H    sodium chloride 0.9 % bolus infusion 1,000 mL  1,000 mL IntraVENous ONCE    0.9% sodium chloride infusion  75 mL/hr IntraVENous CONTINUOUS    0.9% sodium chloride infusion  75 mL/hr IntraVENous CONTINUOUS    lactulose (CHRONULAC) 10 gram/15 mL solution 45 mL  30 g Oral BID    HYDROmorphone (DILAUDID) injection 1 mg  1 mg IntraVENous Q4H PRN    docusate sodium (COLACE) capsule 100 mg  100 mg Oral BID    bisacodyL (DULCOLAX) suppository 10 mg  10 mg Rectal DAILY    simethicone (MYLICON) tablet 80 mg  80 mg Oral QID PRN    oxyCODONE IR (ROXICODONE) tablet 7.5 mg  7.5 mg Oral Q4H PRN  aspirin chewable tablet 81 mg  81 mg Oral DAILY    atorvastatin (LIPITOR) tablet 40 mg  40 mg Oral DAILY    [Held by provider] clopidogreL (PLAVIX) tablet 75 mg  75 mg Oral DAILY    famotidine (PEPCID) tablet 20 mg  20 mg Oral DAILY    hydroCHLOROthiazide (HYDRODIURIL) tablet 25 mg  25 mg Oral DAILY    insulin glargine (LANTUS) injection 50 Units  50 Units SubCUTAneous QHS    lisinopriL (PRINIVIL, ZESTRIL) tablet 10 mg  10 mg Oral DAILY    ivjsuvlgfk-ippfboot-kugxpe ala 200-25-25 mg tab 1 Tablet (Patient Supplied)  1 Tablet Oral DAILY    [Held by provider] pentoxifylline CR (TRENTAL) tablet 400 mg  400 mg Oral BID    repaglinide (PRANDIN) tablet 2 mg  2 mg Oral TIDAC    dolutegravir (TIVICAY) tablet 50 mg  50 mg Oral DAILY    sodium chloride (NS) flush 5-40 mL  5-40 mL IntraVENous Q8H    sodium chloride (NS) flush 5-40 mL  5-40 mL IntraVENous PRN    acetaminophen (TYLENOL) tablet 650 mg  650 mg Oral Q6H PRN    Or    acetaminophen (TYLENOL) suppository 650 mg  650 mg Rectal Q6H PRN    polyethylene glycol (MIRALAX) packet 17 g  17 g Oral DAILY PRN    ondansetron (ZOFRAN ODT) tablet 4 mg  4 mg Oral Q8H PRN    Or    ondansetron (ZOFRAN) injection 4 mg  4 mg IntraVENous Q6H PRN    insulin lispro (HUMALOG) injection   SubCUTAneous AC&HS    glucose chewable tablet 16 g  4 Tablet Oral PRN    glucagon (GLUCAGEN) injection 1 mg  1 mg IntraMUSCular PRN    dextrose (D50W) injection syrg 12.5-25 g  25-50 mL IntraVENous PRN        Review of Systems  Review of Systems   Constitutional: Negative for chills and fever. Eyes: Negative. Respiratory: Negative for cough and shortness of breath. Cardiovascular: Negative for chest pain and leg swelling. Gastrointestinal: Positive for abdominal pain. Negative for nausea and vomiting. Genitourinary: Negative. Musculoskeletal: Negative. Skin: Negative. Neurological: Negative. Psychiatric/Behavioral: Negative.           Objective: Visit Vitals  BP 96/67   Pulse 95   Temp 97.4 °F (36.3 °C)   Resp 18   Ht 6' 1\" (1.854 m)   Wt 115.7 kg (255 lb)   SpO2 96%   BMI 33.64 kg/m²    O2 Flow Rate (L/min): 2 l/min O2 Device: None (Room air)    Temp (24hrs), Av.9 °F (36.6 °C), Min:97.4 °F (36.3 °C), Max:98.3 °F (36.8 °C)      No intake/output data recorded.  1901 -  0700  In: -   Out: 1300 [Urine:1300]    PHYSICAL EXAM:  Physical Exam  Vitals reviewed. Constitutional:       General: He is not in acute distress. Appearance: He is not ill-appearing. HENT:      Head: Normocephalic and atraumatic. Mouth/Throat:      Mouth: Mucous membranes are moist.      Pharynx: Oropharynx is clear. Eyes:      Conjunctiva/sclera: Conjunctivae normal.   Cardiovascular:      Rate and Rhythm: Normal rate and regular rhythm. Heart sounds: Normal heart sounds. Pulmonary:      Effort: Pulmonary effort is normal.      Breath sounds: Normal breath sounds. Abdominal:      General: Abdomen is flat. Bowel sounds are normal.      Palpations: Abdomen is soft. Tenderness: There is abdominal tenderness. Comments: Tenderness at incision sites   Genitourinary:     Comments: Left testicular pain improved    Musculoskeletal:         General: Normal range of motion. Cervical back: Normal range of motion and neck supple. Skin:     General: Skin is warm and dry. Neurological:      General: No focal deficit present. Mental Status: He is alert and oriented to person, place, and time.    Psychiatric:         Mood and Affect: Mood normal.           Data Review    Recent Results (from the past 24 hour(s))   GLUCOSE, POC    Collection Time: 21 12:29 PM   Result Value Ref Range    Glucose (POC) 129 (H) 65 - 117 mg/dL    Performed by Noah Flowers    GLUCOSE, POC    Collection Time: 21  5:09 PM   Result Value Ref Range    Glucose (POC) 140 (H) 65 - 117 mg/dL    Performed by [id number], POC    Collection Time: 06/12/21  8:04 PM   Result Value Ref Range    Glucose (POC) 161 (H) 65 - 117 mg/dL    Performed by VIRGILIO KUNZ    CBC WITH AUTOMATED DIFF    Collection Time: 06/13/21  7:40 AM   Result Value Ref Range    WBC 8.2 4.1 - 11.1 K/uL    RBC 5.74 (H) 4.10 - 5.70 M/uL    HGB 16.5 12.1 - 17.0 g/dL    HCT 50.2 36.6 - 50.3 %    MCV 87.5 80.0 - 99.0 FL    MCH 28.7 26.0 - 34.0 PG    MCHC 32.9 30.0 - 36.5 g/dL    RDW 13.3 11.5 - 14.5 %    PLATELET 366 664 - 714 K/uL    MPV 9.5 8.9 - 12.9 FL    NRBC 0.0 0.0  WBC    ABSOLUTE NRBC 0.00 0.00 - 0.01 K/uL    NEUTROPHILS 60 32 - 75 %    LYMPHOCYTES 25 12 - 49 %    MONOCYTES 10 5 - 13 %    EOSINOPHILS 3 0 - 7 %    BASOPHILS 1 0 - 1 %    IMMATURE GRANULOCYTES 1 (H) 0 - 0.5 %    ABS. NEUTROPHILS 5.0 1.8 - 8.0 K/UL    ABS. LYMPHOCYTES 2.0 0.8 - 3.5 K/UL    ABS. MONOCYTES 0.8 0.0 - 1.0 K/UL    ABS. EOSINOPHILS 0.2 0.0 - 0.4 K/UL    ABS. BASOPHILS 0.0 0.0 - 0.1 K/UL    ABS. IMM. GRANS. 0.0 0.00 - 0.04 K/UL    DF AUTOMATED     METABOLIC PANEL, COMPREHENSIVE    Collection Time: 06/13/21  7:40 AM   Result Value Ref Range    Sodium 128 (L) 136 - 145 mmol/L    Potassium 4.1 3.5 - 5.1 mmol/L    Chloride 96 (L) 97 - 108 mmol/L    CO2 25 21 - 32 mmol/L    Anion gap 7 5 - 15 mmol/L    Glucose 129 (H) 65 - 100 mg/dL    BUN 40 (H) 6 - 20 mg/dL    Creatinine 2.31 (H) 0.70 - 1.30 mg/dL    BUN/Creatinine ratio 17 12 - 20      GFR est AA 36 (L) >60 ml/min/1.73m2    GFR est non-AA 29 (L) >60 ml/min/1.73m2    Calcium 9.2 8.5 - 10.1 mg/dL    Bilirubin, total 0.9 0.2 - 1.0 mg/dL    AST (SGOT) 30 15 - 37 U/L    ALT (SGPT) 36 12 - 78 U/L    Alk.  phosphatase 113 45 - 117 U/L    Protein, total 8.0 6.4 - 8.2 g/dL    Albumin 2.6 (L) 3.5 - 5.0 g/dL    Globulin 5.4 (H) 2.0 - 4.0 g/dL    A-G Ratio 0.5 (L) 1.1 - 2.2     GLUCOSE, POC    Collection Time: 06/13/21  9:14 AM   Result Value Ref Range    Glucose (POC) 162 (H) 65 - 117 mg/dL    Performed by [employer], POC    Collection Time: 06/13/21 11:40 AM   Result Value Ref Range    Glucose (POC) 249 (H) 65 - 117 mg/dL    Performed by ANTIONE MCCONNELL        CT ABD PELV WO CONT   Final Result   Postoperative changes. No hematoma. US SCROTUM/TESTICLES W DOPPLER   Final Result   Negative for torsion. XR CHEST PORT   Final Result   The cardiomediastinal silhouette is appropriate for age, technique,   and lung expansion. Pulmonary vasculature is not congested. The lungs are   essentially clear. No effusion or pneumothorax is seen. CT CHEST WO CONT   Final Result   Early bone metastases. No evidence of lung metastasis      XR FLUOROSCOPY UNDER 60 MINUTES   Final Result      CT ABD PELV W WO CONT   Final Result      Left renal mass is worrisome for malignancy. Left-sided hydroureteronephrosis   without stone. Apparent urinary bladder wall thickening. Recommend clinical   correlation and follow-up. Please see full report. XR ABD (KUB)    (Results Pending)       Active Problems:    Hydronephrosis (6/6/2021)      Renal mass (6/6/2021)      Hematuria (6/6/2021)        Assessment/Plan:     1. Left-sided hydronephrosis/status post left nephrectomy with renal insufficiency acute on chronic  CT of abdomen/pelvis showing left renal mass concerning for malignancy, also noted left-sided hydroureteronephrosis and urinary bladder wall thickening. Status post left-sided ureteral stent placement  Left nephrectomy, adrenalectomy and partial ureter removal on 6/9/2021, postoperative bleeding  Worsening renal function  Normal saline 1 L bolus  Nephrology consultation  Fentanyl patch for improved pain control      2. Hematuria/renal mass  Sx hematuria found to have a left renal mass concerning for malignancy  Dilaudid as needed for pain relief at this time  Follow-up urology recommendations  Left nephrectomy scheduled for 6/9/2021  Left nephrectomy, adrenalectomy and partial ureter removal on 6/9/2021     3. Hypertension hold lisinopril     4. History of HIV continue home medications     5. Hyperlipidemia continue statin    6. Left testicular pain  Ultrasound of the scrotum normal, small hydrocele, no torsion  Rocephin and azithromycin  Resolved    7. Febrile illness/acute cystitis  Chest x-ray normal  Blood cultures, pending  Urinalysis  Urine culture gram negative rods, Klebsiella sensitive to Rocephin  Continue Rocephin and azithromycin    8. Diabetes Mellitus, type 2   Continue Lantus  Continue sliding scale, Humalog   Continue repaglinide    9. Constipation  Continue lactulose    10. Acute on chronic kidney disease  Worsening renal function secondary to left nephrectomy  Aggressive IV fluids  Nephrology consultation  Cortisol in a.m. Discharge disposition: Monitor creatinine for worsening signs of renal failure. Recovery from left nephrectomy. Discussed case with daughter via phone    DVT Prophylaxis: SCDs  Ulcer Prophylaxis: Pepcid    Code Status: Full    Care Plan discussed with: patient and nursing    Total time spent with patient: >35 minutes.

## 2021-06-14 LAB
ALBUMIN SERPL-MCNC: 2.3 G/DL (ref 3.5–5)
ALBUMIN/GLOB SERPL: 0.5 {RATIO} (ref 1.1–2.2)
ALP SERPL-CCNC: 90 U/L (ref 45–117)
ALT SERPL-CCNC: 27 U/L (ref 12–78)
ANION GAP SERPL CALC-SCNC: 8 MMOL/L (ref 5–15)
AST SERPL W P-5'-P-CCNC: 29 U/L (ref 15–37)
BASOPHILS # BLD: 0.1 K/UL (ref 0–0.1)
BASOPHILS NFR BLD: 1 % (ref 0–1)
BILIRUB SERPL-MCNC: 0.8 MG/DL (ref 0.2–1)
BUN SERPL-MCNC: 32 MG/DL (ref 6–20)
BUN/CREAT SERPL: 17 (ref 12–20)
CA-I BLD-MCNC: 8.5 MG/DL (ref 8.5–10.1)
CHLORIDE SERPL-SCNC: 100 MMOL/L (ref 97–108)
CO2 SERPL-SCNC: 24 MMOL/L (ref 21–32)
CORTIS SERPL-MCNC: 13.7 UG/DL
CREAT SERPL-MCNC: 1.86 MG/DL (ref 0.7–1.3)
DIFFERENTIAL METHOD BLD: NORMAL
EOSINOPHIL # BLD: 0.2 K/UL (ref 0–0.4)
EOSINOPHIL NFR BLD: 4 % (ref 0–7)
ERYTHROCYTE [DISTWIDTH] IN BLOOD BY AUTOMATED COUNT: 13.2 % (ref 11.5–14.5)
GLOBULIN SER CALC-MCNC: 4.5 G/DL (ref 2–4)
GLUCOSE BLD STRIP.AUTO-MCNC: 153 MG/DL (ref 65–117)
GLUCOSE BLD STRIP.AUTO-MCNC: 154 MG/DL (ref 65–117)
GLUCOSE BLD STRIP.AUTO-MCNC: 155 MG/DL (ref 65–117)
GLUCOSE BLD STRIP.AUTO-MCNC: 194 MG/DL (ref 65–117)
GLUCOSE SERPL-MCNC: 198 MG/DL (ref 65–100)
HCT VFR BLD AUTO: 45.8 % (ref 36.6–50.3)
HGB BLD-MCNC: 14.9 G/DL (ref 12.1–17)
IGA SERPL-MCNC: 426 MG/DL (ref 90–386)
IGG SERPL-MCNC: 1053 MG/DL (ref 603–1613)
IGM SERPL-MCNC: 157 MG/DL (ref 20–172)
IMM GRANULOCYTES # BLD AUTO: 0 K/UL (ref 0–0.04)
IMM GRANULOCYTES NFR BLD AUTO: 0 % (ref 0–0.5)
LYMPHOCYTES # BLD: 1.5 K/UL (ref 0.8–3.5)
LYMPHOCYTES NFR BLD: 24 % (ref 12–49)
MCH RBC QN AUTO: 28.6 PG (ref 26–34)
MCHC RBC AUTO-ENTMCNC: 32.5 G/DL (ref 30–36.5)
MCV RBC AUTO: 87.9 FL (ref 80–99)
MONOCYTES # BLD: 0.6 K/UL (ref 0–1)
MONOCYTES NFR BLD: 10 % (ref 5–13)
NEUTS SEG # BLD: 3.9 K/UL (ref 1.8–8)
NEUTS SEG NFR BLD: 61 % (ref 32–75)
NRBC # BLD: 0 K/UL (ref 0–0.01)
NRBC BLD-RTO: 0 PER 100 WBC
PERFORMED BY, TECHID: ABNORMAL
PLATELET # BLD AUTO: 373 K/UL (ref 150–400)
PMV BLD AUTO: 9.4 FL (ref 8.9–12.9)
POTASSIUM SERPL-SCNC: 4.4 MMOL/L (ref 3.5–5.1)
PROT PATTERN SERPL IFE-IMP: ABNORMAL
PROT SERPL-MCNC: 6.8 G/DL (ref 6.4–8.2)
RBC # BLD AUTO: 5.21 M/UL (ref 4.1–5.7)
SODIUM SERPL-SCNC: 132 MMOL/L (ref 136–145)
WBC # BLD AUTO: 6.3 K/UL (ref 4.1–11.1)

## 2021-06-14 PROCEDURE — 80053 COMPREHEN METABOLIC PANEL: CPT

## 2021-06-14 PROCEDURE — 74011250636 HC RX REV CODE- 250/636: Performed by: PHYSICIAN ASSISTANT

## 2021-06-14 PROCEDURE — 74011636637 HC RX REV CODE- 636/637: Performed by: STUDENT IN AN ORGANIZED HEALTH CARE EDUCATION/TRAINING PROGRAM

## 2021-06-14 PROCEDURE — 82533 TOTAL CORTISOL: CPT

## 2021-06-14 PROCEDURE — 36415 COLL VENOUS BLD VENIPUNCTURE: CPT

## 2021-06-14 PROCEDURE — 74011250637 HC RX REV CODE- 250/637: Performed by: PHYSICIAN ASSISTANT

## 2021-06-14 PROCEDURE — 74011250637 HC RX REV CODE- 250/637: Performed by: INTERNAL MEDICINE

## 2021-06-14 PROCEDURE — 82962 GLUCOSE BLOOD TEST: CPT

## 2021-06-14 PROCEDURE — 85025 COMPLETE CBC W/AUTO DIFF WBC: CPT

## 2021-06-14 PROCEDURE — 74011250637 HC RX REV CODE- 250/637: Performed by: NURSE PRACTITIONER

## 2021-06-14 PROCEDURE — 99024 POSTOP FOLLOW-UP VISIT: CPT | Performed by: NURSE PRACTITIONER

## 2021-06-14 PROCEDURE — 65270000029 HC RM PRIVATE

## 2021-06-14 RX ADMIN — HYDROMORPHONE HYDROCHLORIDE 1 MG: 1 INJECTION, SOLUTION INTRAMUSCULAR; INTRAVENOUS; SUBCUTANEOUS at 16:14

## 2021-06-14 RX ADMIN — Medication 10 ML: at 16:14

## 2021-06-14 RX ADMIN — REPAGLINIDE 2 MG: 2 TABLET ORAL at 11:18

## 2021-06-14 RX ADMIN — ASPIRIN 81 MG: 81 TABLET, CHEWABLE ORAL at 11:09

## 2021-06-14 RX ADMIN — REPAGLINIDE 2 MG: 2 TABLET ORAL at 16:58

## 2021-06-14 RX ADMIN — ACETAMINOPHEN 650 MG: 325 TABLET ORAL at 11:10

## 2021-06-14 RX ADMIN — DOCUSATE SODIUM 100 MG: 100 CAPSULE, LIQUID FILLED ORAL at 23:30

## 2021-06-14 RX ADMIN — DOLUTEGRAVIR SODIUM 50 MG: 50 TABLET, FILM COATED ORAL at 11:09

## 2021-06-14 RX ADMIN — HYDROCHLOROTHIAZIDE 25 MG: 25 TABLET ORAL at 11:19

## 2021-06-14 RX ADMIN — INSULIN LISPRO 2 UNITS: 100 INJECTION, SOLUTION INTRAVENOUS; SUBCUTANEOUS at 16:58

## 2021-06-14 RX ADMIN — ATORVASTATIN CALCIUM 40 MG: 40 TABLET, FILM COATED ORAL at 11:09

## 2021-06-14 RX ADMIN — OXYCODONE 7.5 MG: 5 TABLET ORAL at 23:30

## 2021-06-14 RX ADMIN — INSULIN LISPRO 2 UNITS: 100 INJECTION, SOLUTION INTRAVENOUS; SUBCUTANEOUS at 23:30

## 2021-06-14 RX ADMIN — DOCUSATE SODIUM 100 MG: 100 CAPSULE, LIQUID FILLED ORAL at 11:09

## 2021-06-14 RX ADMIN — Medication 10 ML: at 11:59

## 2021-06-14 RX ADMIN — FAMOTIDINE 20 MG: 20 TABLET ORAL at 11:09

## 2021-06-14 RX ADMIN — INSULIN LISPRO 2 UNITS: 100 INJECTION, SOLUTION INTRAVENOUS; SUBCUTANEOUS at 11:10

## 2021-06-14 NOTE — PROGRESS NOTES
Hospitalist Progress Note    Subjective:   Daily Progress Note: 6/14/2021     Hospital Course:  Alec Matute is a 24-year-old obese -American male with a past medical history of CAD, hypertension, hypercholesterolemia, diabetes mellitus, PVD, and HIV who presented to the emergency department with a primary complaint of abdominal pain. Patient reports he was in his normal state of health until few days ago when he started to experience gradually worsening lower abdominal pain. He reports associated hematuria and nausea. .  CT of abdomen/pelvis showing left renal mass concerning for malignancy, also noted left-sided hydroureteronephrosis and urinary bladder wall thickening. Urology and oncology consult. .  Patient admitted to hospitalist services for hydroureteronephrosis. Left nephrectomy on 6/9/2021. Left testicular pain and ultrasound normal.  Prophylactically started Rocephin and azithromycin. Subjective:    Patient seen and examined at bedside. He reports improvement in abdominal pain.    Left testicular pain improving     Current Facility-Administered Medications   Medication Dose Route Frequency    fentaNYL (DURAGESIC) 50 mcg/hr patch 1 Patch  1 Patch TransDERmal Q72H    0.9% sodium chloride infusion  75 mL/hr IntraVENous CONTINUOUS    lactulose (CHRONULAC) 10 gram/15 mL solution 45 mL  30 g Oral BID    HYDROmorphone (DILAUDID) injection 1 mg  1 mg IntraVENous Q4H PRN    docusate sodium (COLACE) capsule 100 mg  100 mg Oral BID    bisacodyL (DULCOLAX) suppository 10 mg  10 mg Rectal DAILY    simethicone (MYLICON) tablet 80 mg  80 mg Oral QID PRN    oxyCODONE IR (ROXICODONE) tablet 7.5 mg  7.5 mg Oral Q4H PRN    aspirin chewable tablet 81 mg  81 mg Oral DAILY    atorvastatin (LIPITOR) tablet 40 mg  40 mg Oral DAILY    [Held by provider] clopidogreL (PLAVIX) tablet 75 mg  75 mg Oral DAILY    famotidine (PEPCID) tablet 20 mg  20 mg Oral DAILY    hydroCHLOROthiazide (HYDRODIURIL) tablet 25 mg 25 mg Oral DAILY    insulin glargine (LANTUS) injection 50 Units  50 Units SubCUTAneous QHS    [Held by provider] lisinopriL (PRINIVIL, ZESTRIL) tablet 10 mg  10 mg Oral DAILY    sdteexbdic-hhvmeziv-xeoumq ala 200-25-25 mg tab 1 Tablet (Patient Supplied)  1 Tablet Oral DAILY    [Held by provider] pentoxifylline CR (TRENTAL) tablet 400 mg  400 mg Oral BID    repaglinide (PRANDIN) tablet 2 mg  2 mg Oral TIDAC    dolutegravir (TIVICAY) tablet 50 mg  50 mg Oral DAILY    sodium chloride (NS) flush 5-40 mL  5-40 mL IntraVENous Q8H    sodium chloride (NS) flush 5-40 mL  5-40 mL IntraVENous PRN    acetaminophen (TYLENOL) tablet 650 mg  650 mg Oral Q6H PRN    Or    acetaminophen (TYLENOL) suppository 650 mg  650 mg Rectal Q6H PRN    polyethylene glycol (MIRALAX) packet 17 g  17 g Oral DAILY PRN    ondansetron (ZOFRAN ODT) tablet 4 mg  4 mg Oral Q8H PRN    Or    ondansetron (ZOFRAN) injection 4 mg  4 mg IntraVENous Q6H PRN    insulin lispro (HUMALOG) injection   SubCUTAneous AC&HS    glucose chewable tablet 16 g  4 Tablet Oral PRN    glucagon (GLUCAGEN) injection 1 mg  1 mg IntraMUSCular PRN    dextrose (D50W) injection syrg 12.5-25 g  25-50 mL IntraVENous PRN        Review of Systems  Review of Systems   Constitutional: Negative for chills and fever. Eyes: Negative. Respiratory: Negative for cough and shortness of breath. Cardiovascular: Negative for chest pain and leg swelling. Gastrointestinal: Positive for abdominal pain. Negative for nausea and vomiting. Genitourinary: Negative. Musculoskeletal: Negative. Skin: Negative. Neurological: Negative. Psychiatric/Behavioral: Negative.           Objective:     Visit Vitals  /69 (BP 1 Location: Left upper arm, BP Patient Position: At rest)   Pulse 76   Temp 98 °F (36.7 °C)   Resp 18   Ht 6' 1\" (1.854 m)   Wt 114.5 kg (252 lb 6.8 oz)   SpO2 96%   BMI 33.30 kg/m²    O2 Flow Rate (L/min): 2 l/min O2 Device: None (Room air)    Temp (24hrs), Av.9 °F (36.6 °C), Min:97.6 °F (36.4 °C), Max:98.1 °F (36.7 °C)      701 -  190  In: -   Out: 6726 [TIUQO:9919]  1901 -  0700  In: 1960 [P.O.:960; I.V.:1000]  Out: 1050 [Urine:1050]    PHYSICAL EXAM:  Physical Exam  Vitals reviewed. Constitutional:       General: He is not in acute distress. Appearance: He is not ill-appearing. HENT:      Head: Normocephalic and atraumatic. Mouth/Throat:      Mouth: Mucous membranes are moist.      Pharynx: Oropharynx is clear. Eyes:      Conjunctiva/sclera: Conjunctivae normal.   Cardiovascular:      Rate and Rhythm: Normal rate and regular rhythm. Heart sounds: Normal heart sounds. Pulmonary:      Effort: Pulmonary effort is normal.      Breath sounds: Normal breath sounds. Abdominal:      General: Abdomen is flat. Bowel sounds are normal.      Palpations: Abdomen is soft. Tenderness: There is abdominal tenderness. Comments: Tenderness at incision sites   Genitourinary:     Comments: Left testicular pain improved    Musculoskeletal:         General: Normal range of motion. Cervical back: Normal range of motion and neck supple. Skin:     General: Skin is warm and dry. Neurological:      General: No focal deficit present. Mental Status: He is alert and oriented to person, place, and time.    Psychiatric:         Mood and Affect: Mood normal.           Data Review    Recent Results (from the past 24 hour(s))   GLUCOSE, POC    Collection Time: 21  3:38 PM   Result Value Ref Range    Glucose (POC) 82 65 - 117 mg/dL    Performed by [id number] [employer], POC    Collection Time: 21 10:16 PM   Result Value Ref Range    Glucose (POC) 102 65 - 117 mg/dL    Performed by [id number], POC    Collection Time: 21  8:50 AM   Result Value Ref Range    Glucose (POC) 153 (H) 65 - 117 mg/dL    Performed by Finesse Wen    CBC WITH AUTOMATED DIFF    Collection Time: 06/14/21  9:45 AM   Result Value Ref Range    WBC 6.3 4.1 - 11.1 K/uL    RBC 5.21 4.10 - 5.70 M/uL    HGB 14.9 12.1 - 17.0 g/dL    HCT 45.8 36.6 - 50.3 %    MCV 87.9 80.0 - 99.0 FL    MCH 28.6 26.0 - 34.0 PG    MCHC 32.5 30.0 - 36.5 g/dL    RDW 13.2 11.5 - 14.5 %    PLATELET 717 090 - 163 K/uL    MPV 9.4 8.9 - 12.9 FL    NRBC 0.0 0.0  WBC    ABSOLUTE NRBC 0.00 0.00 - 0.01 K/uL    NEUTROPHILS 61 32 - 75 %    LYMPHOCYTES 24 12 - 49 %    MONOCYTES 10 5 - 13 %    EOSINOPHILS 4 0 - 7 %    BASOPHILS 1 0 - 1 %    IMMATURE GRANULOCYTES 0 0 - 0.5 %    ABS. NEUTROPHILS 3.9 1.8 - 8.0 K/UL    ABS. LYMPHOCYTES 1.5 0.8 - 3.5 K/UL    ABS. MONOCYTES 0.6 0.0 - 1.0 K/UL    ABS. EOSINOPHILS 0.2 0.0 - 0.4 K/UL    ABS. BASOPHILS 0.1 0.0 - 0.1 K/UL    ABS. IMM. GRANS. 0.0 0.00 - 0.04 K/UL    DF AUTOMATED     METABOLIC PANEL, COMPREHENSIVE    Collection Time: 06/14/21  9:45 AM   Result Value Ref Range    Sodium 132 (L) 136 - 145 mmol/L    Potassium 4.4 3.5 - 5.1 mmol/L    Chloride 100 97 - 108 mmol/L    CO2 24 21 - 32 mmol/L    Anion gap 8 5 - 15 mmol/L    Glucose 198 (H) 65 - 100 mg/dL    BUN 32 (H) 6 - 20 mg/dL    Creatinine 1.86 (H) 0.70 - 1.30 mg/dL    BUN/Creatinine ratio 17 12 - 20      GFR est AA 46 (L) >60 ml/min/1.73m2    GFR est non-AA 38 (L) >60 ml/min/1.73m2    Calcium 8.5 8.5 - 10.1 mg/dL    Bilirubin, total 0.8 0.2 - 1.0 mg/dL    AST (SGOT) 29 15 - 37 U/L    ALT (SGPT) 27 12 - 78 U/L    Alk. phosphatase 90 45 - 117 U/L    Protein, total 6.8 6.4 - 8.2 g/dL    Albumin 2.3 (L) 3.5 - 5.0 g/dL    Globulin 4.5 (H) 2.0 - 4.0 g/dL    A-G Ratio 0.5 (L) 1.1 - 2.2     GLUCOSE, POC    Collection Time: 06/14/21 11:56 AM   Result Value Ref Range    Glucose (POC) 155 (H) 65 - 117 mg/dL    Performed by Acra Slice        XR ABD (KUB)   Final Result   Persistent postoperative ileus      CT ABD PELV WO CONT   Final Result   Postoperative changes. No hematoma.                [place]   Final Result   Negative for torsion. XR CHEST PORT   Final Result   The cardiomediastinal silhouette is appropriate for age, technique,   and lung expansion. Pulmonary vasculature is not congested. The lungs are   essentially clear. No effusion or pneumothorax is seen. CT CHEST WO CONT   Final Result   Early bone metastases. No evidence of lung metastasis      XR FLUOROSCOPY UNDER 60 MINUTES   Final Result      CT ABD PELV W WO CONT   Final Result      Left renal mass is worrisome for malignancy. Left-sided hydroureteronephrosis   without stone. Apparent urinary bladder wall thickening. Recommend clinical   correlation and follow-up. Please see full report. Active Problems:    Hydronephrosis (6/6/2021)      Renal mass (6/6/2021)      Hematuria (6/6/2021)        Assessment/Plan:     1. Left-sided hydronephrosis/status post left nephrectomy with renal insufficiency acute on chronic  CT of abdomen/pelvis showing left renal mass concerning for malignancy, also noted left-sided hydroureteronephrosis and urinary bladder wall thickening. Status post left-sided ureteral stent placement  Left nephrectomy, adrenalectomy and partial ureter removal on 6/9/2021, postoperative bleeding  Normal saline 1 L bolus given with improvement  Nephrology consultation  Fentanyl patch for improved pain control  Left ureteral stent placed 6/6/2021, removed with nephrectomy    2. Hematuria/renal mass  Sx hematuria found to have a left renal mass concerning for malignancy  Dilaudid as needed for pain relief at this time  Follow-up urology recommendations  Left nephrectomy 6/9/2021  Left nephrectomy, adrenalectomy and partial ureter removal on 6/9/2021     3. Hypertension hold lisinopril     4. History of HIV continue home medications     5. Hyperlipidemia continue statin    6. Left testicular pain  Ultrasound of the scrotum normal, small hydrocele, no torsion  Rocephin and azithromycin completed  Resolved    7.   Febrile illness/acute cystitis  Chest x-ray normal  Blood cultures, pending  Urinalysis  Urine culture gram negative rods, Klebsiella sensitive to Rocephin  Continue Rocephin and azithromycin completed    8. Diabetes Mellitus, type 2   Continue Lantus  Continue sliding scale, Humalog   Continue repaglinide    9. Constipation  Continue lactulose    10. Acute on chronic kidney disease  Worsening renal function secondary to left nephrectomy, improved with IV fluids. May be near new normal at this point. Continue to follow with labs  IV fluids  Nephrology consultation  Cortisol in process as patient had low BP    Discharge disposition: Monitor creatinine for worsening signs of renal failure. Recovery from left nephrectomy. Wean pain medications    Discussed case with daughter via phone    DVT Prophylaxis: SCDs  Ulcer Prophylaxis: Pepcid    Code Status: Full    Care Plan discussed with: patient and nursing    Total time spent with patient: >35 minutes.

## 2021-06-14 NOTE — PROGRESS NOTES
UROLOGY Progress Note         [phone]      Daily Progress Note: 6/14/2021      Subjective: The patient is seen for UROLOGIC follow up for hydronephrosis, renal mass, hematuria, urinary frequency. He is s/p cystourethroscopy, clot evacuation, bilateral RPG, and left ureteral stent placement. The ureter appeared mildly dilated along its entire length without focal stricturing. There was filling defect in the upper and mid infundibula high with dilation of these. This was suspicious for clot. He is s/p left laparoscopic radical nephrectomy and ureteral stent removal on 6/9/21. He is sore today, c/o generalized abdominal pain. Abdomen is soft, non-distended. Pain is improved. His incision sites are clean, dry and intact. Voiding dark yellow urine. Cr 2.31 zuleyma 6/13; nephrology consult pending. Report passing flatus, having BM. Tolerating diet. No ongoing c/o nausea. ASA started yesterday. OK to start Plavix tomorrow 6/15/21. CT scan 6/12/21: No retroperitoneal, intraperitoneal or  abdominal wall hematoma. Trace water attenuation fluid posterior to the nephrectomy bed. No otherwise free or loculated fluid. Diffuse ileus.       Problem List:  Patient Active Problem List   Diagnosis Code    Pain of lower extremity M79.606    Abnormal stress test R94.39    Hydronephrosis N13.30    Renal mass N28.89    Hematuria R31.9         Medications reviewed  Current Facility-Administered Medications   Medication Dose Route Frequency    fentaNYL (DURAGESIC) 50 mcg/hr patch 1 Patch  1 Patch TransDERmal Q72H    0.9% sodium chloride infusion  75 mL/hr IntraVENous CONTINUOUS    lactulose (CHRONULAC) 10 gram/15 mL solution 45 mL  30 g Oral BID    HYDROmorphone (DILAUDID) injection 1 mg  1 mg IntraVENous Q4H PRN    docusate sodium (COLACE) capsule 100 mg  100 mg Oral BID    bisacodyL (DULCOLAX) suppository 10 mg  10 mg Rectal DAILY    simethicone (MYLICON) tablet 80 mg  80 mg Oral QID PRN  oxyCODONE IR (ROXICODONE) tablet 7.5 mg  7.5 mg Oral Q4H PRN    aspirin chewable tablet 81 mg  81 mg Oral DAILY    atorvastatin (LIPITOR) tablet 40 mg  40 mg Oral DAILY    [Held by provider] clopidogreL (PLAVIX) tablet 75 mg  75 mg Oral DAILY    famotidine (PEPCID) tablet 20 mg  20 mg Oral DAILY    hydroCHLOROthiazide (HYDRODIURIL) tablet 25 mg  25 mg Oral DAILY    insulin glargine (LANTUS) injection 50 Units  50 Units SubCUTAneous QHS    [Held by provider] lisinopriL (PRINIVIL, ZESTRIL) tablet 10 mg  10 mg Oral DAILY    zafvuuqwpm-dxwxbjev-ttzwgp ala 200-25-25 mg tab 1 Tablet (Patient Supplied)  1 Tablet Oral DAILY    [Held by provider] pentoxifylline CR (TRENTAL) tablet 400 mg  400 mg Oral BID    repaglinide (PRANDIN) tablet 2 mg  2 mg Oral TIDAC    dolutegravir (TIVICAY) tablet 50 mg  50 mg Oral DAILY    sodium chloride (NS) flush 5-40 mL  5-40 mL IntraVENous Q8H    sodium chloride (NS) flush 5-40 mL  5-40 mL IntraVENous PRN    acetaminophen (TYLENOL) tablet 650 mg  650 mg Oral Q6H PRN    Or    acetaminophen (TYLENOL) suppository 650 mg  650 mg Rectal Q6H PRN    polyethylene glycol (MIRALAX) packet 17 g  17 g Oral DAILY PRN    ondansetron (ZOFRAN ODT) tablet 4 mg  4 mg Oral Q8H PRN    Or    ondansetron (ZOFRAN) injection 4 mg  4 mg IntraVENous Q6H PRN    insulin lispro (HUMALOG) injection   SubCUTAneous AC&HS    glucose chewable tablet 16 g  4 Tablet Oral PRN    glucagon (GLUCAGEN) injection 1 mg  1 mg IntraMUSCular PRN    dextrose (D50W) injection syrg 12.5-25 g  25-50 mL IntraVENous PRN       Review of Systems:   Review of Systems   Constitutional: Negative for chills and fever. Gastrointestinal: Negative for nausea and vomiting. Abdominal soreness post-operatively   Genitourinary: Negative for dysuria, frequency, hematuria and urgency. Objective:   Physical Exam  Vitals and nursing note reviewed. Constitutional:       General: He is not in acute distress.   HENT: Head: Normocephalic and atraumatic. Eyes:      Extraocular Movements: Extraocular movements intact. Cardiovascular:      Rate and Rhythm: Normal rate. Pulmonary:      Effort: Pulmonary effort is normal. No respiratory distress. Breath sounds: No wheezing. Abdominal:      General: There is no distension. Palpations: Abdomen is soft. Tenderness: There is no abdominal tenderness. Hernia: No hernia is present. Genitourinary:     Comments: Voiding dark, yellow urine    Musculoskeletal:      Cervical back: Normal range of motion. Skin:     General: Skin is warm and dry. Neurological:      Mental Status: He is alert and oriented to person, place, and time. Psychiatric:         Behavior: Behavior normal.          Visit Vitals  /83 (BP 1 Location: Left upper arm, BP Patient Position: At rest)   Pulse 76   Temp 98.1 °F (36.7 °C)   Resp 18   Ht 6' 1\" (1.854 m)   Wt 252 lb 6.8 oz (114.5 kg)   SpO2 95%   BMI 33.30 kg/m²         Data Review:    LABS ORDERED AND PENDING. Assessment/     Patient Active Problem List   Diagnosis Code    Pain of lower extremity M79.606    Abnormal stress test R94.39    Hydronephrosis N13.30    Renal mass N28.89    Hematuria R31.9       Plan:  LEFT SIDED HYDRONEPHROSIS: he is s/p left ureteral stent placement on 6/6/21. Now s/p left nephrectomy on 6/9/21. Stent removed. RETAINED URETERAL STENT: left ureteral stent placed on 6/6/21. Removed with nephrectomy on 6/9/2021. LEFT RENAL MASS: Seen on admission CT scan, at least T2, likely a renal cell carcinoma. Anticoagulation on hold at this time. Left nephrectomy on 6/9/21. Pathology pending. No gross hematuria. Solitary kidney; nephrology evaluation pending. Oncology recommends PET scan. GROSS HEMATURIA:  Improved off Plavix. He is s/p nephrectomy and Plavix is on hold. Restarted ASA 6/13/21. Restart Plavix 6/15/21 if no evidence of bleeding. No gross hematuria.     LUTS: Frequency and urgency. Improved. GENITAL WARTS: chronic. Topical therapy recommended as an outpatient. SCROTAL/TESTICULAR PAIN: chronic. No abnormalities on examination. Mildly bothersome. Small left hydrocele on US, otherwise normal imaging.       Patrina Olszewski, NP

## 2021-06-14 NOTE — ROUTINE PROCESS
Bedside shift report given to  Mick Pedroza, RN  And Massimo Kennedy RN (oncoming nurses) by Siva Arriaga RN (off going nurse). Report to include SBAR, plan of care, recent results, discharge planning, and patient's questions and concerns.

## 2021-06-14 NOTE — PROGRESS NOTES
Physician Progress Note      Liv Farias  CSN #:                  [id number]  :                       1965  ADMIT DATE:       2021 8:23 PM  DISCH DATE:  RESPONDING  PROVIDER #:        VALENTINO HERNANDEZ PA-C          QUERY TEXT:    Pt admitted with hematuria. Pt noted to have 2 or more SIRS criteria (leukocytosis, elev Procal, tachycardia, tachypnea). If possible, please document in the progress notes and discharge summary if you are evaluating and /or treating any of the following: The medical record reflects the following:  Risk Factors: UTI    Clinical Indicators:    WBC 13.6-> 10.9-> 9.3-> 8.2  Procal 0.34  100.6  HR 64 - 108  RR 16 - 27    UA= clear, Blood LG, Leuks MOD, WBC 20-50  UCx >100k, K oxytoca    Treatment: 1L 0.9% NS bolus x 2; Rocephin 1g IV daily; Zithromax 500mg IV daily    Thank you,  Ana Saunders RN, BSN, [facility]  Clinical Documentation  [phone]  Options provided:  -- Sepsis, present on admission  -- Sepsis, present on admission now resolved  -- Sepsis, not present on admission  -- UTI without Sepsis  -- Sepsis was ruled out  -- Other - I will add my own diagnosis  -- Disagree - Not applicable / Not valid  -- Disagree - Clinically unable to determine / Unknown  -- Refer to Clinical Documentation Reviewer    PROVIDER RESPONSE TEXT:    This patient has UTI without Sepsis.     Query created by: Shreya Najera on 2021 8:40 AM      Electronically signed by:  Yaritza Gayle PA-C 2021 1:50 PM

## 2021-06-15 LAB
ALBUMIN SERPL-MCNC: 2.5 G/DL (ref 3.5–5)
ALBUMIN/GLOB SERPL: 0.6 {RATIO} (ref 1.1–2.2)
ALP SERPL-CCNC: 86 U/L (ref 45–117)
ALT SERPL-CCNC: 27 U/L (ref 12–78)
ANION GAP SERPL CALC-SCNC: 6 MMOL/L (ref 5–15)
AST SERPL W P-5'-P-CCNC: 27 U/L (ref 15–37)
BACTERIA SPEC CULT: NORMAL
BASOPHILS # BLD: 0 K/UL (ref 0–0.1)
BASOPHILS NFR BLD: 1 % (ref 0–1)
BILIRUB SERPL-MCNC: 0.6 MG/DL (ref 0.2–1)
BUN SERPL-MCNC: 24 MG/DL (ref 6–20)
BUN/CREAT SERPL: 15 (ref 12–20)
CA-I BLD-MCNC: 8.9 MG/DL (ref 8.5–10.1)
CHLORIDE SERPL-SCNC: 101 MMOL/L (ref 97–108)
CO2 SERPL-SCNC: 27 MMOL/L (ref 21–32)
CREAT SERPL-MCNC: 1.63 MG/DL (ref 0.7–1.3)
DIFFERENTIAL METHOD BLD: NORMAL
EOSINOPHIL # BLD: 0.2 K/UL (ref 0–0.4)
EOSINOPHIL NFR BLD: 5 % (ref 0–7)
ERYTHROCYTE [DISTWIDTH] IN BLOOD BY AUTOMATED COUNT: 13.1 % (ref 11.5–14.5)
GLOBULIN SER CALC-MCNC: 4.3 G/DL (ref 2–4)
GLUCOSE BLD STRIP.AUTO-MCNC: 142 MG/DL (ref 65–117)
GLUCOSE BLD STRIP.AUTO-MCNC: 166 MG/DL (ref 65–117)
GLUCOSE BLD STRIP.AUTO-MCNC: 187 MG/DL (ref 65–117)
GLUCOSE BLD STRIP.AUTO-MCNC: 197 MG/DL (ref 65–117)
GLUCOSE SERPL-MCNC: 149 MG/DL (ref 65–100)
HCT VFR BLD AUTO: 45.3 % (ref 36.6–50.3)
HGB BLD-MCNC: 14.7 G/DL (ref 12.1–17)
IMM GRANULOCYTES # BLD AUTO: 0 K/UL (ref 0–0.04)
IMM GRANULOCYTES NFR BLD AUTO: 0 % (ref 0–0.5)
LYMPHOCYTES # BLD: 1.6 K/UL (ref 0.8–3.5)
LYMPHOCYTES NFR BLD: 34 % (ref 12–49)
MCH RBC QN AUTO: 28.7 PG (ref 26–34)
MCHC RBC AUTO-ENTMCNC: 32.5 G/DL (ref 30–36.5)
MCV RBC AUTO: 88.5 FL (ref 80–99)
MONOCYTES # BLD: 0.6 K/UL (ref 0–1)
MONOCYTES NFR BLD: 12 % (ref 5–13)
NEUTS SEG # BLD: 2.2 K/UL (ref 1.8–8)
NEUTS SEG NFR BLD: 48 % (ref 32–75)
NRBC # BLD: 0 K/UL (ref 0–0.01)
NRBC BLD-RTO: 0 PER 100 WBC
PERFORMED BY, TECHID: ABNORMAL
PLATELET # BLD AUTO: 363 K/UL (ref 150–400)
PMV BLD AUTO: 9.7 FL (ref 8.9–12.9)
POTASSIUM SERPL-SCNC: 4.4 MMOL/L (ref 3.5–5.1)
PROT SERPL-MCNC: 6.8 G/DL (ref 6.4–8.2)
RBC # BLD AUTO: 5.12 M/UL (ref 4.1–5.7)
SODIUM SERPL-SCNC: 134 MMOL/L (ref 136–145)
SPECIAL REQUESTS,SREQ: NORMAL
WBC # BLD AUTO: 4.6 K/UL (ref 4.1–11.1)

## 2021-06-15 PROCEDURE — 65270000029 HC RM PRIVATE

## 2021-06-15 PROCEDURE — 85025 COMPLETE CBC W/AUTO DIFF WBC: CPT

## 2021-06-15 PROCEDURE — 36415 COLL VENOUS BLD VENIPUNCTURE: CPT

## 2021-06-15 PROCEDURE — 74011636637 HC RX REV CODE- 636/637: Performed by: STUDENT IN AN ORGANIZED HEALTH CARE EDUCATION/TRAINING PROGRAM

## 2021-06-15 PROCEDURE — 99024 POSTOP FOLLOW-UP VISIT: CPT | Performed by: NURSE PRACTITIONER

## 2021-06-15 PROCEDURE — 74011250637 HC RX REV CODE- 250/637: Performed by: NURSE PRACTITIONER

## 2021-06-15 PROCEDURE — 74011250636 HC RX REV CODE- 250/636: Performed by: PHYSICIAN ASSISTANT

## 2021-06-15 PROCEDURE — 74011250637 HC RX REV CODE- 250/637: Performed by: INTERNAL MEDICINE

## 2021-06-15 PROCEDURE — 74011250637 HC RX REV CODE- 250/637: Performed by: PHYSICIAN ASSISTANT

## 2021-06-15 PROCEDURE — 74011636637 HC RX REV CODE- 636/637: Performed by: INTERNAL MEDICINE

## 2021-06-15 PROCEDURE — 80053 COMPREHEN METABOLIC PANEL: CPT

## 2021-06-15 PROCEDURE — 82962 GLUCOSE BLOOD TEST: CPT

## 2021-06-15 RX ORDER — OXYCODONE HYDROCHLORIDE 5 MG/1
7.5 TABLET ORAL
Qty: 21 TABLET | Refills: 0 | Status: SHIPPED | OUTPATIENT
Start: 2021-06-15 | End: 2021-06-18

## 2021-06-15 RX ADMIN — INSULIN LISPRO 3 UNITS: 100 INJECTION, SOLUTION INTRAVENOUS; SUBCUTANEOUS at 09:54

## 2021-06-15 RX ADMIN — Medication 10 ML: at 21:09

## 2021-06-15 RX ADMIN — INSULIN LISPRO 2 UNITS: 100 INJECTION, SOLUTION INTRAVENOUS; SUBCUTANEOUS at 16:27

## 2021-06-15 RX ADMIN — ASPIRIN 81 MG: 81 TABLET, CHEWABLE ORAL at 09:56

## 2021-06-15 RX ADMIN — REPAGLINIDE 2 MG: 2 TABLET ORAL at 13:02

## 2021-06-15 RX ADMIN — SODIUM CHLORIDE 75 ML/HR: 9 INJECTION, SOLUTION INTRAVENOUS at 10:12

## 2021-06-15 RX ADMIN — Medication 10 ML: at 14:21

## 2021-06-15 RX ADMIN — Medication 10 ML: at 04:30

## 2021-06-15 RX ADMIN — HYDROMORPHONE HYDROCHLORIDE 1 MG: 1 INJECTION, SOLUTION INTRAMUSCULAR; INTRAVENOUS; SUBCUTANEOUS at 14:21

## 2021-06-15 RX ADMIN — REPAGLINIDE 2 MG: 2 TABLET ORAL at 16:28

## 2021-06-15 RX ADMIN — DOLUTEGRAVIR SODIUM 50 MG: 50 TABLET, FILM COATED ORAL at 09:55

## 2021-06-15 RX ADMIN — LACTULOSE 45 ML: 20 SOLUTION ORAL at 21:04

## 2021-06-15 RX ADMIN — HYDROCHLOROTHIAZIDE 25 MG: 25 TABLET ORAL at 09:55

## 2021-06-15 RX ADMIN — SODIUM CHLORIDE 75 ML/HR: 9 INJECTION, SOLUTION INTRAVENOUS at 04:29

## 2021-06-15 RX ADMIN — INSULIN GLARGINE 50 UNITS: 100 INJECTION, SOLUTION SUBCUTANEOUS at 21:03

## 2021-06-15 RX ADMIN — DOCUSATE SODIUM 100 MG: 100 CAPSULE, LIQUID FILLED ORAL at 09:55

## 2021-06-15 RX ADMIN — LACTULOSE 45 ML: 20 SOLUTION ORAL at 09:54

## 2021-06-15 RX ADMIN — DOCUSATE SODIUM 100 MG: 100 CAPSULE, LIQUID FILLED ORAL at 21:04

## 2021-06-15 RX ADMIN — HYDROMORPHONE HYDROCHLORIDE 1 MG: 1 INJECTION, SOLUTION INTRAMUSCULAR; INTRAVENOUS; SUBCUTANEOUS at 18:21

## 2021-06-15 RX ADMIN — REPAGLINIDE 2 MG: 2 TABLET ORAL at 09:54

## 2021-06-15 RX ADMIN — ATORVASTATIN CALCIUM 40 MG: 40 TABLET, FILM COATED ORAL at 09:56

## 2021-06-15 NOTE — CONSULTS
Nephrology Consult    Patient: Allegra Diallo MRN: [id number]  SSN: [id number]    YOB: 1965  Age: 60 y.o. Sex: male      Subjective:   Reason for the consultation. Acute renal failure  History of present illness. The patient is 57-year-old man with underlying history of HIV, diabetes mellitus type 2, hypertension, PVD, coronary artery disease was admitted with abdominal pain, CT abdomen left hydronephrosis/left renal mass, left ureteral stent placement on 6/6, left nephrectomy on 6/9, removed stent, gross hematuria, held Plavix. On admission creatinine was 1.1 and bumped to 2.3 and had improved down to 1.6. He was on lisinopril which was already held. Also on HCTZ. Noticed to have episodes of low blood pressures. On slow IV fluids.     Past Medical History:   Diagnosis Date    CAD (coronary artery disease)     [facility]     HIV (human immunodeficiency virus infection) ([facility] [facility]     Hypercholesterolemia     Hypertension     PVD (peripheral vascular disease) ([facility] [facility]      Past Surgical History:   Procedure Laterality Date    HX NEPHRECTOMY  06/09/2021    left laparoscopic radical nephrectomy,     HX UROLOGICAL  06/09/2021    ureteral stent removal    VASCULAR SURGERY PROCEDURE UNLIST      right leg bypass      Family History   Problem Relation Age of Onset    Hypertension Mother     Cancer Mother     Hypertension Father     Heart Disease Father      Social History     Tobacco Use    Smoking status: Former Smoker     Packs/day: 1.00     Years: 45.00     Pack years: 45.00     Types: Cigarettes    Smokeless tobacco: Never Used    Tobacco comment: recently quit   Substance Use Topics    Alcohol use: Never      Current Facility-Administered Medications   Medication Dose Route Frequency Provider Last Rate Last Admin    fentaNYL (DURAGESIC) 50 mcg/hr patch 1 Patch  1 Patch TransDERmal Q72H Kailash Weiner PA-C   1 Patch at 06/13/21 0294    0.9% sodium chloride infusion  75 mL/hr IntraVENous CONTINUOUS Miya Juares PA-C 75 mL/hr at 06/15/21 1012 75 mL/hr at 06/15/21 1012    lactulose (CHRONULAC) 10 gram/15 mL solution 45 mL  30 g Oral BID Miya Juares PA-C   45 mL at 06/15/21 0954    HYDROmorphone (DILAUDID) injection 1 mg  1 mg IntraVENous Q4H PRN Miya Juares PA-C   1 mg at 06/14/21 1614    docusate sodium (COLACE) capsule 100 mg  100 mg Oral BID Glenice Golds, NP   100 mg at 06/15/21 0955    bisacodyL (DULCOLAX) suppository 10 mg  10 mg Rectal DAILY Glenice Golds, NP   10 mg at 06/12/21 1308    simethicone (MYLICON) tablet 80 mg  80 mg Oral QID PRN Marianela Nava, NP   80 mg at 06/12/21 0516    oxyCODONE IR (ROXICODONE) tablet 7.5 mg  7.5 mg Oral Q4H PRN Miya Juares PA-C   7.5 mg at 06/14/21 2330    aspirin chewable tablet 81 mg  81 mg Oral DAILY Shannon Fabian MD   81 mg at 06/15/21 0956    atorvastatin (LIPITOR) tablet 40 mg  40 mg Oral DAILY Dede Ramsey MD   40 mg at 06/15/21 0956    [Held by provider] clopidogreL (PLAVIX) tablet 75 mg  75 mg Oral DAILY Dede Ramsey MD        famotidine (PEPCID) tablet 20 mg  20 mg Oral DAILY Dede Ramsey MD   20 mg at 06/14/21 1109    [Held by provider] hydroCHLOROthiazide (HYDRODIURIL) tablet 25 mg  25 mg Oral DAILY Ddee Ramsey MD   25 mg at 06/15/21 0955    insulin glargine (LANTUS) injection 50 Units  50 Units SubCUTAneous QHS Shannon Fabian MD   50 Units at 06/12/21 2202    [Held by provider] lisinopriL (PRINIVIL, ZESTRIL) tablet 10 mg  10 mg Oral DAILY Shannon Fabian MD   10 mg at 06/12/21 8500    qpazyjrxxz-sebslwti-nqgwbm ala 200-25-25 mg tab 1 Tablet (Patient Supplied)  Sunil Kellogg, Natali Rousseau MD   1 Tablet at 06/15/21 1029    [Held by provider] pentoxifylline CR (TRENTAL) tablet 400 mg  400 mg Oral BID Shannon Fabian MD        repaglinide [facility]) tablet 2 mg  2 mg Oral TIDAC Dede Ramsey MD   2 mg at 06/15/21 0954    dolutegravir (TIVICAY) tablet 50 mg  50 mg Oral DAILY Hannah Ramsey MD   50 mg at 06/15/21 0955    sodium chloride (NS) flush 5-40 mL  5-40 mL IntraVENous Dede Marti MD   10 mL at 06/15/21 0430    sodium chloride (NS) flush 5-40 mL  5-40 mL IntraVENous PRN Maryann Wu MD        acetaminophen (TYLENOL) tablet 650 mg  650 mg Oral Q6H PRN Maryann Wu MD   650 mg at 06/14/21 1110    Or    acetaminophen (TYLENOL) suppository 650 mg  650 mg Rectal Q6H PRN Maryann Wu MD        polyethylene glycol [facility]) packet 17 g  17 g Oral DAILY PRN Maryann Wu MD   17 g at 06/07/21 1208    ondansetron (ZOFRAN ODT) tablet 4 mg  4 mg Oral Q8H PRN Maryann Wu MD        Or    ondansetron [facility]) injection 4 mg  4 mg IntraVENous Q6H PRN Maryann Wu MD        insulin lispro (HUMALOG) injection   SubCUTAneous AC&HS Elder Hawk PA-C   3 Units at 06/15/21 8748    glucose chewable tablet 16 g  4 Tablet Oral PRN Elder Hawk PA-C        glucagon (GLUCAGEN) injection 1 mg  1 mg IntraMUSCular PRN Elder Hawk PA-C        dextrose (D50W) injection syrg 12.5-25 g  25-50 mL IntraVENous PRN Elder Hawk PA-C            No Known Allergies    Review of Systems:  A comprehensive review of systems was negative except for that written in the History of Present Illness.     Objective:     Vitals:    06/15/21 0420 06/15/21 0800 06/15/21 0954 06/15/21 1223   BP: 112/72 98/64 101/64 111/77   Pulse: 65 76 79 82   Resp: 18 18  18   Temp: 97.6 °F (36.4 °C) 98.1 °F (36.7 °C)  98.1 °F (36.7 °C)   SpO2: 96% 97%  94%   Weight:       Height:            Physical Exam:  General: NAD  Eyes: sclera anicteric  Oral Cavity: No thrush or ulcers  Neck: no JVD  Chest: Fair bilateral air entry  Heart: normal sounds  Abdomen: soft and non tender   : no barrow  Lower Extremities: no edema  Skin: no rash  Neuro: intact  Psychiatric: non-depressed            Assessment:     Hospital Problems  Date Reviewed: 5/3/2021        Codes Class Noted POA    Hydronephrosis ICD-10-CM: N13.30  ICD-9-CM: 314  6/6/2021 Unknown        Renal mass ICD-10-CM: N28.89  ICD-9-CM: 593.9  6/6/2021 Unknown        Hematuria ICD-10-CM: R31.9  ICD-9-CM: 599.70  6/6/2021 Unknown              Plan:     1 acute kidney injury. -2/2  prerenal plus on lisinopril and HCTZ. -On admission creatinine was 1.1 and had bumped to 2.3 on 6/13.    -His creatinine has improved down to 1.6.    -Agreed to hold lisinopril, will hold HCTZ too. - Agreed with slow IV fluids. 2 hypertension.    -Noticed episodes of low blood pressure.   -I agree to discontinue lisinopril.    -I will hold HCTZ. -Agree with IV fluids. 3.  Renal mass. -CT abdomen left renal mass and left hydronephrosis. -Status post left ureteric stent placement on 6/6, left nephrectomy on 6/9 and a stent has been removed. 4.  Hematuria, gross.    -Plavix on hold. Thank you for the consultation.     Signed By: Teodoro Salazar MD     Eva 15, 2021

## 2021-06-15 NOTE — PROGRESS NOTES
UROLOGY Progress Note         [phone]      Daily Progress Note: 6/15/2021      Subjective: The patient is seen for UROLOGIC follow up for hydronephrosis, renal mass, hematuria, urinary frequency. He is s/p cystourethroscopy, clot evacuation, bilateral RPG, and left ureteral stent placement. The ureter appeared mildly dilated along its entire length without focal stricturing. There was filling defect in the upper and mid infundibula high with dilation of these. This was suspicious for clot. He is s/p left laparoscopic radical nephrectomy and ureteral stent removal on 6/9/21. CT scan 6/12/21: No retroperitoneal, intraperitoneal or  abdominal wall hematoma. Trace water attenuation fluid posterior to the nephrectomy bed. No otherwise free or loculated fluid. Diffuse ileus. He is well today. Pain has improved. He is not seeing any gross hematuria after restarting ASA. His incision sites are clean, dry and intact. Voiding dark yellow urine.       Problem List:  Patient Active Problem List   Diagnosis Code    Pain of lower extremity M79.606    Abnormal stress test R94.39    Hydronephrosis N13.30    Renal mass N28.89    Hematuria R31.9         Medications reviewed  Current Facility-Administered Medications   Medication Dose Route Frequency    fentaNYL (DURAGESIC) 50 mcg/hr patch 1 Patch  1 Patch TransDERmal Q72H    0.9% sodium chloride infusion  75 mL/hr IntraVENous CONTINUOUS    lactulose (CHRONULAC) 10 gram/15 mL solution 45 mL  30 g Oral BID    HYDROmorphone (DILAUDID) injection 1 mg  1 mg IntraVENous Q4H PRN    docusate sodium (COLACE) capsule 100 mg  100 mg Oral BID    bisacodyL (DULCOLAX) suppository 10 mg  10 mg Rectal DAILY    simethicone (MYLICON) tablet 80 mg  80 mg Oral QID PRN    oxyCODONE IR (ROXICODONE) tablet 7.5 mg  7.5 mg Oral Q4H PRN    aspirin chewable tablet 81 mg  81 mg Oral DAILY    atorvastatin (LIPITOR) tablet 40 mg  40 mg Oral DAILY    [Held by provider] clopidogreL (PLAVIX) tablet 75 mg  75 mg Oral DAILY    famotidine (PEPCID) tablet 20 mg  20 mg Oral DAILY    hydroCHLOROthiazide (HYDRODIURIL) tablet 25 mg  25 mg Oral DAILY    insulin glargine (LANTUS) injection 50 Units  50 Units SubCUTAneous QHS    [Held by provider] lisinopriL (PRINIVIL, ZESTRIL) tablet 10 mg  10 mg Oral DAILY    kqhjfldole-sfcnpngd-bdboxo ala 200-25-25 mg tab 1 Tablet (Patient Supplied)  1 Tablet Oral DAILY    [Held by provider] pentoxifylline CR (TRENTAL) tablet 400 mg  400 mg Oral BID    repaglinide (PRANDIN) tablet 2 mg  2 mg Oral TIDAC    dolutegravir (TIVICAY) tablet 50 mg  50 mg Oral DAILY    sodium chloride (NS) flush 5-40 mL  5-40 mL IntraVENous Q8H    sodium chloride (NS) flush 5-40 mL  5-40 mL IntraVENous PRN    acetaminophen (TYLENOL) tablet 650 mg  650 mg Oral Q6H PRN    Or    acetaminophen (TYLENOL) suppository 650 mg  650 mg Rectal Q6H PRN    polyethylene glycol (MIRALAX) packet 17 g  17 g Oral DAILY PRN    ondansetron (ZOFRAN ODT) tablet 4 mg  4 mg Oral Q8H PRN    Or    ondansetron (ZOFRAN) injection 4 mg  4 mg IntraVENous Q6H PRN    insulin lispro (HUMALOG) injection   SubCUTAneous AC&HS    glucose chewable tablet 16 g  4 Tablet Oral PRN    glucagon (GLUCAGEN) injection 1 mg  1 mg IntraMUSCular PRN    dextrose (D50W) injection syrg 12.5-25 g  25-50 mL IntraVENous PRN       Review of Systems:   Review of Systems   Constitutional: Negative for chills and fever. Gastrointestinal: Negative for nausea and vomiting. Abdominal soreness post-operatively   Genitourinary: Negative for dysuria, frequency, hematuria and urgency. Objective:   Physical Exam  Vitals and nursing note reviewed. Constitutional:       General: He is not in acute distress. HENT:      Head: Normocephalic and atraumatic. Eyes:      Extraocular Movements: Extraocular movements intact. Cardiovascular:      Rate and Rhythm: Normal rate.    Pulmonary:      Effort: Pulmonary effort is normal. No respiratory distress. Breath sounds: No wheezing. Abdominal:      General: There is no distension. Palpations: Abdomen is soft. Tenderness: There is no abdominal tenderness. Hernia: No hernia is present. Genitourinary:     Comments: Voiding dark, yellow urine    Musculoskeletal:      Cervical back: Normal range of motion. Skin:     General: Skin is warm and dry. Neurological:      Mental Status: He is alert and oriented to person, place, and time. Psychiatric:         Behavior: Behavior normal.          Visit Vitals  BP 98/64 (BP 1 Location: Right upper arm, BP Patient Position: At rest)   Pulse 76   Temp 98.1 °F (36.7 °C)   Resp 18   Ht 6' 1\" (1.854 m)   Wt 252 lb 6.8 oz (114.5 kg)   SpO2 97%   BMI 33.30 kg/m²         Data Review:    LABS ORDERED AND PENDING. Assessment/     Patient Active Problem List   Diagnosis Code    Pain of lower extremity M79.606    Abnormal stress test R94.39    Hydronephrosis N13.30    Renal mass N28.89    Hematuria R31.9       Plan:  LEFT SIDED HYDRONEPHROSIS: he is s/p left ureteral stent placement on 6/6/21. Now s/p left nephrectomy on 6/9/21. Stent removed. RETAINED URETERAL STENT: left ureteral stent placed on 6/6/21. Removed with nephrectomy on 6/9/2021. LEFT RENAL MASS: Seen on admission CT scan, at least T2, likely a renal cell carcinoma. Left nephrectomy on 6/9/21. Pathology pending. No gross hematuria. Solitary kidney; nephrology evaluation pending. Oncology recommends PET scan. GROSS HEMATURIA:  Improved off Plavix. He is s/p nephrectomy and Plavix is on hold. Restarted ASA 6/13/21. Restart Plavix today. Monitor for evidence of bleeding. GENITAL WARTS: chronic. Topical therapy recommended as an outpatient. SCROTAL/TESTICULAR PAIN: chronic. No abnormalities on examination. Mildly bothersome. Small left hydrocele on US, otherwise normal imaging.     PLAN TO RESTART PLAVIX TODAY; NEPHROLOGY EVALUATION. If not evidence of bleeding, patient ok for d/c from Urology standpoint as early as tomorrow.       Chau Salazar NP

## 2021-06-15 NOTE — PROGRESS NOTES
Problem: Falls - Risk of  Goal: *Absence of Falls  Description: Document Aleksandar Grier Fall Risk and appropriate interventions in the flowsheet.   Outcome: Progressing Towards Goal  Note: Fall Risk Interventions:  Mobility Interventions: Bed/chair exit alarm         Medication Interventions: Bed/chair exit alarm    Elimination Interventions: Call light in reach, Bed/chair exit alarm    History of Falls Interventions: Bed/chair exit alarm         Problem: Infection - Risk of, Surgical Site Infection  Goal: *Absence of surgical site infection signs and symptoms  Outcome: Progressing Towards Goal     Problem: Pain  Goal: *Control of Pain  Outcome: Progressing Towards Goal

## 2021-06-15 NOTE — PROGRESS NOTES
CM was notified patient needs home health arranged. CM attempting to arrange at this time. Currently no accepting agencies at this time.

## 2021-06-15 NOTE — PROGRESS NOTES
Comprehensive Nutrition Assessment    Type and Reason for Visit: RD nutrition re-screen/LOS    Nutrition Recommendations/Plan:   Add chicken salad daily    Document intakes and BM in I/O'S    Nutrition Assessment:  Admitted for gradual onset persistent progressive lower abdominal pain associated with hematuria as well as nausea following which patient presented to the ED. Based on CT abdomen pelvis concerns for malignancy. Left nephrectomy, adrenalectomy and partial ureter removal on 6/9/2021. Pt endorses consuming 75%-100% of trays. States that trays do not fill him up- will order chicken salad. Labs: reviewed. Meds: reviewed. Malnutrition Assessment:  Malnutrition Status:  Mild malnutrition    Context:  Chronic illness     Findings of the 6 clinical characteristics of malnutrition:   Energy Intake:  No significant decrease in energy intake  Weight Loss:  7.00 - Greater than 10% over 6 months     Body Fat Loss:  No significant body fat loss,     Muscle Mass Loss:  No significant muscle mass loss,    Fluid Accumulation:  No significant fluid accumulation,      Nutrition Related Findings:  NFPE not performed- appears nourished. Last BM 6/14, watery. Pt denies c/s issues and N/V/D/C. No edema     Wounds:    Surgical incision (abdomen)       Current Nutrition Therapies:  ADULT DIET Regular; 4 carb choices (60 gm/meal); Likes: fresh fruit, salads. Dislikes: mashed potatoes, too much meat, bread    Anthropometric Measures:  · Height:  6' 1\" (185.4 cm)  · Current Body Wt:  113.9 kg (251 lb 1.7 oz)   · Usual Body Wt:  145.2 kg (320 lb) (9 months ago)     · Ideal Body Wt:  184 lbs:  136.5 %   · BMI Category:  Obese class 1 (BMI 30.0-34. 9)       Nutrition Diagnosis:   No nutrition diagnosis at this time     Nutrition Interventions:   Food and/or Nutrient Delivery: Continue current diet  Nutrition Education and Counseling: No recommendations at this time  Coordination of Nutrition Care: Continue to monitor while inpatient    Nutrition Monitoring and Evaluation:   Behavioral-Environmental Outcomes: None identified  Food/Nutrient Intake Outcomes: Food and nutrient intake  Physical Signs/Symptoms Outcomes: Meal time behavior    Discharge Planning:    Recommend pursue outpatient nutrition counseling     Electronically signed by Eber Burroughs RD on 6/15/2021 at 2:26 PM    Contact: [phone]

## 2021-06-15 NOTE — PROGRESS NOTES
Hospitalist Progress Note         FostoriaMAURO Gonsalez, FNP-C    Daily Progress Note: 6/15/2021     HPI: 52-year-old male with past medical history multiple comorbidities presents [facility] with complaints of abdominal pain. Patient states he was in his usual state of health until a few days back when he started experiencing gradual onset persistent progressive lower abdominal pain associated with hematuria as well as nausea following which patient presented to the ED. Patient denies any fevers chills vomiting lightheadedness dizziness dyspnea orthopnea paroxysmal nocturnal dyspnea chest pain palpitations headache focal weakness loss sensation auditory or visual symptoms stool complaints or any other associated symptoms    Subjective:   Subjective   Patient seen on f/u alert and oriented lying in bed  No complaints voiced on examination   No acute distress noted on examination    Review of Systems:   Review of Systems   Constitutional: Negative. HENT: Negative. Eyes: Negative. Respiratory: Negative. Cardiovascular: Negative. Gastrointestinal: Negative. Genitourinary: Negative. Musculoskeletal: Negative. Skin: Negative. Neurological: Negative. Endo/Heme/Allergies: Negative. Psychiatric/Behavioral: Negative. Objective:   Objective      Vitals:  Patient Vitals for the past 12 hrs:   Temp Pulse Resp BP SpO2   06/15/21 1223 98.1 °F (36.7 °C) 82 18 111/77 94 %   06/15/21 0954  79  101/64    06/15/21 0800 98.1 °F (36.7 °C) 76 18 98/64 97 %   06/15/21 0420 97.6 °F (36.4 °C) 65 18 112/72 96 %        Physical Exam:  Physical Exam  Vitals and nursing note reviewed. Constitutional:       Appearance: He is obese. HENT:      Head: Normocephalic. Nose: Nose normal.      Mouth/Throat:      Mouth: Mucous membranes are moist.   Eyes:      Extraocular Movements: Extraocular movements intact. Cardiovascular:      Rate and Rhythm: Normal rate. Pulses: Normal pulses. Heart sounds: Normal heart sounds. Pulmonary:      Effort: Pulmonary effort is normal.      Breath sounds: Normal breath sounds. Abdominal:      Comments: Abdominal incision with dermabond noted   Musculoskeletal:         General: Normal range of motion. Cervical back: Normal range of motion. Skin:     General: Skin is warm and dry. Capillary Refill: Capillary refill takes less than 2 seconds. Neurological:      Mental Status: He is oriented to person, place, and time. Psychiatric:         Mood and Affect: Mood normal.          Lab Results:  Recent Results (from the past 24 hour(s))   GLUCOSE, POC    Collection Time: 06/14/21  3:37 PM   Result Value Ref Range    Glucose (POC) 154 (H) 65 - 117 mg/dL    Performed by Barry Nava, POC    Collection Time: 06/14/21 11:21 PM   Result Value Ref Range    Glucose (POC) 194 (H) 65 - 117 mg/dL    Performed by Davis Olivares ([place])    CBC WITH AUTOMATED DIFF    Collection Time: 06/15/21  7:13 AM   Result Value Ref Range    WBC 4.6 4.1 - 11.1 K/uL    RBC 5.12 4.10 - 5.70 M/uL    HGB 14.7 12.1 - 17.0 g/dL    HCT 45.3 36.6 - 50.3 %    MCV 88.5 80.0 - 99.0 FL    MCH 28.7 26.0 - 34.0 PG    MCHC 32.5 30.0 - 36.5 g/dL    RDW 13.1 11.5 - 14.5 %    PLATELET 486 352 - 858 K/uL    MPV 9.7 8.9 - 12.9 FL    NRBC 0.0 0.0  WBC    ABSOLUTE NRBC 0.00 0.00 - 0.01 K/uL    NEUTROPHILS 48 32 - 75 %    LYMPHOCYTES 34 12 - 49 %    MONOCYTES 12 5 - 13 %    EOSINOPHILS 5 0 - 7 %    BASOPHILS 1 0 - 1 %    IMMATURE GRANULOCYTES 0 0 - 0.5 %    ABS. NEUTROPHILS 2.2 1.8 - 8.0 K/UL    ABS. LYMPHOCYTES 1.6 0.8 - 3.5 K/UL    ABS. MONOCYTES 0.6 0.0 - 1.0 K/UL    ABS. EOSINOPHILS 0.2 0.0 - 0.4 K/UL    ABS. BASOPHILS 0.0 0.0 - 0.1 K/UL    ABS. IMM.  GRANS. 0.0 0.00 - 0.04 K/UL    DF AUTOMATED     METABOLIC PANEL, COMPREHENSIVE    Collection Time: 06/15/21  7:13 AM   Result Value Ref Range    Sodium 134 (L) 136 - 145 mmol/L    Potassium 4.4 3.5 - 5.1 mmol/L    Chloride 101 97 - 108 mmol/L    CO2 27 21 - 32 mmol/L    Anion gap 6 5 - 15 mmol/L    Glucose 149 (H) 65 - 100 mg/dL    BUN 24 (H) 6 - 20 mg/dL    Creatinine 1.63 (H) 0.70 - 1.30 mg/dL    BUN/Creatinine ratio 15 12 - 20      GFR est AA 53 (L) >60 ml/min/1.73m2    GFR est non-AA 44 (L) >60 ml/min/1.73m2    Calcium 8.9 8.5 - 10.1 mg/dL    Bilirubin, total 0.6 0.2 - 1.0 mg/dL    AST (SGOT) 27 15 - 37 U/L    ALT (SGPT) 27 12 - 78 U/L    Alk.  phosphatase 86 45 - 117 U/L    Protein, total 6.8 6.4 - 8.2 g/dL    Albumin 2.5 (L) 3.5 - 5.0 g/dL    Globulin 4.3 (H) 2.0 - 4.0 g/dL    A-G Ratio 0.6 (L) 1.1 - 2.2     GLUCOSE, POC    Collection Time: 06/15/21  8:06 AM   Result Value Ref Range    Glucose (POC) 166 (H) 65 - 117 mg/dL    Performed by Ai Stokes, POC    Collection Time: 06/15/21 12:11 PM   Result Value Ref Range    Glucose (POC) 197 (H) 65 - 117 mg/dL    Performed by Modesto Lubin           Diagnostic Images:  CT Results  (Last 48 hours)    None          Current Medications:    Current Facility-Administered Medications:     fentaNYL (DURAGESIC) 50 mcg/hr patch 1 Patch, 1 Patch, TransDERmal, Q72H, Ayaz Weiner PA-C, 1 Patch at 06/13/21 1339    0.9% sodium chloride infusion, 75 mL/hr, IntraVENous, CONTINUOUS, Ayaz Weiner PA-C, Last Rate: 75 mL/hr at 06/15/21 1012, 75 mL/hr at 06/15/21 1012    lactulose (CHRONULAC) 10 gram/15 mL solution 45 mL, 30 g, Oral, BID, Ayaz Weiner PA-C, 45 mL at 06/15/21 0954    HYDROmorphone (DILAUDID) injection 1 mg, 1 mg, IntraVENous, Q4H PRN, Ayaz Weiner PA-C, 1 mg at 06/14/21 1614    docusate sodium (COLACE) capsule 100 mg, 100 mg, Oral, BID, Barbara Tay NP, 100 mg at 06/15/21 0955    bisacodyL (DULCOLAX) suppository 10 mg, 10 mg, Rectal, DAILY, Leni Guzman NP, 10 mg at 06/12/21 1308    simethicone (MYLICON) tablet 80 mg, 80 mg, Oral, QID PRN, Leni Guzman NP, 80 mg at 06/12/21 0516    oxyCODONE IR (ROXICODONE) tablet 7.5 mg, 7.5 mg, Oral, Q4H PRN, Steve Weiner, PA-C, 7.5 mg at 06/14/21 2330    aspirin chewable tablet 81 mg, 81 mg, Oral, DAILY, Dede Ramsey MD, 81 mg at 06/15/21 0956    atorvastatin (LIPITOR) tablet 40 mg, 40 mg, Oral, DAILY, Dede Ramsey MD, 40 mg at 06/15/21 0956    [Held by provider] clopidogreL (PLAVIX) tablet 75 mg, 75 mg, Oral, DAILY, Dede Ramsey MD    famotidine (PEPCID) tablet 20 mg, 20 mg, Oral, DAILY, Dede Ramsey MD, 20 mg at 06/14/21 1109    [Held by provider] hydroCHLOROthiazide (HYDRODIURIL) tablet 25 mg, 25 mg, Oral, DAILY, Opal Blair MD, 25 mg at 06/15/21 0955    insulin glargine (LANTUS) injection 50 Units, 50 Units, SubCUTAneous, QHS, Dede Ramsey MD, 50 Units at 06/12/21 2202    [Held by provider] lisinopriL (PRINIVIL, ZESTRIL) tablet 10 mg, 10 mg, Oral, DAILY, Dede Ramsey MD, 10 mg at 06/12/21 8284    sswztsflat-hivypmio-pzisnu ala 200-25-25 mg tab 1 Tablet (Patient Supplied), 1 Tablet, Oral, DAILY, Opal Blair MD, 1 Tablet at 06/15/21 1029    [Held by provider] pentoxifylline CR (TRENTAL) tablet 400 mg, 400 mg, Oral, BID, Opal Blair MD    repaglinide [facility]) tablet 2 mg, 2 mg, Oral, Gwen Fong, Dede Hendrix MD, 2 mg at 06/15/21 1302    dolutegravir (TIVICAY) tablet 50 mg, 50 mg, Oral, DAILY, Opal Blair MD, 50 mg at 06/15/21 0955    sodium chloride (NS) flush 5-40 mL, 5-40 mL, IntraVENous, Dilan Kaplan MD, 10 mL at 06/15/21 0430    sodium chloride (NS) flush 5-40 mL, 5-40 mL, IntraVENous, PRN, Dilan Ramsey MD    acetaminophen (TYLENOL) tablet 650 mg, 650 mg, Oral, Q6H PRN, 650 mg at 06/14/21 1110 **OR** acetaminophen (TYLENOL) suppository 650 mg, 650 mg, Rectal, Q6H PRN, Opal Blair MD    polyethylene glycol [facility]) packet 17 g, 17 g, Oral, DAILY PRN, Keith Escalante MD, 17 g at 06/07/21 1208    ondansetron (ZOFRAN ODT) tablet 4 mg, 4 mg, Oral, Q8H PRN **OR** ondansetron (ZOFRAN) injection 4 mg, 4 mg, IntraVENous, Q6H PRN, Dede Ramsey MD    insulin lispro (HUMALOG) injection, , SubCUTAneous, AC&HS, Claudio Rebolledo PA-C, 3 Units at 06/15/21 0954    glucose chewable tablet 16 g, 4 Tablet, Oral, PRN, Claudio Rebolledo PA-C    glucagon (GLUCAGEN) injection 1 mg, 1 mg, IntraMUSCular, PRN, Claudio Rebolledo PA-C    dextrose (D50W) injection syrg 12.5-25 g, 25-50 mL, IntraVENous, PRN, Claudio Rebolledo PA-C       ASSESSMENT:    1. Left-sided hydronephrosis: status post left nephrectomy with renal insufficiency acute on chronic. CT of abdomen/pelvis showing left renal mass concerning for malignancy, also noted left-sided hydroureteronephrosis and urinary bladder wall thickening. Status post left-sided ureteral stent placement. Left nephrectomy, adrenalectomy and partial ureter removal on 6/9/2021, postoperative bleeding, Nephrology following. Fentanyl patch for improved pain control  Left ureteral stent placed 6/6/2021, removed with nephrectomy     2. Hematuria/renal mass: found to have a left renal mass concerning for malignancy. Dilaudid as needed for pain relief at this time. Follow-up urology recommendations. Left nephrectomy 6/9/2021, Left nephrectomy, adrenalectomy and partial ureter removal on 6/9/2021      3. Hypertension: bp currently at goal per JNC 8 guidelines, hold HCTZ, discontinue lisinopril     4. History of HIV: continue home medications     5. Hyperlipidemia: continue statin     6. Left testicular pain: Ultrasound of the scrotum normal, small hydrocele, no torsion. S/p   tx w/ Rocephin and azithromycin     7.   Febrile illness/acute cystitis: Chest x-ray normal. Blood cultures negative, Urine culture gram negative rods, Klebsiella sensitive to Rocephin, s/p completed tx w/ Rocephin and azithromycin.     8. Diabetes Mellitus, type 2: continue ac/hs accu check, Lantus and repaglinide     9. Constipation: Continue lactulose     10. Acute on chronic kidney disease: Worsening renal function noted secondary to left nephrectomy, improved with IV fluids. Cr improved with gentle IV hydration, nephrology following. Repeat labs in am    11: Depression: patient expresses multiple losses within a short period of time. Reports having a lot of mental problems going on. Will obtain psych eval        Full Code  Dvt Prophylaxis  GI Prophylaxis  Home medications reviewed and reconciled      Above treatment plan reviewed and discussed with patient in detail at bedside, all questions answered. Disposition: 24hrs pending Cr continue to trend down. HH/PT/OT/HH aide needed    Care Plan discussed with: Interdisciplinary team    Total time spent with patient: 30 minutes.     Nelida Espitia NP

## 2021-06-15 NOTE — ROUTINE PROCESS
Bedside shift report given to JOYCE Karimi and Ayan Kirk RN  (oncoming nurse) by Kim Aguiar RN (off going nurse). Report to include SBAR, plan of care, recent results, discharge planning, and patient's questions and concerns.

## 2021-06-16 LAB
ALBUMIN SERPL-MCNC: 2.7 G/DL (ref 3.5–5)
ALBUMIN SERPL-MCNC: 2.7 G/DL (ref 3.5–5)
ALBUMIN/GLOB SERPL: 0.6 {RATIO} (ref 1.1–2.2)
ALP SERPL-CCNC: 95 U/L (ref 45–117)
ALT SERPL-CCNC: 35 U/L (ref 12–78)
ANION GAP SERPL CALC-SCNC: 10 MMOL/L (ref 5–15)
ANION GAP SERPL CALC-SCNC: 6 MMOL/L (ref 5–15)
AST SERPL W P-5'-P-CCNC: 34 U/L (ref 15–37)
BASOPHILS # BLD: 0.1 K/UL (ref 0–0.1)
BASOPHILS NFR BLD: 1 % (ref 0–1)
BILIRUB SERPL-MCNC: 0.6 MG/DL (ref 0.2–1)
BUN SERPL-MCNC: 20 MG/DL (ref 6–20)
BUN SERPL-MCNC: 21 MG/DL (ref 6–20)
BUN/CREAT SERPL: 13 (ref 12–20)
BUN/CREAT SERPL: 13 (ref 12–20)
CA-I BLD-MCNC: 8.7 MG/DL (ref 8.5–10.1)
CA-I BLD-MCNC: 8.7 MG/DL (ref 8.5–10.1)
CHLORIDE SERPL-SCNC: 101 MMOL/L (ref 97–108)
CHLORIDE SERPL-SCNC: 102 MMOL/L (ref 97–108)
CO2 SERPL-SCNC: 23 MMOL/L (ref 21–32)
CO2 SERPL-SCNC: 26 MMOL/L (ref 21–32)
CREAT SERPL-MCNC: 1.56 MG/DL (ref 0.7–1.3)
CREAT SERPL-MCNC: 1.58 MG/DL (ref 0.7–1.3)
DIFFERENTIAL METHOD BLD: NORMAL
EOSINOPHIL # BLD: 0.2 K/UL (ref 0–0.4)
EOSINOPHIL NFR BLD: 3 % (ref 0–7)
ERYTHROCYTE [DISTWIDTH] IN BLOOD BY AUTOMATED COUNT: 13.2 % (ref 11.5–14.5)
GLOBULIN SER CALC-MCNC: 4.7 G/DL (ref 2–4)
GLUCOSE BLD STRIP.AUTO-MCNC: 115 MG/DL (ref 65–117)
GLUCOSE BLD STRIP.AUTO-MCNC: 143 MG/DL (ref 65–117)
GLUCOSE BLD STRIP.AUTO-MCNC: 169 MG/DL (ref 65–117)
GLUCOSE BLD STRIP.AUTO-MCNC: 256 MG/DL (ref 65–117)
GLUCOSE SERPL-MCNC: 128 MG/DL (ref 65–100)
GLUCOSE SERPL-MCNC: 128 MG/DL (ref 65–100)
HCT VFR BLD AUTO: 45.2 % (ref 36.6–50.3)
HGB BLD-MCNC: 14.5 G/DL (ref 12.1–17)
IMM GRANULOCYTES # BLD AUTO: 0 K/UL (ref 0–0.04)
IMM GRANULOCYTES NFR BLD AUTO: 0 % (ref 0–0.5)
LYMPHOCYTES # BLD: 1.6 K/UL (ref 0.8–3.5)
LYMPHOCYTES NFR BLD: 29 % (ref 12–49)
MCH RBC QN AUTO: 28.5 PG (ref 26–34)
MCHC RBC AUTO-ENTMCNC: 32.1 G/DL (ref 30–36.5)
MCV RBC AUTO: 89 FL (ref 80–99)
MONOCYTES # BLD: 0.7 K/UL (ref 0–1)
MONOCYTES NFR BLD: 13 % (ref 5–13)
NEUTS SEG # BLD: 2.8 K/UL (ref 1.8–8)
NEUTS SEG NFR BLD: 54 % (ref 32–75)
NRBC # BLD: 0 K/UL (ref 0–0.01)
NRBC BLD-RTO: 0 PER 100 WBC
PERFORMED BY, TECHID: ABNORMAL
PERFORMED BY, TECHID: NORMAL
PHOSPHATE SERPL-MCNC: 2.6 MG/DL (ref 2.6–4.7)
PLATELET # BLD AUTO: 366 K/UL (ref 150–400)
PMV BLD AUTO: 9.8 FL (ref 8.9–12.9)
POTASSIUM SERPL-SCNC: 4.5 MMOL/L (ref 3.5–5.1)
POTASSIUM SERPL-SCNC: 4.6 MMOL/L (ref 3.5–5.1)
PROT SERPL-MCNC: 7.4 G/DL (ref 6.4–8.2)
RBC # BLD AUTO: 5.08 M/UL (ref 4.1–5.7)
SODIUM SERPL-SCNC: 133 MMOL/L (ref 136–145)
SODIUM SERPL-SCNC: 135 MMOL/L (ref 136–145)
WBC # BLD AUTO: 5.4 K/UL (ref 4.1–11.1)

## 2021-06-16 PROCEDURE — 74011636637 HC RX REV CODE- 636/637: Performed by: STUDENT IN AN ORGANIZED HEALTH CARE EDUCATION/TRAINING PROGRAM

## 2021-06-16 PROCEDURE — 80069 RENAL FUNCTION PANEL: CPT

## 2021-06-16 PROCEDURE — 97161 PT EVAL LOW COMPLEX 20 MIN: CPT | Performed by: PHYSICAL THERAPIST

## 2021-06-16 PROCEDURE — 74011250636 HC RX REV CODE- 250/636: Performed by: PHYSICIAN ASSISTANT

## 2021-06-16 PROCEDURE — 74011250637 HC RX REV CODE- 250/637: Performed by: INTERNAL MEDICINE

## 2021-06-16 PROCEDURE — 36415 COLL VENOUS BLD VENIPUNCTURE: CPT

## 2021-06-16 PROCEDURE — 99024 POSTOP FOLLOW-UP VISIT: CPT | Performed by: NURSE PRACTITIONER

## 2021-06-16 PROCEDURE — 80053 COMPREHEN METABOLIC PANEL: CPT

## 2021-06-16 PROCEDURE — 65270000029 HC RM PRIVATE

## 2021-06-16 PROCEDURE — 82962 GLUCOSE BLOOD TEST: CPT

## 2021-06-16 PROCEDURE — 74011636637 HC RX REV CODE- 636/637: Performed by: INTERNAL MEDICINE

## 2021-06-16 PROCEDURE — 85025 COMPLETE CBC W/AUTO DIFF WBC: CPT

## 2021-06-16 PROCEDURE — 97116 GAIT TRAINING THERAPY: CPT | Performed by: PHYSICAL THERAPIST

## 2021-06-16 PROCEDURE — 74011250637 HC RX REV CODE- 250/637: Performed by: NURSE PRACTITIONER

## 2021-06-16 RX ADMIN — DOCUSATE SODIUM 100 MG: 100 CAPSULE, LIQUID FILLED ORAL at 21:09

## 2021-06-16 RX ADMIN — DOCUSATE SODIUM 100 MG: 100 CAPSULE, LIQUID FILLED ORAL at 08:36

## 2021-06-16 RX ADMIN — Medication 10 ML: at 15:12

## 2021-06-16 RX ADMIN — Medication 10 ML: at 05:31

## 2021-06-16 RX ADMIN — INSULIN LISPRO 6 UNITS: 100 INJECTION, SOLUTION INTRAVENOUS; SUBCUTANEOUS at 16:56

## 2021-06-16 RX ADMIN — REPAGLINIDE 2 MG: 2 TABLET ORAL at 16:56

## 2021-06-16 RX ADMIN — Medication 10 ML: at 21:12

## 2021-06-16 RX ADMIN — ATORVASTATIN CALCIUM 40 MG: 40 TABLET, FILM COATED ORAL at 08:36

## 2021-06-16 RX ADMIN — HYDROMORPHONE HYDROCHLORIDE 1 MG: 1 INJECTION, SOLUTION INTRAMUSCULAR; INTRAVENOUS; SUBCUTANEOUS at 15:13

## 2021-06-16 RX ADMIN — ASPIRIN 81 MG: 81 TABLET, CHEWABLE ORAL at 08:36

## 2021-06-16 RX ADMIN — DOLUTEGRAVIR SODIUM 50 MG: 50 TABLET, FILM COATED ORAL at 08:44

## 2021-06-16 RX ADMIN — CLOPIDOGREL BISULFATE 75 MG: 75 TABLET ORAL at 08:36

## 2021-06-16 RX ADMIN — HYDROMORPHONE HYDROCHLORIDE 1 MG: 1 INJECTION, SOLUTION INTRAMUSCULAR; INTRAVENOUS; SUBCUTANEOUS at 08:46

## 2021-06-16 RX ADMIN — HYDROMORPHONE HYDROCHLORIDE 1 MG: 1 INJECTION, SOLUTION INTRAMUSCULAR; INTRAVENOUS; SUBCUTANEOUS at 02:03

## 2021-06-16 RX ADMIN — INSULIN LISPRO 2 UNITS: 100 INJECTION, SOLUTION INTRAVENOUS; SUBCUTANEOUS at 12:08

## 2021-06-16 RX ADMIN — HYDROMORPHONE HYDROCHLORIDE 1 MG: 1 INJECTION, SOLUTION INTRAMUSCULAR; INTRAVENOUS; SUBCUTANEOUS at 21:09

## 2021-06-16 RX ADMIN — FAMOTIDINE 20 MG: 20 TABLET ORAL at 08:37

## 2021-06-16 RX ADMIN — INSULIN GLARGINE 50 UNITS: 100 INJECTION, SOLUTION SUBCUTANEOUS at 21:09

## 2021-06-16 RX ADMIN — REPAGLINIDE 2 MG: 2 TABLET ORAL at 10:43

## 2021-06-16 NOTE — PROGRESS NOTES
Nephrology Consult    Patient: Gracy Cuenca MRN: [id number]  SSN: [id number]    YOB: 1965  Age: 60 y.o.   Sex: male      Subjective:   Pt is seen in the room  No new complaints  Cr 1.5    Past Medical History:   Diagnosis Date    CAD (coronary artery disease)     Diabetes ([facility]     HIV [facility] immunodeficiency virus infection) ([facility]     Hypercholesterolemia     Hypertension     PVD (peripheral vascular disease) ([facility]      Past Surgical History:   Procedure Laterality Date    HX NEPHRECTOMY  06/09/2021    left laparoscopic radical nephrectomy,     HX UROLOGICAL  06/09/2021    ureteral stent removal    VASCULAR SURGERY PROCEDURE UNLIST      right leg bypass      Family History   Problem Relation Age of Onset    Hypertension Mother     Cancer Mother     Hypertension Father     Heart Disease Father      Social History     Tobacco Use    Smoking status: Former Smoker     Packs/day: 1.00     Years: 45.00     Pack years: 45.00     Types: Cigarettes    Smokeless tobacco: Never Used    Tobacco comment: recently quit   Substance Use Topics    Alcohol use: Never      Current Facility-Administered Medications   Medication Dose Route Frequency Provider Last Rate Last Admin    fentaNYL (DURAGESIC) 50 mcg/hr patch 1 Patch  1 Patch TransDERmal Q72H Ayaz Weiner PA-C   1 Patch at 06/13/21 1339    lactulose (CHRONULAC) 10 gram/15 mL solution 45 mL  30 g Oral BID Wilma Andersen PA-C   45 mL at 06/15/21 2104    HYDROmorphone (DILAUDID) injection 1 mg  1 mg IntraVENous Q4H PRN Wilma Andersen PA-C   1 mg at 06/16/21 0846    docusate sodium (COLACE) capsule 100 mg  100 mg Oral BID Toby Radha NP   100 mg at 06/16/21 0836    bisacodyL (DULCOLAX) suppository 10 mg  10 mg Rectal DAILY Toby Adie, NP   10 mg at 06/12/21 1308    simethicone (MYLICON) tablet 80 mg  80 mg Oral QID PRN Toby Rioie, NP   80 mg at 06/12/21 0516    oxyCODONE IR (ROXICODONE) tablet 7.5 mg  7.5 mg Oral Q4H PRN Krysta Pimentel PA-C   7.5 mg at 06/14/21 2330    aspirin chewable tablet 81 mg  81 mg Oral DAILY Lacie Ramsey MD   81 mg at 06/16/21 0836    atorvastatin (LIPITOR) tablet 40 mg  40 mg Oral DAILY Kardar, Ahmed Ardie Kussmaul, MD   40 mg at 06/16/21 0836    clopidogreL (PLAVIX) tablet 75 mg  75 mg Oral DAILY Sushil Ceron MD   75 mg at 06/16/21 0836    famotidine (PEPCID) tablet 20 mg  20 mg Oral DAILY Kardar, Ahmed Ardie Kussmaul, MD   20 mg at 06/16/21 0837    [Held by provider] hydroCHLOROthiazide (HYDRODIURIL) tablet 25 mg  25 mg Oral DAILY Kardar, Ahmed Ardie Kussmaul, MD   25 mg at 06/15/21 0955    insulin glargine (LANTUS) injection 50 Units  50 Units SubCUTAneous QHS Sushil Ceron MD   50 Units at 06/15/21 2103    [Held by provider] lisinopriL (PRINIVIL, ZESTRIL) tablet 10 mg  10 mg Oral DAILY Sushil Ceron MD   10 mg at 06/12/21 7817    mkqugrjzxs-gemclzuv-hyjfaa ala 200-25-25 mg tab 1 Tablet (Patient Supplied)  1 Tablet Oral DAILY Sushil Ceron MD   1 Tablet at 06/16/21 4076    [Held by provider] pentoxifylline CR (TRENTAL) tablet 400 mg  400 mg Oral BID Sushil Ceron MD        repaglinide [facility]) tablet 2 mg  2 mg Oral TIDAC Kardar, Ahmed Ardie Kussmaul, MD   2 mg at 06/16/21 1043    dolutegravir (TIVICAY) tablet 50 mg  50 mg Oral DAILY Sushil Ceron MD   50 mg at 06/16/21 0844    sodium chloride (NS) flush 5-40 mL  5-40 mL IntraVENous Terrie Drummer, Ahmed Ardie Kussmaul, MD   10 mL at 06/16/21 0531    sodium chloride (NS) flush 5-40 mL  5-40 mL IntraVENous PRN Sushil Ceron MD        acetaminophen (TYLENOL) tablet 650 mg  650 mg Oral Q6H PRN Sushil Ceron MD   650 mg at 06/14/21 1110    Or    acetaminophen (TYLENOL) suppository 650 mg  650 mg Rectal Q6H PRN Sushil Ceron MD        polyethylene glycol (MIRALAX) packet 17 g  17 g Oral DAILY PRN Karel Bird MD   17 g at 06/07/21 1208    ondansetron (ZOFRAN ODT) tablet 4 mg  4 mg Oral Q8H PRN Karel Bird MD        Or    ondansetron [facility]) injection 4 mg  4 mg IntraVENous Q6H PRN Karel Bird MD        insulin lispro (HUMALOG) injection   SubCUTAneous AC&HS Anahi Newsome PA-C   2 Units at 06/16/21 1208    glucose chewable tablet 16 g  4 Tablet Oral PRN Anahi Newsome PA-C        glucagon (GLUCAGEN) injection 1 mg  1 mg IntraMUSCular PRN Anahi Newsome PA-C        dextrose (D50W) injection syrg 12.5-25 g  25-50 mL IntraVENous PRN Anahi Newsome PA-C            No Known Allergies    Review of Systems:  A comprehensive review of systems was negative except for that written in the History of Present Illness. Objective:     Vitals:    06/15/21 2102 06/16/21 0512 06/16/21 0800 06/16/21 1100   BP: 115/70 118/79 109/68 116/64   Pulse: 65 65 71 80   Resp: 18 18 18 18   Temp: 97.9 °F (36.6 °C) 97.7 °F (36.5 °C) 98.2 °F (36.8 °C) 98.2 °F (36.8 °C)   SpO2: 96% 98% 97% 94%   Weight:       Height:            Physical Exam:  General: NAD  Eyes: sclera anicteric  Oral Cavity: No thrush or ulcers  Neck: no JVD  Chest: Fair bilateral air entry  Heart: normal sounds  Abdomen: soft and non tender   : no barrow  Lower Extremities: no edema  Skin: no rash  Neuro: intact  Psychiatric: non-depressed            Assessment:     Hospital Problems  Date Reviewed: 5/3/2021        Codes Class Noted POA    Hydronephrosis ICD-10-CM: N13.30  ICD-9-CM: 819  6/6/2021 Unknown        * (Principal) Renal mass ICD-10-CM: N28.89  ICD-9-CM: 593.9  6/6/2021 Unknown        Hematuria ICD-10-CM: R31.9  ICD-9-CM: 599.70  6/6/2021 Unknown              Plan:     1 acute kidney injury. -2/2  prerenal plus on lisinopril and HCTZ.     -On admission creatinine was 1.1 and had bumped to 2.3 on 6/13.    -His creatinine has improved down to 1.5.    -Agreed to hold lisinopril, will hold HCTZ too. - will dc  IV fluids. 2 hypertension.    -Noticed episodes of low blood pressure.   -I agree to discontinue lisinopril.    -I will hold HCTZ. -Agree with IV fluids. 3.  Renal mass. -CT abdomen left renal mass and left hydronephrosis. -Status post left ureteric stent placement on 6/6, left nephrectomy on 6/9 and a stent has been removed. 4.  Hematuria, gross.    -Plavix on hold.       Signed By: Racheal Alonso MD     June 16, 2021

## 2021-06-16 NOTE — DISCHARGE INSTRUCTIONS
LEAVING THE HOSPITAL AFTER YOUR NEPHRECTOMY OR PARTIAL NEPHRECTOMY  DISCHARGE INSTRUCTIONS FOR DR. FIGUEREDO'S PATIENTS    Activity   Refrain from vigorous activity (running, golfing, exercising, horseback riding, bicycling) for 4-6 weeks after your surgery. Walking is fine. You may gradually increase your pace and distance as you feel able.  Do not lift anything over 10 lbs for at least 2 weeks.  Avoid sitting still in one position for prolonged periods of time (more than 45 minutes).  Do not drive while you are taking narcotic pain medications. They may make you drowsy.  Driving, in general, should be avoided for 1-2 weeks. Bathing   Do not soak in tubs, swim, or submerge yourself in water.  You may shower as soon as you get home from the hospital. Keep the incision sites clean and dry, but do not scrub the glue. It will peel off with time. Use mild soap and water to clean your sites and pat yourself dry. Work   When you may return to work is variable. It will depend on your occupation and how quickly you recover. Specific questions can be addressed at your follow up appointment. Most patients will be able to return to work within 2-4 weeks. Medication   Most patients experience only minimal discomfort. Dr. Rai Adler prefers you treat this with Tylenol (avoid ibuprofen or aspirin or other NSAID's as they may cause additional bleeding).  You will be sent home with a stronger, prescription pain reliever. However, it is important to note that these medications tend to be EXTREMELY constipating. It is better to avoid them, and only take them if you are having significant pain.  You may also have several days of an antibiotic.  You can resume most of your home medications as soon as you leave the hospital except for anti-coagulants like Coumadin, aspirin, or Eloquis. Some diabetic medications are also not ok to resume (Metformin).   If you have questions about your regular medications, please call the office.  At the time of discharge, you will also have a prescription for the stool softener and the suppository you received during your hospital stay. You may take these daily until you have a bowel movement. You may continue taking the stool softener as needed to combat constipation. Food   We recommend you stick to a bland, soft diet immediately after you are discharged from the hospital.     Do not force yourself to eat. It can contribute to the abdominal bloating you may feel. Once you have had a bowel movement, you can start eating normally, as you feel comfortable.  Avoid gas producing foods (beans, flour, broccoli) that can make you more uncomfortable.  Spread out eating throughout the day with snacks and small meals. Avoid eating large meals at first.    [facility] AFTER SURGERY   Abdominal distension, constipation, or bloating. Make sure you are taking your stool softener as directed and drinking plenty of water. Avoid carbonated beverages and gas-producing foods. You can use a suppository daily. The prescription pain medicine can contribute to this. Therefore, only take it when you are having significant pain. Walking and moving around help to move the gas out of your belly.  Pain and bruising. You may have pain in your belly or the side where the kidney was removed. This is normal.  You may also have bruising or slight redness around your incision sites which is also normal.  Additionally, the type of surgery you had (laparoscopic) may cause you to have some discomfort in your shoulder. The gas used during your surgery can irritate your abdominal muscles and radiate discomfort to your shoulder.  Blood in the urine. You may have blood in your urine off and on for several weeks after a urologic procedure. The blood can make your urine look tea-colored or pink.   This is normal.      WHEN TO CALL THE DOCTOR:   You have a fever over 100.5F   You have nausea or vomiting for more than 24 hours   It becomes hard to breathe   You cannot urinate    [facility] [address], [address]

## 2021-06-16 NOTE — PROGRESS NOTES
Two person skin assessment performed by Chad Salazar RN. Scratch noted to patient's left upper back and left medial ankle. Patient stated that he scratched himself because of itchy skin. Applied lotion. Will continue to monitor.

## 2021-06-16 NOTE — PROGRESS NOTES
Hospitalist Progress Note         Goran Rust MD          Daily Progress Note: 6/16/2021      Subjective: The patient is seen for follow  up. 53-year-old male with past medical history multiple comorbidities presents [facility] with complaints of abdominal pain.  Patient states he was in his usual state of health until a few days back when he started experiencing gradual onset persistent progressive lower abdominal pain associated with hematuria as well as nausea following which patient presented to the ED. CT scan of the abdomen and pelvis showed a left renal mass concerning for malignancy. He is status post left-sided nephrectomy with ureteral stent placement. 06/09/21. He is seen in follow-up. Reports lower abdominal pain without hematuria.          Problem List:  Problem List as of 6/16/2021 Date Reviewed: 5/3/2021        Codes Class Noted - Resolved    Hydronephrosis ICD-10-CM: N13.30  ICD-9-CM: 899  6/6/2021 - Present        * (Principal) Renal mass ICD-10-CM: N28.89  ICD-9-CM: 593.9  6/6/2021 - Present        Hematuria ICD-10-CM: R31.9  ICD-9-CM: 599.70  6/6/2021 - Present        Abnormal stress test ICD-10-CM: R94.39  ICD-9-CM: 794.39  5/17/2021 - Present        Pain of lower extremity ICD-10-CM: M79.606  ICD-9-CM: 729.5  3/15/2021 - Present        RESOLVED: Peripheral vascular disease ([facility] ICD-10-CM: I73.9  ICD-9-CM: 443.9  3/15/2021 - 3/15/2021              Medications reviewed  Current Facility-Administered Medications   Medication Dose Route Frequency    fentaNYL (DURAGESIC) 50 mcg/hr patch 1 Patch  1 Patch TransDERmal Q72H    0.9% sodium chloride infusion  75 mL/hr IntraVENous CONTINUOUS    lactulose (CHRONULAC) 10 gram/15 mL solution 45 mL  30 g Oral BID    HYDROmorphone (DILAUDID) injection 1 mg  1 mg IntraVENous Q4H PRN    docusate sodium (COLACE) capsule 100 mg  100 mg Oral BID    bisacodyL (DULCOLAX) suppository 10 mg  10 mg Rectal DAILY    simethicone (MYLICON) tablet 80 mg  80 mg Oral QID PRN    oxyCODONE IR (ROXICODONE) tablet 7.5 mg  7.5 mg Oral Q4H PRN    aspirin chewable tablet 81 mg  81 mg Oral DAILY    atorvastatin (LIPITOR) tablet 40 mg  40 mg Oral DAILY    clopidogreL (PLAVIX) tablet 75 mg  75 mg Oral DAILY    famotidine (PEPCID) tablet 20 mg  20 mg Oral DAILY    [Held by provider] hydroCHLOROthiazide (HYDRODIURIL) tablet 25 mg  25 mg Oral DAILY    insulin glargine (LANTUS) injection 50 Units  50 Units SubCUTAneous QHS    [Held by provider] lisinopriL (PRINIVIL, ZESTRIL) tablet 10 mg  10 mg Oral DAILY    suprugfids-dtcptcek-aeywjj ala 200-25-25 mg tab 1 Tablet (Patient Supplied)  1 Tablet Oral DAILY    [Held by provider] pentoxifylline CR (TRENTAL) tablet 400 mg  400 mg Oral BID    repaglinide (PRANDIN) tablet 2 mg  2 mg Oral TIDAC    dolutegravir (TIVICAY) tablet 50 mg  50 mg Oral DAILY    sodium chloride (NS) flush 5-40 mL  5-40 mL IntraVENous Q8H    sodium chloride (NS) flush 5-40 mL  5-40 mL IntraVENous PRN    acetaminophen (TYLENOL) tablet 650 mg  650 mg Oral Q6H PRN    Or    acetaminophen (TYLENOL) suppository 650 mg  650 mg Rectal Q6H PRN    polyethylene glycol (MIRALAX) packet 17 g  17 g Oral DAILY PRN    ondansetron (ZOFRAN ODT) tablet 4 mg  4 mg Oral Q8H PRN    Or    ondansetron (ZOFRAN) injection 4 mg  4 mg IntraVENous Q6H PRN    insulin lispro (HUMALOG) injection   SubCUTAneous AC&HS    glucose chewable tablet 16 g  4 Tablet Oral PRN    glucagon (GLUCAGEN) injection 1 mg  1 mg IntraMUSCular PRN    dextrose (D50W) injection syrg 12.5-25 g  25-50 mL IntraVENous PRN       Review of Systems:   A comprehensive review of systems was negative except for that written in the HPI.     Objective:   Physical Exam:     Visit Vitals  /68   Pulse 71   Temp 98.2 °F (36.8 °C)   Resp 18   Ht 6' 1\" (1.854 m)   Wt 113.9 kg (251 lb 1.7 oz)   SpO2 97%   BMI 33.13 kg/m²    O2 Flow Rate (L/min): 2 l/min O2 Device: None (Room air)    Temp (24hrs), Av °F (36.7 °C), Min:97.7 °F (36.5 °C), Max:98.2 °F (36.8 °C)    No intake/output data recorded.  1901 -  0700  In: 1916.3 [P.O.:1540; I.V.:376.3]  Out: 1645 [BQSEU:0579]    General:  Alert, cooperative, no distress, appears stated age. Lungs:   Clear to auscultation bilaterally. Chest wall:  No tenderness or deformity. Heart:  Regular rate and rhythm, S1, S2 normal, no murmur, click, rub or gallop. Abdomen:   Soft, non-tender. Bowel sounds normal. No masses,  No organomegaly. Extremities: Extremities normal, atraumatic, no cyanosis or edema. Pulses: 2+ and symmetric all extremities. Skin: Skin color, texture, turgor normal. No rashes or lesions   Neurologic: CNII-XII intact. No gross sensory or motor deficits     Data Review:       Recent Days:  Recent Labs     21  0326 06/15/21  0713 21  0945   WBC 5.4 4.6 6.3   HGB 14.5 14.7 14.9   HCT 45.2 45.3 45.8    363 373     Recent Labs     21  0326 06/15/21  0713 21  0945   *  133* 134* 132*   K 4.6  4.5 4.4 4.4     101 101 100   CO2 23  26 27 24   *  128* 149* 198*   BUN 21*  20 24* 32*   CREA 1.56*  1.58* 1.63* 1.86*   CA 8.7  8.7 8.9 8.5   PHOS 2.6  --   --    ALB 2.7*  2.7* 2.5* 2.3*   TBILI 0.6 0.6 0.8   ALT 35 27 27     No results for input(s): PH, PCO2, PO2, HCO3, FIO2 in the last 72 hours.     24 Hour Results:  Recent Results (from the past 24 hour(s))   GLUCOSE, POC    Collection Time: 06/15/21 12:11 PM   Result Value Ref Range    Glucose (POC) 197 (H) 65 - 117 mg/dL    Performed by Jana Ruiz, POC    Collection Time: 06/15/21  3:53 PM   Result Value Ref Range    Glucose (POC) 187 (H) 65 - 117 mg/dL    Performed by Yoselyn Ricks, POC    Collection Time: 06/15/21  8:00 PM   Result Value Ref Range    Glucose (POC) 142 (H) 65 - 117 mg/dL    Performed by Marjorie SMITH WITH AUTOMATED DIFF    Collection Time: 06/16/21  3:26 AM   Result Value Ref Range    WBC 5.4 4.1 - 11.1 K/uL    RBC 5.08 4.10 - 5.70 M/uL    HGB 14.5 12.1 - 17.0 g/dL    HCT 45.2 36.6 - 50.3 %    MCV 89.0 80.0 - 99.0 FL    MCH 28.5 26.0 - 34.0 PG    MCHC 32.1 30.0 - 36.5 g/dL    RDW 13.2 11.5 - 14.5 %    PLATELET 525 831 - 696 K/uL    MPV 9.8 8.9 - 12.9 FL    NRBC 0.0 0.0  WBC    ABSOLUTE NRBC 0.00 0.00 - 0.01 K/uL    NEUTROPHILS 54 32 - 75 %    LYMPHOCYTES 29 12 - 49 %    MONOCYTES 13 5 - 13 %    EOSINOPHILS 3 0 - 7 %    BASOPHILS 1 0 - 1 %    IMMATURE GRANULOCYTES 0 0 - 0.5 %    ABS. NEUTROPHILS 2.8 1.8 - 8.0 K/UL    ABS. LYMPHOCYTES 1.6 0.8 - 3.5 K/UL    ABS. MONOCYTES 0.7 0.0 - 1.0 K/UL    ABS. EOSINOPHILS 0.2 0.0 - 0.4 K/UL    ABS. BASOPHILS 0.1 0.0 - 0.1 K/UL    ABS. IMM. GRANS. 0.0 0.00 - 0.04 K/UL    DF AUTOMATED     METABOLIC PANEL, COMPREHENSIVE    Collection Time: 06/16/21  3:26 AM   Result Value Ref Range    Sodium 135 (L) 136 - 145 mmol/L    Potassium 4.6 3.5 - 5.1 mmol/L    Chloride 102 97 - 108 mmol/L    CO2 23 21 - 32 mmol/L    Anion gap 10 5 - 15 mmol/L    Glucose 128 (H) 65 - 100 mg/dL    BUN 21 (H) 6 - 20 mg/dL    Creatinine 1.56 (H) 0.70 - 1.30 mg/dL    BUN/Creatinine ratio 13 12 - 20      GFR est AA 56 (L) >60 ml/min/1.73m2    GFR est non-AA 46 (L) >60 ml/min/1.73m2    Calcium 8.7 8.5 - 10.1 mg/dL    Bilirubin, total 0.6 0.2 - 1.0 mg/dL    AST (SGOT) 34 15 - 37 U/L    ALT (SGPT) 35 12 - 78 U/L    Alk.  phosphatase 95 45 - 117 U/L    Protein, total 7.4 6.4 - 8.2 g/dL    Albumin 2.7 (L) 3.5 - 5.0 g/dL    Globulin 4.7 (H) 2.0 - 4.0 g/dL    A-G Ratio 0.6 (L) 1.1 - 2.2     RENAL FUNCTION PANEL    Collection Time: 06/16/21  3:26 AM   Result Value Ref Range    Sodium 133 (L) 136 - 145 mmol/L    Potassium 4.5 3.5 - 5.1 mmol/L    Chloride 101 97 - 108 mmol/L    CO2 26 21 - 32 mmol/L    Anion gap 6 5 - 15 mmol/L    Glucose 128 (H) 65 - 100 mg/dL    BUN 20 6 - 20 mg/dL    Creatinine 1.58 (H) 0.70 - 1.30 mg/dL    BUN/Creatinine ratio 13 12 - 20      GFR est AA 55 (L) >60 ml/min/1.73m2    GFR est non-AA 45 (L) >60 ml/min/1.73m2    Calcium 8.7 8.5 - 10.1 mg/dL    Phosphorus 2.6 2.6 - 4.7 mg/dL    Albumin 2.7 (L) 3.5 - 5.0 g/dL   GLUCOSE, POC    Collection Time: 06/16/21  7:27 AM   Result Value Ref Range    Glucose (POC) 115 65 - 117 mg/dL    Performed by Issac Holden            Assessment/     Left-sided renal mass status post left nephrectomy June 9, 2021  Left-sided hydronephrosis  9 essential hypertension  History of HIV AIDS on antiretroviral  Hyperlipidemia  Acute cystitis    Plan:  Continue supportive care including pain control  Encourage ambulation  Continue stool softeners  Anticipate discharge to home tomorrow    Care Plan discussed with: Patient/Family    Total time spent with patient: 30 minutes.     Argentina Olea MD

## 2021-06-16 NOTE — PROGRESS NOTES
UROLOGY Progress Note         [phone]      Daily Progress Note: 6/16/2021      Subjective: The patient is seen for UROLOGIC follow up for hydronephrosis, renal mass, hematuria, urinary frequency. He is s/p cystourethroscopy, clot evacuation, bilateral RPG, and left ureteral stent placement. The ureter appeared mildly dilated along its entire length without focal stricturing. There was filling defect in the upper and mid infundibula high with dilation of these. This was suspicious for clot. He is s/p left laparoscopic radical nephrectomy and ureteral stent removal on 6/9/21. CT scan 6/12/21: No retroperitoneal, intraperitoneal or  abdominal wall hematoma. Trace water attenuation fluid posterior to the nephrectomy bed. No otherwise free or loculated fluid. Diffuse ileus. He is well today. Pain has improved. He is not seeing any gross hematuria after restarting ASA. Plavix was not restarted yesterday, I have restarted it today and discussed with patient and Primary MD.    His incision sites are clean, dry and intact. Voiding clear yellow urine. Pathology pending. Plan for d/c when appropriate.       Problem List:  Patient Active Problem List   Diagnosis Code    Pain of lower extremity M79.606    Abnormal stress test R94.39    Hydronephrosis N13.30    Renal mass N28.89    Hematuria R31.9         Medications reviewed  Current Facility-Administered Medications   Medication Dose Route Frequency    fentaNYL (DURAGESIC) 50 mcg/hr patch 1 Patch  1 Patch TransDERmal Q72H    0.9% sodium chloride infusion  75 mL/hr IntraVENous CONTINUOUS    lactulose (CHRONULAC) 10 gram/15 mL solution 45 mL  30 g Oral BID    HYDROmorphone (DILAUDID) injection 1 mg  1 mg IntraVENous Q4H PRN    docusate sodium (COLACE) capsule 100 mg  100 mg Oral BID    bisacodyL (DULCOLAX) suppository 10 mg  10 mg Rectal DAILY    simethicone (MYLICON) tablet 80 mg  80 mg Oral QID PRN    oxyCODONE IR (ROXICODONE) tablet 7.5 mg  7.5 mg Oral Q4H PRN    aspirin chewable tablet 81 mg  81 mg Oral DAILY    atorvastatin (LIPITOR) tablet 40 mg  40 mg Oral DAILY    clopidogreL (PLAVIX) tablet 75 mg  75 mg Oral DAILY    famotidine (PEPCID) tablet 20 mg  20 mg Oral DAILY    [Held by provider] hydroCHLOROthiazide (HYDRODIURIL) tablet 25 mg  25 mg Oral DAILY    insulin glargine (LANTUS) injection 50 Units  50 Units SubCUTAneous QHS    [Held by provider] lisinopriL (PRINIVIL, ZESTRIL) tablet 10 mg  10 mg Oral DAILY    soxqkismsa-wwtfkaqi-nypplv ala 200-25-25 mg tab 1 Tablet (Patient Supplied)  1 Tablet Oral DAILY    [Held by provider] pentoxifylline CR (TRENTAL) tablet 400 mg  400 mg Oral BID    repaglinide (PRANDIN) tablet 2 mg  2 mg Oral TIDAC    dolutegravir (TIVICAY) tablet 50 mg  50 mg Oral DAILY    sodium chloride (NS) flush 5-40 mL  5-40 mL IntraVENous Q8H    sodium chloride (NS) flush 5-40 mL  5-40 mL IntraVENous PRN    acetaminophen (TYLENOL) tablet 650 mg  650 mg Oral Q6H PRN    Or    acetaminophen (TYLENOL) suppository 650 mg  650 mg Rectal Q6H PRN    polyethylene glycol (MIRALAX) packet 17 g  17 g Oral DAILY PRN    ondansetron (ZOFRAN ODT) tablet 4 mg  4 mg Oral Q8H PRN    Or    ondansetron (ZOFRAN) injection 4 mg  4 mg IntraVENous Q6H PRN    insulin lispro (HUMALOG) injection   SubCUTAneous AC&HS    glucose chewable tablet 16 g  4 Tablet Oral PRN    glucagon (GLUCAGEN) injection 1 mg  1 mg IntraMUSCular PRN    dextrose (D50W) injection syrg 12.5-25 g  25-50 mL IntraVENous PRN       Review of Systems:   Review of Systems   Constitutional: Negative for chills and fever. Gastrointestinal: Negative for nausea and vomiting. Abdominal soreness post-operatively   Genitourinary: Negative for dysuria, frequency, hematuria and urgency. Objective:   Physical Exam  Vitals and nursing note reviewed. Constitutional:       General: He is not in acute distress.   HENT:      Head: Normocephalic and atraumatic. Eyes:      Extraocular Movements: Extraocular movements intact. Cardiovascular:      Rate and Rhythm: Normal rate. Pulmonary:      Effort: Pulmonary effort is normal. No respiratory distress. Breath sounds: No wheezing. Abdominal:      General: There is no distension. Palpations: Abdomen is soft. Tenderness: There is no abdominal tenderness. Hernia: No hernia is present. Genitourinary:     Comments: Voiding clear, yellow urine    Musculoskeletal:      Cervical back: Normal range of motion. Skin:     General: Skin is warm and dry. Neurological:      Mental Status: He is alert and oriented to person, place, and time. Psychiatric:         Behavior: Behavior normal.          Visit Vitals  /68   Pulse 71   Temp 98.2 °F (36.8 °C)   Resp 18   Ht 6' 1\" (1.854 m)   Wt 251 lb 1.7 oz (113.9 kg)   SpO2 97%   BMI 33.13 kg/m²         Data Review:    LABS ORDERED AND PENDING. Assessment/     Patient Active Problem List   Diagnosis Code    Pain of lower extremity M79.606    Abnormal stress test R94.39    Hydronephrosis N13.30    Renal mass N28.89    Hematuria R31.9       Plan:  LEFT SIDED HYDRONEPHROSIS: he is s/p left ureteral stent placement on 6/6/21. Now s/p left nephrectomy on 6/9/21. Stent removed. RETAINED URETERAL STENT: left ureteral stent placed on 6/6/21. Removed with nephrectomy on 6/9/2021. LEFT RENAL MASS: Seen on admission CT scan, at least T2, likely a renal cell carcinoma. Left nephrectomy on 6/9/21. Pathology pending. No gross hematuria. Solitary kidney; nephrology evaluation pending. Oncology recommends PET scan. GROSS HEMATURIA:  Improved off Plavix. He is s/p nephrectomy and Plavix is on hold. Restarted ASA 6/13/21. Restart Plavix today. Monitor for evidence of bleeding. GENITAL WARTS: chronic. Topical therapy recommended as an outpatient. SCROTAL/TESTICULAR PAIN: chronic. No abnormalities on examination.   Mildly bothersome. Small left hydrocele on US, otherwise normal imaging. PLAN TO RESTART PLAVIX TODAY; patient ok for d/c from Urology standpoint.       Leo Gibbons, NP

## 2021-06-16 NOTE — PROGRESS NOTES
Problem: Mobility Impaired (Adult and Pediatric)  Goal: *Acute Goals and Plan of Care (Insert Text)  Description: Pt will be I with LE HEP in 7 days. Pt will perform bed mobility with IND in 7 days. Pt will perform transfers with IND in 7 days. Pt will amb 150 feet with LRAD safely with mod I in 7 days. Outcome: Not Met     Problem: Patient Education: Go to Patient Education Activity  Goal: Patient/Family Education  Outcome: Not Met     PHYSICAL THERAPY EVALUATION  Patient: Lambert Hylton (17 y.o. male)  Date: 6/16/2021  Primary Diagnosis: Hematuria [R31.9]  Hydronephrosis [N13.30]  Renal mass [N28.89]  Procedure(s) (LRB):  LAPAROSCOPIC LEFT RADICAL NEPHRECTOMY (Left) 7 Days Post-Op   Precautions:      ASSESSMENT  60 yom who came to the hospital on 6/5/21 due to abdominal pain. Renal mass found with mets to lower ribs. 6/6/21 underwent Cystoscopy with stent placement. Underwent left laparoscopic radical nephrectomy, ureteral stent removal on 6/8/21. PMHx: CAD, DM, HIV, HLD, HTN, PVD. Pt lives in [address] [place] [address] with bedroom on the frist floor. He has a roommate but he is not close to them. Pt IND at PLOF. Does not own DME, wash cloth bath at sink due to fear of falling. Pt has siblings in the area but they work so they will not be able to assist pt. Pt A&O at PT eval. Supine to sit at SBA, Sit <>stand from bed/commode/recliner at [place] [address], amb with FWW at [place] [address]. Pt required addtional time for all funtional mobility. Pt demos weakness, decreased balance, and impaired functional mobiltiy. He would benefit from acute PT to address his limitaitons. Pt rec DC HH PT with FWW. Patient will benefit from skilled therapy intervention to address the above noted impairments.        PLAN :  Recommendations and Planned Interventions: bed mobility training, transfer training, gait training, therapeutic exercises, neuromuscular re-education, and therapeutic activities      Frequency/Duration: Patient will be followed by physical therapy:  3 times a week to address goals. Recommendation for discharge: (in order for the patient to meet his/her long term goals)   PT    This discharge recommendation:  Has not yet been discussed the attending provider and/or case management    IF patient discharges home will need the following DME: rolling walker         SUBJECTIVE:   Patient stated I want to get back to running again.     OBJECTIVE DATA SUMMARY:   HISTORY:    Past Medical History:   Diagnosis Date    CAD (coronary artery disease)     Diabetes ([facility]     HIV (human immunodeficiency virus infection) ([facility]     Hypercholesterolemia     Hypertension     PVD (peripheral vascular disease) ([facility]      Past Surgical History:   Procedure Laterality Date    HX NEPHRECTOMY  06/09/2021    left laparoscopic radical nephrectomy,     HX UROLOGICAL  06/09/2021    ureteral stent removal    VASCULAR SURGERY PROCEDURE UNLIST      right leg bypass       Personal factors and/or comorbidities impacting plan of care: HIV, CA, no assiatance    Home Situation  Home Environment: Private residence  One/Two Story Residence: Two story, live on 1st floor  Living Alone: Yes  Support Systems:  (Roommate, will not be able to help)  Patient Expects to be Discharged to[de-identified] [place]  Current DME Used/Available at Home: None  Tub or Shower Type:  (washes up at the sink)    PLOF: Pt IND for ADLS/IADLS, IND with mobility prior to admission. EXAMINATION/PRESENTATION/DECISION MAKING:   Critical Behavior:  Neurologic State: Alert  Orientation Level: Oriented X4  Cognition: Appropriate safety awareness, Follows commands, Appropriate decision making, Appropriate for age attention/concentration  Safety/Judgement: Awareness of environment  Hearing:   Auditory  Auditory Impairment: None    Range Of Motion:  AROM: Within functional limits           Strength:    Strength: Generally decreased, functional               Functional Mobility:  Bed Mobility:  Rolling: Independent  Supine to Sit: Stand-by assistance; Additional time     Scooting: Stand-by assistance; Additional time  Transfers:  Sit to Stand: Contact guard assistance; Additional time  Stand to Sit: Contact guard assistance; Additional time                       Balance:   Sitting: Intact  Standing: Impaired  Standing - Static: Good;Constant support  Standing - Dynamic : Fair;Constant support  Ambulation/Gait Training:  Distance (ft): 10 Feet (ft)  Assistive Device: Walker, rolling;Gait belt  Ambulation - Level of Assistance: Contact guard assistance; Additional time     Gait Description (WDL): Exceptions to [facility]           Base of Support: Widened     [place]: [address] [facility] AM-PAC 6 Clicks         Basic Mobility Inpatient Short Form  How much difficulty does the patient currently have. .. Unable A Lot A Little None   1. Turning over in bed (including adjusting bedclothes, sheets and blankets)? [] 1   [] 2   [x] 3   [] 4   2. Sitting down on and standing up from a chair with arms ( e.g., wheelchair, bedside commode, etc.)   [] 1   [] 2   [x] 3   [] 4   3. Moving from lying on back to sitting on the side of the bed? [] 1   [] 2   [x] 3   [] 4          How much help from another person does the patient currently need. .. Total A Lot A Little None   4. Moving to and from a bed to a chair (including a wheelchair)? [] 1   [] 2   [x] 3   [] 4   5. Need to walk in hospital room? [] 1   [] 2   [x] 3   [] 4   6. Climbing 3-5 steps with a railing? [] 1   [] 2   [x] 3   [] 4   © 2007, [address] [address], under license to [email or web address]. All rights reserved     Score:  Initial: 18/24 Most Recent: X (Date: -- )   Interpretation of Tool:  Represents activities that are increasingly more difficult (i.e. Bed mobility, Transfers, Gait).   Score 24 23 22-20 19-15 14-10 9-7 6   Modifier CH CI CJ CK CL CM CN          Physical Therapy Evaluation Charge Determination   History Examination Presentation Decision-Making   LOW Complexity : Zero comorbidities / personal factors that will impact the outcome / POC LOW Complexity : 1-2 Standardized tests and measures addressing body structure, function, activity limitation and / or participation in recreation  LOW Complexity : Stable, uncomplicated  Other Functional Measure [facility] 6       Based on the above components, the patient evaluation is determined to be of the following complexity level: LOW     Pain Ratin/10    Activity Tolerance:   Fair  Please refer to the flowsheet for vital signs taken during this treatment. After treatment patient left in no apparent distress:   Sitting in chair and Call bell within reach    COMMUNICATION/EDUCATION:   The patients plan of care was discussed with: Registered nurse. Patient/family have participated as able in goal setting and plan of care.     Thank you for this referral.  Kirstin Brantley, PT, DPT   Time Calculation: 40 mins

## 2021-06-17 VITALS
SYSTOLIC BLOOD PRESSURE: 109 MMHG | BODY MASS INDEX: 33.28 KG/M2 | TEMPERATURE: 98 F | HEIGHT: 73 IN | DIASTOLIC BLOOD PRESSURE: 63 MMHG | WEIGHT: 251.1 LBS | OXYGEN SATURATION: 97 % | HEART RATE: 76 BPM | RESPIRATION RATE: 20 BRPM

## 2021-06-17 LAB
ALBUMIN SERPL-MCNC: 2.7 G/DL (ref 3.5–5)
ALBUMIN/GLOB SERPL: 0.6 {RATIO} (ref 1.1–2.2)
ALP SERPL-CCNC: 92 U/L (ref 45–117)
ALT SERPL-CCNC: 37 U/L (ref 12–78)
ANION GAP SERPL CALC-SCNC: 5 MMOL/L (ref 5–15)
AST SERPL W P-5'-P-CCNC: 36 U/L (ref 15–37)
BASOPHILS # BLD: 0 K/UL (ref 0–0.1)
BASOPHILS NFR BLD: 1 % (ref 0–1)
BILIRUB SERPL-MCNC: 0.6 MG/DL (ref 0.2–1)
BUN SERPL-MCNC: 18 MG/DL (ref 6–20)
BUN/CREAT SERPL: 11 (ref 12–20)
CA-I BLD-MCNC: 8.9 MG/DL (ref 8.5–10.1)
CHLORIDE SERPL-SCNC: 102 MMOL/L (ref 97–108)
CO2 SERPL-SCNC: 27 MMOL/L (ref 21–32)
CREAT SERPL-MCNC: 1.69 MG/DL (ref 0.7–1.3)
DIFFERENTIAL METHOD BLD: ABNORMAL
EOSINOPHIL # BLD: 0.2 K/UL (ref 0–0.4)
EOSINOPHIL NFR BLD: 5 % (ref 0–7)
ERYTHROCYTE [DISTWIDTH] IN BLOOD BY AUTOMATED COUNT: 13.1 % (ref 11.5–14.5)
GLOBULIN SER CALC-MCNC: 4.5 G/DL (ref 2–4)
GLUCOSE BLD STRIP.AUTO-MCNC: 125 MG/DL (ref 65–117)
GLUCOSE BLD STRIP.AUTO-MCNC: 145 MG/DL (ref 65–117)
GLUCOSE SERPL-MCNC: 110 MG/DL (ref 65–100)
HCT VFR BLD AUTO: 44.7 % (ref 36.6–50.3)
HGB BLD-MCNC: 14.7 G/DL (ref 12.1–17)
IMM GRANULOCYTES # BLD AUTO: 0 K/UL (ref 0–0.04)
IMM GRANULOCYTES NFR BLD AUTO: 0 % (ref 0–0.5)
LYMPHOCYTES # BLD: 1.7 K/UL (ref 0.8–3.5)
LYMPHOCYTES NFR BLD: 38 % (ref 12–49)
MCH RBC QN AUTO: 28.8 PG (ref 26–34)
MCHC RBC AUTO-ENTMCNC: 32.9 G/DL (ref 30–36.5)
MCV RBC AUTO: 87.6 FL (ref 80–99)
MONOCYTES # BLD: 0.6 K/UL (ref 0–1)
MONOCYTES NFR BLD: 14 % (ref 5–13)
NEUTS SEG # BLD: 1.9 K/UL (ref 1.8–8)
NEUTS SEG NFR BLD: 42 % (ref 32–75)
NRBC # BLD: 0 K/UL (ref 0–0.01)
NRBC BLD-RTO: 0 PER 100 WBC
PERFORMED BY, TECHID: ABNORMAL
PERFORMED BY, TECHID: ABNORMAL
PLATELET # BLD AUTO: 360 K/UL (ref 150–400)
PMV BLD AUTO: 9.7 FL (ref 8.9–12.9)
POTASSIUM SERPL-SCNC: 4 MMOL/L (ref 3.5–5.1)
PROT SERPL-MCNC: 7.2 G/DL (ref 6.4–8.2)
RBC # BLD AUTO: 5.1 M/UL (ref 4.1–5.7)
SODIUM SERPL-SCNC: 134 MMOL/L (ref 136–145)
WBC # BLD AUTO: 4.5 K/UL (ref 4.1–11.1)

## 2021-06-17 PROCEDURE — 74011250637 HC RX REV CODE- 250/637: Performed by: INTERNAL MEDICINE

## 2021-06-17 PROCEDURE — 74011250637 HC RX REV CODE- 250/637: Performed by: NURSE PRACTITIONER

## 2021-06-17 PROCEDURE — 80053 COMPREHEN METABOLIC PANEL: CPT

## 2021-06-17 PROCEDURE — 74011250637 HC RX REV CODE- 250/637: Performed by: PHYSICIAN ASSISTANT

## 2021-06-17 PROCEDURE — 99024 POSTOP FOLLOW-UP VISIT: CPT | Performed by: NURSE PRACTITIONER

## 2021-06-17 PROCEDURE — 36415 COLL VENOUS BLD VENIPUNCTURE: CPT

## 2021-06-17 PROCEDURE — 85025 COMPLETE CBC W/AUTO DIFF WBC: CPT

## 2021-06-17 PROCEDURE — 82962 GLUCOSE BLOOD TEST: CPT

## 2021-06-17 RX ORDER — IMIQUIMOD 12.5 MG/.25G
1 CREAM TOPICAL
Qty: 42 PACKET | Refills: 0 | Status: SHIPPED | OUTPATIENT
Start: 2021-06-18 | End: 2021-07-30

## 2021-06-17 RX ORDER — HYDROCODONE BITARTRATE AND ACETAMINOPHEN 7.5; 325 MG/1; MG/1
1 TABLET ORAL
Qty: 12 TABLET | Refills: 0 | Status: SHIPPED | OUTPATIENT
Start: 2021-06-17 | End: 2021-06-20

## 2021-06-17 RX ADMIN — OXYCODONE 7.5 MG: 5 TABLET ORAL at 14:52

## 2021-06-17 RX ADMIN — CLOPIDOGREL BISULFATE 75 MG: 75 TABLET ORAL at 09:30

## 2021-06-17 RX ADMIN — REPAGLINIDE 2 MG: 2 TABLET ORAL at 14:53

## 2021-06-17 RX ADMIN — Medication 10 ML: at 06:42

## 2021-06-17 RX ADMIN — REPAGLINIDE 2 MG: 2 TABLET ORAL at 09:30

## 2021-06-17 RX ADMIN — FAMOTIDINE 20 MG: 20 TABLET ORAL at 09:30

## 2021-06-17 RX ADMIN — DOCUSATE SODIUM 100 MG: 100 CAPSULE, LIQUID FILLED ORAL at 09:30

## 2021-06-17 RX ADMIN — DOLUTEGRAVIR SODIUM 50 MG: 50 TABLET, FILM COATED ORAL at 09:30

## 2021-06-17 RX ADMIN — ASPIRIN 81 MG: 81 TABLET, CHEWABLE ORAL at 09:32

## 2021-06-17 RX ADMIN — OXYCODONE 7.5 MG: 5 TABLET ORAL at 09:40

## 2021-06-17 RX ADMIN — Medication 10 ML: at 14:56

## 2021-06-17 RX ADMIN — ATORVASTATIN CALCIUM 40 MG: 40 TABLET, FILM COATED ORAL at 09:30

## 2021-06-17 NOTE — PROGRESS NOTES
Discharge plan of care/case management plan validated with provider discharge order. Patient alert and oriented x 4 with respirations even and unlabored on RA. Patient verbalizes understanding of discharge instructions. Patient's home medications returned to him from pharmacy. Patient's walker delivered to patient's room from [facility]. Patient discharged home with home health per physician's order. Patient transported via wheelchair to front hospital entrance with patient's sister present to transport patient via private vehicle.

## 2021-06-17 NOTE — PROGRESS NOTES
Nephrology Consult    Patient: Rola Dunn MRN: [id number]  SSN: [id number]    YOB: 1965  Age: 60 y.o.   Sex: male      Subjective:   Pt is seen in the room  No new complaints  Cr 1.6    Past Medical History:   Diagnosis Date    CAD (coronary artery disease)     Diabetes ([facility]     HIV (human immunodeficiency virus infection) ([facility]     Hypercholesterolemia     Hypertension     PVD (peripheral vascular disease) ([facility]      Past Surgical History:   Procedure Laterality Date    HX NEPHRECTOMY  06/09/2021    left laparoscopic radical nephrectomy,     HX UROLOGICAL  06/09/2021    ureteral stent removal    VASCULAR SURGERY PROCEDURE UNLIST      right leg bypass      Family History   Problem Relation Age of Onset    Hypertension Mother     Cancer Mother     Hypertension Father     Heart Disease Father      Social History     Tobacco Use    Smoking status: Former Smoker     Packs/day: 1.00     Years: 45.00     Pack years: 45.00     Types: Cigarettes    Smokeless tobacco: Never Used    Tobacco comment: recently quit   Substance Use Topics    Alcohol use: Never      Current Facility-Administered Medications   Medication Dose Route Frequency Provider Last Rate Last Admin    fentaNYL (DURAGESIC) 50 mcg/hr patch 1 Patch  1 Patch TransDERmal Q72H Ayaz Weiner PA-C   1 Patch at 06/13/21 1339    lactulose (CHRONULAC) 10 gram/15 mL solution 45 mL  30 g Oral BID Juhi Patel PA-C   45 mL at 06/15/21 2104    HYDROmorphone (DILAUDID) injection 1 mg  1 mg IntraVENous Q4H PRN uJhi Patel PA-C   1 mg at 06/16/21 2109    docusate sodium (COLACE) capsule 100 mg  100 mg Oral BID Meghana Dickerson NP   100 mg at 06/17/21 0930    bisacodyL (DULCOLAX) suppository 10 mg  10 mg Rectal DAILY Meghana Dickerson NP   10 mg at 06/12/21 1308    simethicone (MYLICON) tablet 80 mg  80 mg Oral QID PRN Meghana Dickerson NP   80 mg at 06/12/21 0516    oxyCODONE IR (ROXICODONE) tablet 7.5 mg  7.5 mg Oral Q4H PRN Sheldon Carrizales PA-C   7.5 mg at 06/17/21 1452    aspirin chewable tablet 81 mg  81 mg Oral DAILY Dede Ramsey MD   81 mg at 06/17/21 0932    atorvastatin (LIPITOR) tablet 40 mg  40 mg Oral DAILY Dede Ramsey MD   40 mg at 06/17/21 0930    clopidogreL (PLAVIX) tablet 75 mg  75 mg Oral DAILY Dilan Ramsey MD   75 mg at 06/17/21 0930    famotidine (PEPCID) tablet 20 mg  20 mg Oral DAILY Dede Ramsey MD   20 mg at 06/17/21 0930    [Held by provider] hydroCHLOROthiazide (HYDRODIURIL) tablet 25 mg  25 mg Oral DAILY Dede Ramsey MD   25 mg at 06/15/21 0955    insulin glargine (LANTUS) injection 50 Units  50 Units SubCUTAneous QHS Opal Blair MD   50 Units at 06/16/21 2109    [Held by provider] lisinopriL (PRINIVIL, ZESTRIL) tablet 10 mg  10 mg Oral DAILY Opal Blair MD   10 mg at 06/12/21 9954    rqvtzvqccg-hrrehgxs-siirda ala 200-25-25 mg tab 1 Tablet (Patient Supplied)  1 Tablet Oral DAILY Opal Blair MD   1 Tablet at 06/17/21 0932    [Held by provider] pentoxifylline CR (TRENTAL) tablet 400 mg  400 mg Oral BID Opal Blair MD        repaglinide [facility]) tablet 2 mg  2 mg Oral Sigifredo Tacos, Dede Hendrix MD   2 mg at 06/17/21 1453    dolutegravir (TIVICAY) tablet 50 mg  50 mg Oral DAILY Opal Blair MD   50 mg at 06/17/21 0930    sodium chloride (NS) flush 5-40 mL  5-40 mL IntraVENous Calderon [place], Dede Hendrix MD   10 mL at 06/17/21 1456    sodium chloride (NS) flush 5-40 mL  5-40 mL IntraVENous PRN Opal Blair MD        acetaminophen (TYLENOL) tablet 650 mg  650 mg Oral Q6H PRN Opal Blair MD   650 mg at 06/14/21 1110    Or    acetaminophen (TYLENOL) suppository 650 mg  650 mg Rectal Q6H PRN Opal Blair MD        polyethylene glycol (MIRALAX) packet 17 g  17 g Oral DAILY PRN Kisha Bynum MD   17 g at 06/07/21 1208    ondansetron (ZOFRAN ODT) tablet 4 mg  4 mg Oral Q8H PRN Kisha Bynum MD        Or    [facility]ron [facility]) injection 4 mg  4 mg IntraVENous Q6H PRN Kisha Bynum MD        insulin lispro (HUMALOG) injection   SubCUTAneous AC&HS Pavithra Wylie PA-C   6 Units at 06/16/21 1656    glucose chewable tablet 16 g  4 Tablet Oral PRN Pavithra Wylie PA-C        glucagon (GLUCAGEN) injection 1 mg  1 mg IntraMUSCular PRN Pavithra Wylie PA-C        dextrose (D50W) injection syrg 12.5-25 g  25-50 mL IntraVENous PRN Pavithra Wylie PA-C            No Known Allergies    Review of Systems:  A comprehensive review of systems was negative except for that written in the History of Present Illness. Objective:     Vitals:    06/17/21 0035 06/17/21 0846 06/17/21 1232 06/17/21 1532   BP: 105/74 108/63 110/63 109/63   Pulse: 66 82 75 76   Resp: 20 20 20 20   Temp: 97.9 °F (36.6 °C) 98 °F (36.7 °C) 97.7 °F (36.5 °C) 98 °F (36.7 °C)   SpO2: 99% 95% 96% 97%   Weight:       Height:            Physical Exam:  General: NAD  Eyes: sclera anicteric  Oral Cavity: No thrush or ulcers  Neck: no JVD  Chest: Fair bilateral air entry  Heart: normal sounds  Abdomen: soft and non tender   : no barrow  Lower Extremities: no edema  Skin: no rash  Neuro: intact  Psychiatric: non-depressed            Assessment:     Hospital Problems  Date Reviewed: 5/3/2021        Codes Class Noted POA    Hydronephrosis ICD-10-CM: N13.30  ICD-9-CM: 289  6/6/2021 Unknown        * (Principal) Renal mass ICD-10-CM: N28.89  ICD-9-CM: 593.9  6/6/2021 Unknown        Hematuria ICD-10-CM: R31.9  ICD-9-CM: 599.70  6/6/2021 Unknown              Plan:     1 acute kidney injury. -2/2  prerenal plus on lisinopril and HCTZ.     -On admission creatinine was 1.1 and had bumped to 2.3 on 6/13.    -His creatinine has improved down to 1.5/1.6.    -Agreed to hold lisinopril, will hold HCTZ too. -off  IV fluids. 2 hypertension.    -Noticed episodes of low blood pressure.   -I agree to discontinue lisinopril.    -I will hold HCTZ. -Agree with IV fluids. 3.  Renal mass. -CT abdomen left renal mass and left hydronephrosis. -Status post left ureteric stent placement on 6/6, left nephrectomy on 6/9 and a stent has been removed.     Will f/u in office in 2-3 weeks       Signed By: Daisy Jimenez MD     June 17, 2021

## 2021-06-17 NOTE — DISCHARGE SUMMARY
Physician Discharge Summary     Patient ID:    Elio Yang  [id number]  37 y.o.  1965    Admit date: 6/5/2021    Discharge date : 6/17/2021    Chronic Diagnoses:    Problem List as of 6/17/2021 Date Reviewed: 5/3/2021        Codes Class Noted - Resolved    Hydronephrosis ICD-10-CM: N13.30  ICD-9-CM: 467  6/6/2021 - Present        * (Principal) Renal mass ICD-10-CM: N28.89  ICD-9-CM: 593.9  6/6/2021 - Present        Hematuria ICD-10-CM: R31.9  ICD-9-CM: 599.70  6/6/2021 - Present        Abnormal stress test ICD-10-CM: R94.39  ICD-9-CM: 794.39  5/17/2021 - Present        Pain of lower extremity ICD-10-CM: M79.606  ICD-9-CM: 729.5  3/15/2021 - Present        RESOLVED: Peripheral vascular disease ([facility] ICD-10-CM: I73.9  ICD-9-CM: 443.9  3/15/2021 - 3/15/2021          22    Final Diagnoses:   Hematuria [R31.9]  Hydronephrosis [N13.30]  Renal mass [N28.89]    Reason for Hospitalization:  Abdominal pain      Hospital Course:     47-year-old male with past medical history multiple comorbidities presents [facility] with complaints of abdominal pain.  Patient states he was in his usual state of health until a few days back when he started experiencing gradual onset persistent progressive lower abdominal pain associated with hematuria as well as nausea following which patient presented to the ED. CT scan of the abdomen and pelvis showed a left renal mass concerning for malignancy. He is status post left-sided nephrectomy with ureteral stent placement. 06/09/21. Postoperatively, no complications except for postoperative ileus. He was treated with laxatives with improvement over course of hospitalization. Oral pain medications total of 12 tablets have been prescribed. Advise follow-up with urology as outpatient in 1 to 2 weeks.             Discharge Medications:   Current Discharge Medication List      START taking these medications    Details   HYDROcodone-acetaminophen (Lorcet Plus) 7.5-325 mg per tablet Take 1 Tablet by mouth every six (6) hours as needed for Pain for up to 3 days. Max Daily Amount: 4 Tablets. Qty: 12 Tablet, Refills: 0  Start date: 6/17/2021, End date: 6/20/2021    Associated Diagnoses: Renal mass      oxyCODONE IR (ROXICODONE) 5 mg immediate release tablet Take 1.5 Tablets by mouth every four (4) hours as needed for Pain for up to 3 days. Max Daily Amount: 45 mg.  Qty: 21 Tablet, Refills: 0  Start date: 6/15/2021, End date: 6/18/2021    Associated Diagnoses: Renal mass         CONTINUE these medications which have NOT CHANGED    Details   liraglutide (VICTOZA) 0.6 mg/0.1 mL (18 mg/3 mL) pnij Inject 1.2mg daily. Stop Bydureon/Trulicity  Qty: 6 mL, Refills: 6      OneTouch Ultra Blue Test Strip strip       insulin glargine (Lantus Solostar U-100 Insulin) 100 unit/mL (3 mL) inpn 50 Units by SubCUTAneous route nightly. Qty: 15 mL, Refills: 6      repaglinide (PRANDIN) 2 mg tablet Take 1 Tab by mouth Before breakfast, lunch, and dinner. Qty: 90 Tab, Refills: 6      pentoxifylline CR (TRENTAL) 400 mg CR tablet Take 1 Tab by mouth two (2) times a day. Qty: 60 Tab, Refills: 3      aspirin 81 mg chewable tablet       atorvastatin (LIPITOR) 40 mg tablet       clopidogreL (PLAVIX) 75 mg tab       Tivicay 50 mg tab tablet       Odefsey 200-25-25 mg tab       famotidine (PEPCID) 20 mg tablet          STOP taking these medications       metFORMIN (GLUCOPHAGE) 500 mg tablet Comments:   Reason for Stopping:         Insulin Needles, Disposable, (Funmilayo Pen Needle) 32 gauge x 5/32\" ndle Comments:   Reason for Stopping:         hydroCHLOROthiazide (HYDRODIURIL) 25 mg tablet Comments:   Reason for Stopping:         lisinopriL (PRINIVIL, ZESTRIL) 10 mg tablet Comments:   Reason for Stopping: Follow up Care:    1. Maximino Jackson NP in 1-2 weeks. Please call to set up an appointment shortly after discharge.       Diet:  Regular Diet    Disposition:  Home. Advanced Directive:   FULL    DNR      Discharge Exam:  Visit Vitals  /63   Pulse 82   Temp 98 °F (36.7 °C)   Resp 20   Ht 6' 1\" (1.854 m)   Wt 113.9 kg (251 lb 1.7 oz)   SpO2 95%   BMI 33.13 kg/m²    O2 Flow Rate (L/min): 2 l/min O2 Device: None (Room air)    Temp (24hrs), Av.9 °F (36.6 °C), Min:97.4 °F (36.3 °C), Max:98.2 °F (36.8 °C)    No intake/output data recorded. 06/15 1901 -  0700  In:  [P.O.:]  Out: -     General:  Alert, cooperative, no distress, appears stated age. Lungs:   Clear to auscultation bilaterally. Chest wall:  No tenderness or deformity. Heart:  Regular rate and rhythm, S1, S2 normal, no murmur, click, rub or gallop. Abdomen:   Soft, non-tender. Bowel sounds normal. No masses,  No organomegaly. Extremities: Extremities normal, atraumatic, no cyanosis or edema. Pulses: 2+ and symmetric all extremities. Skin: Skin color, texture, turgor normal. No rashes or lesions   Neurologic: CNII-XII intact. No gross sensory or motor deficits         CONSULTATIONS: Urology    Significant Diagnostic Studies:   2021: BUN 21 mg/dL* (Ref range: 6 - 20 mg/dL); Calcium 9.3 mg/dL (Ref range: 8.5 - 10.1 mg/dL); CO2 22 mmol/L (Ref range: 21 - 32 mmol/L); Creatinine 1.64 mg/dL* (Ref range: 0.70 - 1.30 mg/dL); Glucose 348 mg/dL* (Ref range: 65 - 100 mg/dL); HCT 52.4 %* (Ref range: 36.6 - 50.3 %); HGB 17.0 g/dL (Ref range: 12.1 - 17.0 g/dL); Potassium 3.9 mmol/L (Ref range: 3.5 - 5.1 mmol/L);  Sodium 132 mmol/L* (Ref range: 136 - 145 mmol/L)  Recent Labs     21  0631 216   WBC 4.5 5.4   HGB 14.7 14.5   HCT 44.7 45.2    366     Recent Labs     21  0631 216 06/15/21  0713   * 135*  133* 134*   K 4.0 4.6  4.5 4.4    102  101 101   CO2 27 23  26 27   BUN 18 21*  20 24*   CREA 1.69* 1.56*  1.58* 1.63*   * 128*  128* 149*   CA 8.9 8.7  8.7 8.9   PHOS  --  2.6  --      Recent Labs 06/17/21  0631 06/16/21  0326 06/15/21  0713   ALT 37 35 27   AP 92 95 86   TBILI 0.6 0.6 0.6   TP 7.2 7.4 6.8   ALB 2.7* 2.7*  2.7* 2.5*   GLOB 4.5* 4.7* 4.3*     No results for input(s): INR, PTP, APTT, INREXT in the last 72 hours. No results for input(s): FE, TIBC, PSAT, FERR in the last 72 hours. No results for input(s): PH, PCO2, PO2 in the last 72 hours. No results for input(s): CPK, CKMB in the last 72 hours.     No lab exists for component: TROPONINI  Lab Results   Component Value Date/Time    Glucose (POC) 125 (H) 06/17/2021 08:54 AM    Glucose (POC) 143 (H) 06/16/2021 07:38 PM    Glucose (POC) 256 (H) 06/16/2021 04:21 PM    Glucose (POC) 169 (H) 06/16/2021 11:57 AM    Glucose (POC) 115 06/16/2021 07:27 AM       Total Time: 35 minutes    Signed:  Jagruti Newsome MD  6/17/2021  9:33 AM

## 2021-06-17 NOTE — PROGRESS NOTES
Patient has been accepted with [address] for [facility] with a start of care of 6/18/2021. Patient notified of this at the bedside. Therapy is also recommending a Rolling Walker. Patient stated he does not have one. Order obtained from attending provider. Referral has been sent to [facility].    Once Rolling Mare Creamer is delivered to the bedside patient is cleared to discharge from CM standpoint.

## 2021-06-17 NOTE — PROGRESS NOTES
UROLOGY Progress Note         [phone]      Daily Progress Note: 6/17/2021      Subjective: The patient is seen for UROLOGIC follow up for hydronephrosis, renal mass, hematuria, urinary frequency. He is s/p cystourethroscopy, clot evacuation, bilateral RPG, and left ureteral stent placement. The ureter appeared mildly dilated along its entire length without focal stricturing. There was filling defect in the upper and mid infundibula high with dilation of these. This was suspicious for clot. He is s/p left laparoscopic radical nephrectomy and ureteral stent removal on 6/9/21. CT scan 6/12/21: No retroperitoneal, intraperitoneal or  abdominal wall hematoma. Trace water attenuation fluid posterior to the nephrectomy bed. No otherwise free or loculated fluid. Diffuse ileus. He is well today. Pain has improved. He is not seeing any gross hematuria after restarting ASA. His incision sites are clean, dry and intact. He is ready for discharge.     Problem List:  Patient Active Problem List   Diagnosis Code    Pain of lower extremity M79.606    Abnormal stress test R94.39    Hydronephrosis N13.30    Renal mass N28.89    Hematuria R31.9         Medications reviewed  Current Facility-Administered Medications   Medication Dose Route Frequency    fentaNYL (DURAGESIC) 50 mcg/hr patch 1 Patch  1 Patch TransDERmal Q72H    lactulose (CHRONULAC) 10 gram/15 mL solution 45 mL  30 g Oral BID    HYDROmorphone (DILAUDID) injection 1 mg  1 mg IntraVENous Q4H PRN    docusate sodium (COLACE) capsule 100 mg  100 mg Oral BID    bisacodyL (DULCOLAX) suppository 10 mg  10 mg Rectal DAILY    simethicone (MYLICON) tablet 80 mg  80 mg Oral QID PRN    oxyCODONE IR (ROXICODONE) tablet 7.5 mg  7.5 mg Oral Q4H PRN    aspirin chewable tablet 81 mg  81 mg Oral DAILY    atorvastatin (LIPITOR) tablet 40 mg  40 mg Oral DAILY    clopidogreL (PLAVIX) tablet 75 mg  75 mg Oral DAILY    famotidine (PEPCID) tablet 20 mg  20 mg Oral DAILY    [Held by provider] hydroCHLOROthiazide (HYDRODIURIL) tablet 25 mg  25 mg Oral DAILY    insulin glargine (LANTUS) injection 50 Units  50 Units SubCUTAneous QHS    [Held by provider] lisinopriL (PRINIVIL, ZESTRIL) tablet 10 mg  10 mg Oral DAILY    uyesfgscba-cefkubgn-dztiww ala 200-25-25 mg tab 1 Tablet (Patient Supplied)  1 Tablet Oral DAILY    [Held by provider] pentoxifylline CR (TRENTAL) tablet 400 mg  400 mg Oral BID    repaglinide (PRANDIN) tablet 2 mg  2 mg Oral TIDAC    dolutegravir (TIVICAY) tablet 50 mg  50 mg Oral DAILY    sodium chloride (NS) flush 5-40 mL  5-40 mL IntraVENous Q8H    sodium chloride (NS) flush 5-40 mL  5-40 mL IntraVENous PRN    acetaminophen (TYLENOL) tablet 650 mg  650 mg Oral Q6H PRN    Or    acetaminophen (TYLENOL) suppository 650 mg  650 mg Rectal Q6H PRN    polyethylene glycol (MIRALAX) packet 17 g  17 g Oral DAILY PRN    ondansetron (ZOFRAN ODT) tablet 4 mg  4 mg Oral Q8H PRN    Or    ondansetron (ZOFRAN) injection 4 mg  4 mg IntraVENous Q6H PRN    insulin lispro (HUMALOG) injection   SubCUTAneous AC&HS    glucose chewable tablet 16 g  4 Tablet Oral PRN    glucagon (GLUCAGEN) injection 1 mg  1 mg IntraMUSCular PRN    dextrose (D50W) injection syrg 12.5-25 g  25-50 mL IntraVENous PRN       Review of Systems:   Review of Systems   Constitutional: Negative for chills and fever. Gastrointestinal: Negative for nausea and vomiting. Abdominal soreness post-operatively   Genitourinary: Negative for dysuria, frequency, hematuria and urgency. Objective:   Physical Exam  Vitals and nursing note reviewed. Constitutional:       General: He is not in acute distress. HENT:      Head: Normocephalic and atraumatic. Eyes:      Extraocular Movements: Extraocular movements intact. Cardiovascular:      Rate and Rhythm: Normal rate. Pulmonary:      Effort: Pulmonary effort is normal. No respiratory distress.       Breath sounds: No wheezing. Abdominal:      General: There is no distension. Palpations: Abdomen is soft. Tenderness: There is no abdominal tenderness. Hernia: No hernia is present. Genitourinary:     Comments: Voiding dark, yellow urine    Musculoskeletal:      Cervical back: Normal range of motion. Skin:     General: Skin is warm and dry. Neurological:      Mental Status: He is alert and oriented to person, place, and time. Psychiatric:         Behavior: Behavior normal.          Visit Vitals  /63   Pulse 82   Temp 98 °F (36.7 °C)   Resp 20   Ht 6' 1\" (1.854 m)   Wt 251 lb 1.7 oz (113.9 kg)   SpO2 95%   BMI 33.13 kg/m²         Data Review:    LABS ORDERED AND PENDING. Assessment/     Patient Active Problem List   Diagnosis Code    Pain of lower extremity M79.606    Abnormal stress test R94.39    Hydronephrosis N13.30    Renal mass N28.89    Hematuria R31.9       Plan:  LEFT SIDED HYDRONEPHROSIS: he is s/p left ureteral stent placement on 6/6/21. Now s/p left nephrectomy on 6/9/21. Stent removed 6/9/21. LEFT RENAL MASS: Seen on admission CT scan, at least T2, likely a renal cell carcinoma. Left nephrectomy on 6/9/21. Pathology pending. No gross hematuria. Solitary kidney; nephrology evaluation pending. Oncology recommends PET scan. GROSS HEMATURIA:  Improved off Plavix. He is s/p nephrectomy and Plavix is on hold. Restarted ASA 6/13/21. Restart Plavix today. Monitor for evidence of bleeding. GENITAL WARTS: chronic. Topical therapy recommended as an outpatient. SCROTAL/TESTICULAR PAIN: chronic. No abnormalities on examination. Mildly bothersome. Small left hydrocele on US, otherwise normal imaging. OK to d/c from Urology standpoint. I will add topical therapy for genital condyloma to discharge medications.     Wally Lopez NP

## 2021-06-18 NOTE — CONSULTS
[facility]    Name:  Carlos Bose  MR#:  [id number]  :  1965  ACCOUNT #:  [de-identified]  DATE OF SERVICE:  2021    PSYCHIATRIC CONSULTATION    Found about the consult from computer. ORDERING PHYSICIAN:  KRISTEN Gaitan    REASON FOR CONSULTATION:  Depression. This is a 56-year-old single  male patient who was admitted to medical inpatient triage with two days of hematuria and abdominal pain, history of frequent UTI. The patient endorses nausea, vomiting, diarrhea, decreased appetite for three days. CHIEF COMPLAINT:  The patient says \"my kidney problem. \"    HISTORY OF PRESENT ILLNESS:  The patient apparently has diabetes mellitus, hypertension, first time he found out he has got kidney problems. His hemoglobin A1c was around 10 according to him. He says he has had diabetes for 14 years, hypertension for 14 years. He says [place] this area. There was a delay in getting Medicaid transferred. For six months, he did not have access to a physician, medications, neglecting himself. I was asked to see. Now he has got insurance coverage. He started getting help. He sees Dr. Taylor Rivas. When I asked about depression, he denied disability; but says he is depressed. He sustained several losses, his daughter who was 15years old had Down's syndrome, got sick, was getting better in the hospital, then all of a sudden she passed away in 2019. Something just spread all over the . Next, his girlfriend of 25 years who was into drug abuse addiction,  in 2020. Mother was sick, . I believe sister  too. The patient says after his daughter , he kind of felt like taking a. He does not know when death will come. He gets sad but does not label it as depression. He is prepared for any thing can happen and things can change.   He says one time he gained from 240 pounds to 320 pounds, now back to 255 pounds. He came close to family here, some of them are here. He says his appetite is okay, sleep is decreased, only gets one hour. He wants to be checked for obstructive sleep apnea. He says he does not drive. He stays home, watches TV. PAST HISTORY:  Denied any inpatient psychiatric treatment or psychiatric treatment currently. He says he might have seen a psychiatrist when he was young. The patient denied alcohol abuse. Says he used to drink in the past.  Also quit smoking, he used to smoke heavily, quit doing any drugs. ALLERGIES TO MEDICATIONS:  NO KNOWN ALLERGIES TO MEDICATION. CURRENT MEDICATIONS:  Aspirin; atorvastatin; bisacodyl; Plavix; docusate; PVK; Pepcid; fentanyl every 72 hours, first dose on 06/13/2021; insulin; lactulose; Prandin; p.r.n. Tylenol; hydromorphone 1 mg every 4 hours p.r.n. intravenous; p.r.n. Zofran; oxycodone; MiraLax; Mylicon; sodium chloride. SOCIAL HISTORY:  He says he used to be a , not working. He is on disability. He has a 80-year-old daughter and a grandchild, but they are far away. They are supportive. He has got a brother and a sister, but they are busy with their own family, but he can reach out to them. Review of systems and physical examination, see attending's findings. MEDICAL ISSUES:  Obesity, coronary artery disease, diabetes, HIV, hypercholesterolemia, hypertension, GERD, peripheral vascular disease, kidney disorder. MENTAL STATUS EXAMINATION:  Tall, a heavyset male patient, alert, verbal, polite, cooperative, opens spontaneously. Oriented to all the three spheres. Thoughts are linear, logical, goal directed. No psychosis. Denies suicidal thought, sad but does not label as a depression. Felt he does not need medication, he does not want to take it. Memory recall is fair. IQ about average. Insight fair. Judgment fair. DIAGNOSES:  1.  Major depression, moderate, acute, without psychosis.   2.  Coronary artery disease. 3.  Type 2 diabetes mellitus. 4.  Human immunodeficiency virus. 5.  Hypercholesterolemia. 6.  Hypertension. 7.  Peripheral vascular disease. 8.  Gastroesophageal reflux disease. 9.  Kidney disorder. DISPOSITION:  Supportive therapy. I encouraged him to consider taking an antidepressant medication, but he does not want to take it. He is somewhat philosophical, spiritual, and understands that loss has come through but he is accepting it. Suggest at least to keep up with his spiritual support staff in the [employer] which he used to go to and also find a counselor. He is being discharged today. Not suicidal, not homicidal, not psychotic. Thank you for allowing me to participate in the care of this patient.       Herve Dennison MD      [id number]  D:  06/17/2021 15:30  T:  06/17/2021 20:21  JOB #:  [id number]

## 2021-06-28 PROBLEM — A63.0 GENITAL WARTS: Status: ACTIVE | Noted: 2021-06-28

## 2021-06-28 PROBLEM — C64.9 CLEAR CELL RENAL CELL CARCINOMA (HCC): Status: ACTIVE | Noted: 2021-06-28

## 2021-06-28 PROBLEM — N50.82 SCROTAL PAIN: Status: ACTIVE | Noted: 2021-06-28

## 2021-07-02 ENCOUNTER — OFFICE VISIT (OUTPATIENT)
Dept: UROLOGY | Age: 56
End: 2021-07-02
Payer: COMMERCIAL

## 2021-07-02 VITALS
WEIGHT: 255 LBS | SYSTOLIC BLOOD PRESSURE: 131 MMHG | OXYGEN SATURATION: 98 % | BODY MASS INDEX: 33.8 KG/M2 | TEMPERATURE: 96.5 F | HEIGHT: 73 IN | DIASTOLIC BLOOD PRESSURE: 89 MMHG | HEART RATE: 84 BPM | RESPIRATION RATE: 22 BRPM

## 2021-07-02 DIAGNOSIS — C64.2 CLEAR CELL CARCINOMA OF LEFT KIDNEY (HCC): Primary | ICD-10-CM

## 2021-07-02 DIAGNOSIS — R31.0 GROSS HEMATURIA: ICD-10-CM

## 2021-07-02 LAB
BILIRUB UR QL STRIP: NEGATIVE
GLUCOSE UR-MCNC: NEGATIVE MG/DL
KETONES P FAST UR STRIP-MCNC: NEGATIVE MG/DL
PH UR STRIP: 5 [PH] (ref 4.6–8)
PROT UR QL STRIP: NORMAL
SP GR UR STRIP: 1.03 (ref 1–1.03)
UA UROBILINOGEN AMB POC: NORMAL (ref 0.2–1)
URINALYSIS CLARITY POC: CLEAR
URINALYSIS COLOR POC: YELLOW
URINE BLOOD POC: NORMAL
URINE LEUKOCYTES POC: NEGATIVE
URINE NITRITES POC: NEGATIVE

## 2021-07-02 PROCEDURE — 81003 URINALYSIS AUTO W/O SCOPE: CPT | Performed by: NURSE PRACTITIONER

## 2021-07-02 PROCEDURE — 99024 POSTOP FOLLOW-UP VISIT: CPT | Performed by: NURSE PRACTITIONER

## 2021-07-02 NOTE — LETTER
7/2/2021    Patient: Lizzie Galaviz   YOB: 1965   Date of Visit: 7/2/2021     Elayne Bull NP  [address]  [facility] [address]  Via Fax: [address] [place]  [address] [address] [facility] [address]  Via Fax: [phone]     Jameel Huber MD  [address] [address]  [address] [place]    Dear PRIYA Pineda MD Orvin Pong, MD,      Thank you for referring Mr. Abhilash Arango to [facility] for evaluation. My notes for this consultation are attached. If you have questions, please do not hesitate to call me. I look forward to following your patient along with you.       Sincerely,    Anita Duncan NP

## 2021-07-02 NOTE — PROGRESS NOTES
HISTORY OF PRESENT ILLNESS  Ervin Lesch is a 60 y.o. male. Chief Complaint   Patient presents with    New Patient    Renal Cell Carcinoma    Hydronephrosis     He is s/p left laparoscopic radical nephrectomy and ureteral stent removal on 21. 7.4 cm clear cell renal cell carcinoma. Completely excised. Extensive renal sinus extension. No abnormality of left adrenal gland. PT3a. CT chest on 21 with left renal mass on the lowest slice, as before small foci of cortical disruption of several lower anterior ribs, with soft tissue components. Creatinine ranged from 1.56-2.31 post operatively. Seen by Dr. Rebbecca Lesch inpatient. Restarted ASA 21. Restart Plavix 21. He denies gross hematuria. His brother has a prostate nodule. His uncle had prostate cancer and  from it. He is concerned about this. Chronic Conditions Addressed Today     1. Hematuria     Overview      Gross hematuria. He is s/p cystourethroscopy, clot evacuation, bilateral RPG, and left ureteral stent placement. The ureter appeared mildly dilated along its entire length without focal stricturing.  There was filling defect in the upper and mid infundibula high with dilation of these. This was suspicious for clot.     He is s/p left laparoscopic radical nephrectomy and ureteral stent removal on 21. Restarted ASA 21. Restart Plavix 21.            2. Clear cell renal cell carcinoma (HCC) - Primary     Overview      He is s/p LEFT nephrectomy on 21.  7.4 cm clear cell renal cell carcinoma. Completely excised. Extensive renal sinus extension. No abnormality of left adrenal gland. pT3a. 3. Genital warts     Overview      Chronic, mild. Skin tags to LLE (thigh/groin)- no consistent with condyloma. Given RX for imiquimod 5%: apply 1 Packet to affected area every Monday, Wednesday, Friday for 42 days on 21.         4. Scrotal pain     Overview      : Chronic.   No abnormalities on examination. Mildly bothersome. Small left hydrocele on US, otherwise normal imaging.            5. Solitary kidney     Overview      He is s/p LEFT nephrectomy on 6/9/21. Creatinine ranged from 1.56-2.31 post operatively. Seen by Dr. Citlaly Iraheta inpatient. Patient denies the symptoms of COVID-19 per routine screening guidelines. ROS    Past Medical History:  PMHx (including negatives):  has a past medical history of CAD (coronary artery disease), Diabetes ([facility] [address] Herpes, HIV (human immunodeficiency virus infection) ([facility] [address] Hypercholesterolemia, Hypertension, and PVD (peripheral vascular disease) ([facility] [address] PSurgHx:  has a past surgical history that includes pr cardiac surg procedure unlist; pr cardiac surg procedure unlist; hx nephrectomy (06/09/2021); and hx urological (06/09/2021). PSocHx:  reports that he has quit smoking. His smoking use included cigarettes. He has a 45.00 pack-year smoking history. He has never used smokeless tobacco. He reports previous alcohol use. He reports previous drug use. Drug: Marijuana. Physical Exam    ASSESSMENT and PLAN  Diagnoses and all orders for this visit:    1. Clear cell carcinoma of left kidney [facility])  Assessment & Plan:  He is s/p LEFT nephrectomy on 6/9/21. He did well without postop bleeding or problems. 7.4 cm clear cell renal cell carcinoma. Completely excised. Extensive renal sinus extension. No abnormality of left adrenal gland. PT3a M1. On CT chest 6/7/21: possible rib metastasis. \"small foci of cortical disruption of several lower anterior ribs, with soft tissue components. Advanced hypertrophic changes in the lower spine   IMPRESSION Early bone metastases. No evidence of lung metastasis\"    He is directed to follow up with medical oncology. Dr. Eduard Skelton et al. I will defer further management and imaging to her. Orders:  -     AMB POC URINALYSIS DIP STICK AUTO W/O MICRO  -     REFERRAL TO HEMATOLOGY ONCOLOGY    2.  Solitary kidney  Assessment & Plan:  He is s/p LEFT nephrectomy on 6/9/21. I will refer to nephrology to follow renal function    Orders:  -     AMB POC URINALYSIS DIP STICK AUTO W/O MICRO  -     METABOLIC PANEL, BASIC; Future  -     REFERRAL TO NEPHROLOGY    3. Gross hematuria  Assessment & Plan:   Acute hematuria secondary to renal cancer. He has resumed aspirin and plavix without recurrence. Orders:  -     AMB POC URINALYSIS DIP STICK AUTO W/O MICRO         Follow-up and Dispositions    · Return in about 4 months (around 11/2/2021) for with Dr. Michael Jackson; CT prior, with Dr. Michael Jackson; CT scan and labs prior.          Toney Harrell MD

## 2021-07-02 NOTE — PROGRESS NOTES
1. Have you been to the ER, urgent care clinic since your last visit? Hospitalized since your last visit? [facility] & [facility]    2. Have you seen or consulted any other health care providers outside of the [address] [place] [address] since your last visit? Include any pap smears or colon screening.  [facility]  Chief Complaint   Patient presents with    New Patient    Renal Cell Carcinoma    Hydronephrosis     Visit Vitals  /89 (BP 1 Location: Right arm, BP Patient Position: Sitting, BP Cuff Size: Adult)   Pulse 84   Temp (!) 96.5 °F (35.8 °C) (Temporal)   Resp 22   Ht 6' 1\" (1.854 m)   Wt 255 lb (115.7 kg)   SpO2 98%   BMI 33.64 kg/m² Detail Level: Simple Include Location In Plan?: Yes

## 2021-07-02 NOTE — ASSESSMENT & PLAN NOTE
He is s/p LEFT nephrectomy on 6/9/21. He did well without postop bleeding or problems. 7.4 cm clear cell renal cell carcinoma. Completely excised. Extensive renal sinus extension. No abnormality of left adrenal gland. PT3a M1. On CT chest 6/7/21: possible rib metastasis. \"small foci of cortical disruption of several lower anterior ribs, with soft tissue components. Advanced hypertrophic changes in the lower spine   IMPRESSION Early bone metastases. No evidence of lung metastasis\"    He is directed to follow up with medical oncology. Dr. Maida Lai et al. I will defer further management and imaging to her.

## 2021-07-08 ENCOUNTER — TELEPHONE (OUTPATIENT)
Dept: UROLOGY | Age: 56
End: 2021-07-08

## 2021-07-08 NOTE — TELEPHONE ENCOUNTER
Mr. Martha Bañuelos had a hospitalist on discharge from the hospital who should've restarted his medications. He should have an apt with a PCP to re-evaluate the need for BP and diabetic medications. We only hold metformin 72 hours after surgery. I'm not what regimen the hospitalist had him on post op or on discharge but both diabetes and BP issues should be managed by a PCP. If he does not have one, let's refer him to Dr. Augustine Pacheco- I will place that referral if necessary.

## 2021-07-08 NOTE — TELEPHONE ENCOUNTER
Sylwia from [facility] called stating this patient is being discharged today but wanted us to know Patient is not taking BP meds or metformin, was told not to take until kidney removal which occurred on 6/10/21.     Vitals= 140/106   p110  And sugars are 130 something    Please advise

## 2021-07-08 NOTE — TELEPHONE ENCOUNTER
Spoke to Magalie Hickey from [facility] [phone] told her Jasvir's notes. Magalie Hickey is going to contact PCP.

## 2021-07-28 LAB
ALBUMIN SERPL-MCNC: 4.2 G/DL (ref 3.8–4.9)
ALBUMIN/CREAT UR: 54 MG/G CREAT (ref 0–29)
ALBUMIN/GLOB SERPL: 1.6 {RATIO} (ref 1.2–2.2)
ALP SERPL-CCNC: 102 IU/L (ref 48–121)
ALT SERPL-CCNC: 24 IU/L (ref 0–44)
AST SERPL-CCNC: 21 IU/L (ref 0–40)
BILIRUB SERPL-MCNC: 0.6 MG/DL (ref 0–1.2)
BUN SERPL-MCNC: 21 MG/DL (ref 6–24)
BUN/CREAT SERPL: 13 (ref 9–20)
C PEPTIDE SERPL-MCNC: 4.5 NG/ML (ref 1.1–4.4)
CALCIUM SERPL-MCNC: 9.3 MG/DL (ref 8.7–10.2)
CHLORIDE SERPL-SCNC: 101 MMOL/L (ref 96–106)
CHOLEST SERPL-MCNC: 104 MG/DL (ref 100–199)
CO2 SERPL-SCNC: 22 MMOL/L (ref 20–29)
CREAT SERPL-MCNC: 1.6 MG/DL (ref 0.76–1.27)
CREAT UR-MCNC: 105.4 MG/DL
EST. AVERAGE GLUCOSE BLD GHB EST-MCNC: 217 MG/DL
GLOBULIN SER CALC-MCNC: 2.7 G/DL (ref 1.5–4.5)
GLUCOSE SERPL-MCNC: 162 MG/DL (ref 65–99)
HBA1C MFR BLD: 9.2 % (ref 4.8–5.6)
HDLC SERPL-MCNC: 33 MG/DL
IMP & REVIEW OF LAB RESULTS: NORMAL
INTERPRETATION: NORMAL
LDLC SERPL CALC-MCNC: 53 MG/DL (ref 0–99)
MICROALBUMIN UR-MCNC: 56.4 UG/ML
POTASSIUM SERPL-SCNC: 4.6 MMOL/L (ref 3.5–5.2)
PROT SERPL-MCNC: 6.9 G/DL (ref 6–8.5)
SODIUM SERPL-SCNC: 136 MMOL/L (ref 134–144)
TRIGL SERPL-MCNC: 90 MG/DL (ref 0–149)
VLDLC SERPL CALC-MCNC: 18 MG/DL (ref 5–40)

## 2021-08-02 PROBLEM — N30.01 ACUTE CYSTITIS WITH HEMATURIA: Status: ACTIVE | Noted: 2021-08-02

## 2021-08-03 PROBLEM — E66.09 OBESITY DUE TO EXCESS CALORIES WITHOUT SERIOUS COMORBIDITY: Status: ACTIVE | Noted: 2020-04-27

## 2021-08-03 PROBLEM — M19.90 ARTHRITIS: Status: ACTIVE | Noted: 2020-06-26

## 2021-08-03 PROBLEM — I10 ESSENTIAL HYPERTENSION: Status: ACTIVE | Noted: 2020-03-26

## 2021-08-03 PROBLEM — B20 HIV (HUMAN IMMUNODEFICIENCY VIRUS INFECTION) (HCC): Status: ACTIVE | Noted: 2020-03-27

## 2021-08-03 PROBLEM — E11.9 TYPE 2 DIABETES MELLITUS (HCC): Status: ACTIVE | Noted: 2020-03-26

## 2021-08-03 PROBLEM — F17.200 SMOKER: Status: ACTIVE | Noted: 2020-03-27

## 2021-08-03 PROBLEM — E78.5 HYPERLIPIDEMIA: Status: ACTIVE | Noted: 2020-03-26

## 2021-08-03 RX ORDER — LISINOPRIL 10 MG/1
10 TABLET ORAL DAILY
COMMUNITY
Start: 2021-07-02 | End: 2022-02-28

## 2021-08-03 RX ORDER — FAMOTIDINE 20 MG/1
TABLET, FILM COATED ORAL
COMMUNITY
Start: 2021-07-02 | End: 2021-08-05 | Stop reason: ALTCHOICE

## 2021-08-03 RX ORDER — LANCETS 30 GAUGE
EACH MISCELLANEOUS
COMMUNITY
Start: 2021-05-28 | End: 2022-02-07 | Stop reason: SDUPTHER

## 2021-08-03 RX ORDER — CIPROFLOXACIN 250 MG/1
TABLET, FILM COATED ORAL
COMMUNITY
Start: 2021-07-27 | End: 2021-08-05 | Stop reason: ALTCHOICE

## 2021-08-03 RX ORDER — METFORMIN HYDROCHLORIDE 500 MG/1
1000 TABLET ORAL 2 TIMES DAILY WITH MEALS
COMMUNITY
Start: 2021-07-02 | End: 2021-11-08 | Stop reason: SDUPTHER

## 2021-08-03 RX ORDER — UMECLIDINIUM BROMIDE AND VILANTEROL TRIFENATATE 62.5; 25 UG/1; UG/1
1 POWDER RESPIRATORY (INHALATION) DAILY
COMMUNITY
Start: 2021-06-30

## 2021-08-03 RX ORDER — HYDROCHLOROTHIAZIDE 25 MG/1
25 TABLET ORAL DAILY
COMMUNITY
Start: 2021-07-02 | End: 2022-02-28

## 2021-08-04 ENCOUNTER — OFFICE VISIT (OUTPATIENT)
Dept: UROLOGY | Age: 56
End: 2021-08-04
Payer: COMMERCIAL

## 2021-08-04 VITALS
WEIGHT: 255 LBS | RESPIRATION RATE: 16 BRPM | DIASTOLIC BLOOD PRESSURE: 71 MMHG | HEIGHT: 73 IN | HEART RATE: 91 BPM | SYSTOLIC BLOOD PRESSURE: 99 MMHG | OXYGEN SATURATION: 98 % | BODY MASS INDEX: 33.8 KG/M2 | TEMPERATURE: 97.1 F

## 2021-08-04 DIAGNOSIS — R31.0 GROSS HEMATURIA: ICD-10-CM

## 2021-08-04 DIAGNOSIS — K59.09 OTHER CONSTIPATION: ICD-10-CM

## 2021-08-04 DIAGNOSIS — N30.01 ACUTE CYSTITIS WITH HEMATURIA: ICD-10-CM

## 2021-08-04 DIAGNOSIS — C64.2 CLEAR CELL CARCINOMA OF LEFT KIDNEY (HCC): Primary | ICD-10-CM

## 2021-08-04 DIAGNOSIS — N41.1 PROSTATITIS, CHRONIC: ICD-10-CM

## 2021-08-04 LAB
BILIRUB UR QL STRIP: NEGATIVE
GLUCOSE UR-MCNC: NORMAL MG/DL
KETONES P FAST UR STRIP-MCNC: NORMAL MG/DL
PH UR STRIP: 5 [PH] (ref 4.6–8)
PROT UR QL STRIP: NORMAL
SP GR UR STRIP: 1.03 (ref 1–1.03)
UA UROBILINOGEN AMB POC: NORMAL (ref 0.2–1)
URINALYSIS CLARITY POC: CLEAR
URINALYSIS COLOR POC: YELLOW
URINE BLOOD POC: NEGATIVE
URINE LEUKOCYTES POC: NEGATIVE
URINE NITRITES POC: NEGATIVE

## 2021-08-04 PROCEDURE — 99024 POSTOP FOLLOW-UP VISIT: CPT | Performed by: NURSE PRACTITIONER

## 2021-08-04 PROCEDURE — 81003 URINALYSIS AUTO W/O SCOPE: CPT | Performed by: NURSE PRACTITIONER

## 2021-08-04 RX ORDER — CIPROFLOXACIN 500 MG/1
500 TABLET ORAL 2 TIMES DAILY
Qty: 20 TABLET | Refills: 0 | Status: SHIPPED | OUTPATIENT
Start: 2021-08-04 | End: 2021-08-14

## 2021-08-04 NOTE — ASSESSMENT & PLAN NOTE
UA dip today is clear. I do not have the cultures obtained by nephrology or oncology available to me. He has been on Cipro for about 5 days. I will extend that for a total of a 14-day course to treat any component of prostatitis. He is encouraged to hydrate well and work on his issues with constipation.

## 2021-08-04 NOTE — ASSESSMENT & PLAN NOTE
There is a close relationship between the muscles and nerves responsible for bladder function and those that control bowel movements. Large amounts of stool in the colon can add additional pressure on to the bladder causing it to improperly function (incomplete bladder emptying, urinary frequency, UTI's, urgency or incontinence). Increasing the amount of daily fiber can help to bulk up the stool, making it easier to move the bowels. This can help relieve some of the associated urinary symptoms also. Foods high in fiber are fruits, vegetables, grains, nuts, seeds and legumes. Taking a fiber supplement such as Benefiber or Metamucil 1-2 times daily can also help. Use as directed.

## 2021-08-04 NOTE — PROGRESS NOTES
HISTORY OF PRESENT ILLNESS  Marissa Wright is a 60 y.o. male. Chief Complaint   Patient presents with    Follow-up    Recurrent UTI     Mr. Devika Nieves was last seen on 7/2/21 here in the office. He had a left nephrectomy on 6/9/2021. Pathology did show renal cell carcinoma. This was all discussed at his follow-up visit on 7/2/21. He was seen by both nephrology and oncology. He was told that he had a urinary tract infection by both doctors. He is currently on Cipro x 4-5 days. His UA dip today does not show signs of infection. He does not note that he is very symptomatic of infection. He thinks he may have some increased frequency alone. He denies fever, chills, nausea, vomiting, flank pain, urinary discomfort, or gross hematuria. He tells me that one of the other doctors told him he had a problem with his prostate. He thinks he was told that his PSA was elevated. On my review it is 1.1 and 1.5. He is reassured by this. He does struggle with constipation. He thinks it is getting better since his surgery. Chronic Conditions Addressed Today     1. Hematuria     Overview      Gross hematuria. He is s/p cystourethroscopy, clot evacuation, bilateral RPG, and left ureteral stent placement. The ureter appeared mildly dilated along its entire length without focal stricturing.  There was filling defect in the upper and mid infundibula high with dilation of these. This was suspicious for clot.     He is s/p left laparoscopic radical nephrectomy and ureteral stent removal on 6/9/21. Restarted ASA 6/13/21. Restart Plavix 6/17/21.             Current Assessment & Plan      No ongoing hematuria after restarting the Plavix. Relevant Medications     hydroCHLOROthiazide (HYDRODIURIL) 25 mg tablet     lisinopriL (PRINIVIL, ZESTRIL) 10 mg tablet    2. Clear cell renal cell carcinoma (HCC) - Primary     Overview      He is s/p LEFT nephrectomy on 6/9/21 for acute bleeding.   7.4 cm clear cell renal cell carcinoma. Completely excised. Extensive renal sinus extension. No abnormality of left adrenal gland. pT3a. Current Assessment & Plan      Next scheduled visit with us is in November. He is also seeing oncology. He thinks he will return to see Dr. Alli Wallace. He has a PET scan pending. Relevant Medications     hydroCHLOROthiazide (HYDRODIURIL) 25 mg tablet     lisinopriL (PRINIVIL, ZESTRIL) 10 mg tablet    3. Acute cystitis with hematuria     Overview      Urine culture 6/8/21 with klebsiella. Treated with Rocephin/Azithromycin inpatient. 8/4/21: He is here today in follow-up. He saw oncology and nephrology who he reports told him he had a urinary tract infection. He has been on Cipro x 5 days. UA dip today is clear. Current Assessment & Plan      UA dip today is clear. I do not have the cultures obtained by nephrology or oncology available to me. He has been on Cipro for about 5 days. I will extend that for a total of a 14-day course to treat any component of prostatitis. He is encouraged to hydrate well and work on his issues with constipation. Relevant Medications     ciprofloxacin HCl (CIPRO) 250 mg tablet     hydroCHLOROthiazide (HYDRODIURIL) 25 mg tablet     lisinopriL (PRINIVIL, ZESTRIL) 10 mg tablet     ciprofloxacin HCl (CIPRO) 500 mg tablet     Other Relevant Orders     AMB POC URINALYSIS DIP STICK AUTO W/O MICRO    4. Other constipation     Overview      He struggles with constipation from time to time. It is usually responsive to OTC medications. Current Assessment & Plan      There is a close relationship between the muscles and nerves responsible for bladder function and those that control bowel movements. Large amounts of stool in the colon can add additional pressure on to the bladder causing it to improperly function (incomplete bladder emptying, urinary frequency, UTI's, urgency or incontinence).     Increasing the amount of daily fiber can help to bulk up the stool, making it easier to move the bowels. This can help relieve some of the associated urinary symptoms also. Foods high in fiber are fruits, vegetables, grains, nuts, seeds and legumes. Taking a fiber supplement such as Benefiber or Metamucil 1-2 times daily can also help. Use as directed. 5. Prostatitis, chronic     Overview      8/4/21: He may have an element of prostatitis. He does not report any overly bothersome irritative voiding symptoms. He can have some frequency from time to time. He also reports a lot of stress regarding his health status and recent diagnoses. Current Assessment & Plan      I will extend his antibiotic course for a total of at least 14 days to treat for any component of prostatitis that may be involved. Relevant Medications     hydroCHLOROthiazide (HYDRODIURIL) 25 mg tablet     lisinopriL (PRINIVIL, ZESTRIL) 10 mg tablet     ciprofloxacin HCl (CIPRO) 500 mg tablet          Patient denies the symptoms of COVID-19 per routine screening guidelines. Review of Systems   Constitutional: Negative for chills and fever. Gastrointestinal: Positive for constipation. Negative for nausea and vomiting. Genitourinary: Positive for frequency. Negative for dysuria, flank pain and hematuria. Musculoskeletal: Negative for back pain. Past Medical History:  PMHx (including negatives):  has a past medical history of Burning with urination, CAD (coronary artery disease), Diabetes ([address] Herpes, HIV (human immunodeficiency virus infection) ([facility] [address] Hypercholesterolemia, Hypertension, and PVD (peripheral vascular disease) ([facility] [address] PSurgHx:  has a past surgical history that includes pr cardiac surg procedure unlist; pr cardiac surg procedure unlist; hx nephrectomy (06/09/2021); and hx urological (06/09/2021). PSocHx:  reports that he has quit smoking. His smoking use included cigarettes. He has a 45.00 pack-year smoking history.  He has never used smokeless tobacco. He reports previous alcohol use. He reports previous drug use. Drug: Marijuana. ASSESSMENT and PLAN  Diagnoses and all orders for this visit:    1. Clear cell carcinoma of left kidney [facility])  Assessment & Plan:  Next scheduled visit with us is in November. He is also seeing oncology. He thinks he will return to see Dr. Maida Lai. He has a PET scan pending. 2. Gross hematuria  Assessment & Plan:  No ongoing hematuria after restarting the Plavix. 3. Acute cystitis with hematuria  Assessment & Plan:  UA dip today is clear. I do not have the cultures obtained by nephrology or oncology available to me. He has been on Cipro for about 5 days. I will extend that for a total of a 14-day course to treat any component of prostatitis. He is encouraged to hydrate well and work on his issues with constipation. Orders:  -     AMB POC URINALYSIS DIP STICK AUTO W/O MICRO  -     ciprofloxacin HCl (CIPRO) 500 mg tablet; Take 1 Tablet by mouth two (2) times a day for 10 days. 4. Prostatitis, chronic  Assessment & Plan:  I will extend his antibiotic course for a total of at least 14 days to treat for any component of prostatitis that may be involved. Orders:  -     ciprofloxacin HCl (CIPRO) 500 mg tablet; Take 1 Tablet by mouth two (2) times a day for 10 days. 5. Other constipation  Assessment & Plan:  There is a close relationship between the muscles and nerves responsible for bladder function and those that control bowel movements. Large amounts of stool in the colon can add additional pressure on to the bladder causing it to improperly function (incomplete bladder emptying, urinary frequency, UTI's, urgency or incontinence). Increasing the amount of daily fiber can help to bulk up the stool, making it easier to move the bowels. This can help relieve some of the associated urinary symptoms also.      Foods high in fiber are fruits, vegetables, grains, nuts, seeds and legumes. Taking a fiber supplement such as Benefiber or Metamucil 1-2 times daily can also help. Use as directed.                Elian Tejeda NP

## 2021-08-04 NOTE — ASSESSMENT & PLAN NOTE
I will extend his antibiotic course for a total of at least 14 days to treat for any component of prostatitis that may be involved.

## 2021-08-04 NOTE — PROGRESS NOTES
Chief Complaint   Patient presents with    Follow-up    Recurrent UTI         1. Have you been to the ER, urgent care clinic since your last visit? Hospitalized since your last visit? No    2. Have you seen or consulted any other health care providers outside of the [address] [place] [address] since your last visit? Include any pap smears or colon screening.  No      Visit Vitals  BP 99/71 (BP 1 Location: Left upper arm, BP Patient Position: Sitting, BP Cuff Size: Adult)   Pulse 91   Temp 97.1 °F (36.2 °C) (Temporal)   Resp 16   Ht 6' 1\" (1.854 m)   Wt 255 lb (115.7 kg)   SpO2 98%   BMI 33.64 kg/m²

## 2021-08-04 NOTE — ASSESSMENT & PLAN NOTE
Next scheduled visit with us is in November. He is also seeing oncology. He thinks he will return to see Dr. Edith Vega. He has a PET scan pending.

## 2021-08-05 ENCOUNTER — OFFICE VISIT (OUTPATIENT)
Dept: SURGERY | Age: 56
End: 2021-08-05
Payer: COMMERCIAL

## 2021-08-05 ENCOUNTER — OFFICE VISIT (OUTPATIENT)
Dept: ENDOCRINOLOGY | Age: 56
End: 2021-08-05

## 2021-08-05 VITALS
RESPIRATION RATE: 18 BRPM | BODY MASS INDEX: 33.45 KG/M2 | HEART RATE: 78 BPM | TEMPERATURE: 98.4 F | SYSTOLIC BLOOD PRESSURE: 98 MMHG | OXYGEN SATURATION: 96 % | DIASTOLIC BLOOD PRESSURE: 62 MMHG | HEIGHT: 73 IN | WEIGHT: 252.4 LBS

## 2021-08-05 VITALS
BODY MASS INDEX: 33.48 KG/M2 | HEIGHT: 73 IN | HEART RATE: 90 BPM | WEIGHT: 252.6 LBS | DIASTOLIC BLOOD PRESSURE: 83 MMHG | TEMPERATURE: 97.4 F | SYSTOLIC BLOOD PRESSURE: 127 MMHG | OXYGEN SATURATION: 95 % | RESPIRATION RATE: 18 BRPM

## 2021-08-05 DIAGNOSIS — E11.65 UNCONTROLLED TYPE 2 DIABETES MELLITUS WITH HYPERGLYCEMIA (HCC): Primary | ICD-10-CM

## 2021-08-05 DIAGNOSIS — I10 ESSENTIAL HYPERTENSION: ICD-10-CM

## 2021-08-05 DIAGNOSIS — C64.2 RENAL CANCER, LEFT (HCC): ICD-10-CM

## 2021-08-05 DIAGNOSIS — E78.2 MIXED HYPERLIPIDEMIA: ICD-10-CM

## 2021-08-05 DIAGNOSIS — I73.9 PERIPHERAL VASCULAR DISEASE (HCC): Primary | ICD-10-CM

## 2021-08-05 PROCEDURE — 99203 OFFICE O/P NEW LOW 30 MIN: CPT | Performed by: SURGERY

## 2021-08-05 PROCEDURE — 99215 OFFICE O/P EST HI 40 MIN: CPT | Performed by: INTERNAL MEDICINE

## 2021-08-05 NOTE — PROGRESS NOTES
[facility]  Nadege Davis MD, Kieran Day is a 60 y.o. male presents today as a new DM Patient. HPI    Patient here for  First follow up  Visit after   initial visit of Type 2 diabetes mellitus from May 2021   S/p left nephrectomy  For  RCC -   He says it is metastatic to bones    He has high sugars still on the lot       May 2021      Referred : by  [facility]   H/o diabetes for 15  years   Current A1C is 11.6 %    From feb 2021  and symptoms/problems include none   Pt moved to [facility]  From [place]   In oct 2020   He stayed without meds for good 10 months , HE HAD FEW MEDS REFILLED on his visit in feb 2021   Per their notes, he used to be on actos 45 and nesina 25 mg  Current diabetic medications include  Metformin, bydureon, lantus 50 units . bydureon was started 2 months ago- not being covered   Current monitoring regimen: home blood tests - NOT INTERESTED          Review of Systems   None       Physical Exam   Constitutional: She is oriented to person, place, and time. She appears well-developed and well-nourished. Psychiatric: She has a normal mood and affect.        Vitals:    08/05/21 1505   BP: 127/83   BP 1 Location: Right arm   BP Patient Position: Sitting   BP Cuff Size: Adult   Pulse: 90   Temp: 97.4 °F (36.3 °C)   TempSrc: Oral   Resp: 18   Height: 6' 1\" (1.854 m)   Weight: 252 lb 9.6 oz (114.6 kg)   SpO2: 95%             Lab Results   Component Value Date/Time    Hemoglobin A1c 9.2 (H) 07/27/2021 12:00 AM    Hemoglobin A1c 10.2 (H) 05/03/2021 10:05 AM    Glucose 162 (H) 07/27/2021 12:00 AM    Glucose (POC) 145 (H) 06/17/2021 12:35 PM    Microalb/Creat ratio (ug/mg creat.) 54 (H) 07/27/2021 12:00 AM    LDL, calculated 53 07/27/2021 12:00 AM    LDL, calculated 71 05/03/2021 10:05 AM    Creatinine 1.60 (H) 07/27/2021 12:00 AM      Lab Results   Component Value Date/Time    Cholesterol, total 104 07/27/2021 12:00 AM    HDL Cholesterol 33 (L) 07/27/2021 12:00 AM    LDL, calculated 53 07/27/2021 12:00 AM    LDL, calculated 71 05/03/2021 10:05 AM    Triglyceride 90 07/27/2021 12:00 AM    CHOL/HDL Ratio 3.3 05/03/2021 10:05 AM     Lab Results   Component Value Date/Time    ALT (SGPT) 24 07/27/2021 12:00 AM    Alk. phosphatase 102 07/27/2021 12:00 AM    Bilirubin, total 0.6 07/27/2021 12:00 AM    Albumin 4.2 07/27/2021 12:00 AM    Protein, total 6.9 07/27/2021 12:00 AM    INR 0.9 05/17/2021 10:06 AM    Prothrombin time 12.3 05/17/2021 10:06 AM    PLATELET 945 64/79/8020 06:31 AM     Lab Results   Component Value Date/Time    GFR est non-AA 47 (L) 07/27/2021 12:00 AM    GFR est AA 54 (L) 07/27/2021 12:00 AM    Creatinine 1.60 (H) 07/27/2021 12:00 AM    BUN 21 07/27/2021 12:00 AM    Sodium 136 07/27/2021 12:00 AM    Potassium 4.6 07/27/2021 12:00 AM    Chloride 101 07/27/2021 12:00 AM    CO2 22 07/27/2021 12:00 AM    Magnesium 1.7 06/07/2021 02:27 AM    Phosphorus 2.6 06/16/2021 03:26 AM             ASSESSMENT AND  PLAN     1. Type 2 DM uncontrolled :  a1c is   9.2 %       From July 2021     Compared to   10.2 %    From  May 2021  by POC    Compared to 11.6 %    From feb 2021 August 2021  Some improvement   Discouraged use of inj incretins or metformin because of nausea likely from chemo agents   Suggesting starting on basal bolus regimen which could help if chemo and steroids are started in future   He Is agreeable and trained him on this   He is given printed insulin instructions         May 2021     Not in control as he had not gotten med refills for 6 plus months .moved here during [place] times   Discussed patho-physiology of diabetes   Stay on lantus , Will try trulicity   Start on prandin and he will be on metformin       2. Hypoglycemia :  Educated on treating the hypoglycemia. 3. HTN : continue current care. 4.  Dyslipidemia : continue current meds. 5. On [place]  And  plavix     6. Diabetic complications :     A.  Retinopathy-YES   educated on this complication, regular f/u with ophthalmologist encouraged    B. Nephropathy - yes, proteinuria noticeable      educated on this disease and indicated stages and its prognosis. C. Peripheral Neuropathy - NO  ,   educated on this disease and indicated improvement with good and stable glycemic control      7. High Hematocrit , and  High hgb  :  Rule out sleep apnea , copd,       8. HSV 2 positive - saw ID according to [facility] notes       9.  Left sided nephrectomy for renal cancerous mass June 2021       Reviewed results with patient and discussed the labs being ordered today/bnv  Patient voiced understanding of plan of care

## 2021-08-05 NOTE — PATIENT INSTRUCTIONS
SPECIFIC INSTRUCTIONS BELOW     DRINK water       Do not skip meals  Do not eat in between meals    Reduce carbs- pasta, rice, potatoes, bread   Try to avoid processed bread products like BISCUITS, CROISSANTS, MUFFINS    Do not drink juices or sodas, even if they are calorie zero or diet drinks and especially avoid using powders like crystalloids , JULIETTE-AIDS     Do not eat peanut butter     Do not eat sugar free cookies and cakes   Do not eat peaches, oranges, pineapples, raisins, grapes , canteloupe , honey dew, mangoes , watermelon  and fruit medleys      ----------------------------------------------------------------------------------------------    Check blood sugars only twice a day by rotation as follows    First day - before b-fast and - before lunch  Second day- before dinner and  before bed time    After 2 days go back to same rotation    -------------------------------------------------------------------------------------------------------  OPTIONAL use of victoza at 1.2  Mg a day     STAY ON LANTUS  50 UNITS AT NIGHT       START on  Novolog  5  units before meals  ( stop prandin )    Take additional Novolog   as follows with meals:    150-200 mg 1 units    201-250 mg 3 units    251-300 mg 5 units    301-350 mg 7 units    351-400 mg 9 units    401-450 mg 11 units    451-500 mg 13 units              -------------PAY ATTENTION TO THESE GENERAL INSTRUCTIONS -----------------    -ANY tests other than blood work, which you opt to do  outside the  [facility] imaging facilities, you are responsible for prior authorizations if  required    - HEALTH MAINTENANCE IS NOT GOING TO BE UP TO DATE ON YOUR AVS- PLEASE IGNORE   - YOUR MED LIST IS NOT UP TO DATE AS SOME CHANGES ARE BEING MADE AFTER THE VISIT - FOLLOW SPECIFIC INSTRUCTIONS  ABOVE     Results     *Normal results will not be notified by a phone call starting January 1 2021   *If you have an upcoming visit, the results will be discussed at the visit   *Please sign up for MY CHART if you want access to your lab and test results  *Abnormal results which require immediate attention will be notified by phone call   *Abnormal results which do not require immediate assistance will be notified in 1-2 weeks       Refills    -    have your pharmacy send us a refill request . Refills are done max for one year and a visit is a must before refills are extended    Follow up appointments -  highly encourage you to make it when you are checking out. We can accommodate you into the schedule based on your clinical situation, but not for extending refills beyond a year. Labs are important to give refills and is important to get labs before the visit     Phone calls  -  Allow  24 hrs.  for non-urgent calls to be returned  Prior authorization - It may take 2-4 weeks to process  Forms  -  FMLA, DMV etc., will take up to 2 weeks to process  Cancellations - please notify the office 2 days in advance   Samples  - will only be dispensed at visits       If not showing for the appointments and cancelling appointments within 24 hours are kept track of and three  of such situations in  two consecutive years will likely be considered for termination from the practice    -------------------------------------------------------------------------------------------------------------------

## 2021-08-05 NOTE — PROGRESS NOTES
1. Have you been to the ER, urgent care clinic since your last visit? [facility]  Hospitalized since your last visit?2 weeks    2. Have you seen or consulted any other health care providers outside of the [address] [place] [address] since your last visit? Include any pap smears or colon screening. No    Wt Readings from Last 3 Encounters:   08/05/21 252 lb 9.6 oz (114.6 kg)   08/05/21 252 lb 6.4 oz (114.5 kg)   08/04/21 255 lb (115.7 kg)     Temp Readings from Last 3 Encounters:   08/05/21 97.4 °F (36.3 °C) (Oral)   08/05/21 98.4 °F (36.9 °C) (Temporal)   08/04/21 97.1 °F (36.2 °C) (Temporal)     BP Readings from Last 3 Encounters:   08/05/21 127/83   08/05/21 98/62   08/04/21 99/71     Pulse Readings from Last 3 Encounters:   08/05/21 90   08/05/21 78   08/04/21 91     Lab Results   Component Value Date/Time    Hemoglobin A1c 9.2 (H) 07/27/2021 12:00 AM     Patient does not have meter today.

## 2021-08-05 NOTE — PROGRESS NOTES
Identified pt with two pt identifiers(name and ). Reviewed record in preparation for visit and have obtained necessary documentation. Chief Complaint   Patient presents with    Follow-up     circulation      Vitals:    21 1023   BP: 98/62   Pulse: 78   Resp: 18   Temp: 98.4 °F (36.9 °C)   TempSrc: Temporal   SpO2: 96%   Weight: 252 lb 6.4 oz (114.5 kg)   Height: 6' 1\" (1.854 m)   PainSc:   0 - No pain       Health Maintenance Review: Patient reminded of \"due or due soon\" health maintenance. I have asked the patient to contact his/her primary care provider (PCP) for follow-up on his/her health maintenance. Coordination of Care Questionnaire:  :   1) Have you been to an emergency room, urgent care, or hospitalized since your last visit? If yes, where when, and reason for visit? no       2. Have seen or consulted any other health care provider since your last visit? If yes, where when, and reason for visit? YES    Who is the primary learner? Patient    What is the preferred language for health care of the primary learner? ENGLISH    How does the primary learner prefer to learn new concepts?  DEMONSTRATION    Answered By patient    Relationship to Learner SELF      ADL Assessment 2021   Feeding yourself No Help Needed   Getting from bed to chair No Help Needed   Getting dressed No Help Needed   Bathing or showering No Help Needed   Walk across the room (includes cane/walker) No Help Needed   Using the telphone No Help Needed   Taking your medications No Help Needed   Preparing meals No Help Needed   Managing money (expenses/bills) No Help Needed   Moderately strenuous housework (laundry) No Help Needed   Shopping for personal items (toiletries/medicines) No Help Needed   Shopping for groceries No Help Needed   Driving No Help Needed   Climbing a flight of stairs No Help Needed   Getting to places beyond walking distances No Help Needed     Abuse Screening Questionnaire 2021   Do you ever feel afraid of your partner? N   Are you in a relationship with someone who physically or mentally threatens you? N   Is it safe for you to go home?  Kasia Encarnacion

## 2021-08-05 NOTE — LETTER
8/7/2021    Patient: Quirino Rushing   YOB: 1965   Date of Visit: 8/5/2021     Brent [address] [facility] [address]  Via Fax: [phone]    Dear Jose Sun NP,      Thank you for referring Mr. Levar Wilkinson to [address] [place] [address] for evaluation. My notes for this consultation are attached. If you have questions, please do not hesitate to call me. I look forward to following your patient along with you.       Sincerely,    Margarita Gonzalez MD

## 2021-08-06 RX ORDER — INSULIN GLARGINE 100 [IU]/ML
50 INJECTION, SOLUTION SUBCUTANEOUS
Qty: 15 ML | Refills: 6 | Status: SHIPPED | OUTPATIENT
Start: 2021-08-06 | End: 2021-11-08 | Stop reason: SDUPTHER

## 2021-08-06 RX ORDER — PEN NEEDLE, DIABETIC 31 GX3/16"
NEEDLE, DISPOSABLE MISCELLANEOUS
Qty: 200 PEN NEEDLE | Refills: 11 | Status: SHIPPED | OUTPATIENT
Start: 2021-08-06 | End: 2022-06-07 | Stop reason: SDUPTHER

## 2021-08-06 RX ORDER — INSULIN ASPART 100 [IU]/ML
INJECTION, SOLUTION INTRAVENOUS; SUBCUTANEOUS
Qty: 15 ML | Refills: 6 | Status: SHIPPED | OUTPATIENT
Start: 2021-08-06 | End: 2021-11-08 | Stop reason: SDUPTHER

## 2021-08-08 NOTE — PROGRESS NOTES
VASCULAR FOLLOW UP      Subjective:   CHIEF COMPLAINTS:  Leg discomfort  PRESENTATION OF ILLNESS:    Mr. Zuri Rosario is here today follow-up. Patient has known history of peripheral vascular disease. Patient doing otherwise well. He denies any worsening pain in both legs. Denies any swelling. Denies recent trauma. Patient is a pretty active daily basis with a walking exercise program.  Patient is diabetic as well. Patient says that his blood sugar is well under control. Patient had a recently BRIGIDO examination done which shows right ankle index 0.9 and left ankle index 1.0. Past Medical History:   Diagnosis Date    Burning with urination     CAD (coronary artery disease)     [facility]     Herpes     HIV (human immunodeficiency virus infection) [facility]     Hypercholesterolemia     Hypertension     PVD (peripheral vascular disease) ([facility]       Past Surgical History:   Procedure Laterality Date    HX NEPHRECTOMY  06/09/2021    left laparoscopic radical nephrectomy,     HX UROLOGICAL  06/09/2021    ureteral stent removal    VT CARDIAC SURG PROCEDURE UNLIST      bipass in right leg    VT CARDIAC SURG PROCEDURE UNLIST      stent in left leg      Family History   Problem Relation Age of Onset    Hypertension Mother     Cancer Mother     Hypertension Father     Heart Disease Father       Social History     Tobacco Use    Smoking status: Former Smoker     Packs/day: 1.00     Years: 45.00     Pack years: 45.00     Types: Cigarettes    Smokeless tobacco: Never Used    Tobacco comment: recently quit   Substance Use Topics    Alcohol use: Not Currently       Prior to Admission medications    Medication Sig Start Date End Date Taking? Authorizing Provider   ciprofloxacin HCl (CIPRO) 500 mg tablet Take 1 Tablet by mouth two (2) times a day for 10 days. 8/4/21 8/14/21 Yes Paulino Kussmaul, NP   hydroCHLOROthiazide (HYDRODIURIL) 25 mg tablet Take 25 mg by mouth daily.  7/2/21  Yes Provider, Historical OneTouch Delica Plus Lancet 30 gauge misc  5/28/21  Yes Provider, Historical   lisinopriL (PRINIVIL, ZESTRIL) 10 mg tablet Take 10 mg by mouth daily. 7/2/21  Yes Provider, Historical   metFORMIN (GLUCOPHAGE) 500 mg tablet Take 500 mg by mouth two (2) times daily (with meals). 7/2/21  Yes Provider, Historical   Anoro Ellipta 62.5-25 mcg/actuation inhaler Take 1 Puff by inhalation daily. 6/30/21  Yes Provider, Historical   OneTouch Ultra Blue Test Strip strip  4/18/21  Yes Provider, Historical   pentoxifylline CR (TRENTAL) 400 mg CR tablet Take 1 Tab by mouth two (2) times a day. 4/5/21  Yes Dahlia Suárez MD   aspirin 81 mg chewable tablet Take 81 mg by mouth daily. 1/5/21  Yes Provider, Historical   atorvastatin (LIPITOR) 40 mg tablet  2/24/21  Yes Provider, Historical   clopidogreL (PLAVIX) 75 mg tab Take 75 mg by mouth daily. 2/24/21  Yes Provider, Historical   Tivicay 50 mg tab tablet Take 50 mg by mouth daily. 2/17/21  Yes Provider, Historical   Odefsey 200-25-25 mg tab Take 1 Tablet by mouth daily. 2/17/21  Yes Provider, Historical   insulin glargine (Lantus Solostar U-100 Insulin) 100 unit/mL (3 mL) inpn 50 Units by SubCUTAneous route nightly. 8/6/21   Yahaira Thomas MD   liraglutide (VICTOZA) 0.6 mg/0.1 mL (18 mg/3 mL) pnij Inject 1.2mg daily. Stop Bydureon/Trulicity 1/5/60   Yahaira Thomas MD   insulin aspart U-100 (NOVOLOG) 100 unit/mL (3 mL) inpn Inject 5 units before breakfast, lunch and dinner Plus sliding scale to 54 units daily STOP PRANDIN 8/6/21   Yahaira Thomas MD   Insulin Needles, Disposable, (Funmilayo Pen Needle) 32 gauge x 5/32\" ndle Use to inject insulin four times daily.  Dx. Code E11.65 8/6/21   Yahaira Thomas MD     No Known Allergies     Review of Systems:  I reviewed the rest of organ systems personally and they were negative signed by Dr. Siddhartha Ghotra    Objective:     Visit Vitals  BP 98/62 (BP 1 Location: Left upper arm, BP Patient Position: Sitting, BP Cuff Size: Adult)   Pulse 78   Temp 98.4 °F (36.9 °C) (Temporal)   Resp 18   Ht 6' 1\" (1.854 m)   Wt 252 lb 6.4 oz (114.5 kg)   SpO2 96%   BMI 33.30 kg/m²     VITAL SIGNS REVIEWED. Physical Exam:  Patient is well-nourished pleasant in conversation is appropriate. Head and neck examination atraumatic, normocephalic. Gaze appropriate. Conversation appropriate. Neck examination shows supple. No mass. No obvious carotid bruit. Chest examination shows lungs are clear bilaterally well-expanded, no crackles or wheezes. Cardiovascular system regular rate, no obvious murmur. Skin warm to touch  and moist, no skin lesions. Abdomen is soft ,not tender or distended bowel sounds present. No palpable mass. Neurological examinations, no focal neuro deficits moving all 4 extremities. Cranial nerves intact. Sensation is intact as well. Hematologic: No obvious bruise or swelling or obvious lymphadenopathy. Psychosocial: Appropriate. Has good effect. Musculoskeletal system: No muscle wasting, appropriate movements upper and lower extremity. Vascular examination: I reviewed the both pedal circulation patient does have a triphasic signal noted on bilateral PT area.         Data Review:   Office Visit on 08/04/2021   Component Date Value Ref Range Status    Color (UA POC) 08/04/2021 Yellow   Final    Clarity (UA POC) 08/04/2021 Clear   Final    Glucose (UA POC) 08/04/2021 4+  Negative Final    Bilirubin (UA POC) 08/04/2021 Negative  Negative Final    Ketones (UA POC) 08/04/2021 Trace  Negative Final    Specific gravity (UA POC) 08/04/2021 1.030  1.001 - 1.035 Final    Blood (UA POC) 08/04/2021 Negative  Negative Final    pH (UA POC) 08/04/2021 5.0  4.6 - 8.0 Final    Protein (UA POC) 08/04/2021 Trace  Negative Final    Urobilinogen (UA POC) 08/04/2021 0.2 mg/dL  0.2 - 1 Final    Nitrites (UA POC) 08/04/2021 Negative  Negative Final    Leukocyte esterase (UA POC) 08/04/2021 Negative  Negative Final        Assessment:     Problem List Items Addressed This Visit        Circulatory    Peripheral vascular disease ([facility] - Primary              Plan:     Patient was advised to continue monitor his vascular conditions with a surveillance examination. Patient was discussed about risk factor modification for atherosclerotic disease process. Patient will be reevaluated with surveillance vascular examination 1 year follow-up. Patient was encouraged to continue active and exercise. And other risk factors discussed and reminded. Patient will come see me next year for follow-up. And patient was informed about last BRIGIDO examination results as well.         Lul Lopez MD

## 2021-08-11 ENCOUNTER — TRANSCRIBE ORDER (OUTPATIENT)
Dept: SCHEDULING | Age: 56
End: 2021-08-11

## 2021-08-11 DIAGNOSIS — C64.9: Primary | ICD-10-CM

## 2021-08-16 ENCOUNTER — HOSPITAL ENCOUNTER (OUTPATIENT)
Dept: PET IMAGING | Age: 56
Discharge: HOME | End: 2021-08-16
Attending: INTERNAL MEDICINE
Payer: COMMERCIAL

## 2021-08-16 DIAGNOSIS — C64.9: ICD-10-CM

## 2021-08-16 PROCEDURE — A9552 F18 FDG: HCPCS

## 2021-08-16 RX ADMIN — FLUDEOXYGLUCOSE F-18 10.43 MILLICURIE: 200 INJECTION INTRAVENOUS at 15:11

## 2021-08-17 RX ORDER — FLUDEOXYGLUCOSE F-18 200 MCI/ML
10.43 INJECTION INTRAVENOUS ONCE
Status: COMPLETED | OUTPATIENT
Start: 2021-08-17 | End: 2021-08-16

## 2021-09-09 LAB
ALBUMIN SERPL-MCNC: 4.7 G/DL (ref 3.8–4.9)
ALBUMIN/CREAT UR: 34 MG/G CREAT (ref 0–29)
ALBUMIN/GLOB SERPL: 1.6 {RATIO} (ref 1.2–2.2)
ALP SERPL-CCNC: 143 IU/L (ref 48–121)
ALT SERPL-CCNC: 21 IU/L (ref 0–44)
AST SERPL-CCNC: 17 IU/L (ref 0–40)
BILIRUB SERPL-MCNC: 0.7 MG/DL (ref 0–1.2)
BUN SERPL-MCNC: 20 MG/DL (ref 6–24)
BUN/CREAT SERPL: 12 (ref 9–20)
C PEPTIDE SERPL-MCNC: 3.2 NG/ML (ref 1.1–4.4)
CALCIUM SERPL-MCNC: 9.8 MG/DL (ref 8.7–10.2)
CHLORIDE SERPL-SCNC: 100 MMOL/L (ref 96–106)
CHOLEST SERPL-MCNC: 126 MG/DL (ref 100–199)
CO2 SERPL-SCNC: 22 MMOL/L (ref 20–29)
CREAT SERPL-MCNC: 1.68 MG/DL (ref 0.76–1.27)
CREAT UR-MCNC: 186.2 MG/DL
EST. AVERAGE GLUCOSE BLD GHB EST-MCNC: 229 MG/DL
GLOBULIN SER CALC-MCNC: 3 G/DL (ref 1.5–4.5)
GLUCOSE SERPL-MCNC: 133 MG/DL (ref 65–99)
HBA1C MFR BLD: 9.6 % (ref 4.8–5.6)
HDLC SERPL-MCNC: 33 MG/DL
IMP & REVIEW OF LAB RESULTS: NORMAL
INTERPRETATION: NORMAL
LDLC SERPL CALC-MCNC: 71 MG/DL (ref 0–99)
MICROALBUMIN UR-MCNC: 63.3 UG/ML
POTASSIUM SERPL-SCNC: 4.5 MMOL/L (ref 3.5–5.2)
PROT SERPL-MCNC: 7.7 G/DL (ref 6–8.5)
SODIUM SERPL-SCNC: 137 MMOL/L (ref 134–144)
TRIGL SERPL-MCNC: 119 MG/DL (ref 0–149)
VLDLC SERPL CALC-MCNC: 22 MG/DL (ref 5–40)

## 2021-09-17 ENCOUNTER — HOSPITAL ENCOUNTER (OUTPATIENT)
Dept: PHYSICAL THERAPY | Age: 56
Discharge: HOME | End: 2021-09-17
Payer: MEDICAID

## 2021-09-17 PROCEDURE — 97163 PT EVAL HIGH COMPLEX 45 MIN: CPT

## 2021-09-17 NOTE — PROGRESS NOTES
[address]  Ph: [phone]    Fax: [phone]    Initial Evaluation/Plan of Care/Statement of Necessity for Physical Therapy Services     Patient name: Javier Koch    Date/Start of Care:2021  : 1965  [x]  Patient  Verified  Provider#: [id number]          Referral source: Eboni Adame NP    Return visit to MD: 21   Medical/Treatment Diagnosis: Other abnormalities of gait and mobility [R26.89]  Unsteadiness on feet [R26.81]    Payor: [facility] MEDICAID / Plan: [id number] [place] [address] CCCP / Product Type: Managed Care Medicaid /       Prior Hospitalization: see medical history     Comorbidities: see intake summary   Prior Level of Function:   Medications: Verified on Patient Summary List          Patient / Family readiness to learn indicated by: asking questions, trying to perform skills and interest  Persons(s) to be included in education: patient (P)  Barriers to Learning/Limitations: None  Patient Self Reported Health Status: poor  Rehabilitation Potential: good  Previous Treatment/Compliance: none   PMHx/Surgical Hx: see intake summary; h.o. HIV and CA   Work Hx: On Disability due to chronic pain   Living Situation: Lives alone in an apartment, all one level   Barriers to progress: chronicity of sxs   Motivation: fair   Substance use: smoker   Cognition: A & O x 4   Onset Date: chronic    SUBJECTIVE  Mechanism of Injury: H.o. Bypass surgery in the R LE and stent placement in the L LE in [id number]. Pt reports he had weakness in the legs about a year before the surgery and then had some residual weakness following the surgeries. Pt states he started with PT in [place] last year and completed a month before he relocated to [facility]. He also had one HHPT session in 2020 following kidney removal.   It was recommended that he use a cane but he never got one.   Pt's chief complaint is pain/weakness in the LEs (R>L). Inability to stand >5-10 minutes and instability with walking marbin on uneven surfaces. Area of pain: George calves (R all the time, L only with standing)   Pain Descriptors:  Achiness in calves, burning in feet   Pain Level (0-10 scale)  At rest: 3 With activity: 9   Worst: 8-9   Least: 3  Things that worsen pain: standing   Things that ease pain: walking  Objective/Functional Measures including ROM/MMT:   Gait and Functional Mobility:  Transfers:   Bed mobility: indep with increase effort/time  Sit to/from Supine: indep with increase effort   Sit to/from stands: indep with increase effort/time; 5 Timed stands completed in 30s   Gait:   Assistive device: none   Gait speed: 1.67ft/s  TUG test: 26s  Stairs: reciprocal with railings, increase time   Balance:   Standing feet together: 10s   1/2 tandem: 6s  Tandem stance: unable   SLS: R unable, L 3s        MUSCLE STRENGTH  KEY       R  L  0 - No Contraction  Hip    flex  4-  4-  1 - Trace            ext  2+  3-  2 - Poor            abd  4-  3-  3 - Fair             add  2+  4  4 - Good   Knee flex  4+  4  5 - Normal            ext  4  4+      Ankle DF  3-  3-                PF  4  4                INV  4  4+                EV  3+  4  Additional comments: tightness noted with hip flexion, hamstring stretch and hip ER (L>R). Extensor lag with SLR   Ampac Score:   60.74%  ASSESSMENT/Changes in Function:   Pt referred to PT services with diagnoses of loss of balance and gait instability. Pt presents with decrease LE flexibility, strength, balance and gait deviations. Increase fall risk indicated. Pt will benefit from skilled PT services to assist in fall prevention and increase activity tolerance.     Problem List/Impairments: pain affecting function, decrease ROM, decrease strength, impaired gait/ balance, decrease activity tolerance and decrease flexibility/ joint mobility  Treatment Plan may include any combination of the following: Therapeutic exercise, Neuromuscular re-education, Physical agent/modality, Gait/balance training, Manual therapy, Patient education, Functional mobility training and Stair training  Patient/ Caregiver education and instruction: exercises  Frequency / Duration: Patient to be seen 2 times per week for 16 treatments. Certification Period: 9/17/21 - 12/17/21  Patient Goal (s): improve LE strength and balance.     [] Met [] Not met [] Partially met   Short Term Goals: To be accomplished in 8 treatments. 1.  Indep with a HEP to assist in rehab progression. [] Met [] Not met [] Partially met    2.  1/2 - 1 grade improvement in LE strength to improve stability. [] Met [] Not met [] Partially met   3. 5-10 second tandem stance to reduce risk for falls. [] Met [] Not met [] Partially met    Long Term Goals: To be accomplished in 16 treatments. 1.  Pt will be able to stand >10 minutes without significant LE pain/weakness. [] Met [] Not met [] Partially met    2. TUG score </= 20 seconds to reduce risk for falls. [] Met [] Not met [] Partially met    3. 10-20% improvement in AM-PAC score for improved functional mobility. TODAY'S TREATMENT:  Visit #: 1  In time:10:40am  Out time:11:30am Total Treatment Time (min): 50   Pain Level (0-10 scale) pre treatment: 3  Pain Level (0-10 scale) post treatment: 3  With   [x] TE   [] TA   [] Neuro   [] SC   [] other: Patient Education: [x] Review HEP  Initiated with handout   [] Progressed/Changed HEP based on:   [] positioning   [] body mechanics   [] transfers   [] heat/ice application    [x] other: POC discussed        [x]  Plan of care has been reviewed with PTA. The Plan of Care is based on information from the initial evaluation. Yenni Gonzalez, PT, DPT 9/17/2021   ________________________________________________________________________    I certify that the above Therapy Services are being furnished while the patient is under my care.  I agree with the treatment plan and certify that this therapy is necessary.     [de-identified] Signature:_________________________________________________  Date:____________Time: ____________     Trevon PRIYA Chau

## 2021-09-22 LAB
BUN SERPL-MCNC: 22 MG/DL (ref 6–24)
BUN/CREAT SERPL: 15 (ref 9–20)
CALCIUM SERPL-MCNC: 9.8 MG/DL (ref 8.7–10.2)
CHLORIDE SERPL-SCNC: 103 MMOL/L (ref 96–106)
CO2 SERPL-SCNC: 18 MMOL/L (ref 20–29)
CREAT SERPL-MCNC: 1.47 MG/DL (ref 0.76–1.27)
GLUCOSE SERPL-MCNC: 160 MG/DL (ref 65–99)
POTASSIUM SERPL-SCNC: 4.5 MMOL/L (ref 3.5–5.2)
SODIUM SERPL-SCNC: 138 MMOL/L (ref 134–144)

## 2021-10-04 NOTE — ANESTHESIA POSTPROCEDURE EVALUATION
Procedure(s):  LAPAROSCOPIC LEFT RADICAL NEPHRECTOMY.     general    Anesthesia Post Evaluation      Multimodal analgesia: multimodal analgesia not used between 6 hours prior to anesthesia start to PACU discharge  Patient location during evaluation: PACU  Patient participation: complete - patient participated  Level of consciousness: awake and alert  Pain score: 1  Pain management: adequate  Airway patency: patent  Anesthetic complications: no  Cardiovascular status: acceptable, blood pressure returned to baseline and hemodynamically stable  Respiratory status: acceptable, nonlabored ventilation, spontaneous ventilation, room air and unassisted  Hydration status: acceptable  Post anesthesia nausea and vomiting:  none  Final Post Anesthesia Temperature Assessment:  Normothermia (36.0-37.5 degrees C)      INITIAL Post-op Vital signs:   Vitals Value Taken Time   /77 06/09/21 2130   Temp 36.2 °C (97.2 °F) 06/09/21 2130   Pulse 78 06/09/21 2145   Resp 13 06/09/21 2130   SpO2 97 % 06/09/21 2130

## 2021-10-25 ENCOUNTER — TELEPHONE (OUTPATIENT)
Dept: UROLOGY | Age: 56
End: 2021-10-25

## 2021-10-25 NOTE — TELEPHONE ENCOUNTER
PT wanted to know if he needed an appt with  after his last visit he had with corinne and per corinne pt needs to see Dr. Cinthya Lindsay in December for a new patient appt for F/U after nephrectomy

## 2021-11-08 ENCOUNTER — OFFICE VISIT (OUTPATIENT)
Dept: ENDOCRINOLOGY | Age: 56
End: 2021-11-08
Payer: COMMERCIAL

## 2021-11-08 VITALS
WEIGHT: 272.2 LBS | DIASTOLIC BLOOD PRESSURE: 72 MMHG | RESPIRATION RATE: 18 BRPM | BODY MASS INDEX: 36.08 KG/M2 | SYSTOLIC BLOOD PRESSURE: 120 MMHG | OXYGEN SATURATION: 96 % | TEMPERATURE: 97.5 F | HEIGHT: 73 IN | HEART RATE: 83 BPM

## 2021-11-08 DIAGNOSIS — E11.65 UNCONTROLLED TYPE 2 DIABETES MELLITUS WITH HYPERGLYCEMIA (HCC): Primary | ICD-10-CM

## 2021-11-08 DIAGNOSIS — E78.2 MIXED HYPERLIPIDEMIA: ICD-10-CM

## 2021-11-08 DIAGNOSIS — I10 ESSENTIAL HYPERTENSION: ICD-10-CM

## 2021-11-08 PROCEDURE — 99214 OFFICE O/P EST MOD 30 MIN: CPT | Performed by: INTERNAL MEDICINE

## 2021-11-08 PROCEDURE — 3052F HG A1C>EQUAL 8.0%<EQUAL 9.0%: CPT | Performed by: INTERNAL MEDICINE

## 2021-11-08 RX ORDER — INSULIN ASPART 100 [IU]/ML
INJECTION, SOLUTION INTRAVENOUS; SUBCUTANEOUS
Qty: 15 ML | Refills: 6 | Status: SHIPPED | OUTPATIENT
Start: 2021-11-08 | End: 2022-02-28

## 2021-11-08 RX ORDER — SIMETHICONE 180 MG
1 CAPSULE ORAL
COMMUNITY
Start: 2021-10-28

## 2021-11-08 RX ORDER — SUCRALFATE 1 G/1
1 TABLET ORAL AS NEEDED
COMMUNITY
Start: 2021-10-28

## 2021-11-08 RX ORDER — INSULIN GLARGINE 100 [IU]/ML
50 INJECTION, SOLUTION SUBCUTANEOUS
Qty: 15 ML | Refills: 6 | Status: SHIPPED | OUTPATIENT
Start: 2021-11-08 | End: 2022-06-07 | Stop reason: SDUPTHER

## 2021-11-08 RX ORDER — METFORMIN HYDROCHLORIDE 500 MG/1
1000 TABLET ORAL 2 TIMES DAILY WITH MEALS
Qty: 360 TABLET | Refills: 2 | Status: SHIPPED | OUTPATIENT
Start: 2021-11-08 | End: 2022-02-28 | Stop reason: SDUPTHER

## 2021-11-08 RX ORDER — BLOOD SUGAR DIAGNOSTIC
STRIP MISCELLANEOUS
Qty: 150 STRIP | Refills: 5 | Status: SHIPPED | OUTPATIENT
Start: 2021-11-08 | End: 2022-04-07

## 2021-11-08 NOTE — PATIENT INSTRUCTIONS
SPECIFIC INSTRUCTIONS BELOW       DRINK water     Do not skip meals  Do not eat in between meals    Reduce carbs- pasta, rice, potatoes, bread   Try to avoid processed bread products like BISCUITS, CROISSANTS, MUFFINS    Do not drink juices or sodas, even if they are calorie zero or diet drinks and especially avoid using powders like crystalloids , JULIETTE-AIDS     Do not eat peanut butter     Do not eat sugar free cookies and cakes   Do not eat peaches, oranges, pineapples, raisins, grapes , canteloupe , honey dew, mangoes , watermelon  and fruit medleys      victoza at 1.2  Mg a day         Check blood sugars before each meal and at bed time     STAY ON LANTUS  50 UNITS AT NIGHT      Novolog  5  units before meals  ( stop prandin )    Take additional Novolog   as follows with meals:    150-200 mg 1 units    201-250 mg 3 units    251-300 mg 5 units    301-350 mg 7 units    351-400 mg 9 units    401-450 mg 11 units    451-500 mg 13 units      Less than  70  - no insulin         -------------PAY ATTENTION TO THESE GENERAL INSTRUCTIONS -----------------      - The medications prescribed at this visit will not be available at pharmacy until 6 pm       - YOUR MED LIST IS NOT UP TO DATE AS SOME CHANGES ARE BEING MADE AFTER THE VISIT - FOLLOW SPECIFIC INSTRUCTIONS  ABOVE     -ANY tests other than blood work, which you opt to do  outside the  [facility] imaging facilities, you are responsible for prior authorizations if  required    - [address] UP TO DATE ON YOUR AVS- PLEASE IGNORE     Results     *Normal results will not be notified by a phone call starting January 1 2021   *If you have an upcoming visit, the results will be discussed at the visit   *Please sign up for MY CHART if you want access to your lab and test results  *Abnormal results which require immediate attention will be notified by phone call   *Abnormal results which do not require immediate assistance will be notified in 1-2 weeks       Refills    -    have your pharmacy send us a refill request . Refills are done max for one year and a visit is a must before refills are extended    Follow up appointments -  highly encourage you to make it when you are checking out. We can accommodate you into the schedule based on your clinical situation, but not for extending refills beyond a year. Labs are important to give refills and is important to get labs before the visit     Phone calls  -  Allow  24 hrs.  for non-urgent calls to be returned  Prior authorization - It may take 2-4 weeks to process  Forms  -  FMLA, DMV etc., will take up to 2 weeks to process  Cancellations - please notify the office 2 days in advance   Samples  - will only be dispensed at visits       If not showing for the appointments and cancelling appointments within 24 hours are kept track of and three  of such situations in  two consecutive years will likely be considered for termination from the practice    -------------------------------------------------------------------------------------------------------------------

## 2021-11-08 NOTE — PROGRESS NOTES
Identified pt with two pt identifiers(name and ). Reviewed record in preparation for visit and have obtained necessary documentation. Chief Complaint   Patient presents with    Diabetes        Health Maintenance Due   Topic    Hepatitis C Screening     Foot Exam Q1     Eye Exam Retinal or Dilated     Colorectal Cancer Screening Combo     Shingrix Vaccine Age 50> (1 of 2)    DTaP/Tdap/Td series (1 - Tdap)    Flu Vaccine (1)    COVID-19 Vaccine (2 - Pfizer risk 3-dose series)        Visit Vitals  /72 (BP 1 Location: Left upper arm, BP Patient Position: Sitting, BP Cuff Size: Large adult)   Pulse 83   Temp 97.5 °F (36.4 °C) (Temporal)   Resp 18   Ht 6' 1\" (1.854 m)   Wt 272 lb 3.2 oz (123.5 kg)   SpO2 96%   BMI 35.91 kg/m²     Pain Scale: 0 - No pain/10    Coordination of Care Questionnaire:  :   1. Have you been to the ER, urgent care clinic since your last visit? Hospitalized since your last visit? No    2. Have you seen or consulted any other health care providers outside of the [address] [place] [address] since your last visit? Include any pap smears or colon screening.  No

## 2021-11-08 NOTE — LETTER
11/8/2021    Patient: Tiffanie Conner   YOB: 1965   Date of Visit: 11/8/2021     Zaheer Gonzalez NP  [address] [address]  Via Fax: [phone]    Dear Zaheer Gonzalez NP,      Thank you for referring Mr. Yen Cervantes to [id number] [place] [address] for evaluation. My notes for this consultation are attached. If you have questions, please do not hesitate to call me. I look forward to following your patient along with you.       Sincerely,    Laila Patel MD

## 2021-11-08 NOTE — PROGRESS NOTES
[facility]  Dallas Spencer MD, FACE    Kash Blackburn is a 60 y.o. male presents today       Patient here for  First follow up  Visit after   initial visit of Type 2 diabetes mellitus from August 2021      a good gap   He is now checking sugars , But forgot the meter         August 2021     S/p left nephrectomy  For  RCC -   He says it is metastatic to bones  he has high sugars still on the lot       May 2021      Referred : by  [facility]   H/o diabetes for 15  years   Current A1C is 11.6 %    From feb 2021  and symptoms/problems include none   Pt moved to [address] [facility]  From [place]   In oct 2020   He stayed without meds for good 10 months , HE HAD FEW MEDS REFILLED on his visit in feb 2021   Per their notes, he used to be on actos 45 and nesina 25 mg  Current diabetic medications include  Metformin, bydureon, lantus 50 units . bydureon was started 2 months ago- not being covered   Current monitoring regimen: home blood tests - NOT INTERESTED          Review of Systems   None       Physical Exam   Constitutional: She is oriented to person, place, and time. She appears well-developed and well-nourished. Psychiatric: She has a normal mood and affect. Vitals:    11/08/21 1134   BP: 120/72   BP 1 Location: Left upper arm   BP Patient Position: Sitting   BP Cuff Size: Large adult   Pulse: 83   Temp: 97.5 °F (36.4 °C)   TempSrc: Temporal   Resp: 18   Height: 6' 1\" (1.854 m)   Weight: 272 lb 3.2 oz (123.5 kg)   SpO2: 96%      Review of Systems   None       Physical Exam   Constitutional: oriented to person, place, and time. appears well-developed and well-nourished. Psychiatric: She has a normal mood and affect.           Lab Results   Component Value Date/Time    Hemoglobin A1c 8.6 (H) 11/04/2021 12:00 AM    Hemoglobin A1c 9.6 (H) 09/08/2021 10:47 AM    Hemoglobin A1c 9.2 (H) 07/27/2021 12:00 AM    Glucose 102 (H) 11/04/2021 12:00 AM    Glucose (POC) 145 (H) 06/17/2021 12:35 PM Microalb/Creat ratio (ug/mg creat.) 23 11/04/2021 12:00 AM    LDL, calculated 81 11/04/2021 12:00 AM    LDL, calculated 71 05/03/2021 10:05 AM    Creatinine 1.62 (H) 11/04/2021 12:00 AM      Lab Results   Component Value Date/Time    Cholesterol, total 139 11/04/2021 12:00 AM    HDL Cholesterol 35 (L) 11/04/2021 12:00 AM    LDL, calculated 81 11/04/2021 12:00 AM    LDL, calculated 71 05/03/2021 10:05 AM    Triglyceride 130 11/04/2021 12:00 AM    CHOL/HDL Ratio 3.3 05/03/2021 10:05 AM     Lab Results   Component Value Date/Time    ALT (SGPT) 25 11/04/2021 12:00 AM    Alk. phosphatase 105 11/04/2021 12:00 AM    Bilirubin, total 0.4 11/04/2021 12:00 AM    Albumin 4.3 11/04/2021 12:00 AM    Protein, total 7.1 11/04/2021 12:00 AM    INR 0.9 05/17/2021 10:06 AM    Prothrombin time 12.3 05/17/2021 10:06 AM    PLATELET 420 09/33/8790 06:31 AM     Lab Results   Component Value Date/Time    GFR est non-AA 46 (L) 11/04/2021 12:00 AM    GFR est AA 53 (L) 11/04/2021 12:00 AM    Creatinine 1.62 (H) 11/04/2021 12:00 AM    BUN 16 11/04/2021 12:00 AM    Sodium 140 11/04/2021 12:00 AM    Potassium 4.5 11/04/2021 12:00 AM    Chloride 104 11/04/2021 12:00 AM    CO2 21 11/04/2021 12:00 AM    Magnesium 1.7 06/07/2021 02:27 AM    Phosphorus 2.6 06/16/2021 03:26 AM             ASSESSMENT AND  PLAN     1.  Type 2 DM uncontrolled :  a1c is   8.6 %     From   Nov 2021   Compared to  9.6 %   From  Sept 2021   Compared to    9.2 %       From July 2021     Compared to   10.2 %    From  May 2021  by POC    Compared to 11.6 %    From feb 2021 November 2021     Continue on basal bolus regimen   He is to check sugars 4 times a day   He is now off chemotherapy   Will start on metformin , inj incretins         August 2021  Some improvement   Discouraged use of inj incretins or metformin because of nausea likely from chemo agents   Suggesting starting on basal bolus regimen which could help if chemo and steroids are started in future   He Is agreeable and trained him on this   He is given printed insulin instructions         May 2021     Not in control as he had not gotten med refills for 6 plus months .moved here during [place] times   Discussed patho-physiology of diabetes   Stay on lantus , Will try trulicity   Start on prandin and he will be on metformin       2. Hypoglycemia :  Educated on treating the hypoglycemia. 3. HTN : continue current care. 4.  Dyslipidemia : continue current meds. 5. On Trental  And  plavix     6. Diabetic complications :     A. Retinopathy-YES   educated on this complication,  regular f/u with ophthalmologist encouraged    B. Nephropathy - yes, proteinuria noticeable          C. Peripheral Neuropathy - NO  ,       7. High Hematocrit , and  High hgb  :  Rule out sleep apnea , copd,       8. HSV 2 positive - saw ID according to [facility] notes       9.  Left sided nephrectomy for renal cancerous mass June 2021       Reviewed results with patient and discussed the labs being ordered today/bnv  Patient voiced understanding of plan of care

## 2021-11-09 ENCOUNTER — OFFICE VISIT (OUTPATIENT)
Dept: UROLOGY | Age: 56
End: 2021-11-09
Payer: COMMERCIAL

## 2021-11-09 VITALS
TEMPERATURE: 97.8 F | HEIGHT: 73 IN | DIASTOLIC BLOOD PRESSURE: 72 MMHG | HEART RATE: 82 BPM | SYSTOLIC BLOOD PRESSURE: 111 MMHG | RESPIRATION RATE: 22 BRPM | BODY MASS INDEX: 36.05 KG/M2 | OXYGEN SATURATION: 98 % | WEIGHT: 272 LBS

## 2021-11-09 DIAGNOSIS — Z90.5 SOLITARY KIDNEY, ACQUIRED: ICD-10-CM

## 2021-11-09 DIAGNOSIS — N41.1 PROSTATITIS, CHRONIC: ICD-10-CM

## 2021-11-09 DIAGNOSIS — N50.82 SCROTAL PAIN: ICD-10-CM

## 2021-11-09 DIAGNOSIS — C64.2 CLEAR CELL CARCINOMA OF LEFT KIDNEY (HCC): Primary | ICD-10-CM

## 2021-11-09 DIAGNOSIS — L91.8 CUTANEOUS SKIN TAGS: ICD-10-CM

## 2021-11-09 PROCEDURE — 99214 OFFICE O/P EST MOD 30 MIN: CPT | Performed by: UROLOGY

## 2021-11-09 NOTE — PROGRESS NOTES
HISTORY OF PRESENT ILLNESS  Donnie Mahan is a 60 y.o. male. Chief Complaint   Patient presents with    New Patient    Post OP Follow Up    Renal Cell Carcinoma     He is here in follow-up in regards to his clear-cell renal cell carcinoma. He is status post left nephrectomy on 6/9/2021. Pathology showed extensive renal sinus extension, pT3a. PET scan 8/16/2021 showed mild asymmetric uptake in the left pharyngeal tonsil and a single left cervical lymph node. It was nonspecific, possibly reactive but could not exclude neoplasm. Creatinine on 10/11/2021 was 1.63. He is also followed by [facility]. The most recent documentation from them suggest adjuvant therapy with Sutent. That was pending a work-up for acute pancreatitis. He was seen at [facility] again last month and his therapy was held pending an endoscopy scheduled for this month. He thinks he would like to have a second opinion. He is not sure he understands the I commendations. ID also followed by infectious disease for his HIV status. He has some skin tags on his left thigh/groin, also his chest, face and armpit. He thinks they are growing in size. He seems to want to have them removed. He also has chronic left testicular/scrotal pain. Ultrasound done while inpatient did show a small left hydrocele, about the size of a grape. He notes that it is uncomfortable at times. Chronic Conditions Addressed Today     1. Clear cell renal cell carcinoma (HCC) - Primary     Overview      He is s/p LEFT nephrectomy on 6/9/21 for acute bleeding. 7.4 cm clear cell renal cell carcinoma. Completely excised. Extensive renal sinus extension. No abnormality of left adrenal gland. PT3a. PET scan 8/16/21 with mild asymmetric uptake in the left pharyngeal tonsil and a single left cervical lymph node. Nonspecific. Possibly reactive, but neoplasm not excluded. Otherwise, no evidence of FDG avid latency/metastasis.     He is being seen at [facility], as recent as 9/2021. That documentation supports adjuvant therapy with Sutent. This was not started at that visit date secondary to potential active pancreatitis. He was pending further evaluation with Dr. Ji Denny. 2. Genital warts     Overview      6/8/2021: Chronic, mild. Skin tags to LLE (thigh/groin)- no consistent with condyloma. Given RX for imiquimod 5%: apply 1 Packet to affected area every Monday, Wednesday, Friday for 42 days on 6/17/21.         3. Acute cystitis with hematuria     Overview      Urine culture 6/8/21 with klebsiella. Treated with Rocephin/Azithromycin inpatient. 8/4/21: He is here today in follow-up. He saw oncology and nephrology who he reports told him he had a urinary tract infection. He has been on Cipro x 5 days. UA dip today is clear. 4. Prostatitis, chronic     Overview      8/4/21: He may have an element of prostatitis. He does not report any overly bothersome irritative voiding symptoms. He can have some frequency from time to time. He also reports a lot of stress regarding his health status and recent diagnoses. Patient denies the symptoms of COVID-19 per routine screening guidelines. Review of Systems   Constitutional: Negative for chills and fever. Respiratory: Negative for sputum production and shortness of breath. Cardiovascular: Negative for chest pain and palpitations. Gastrointestinal: Negative for nausea and vomiting. Genitourinary: Negative for dysuria, frequency, hematuria and urgency. Skin: Negative for rash. Multiple skin tags chest, armpit, thighs   All other systems reviewed and are negative. Past Medical History:  PMHx (including negatives):  has a past medical history of Burning with urination, CAD (coronary artery disease), Cancer ([facility] Diabetes ([facility] Herpes, HIV (human immunodeficiency virus infection) ([facility] Hypercholesterolemia, Hypertension, and PVD (peripheral vascular disease) ([facility] PSurgHx:  has a past surgical history that includes pr cardiac surg procedure unlist; pr cardiac surg procedure unlist; hx nephrectomy (06/09/2021); hx urological (06/09/2021); and hx heent. PSocHx:  reports that he has been smoking cigarettes. He has a 11.25 pack-year smoking history. He has never used smokeless tobacco. He reports previous alcohol use. He reports previous drug use. Drug: Marijuana. Physical Exam    ASSESSMENT and PLAN  Diagnoses and all orders for this visit:    1. Clear cell carcinoma of left kidney [facility])  Assessment & Plan:  He has initiated another opinion from [facility]. PET from 8/16/21 with tonsil and cervical lymph node activity. He was not symptomatic. More consistent with tonsillitis than renal cancer. He will wait for his [facility] consultation to discuss further imaging. Orders:  -     REFERRAL TO ONCOLOGY    2. Prostatitis, chronic  Comments:  no recurrent symptoms. Anxious and vigilant about his health. I advised he stay hydrated. 3. Scrotal pain  Assessment & Plan:  Minimal hydrocele      4. Solitary kidney, acquired  Comments:  he follows with nephrology  Assessment & Plan:  He is still seeing Dr. Elier Mtz. 5. Cutaneous skin tags  Assessment & Plan:  He [facility] to see [facility].   I recommend a dermatology evaluation                 Radha Lynch MD

## 2021-11-09 NOTE — LETTER
11/9/2021    Patient: Christopher Hsu   YOB: 1965   Date of Visit: 11/9/2021     Paris Cohen NP  [address] [facility] [address]  Via Fax: [phone]    Dear Paris Cohen NP,      Thank you for referring Mr. Gurvinder Valencia to [place] [address] for evaluation. My notes for this consultation are attached. If you have questions, please do not hesitate to call me. I look forward to following your patient along with you.       Sincerely,    Danilo Cummins MD

## 2021-11-09 NOTE — ASSESSMENT & PLAN NOTE
He has initiated another opinion from [facility]. PET from 8/16/21 with tonsil and cervical lymph node activity. He was not symptomatic. More consistent with tonsillitis than renal cancer. He will wait for his [facility] consultation to discuss further imaging.

## 2021-11-09 NOTE — PROGRESS NOTES
1. Have you been to the ER, urgent care clinic since your last visit? Hospitalized since your last visit? No    2. Have you seen or consulted any other health care providers outside of the [address] [place] [address] since your last visit? Include any pap smears or colon screening.  PCP   Chief Complaint   Patient presents with    New Patient    Post OP Follow Up    Renal Cell Carcinoma     Visit Vitals  /72 (BP 1 Location: Left arm, BP Patient Position: Sitting, BP Cuff Size: Adult long)   Pulse 82   Temp 97.8 °F (36.6 °C) (Temporal)   Resp 22   Ht 6' 1\" (1.854 m)   Wt 272 lb (123.4 kg)   SpO2 98%   BMI 35.89 kg/m²

## 2021-11-11 ENCOUNTER — HOSPITAL ENCOUNTER (OUTPATIENT)
Dept: PHYSICAL THERAPY | Age: 56
Discharge: HOME | End: 2021-11-11
Payer: COMMERCIAL

## 2021-11-11 PROCEDURE — 97110 THERAPEUTIC EXERCISES: CPT

## 2021-11-11 NOTE — PROGRESS NOTES
[address], [address] [place] [address]  Ph: [phone]    Fax: [phone]  Therapy Progress Report  Name: Germán Osman  : 1965   MD: Teodoro Hess NP     Medical Diagnosis: Other abnormalities of gait and mobility [R26.89]  Unsteadiness on feet [R26.81]  Start of Care: 21    Visits from Start of Care: 2    Missed Visits: (Patient was out of town)   Certification Period: 21 - 21    Frequency/Duration:2  times a week for 16 treatments     Summary of Care/Goals:  Patient return to  [facility] [place] almost 2 month since his first evalaution, stated he was out of town and had some family bussiness to take care of. Patient did not get a change to do his exercises when he was away, stated his condition is still the same, after this bypass surgery and continues to report weakness in BLEs R>L and gait impairments. Patient continues to present with decreased ankle ROM, decreased strength and function since initial evaluation. We trial some ambulation with an AFO during today session and patient reports some improvement in his gait and we also discussed the benefit of using a cane for stability due to poor balance and being a fall risk. All goals are still appropriate at this time. Pt will [facility] increase activity tolerance and improve mobility. Patient will continue to benefit from skilled PT services to modify and progress therapeutic interventions, address functional mobility deficits, address ROM deficits, address strength deficits, analyze and address soft tissue restrictions, analyze and cue movement patterns, analyze and modify body mechanics/ergonomics and assess and modify postural abnormalities to attain remaining goals.         GOALS/Progress towards goals:  New goals starting assessment from  21      Short Term Goals: To be accomplished in 8 treatments.   1.  Indep with a HEP to assist in rehab progression. []?? Met [x]? ? Not met []? ? Partially met  11/11     2.  1/2 - 1 grade improvement in LE strength to improve stability. [x]? ? Met []? ? Not met []? ? Partially met 11/11     3. 5-10 second tandem stance to reduce risk for falls. [x]? ? Met []? ? Not met []? ? Partially met       Long Term Goals: To be accomplished in 16 treatments. 1.  Pt will be able to stand >10 minutes without significant LE pain/weakness. []?? Met []? ? Not met []? ? Partially met    2. TUG score </= 20 seconds to reduce risk for falls. []?? Met []? ? Not met []? ? Partially met    3. 10-20% improvement in AM-PAC score for improved functional         Reassessment 11/11  Gait and Functional Mobility:  Transfers:   Bed mobility: indep with increase effort/time -verbal cues required for log roll technique  Sit to/from Supine: short sit to supine independent supine to short sit with min A with trunk with increase effort   Sit to/from stands: indep with increase effort/time able to perform without UE Support     Gait: steppage gait pattern and slight R>L foot drop  Assistive device: none - But therapist recommending a cane.   Gait speed: 0.5 m/s     Interpretation:  · Less than 0.4 m/sec:                     Household ambulator  · 0.4 to 0.8 m/sec:                           Limited community ambulator  · 0.8 to 1.2 m/sec:                           [employer]   · 1.2 m/sec and above:                   Able to safely cross streets           TUG test: 26s - mod fall risk will benefit from a cane.      Timed Up and GO (TUG) Test     Description: Measure of function with correlates to balance and fall risk           Interpretation:  ? 10 seconds = normal     ? 20seconds = good mobility,can go out alone, mobile without gait aid     ? 30 seconds = problems, cannot go outside alone, requires gait aid     · A score of ? 14 seconds has been shown to indicate high risk of falls.         Age  Matched  Norms: Age in years Mean  in  seconds     60-69          7.9 +/-0.9     70-79 7.7 +/-2.3     80-89    No  device:  11.0 +/-2.2     With  device:  19.9 +/-6.4      No  device:  14.7 +/- 7.9     With  Device: 19.9 +/-2. 5            1. Felicia ALEXANDER. The Time Up &Go: A Test of Basic Functional Mobility for Frail Elderly Persons. [place] [id number];59(8): U5710082.     2. Nupur Samano A,Aura S,Tom CUNNINGHAM. Predicting the Probability for Falls in Community Dwelling Older Adults Using the Timed Up & Go Test.  Physical Therapy 2000; 80(9): J8401695.     3. Usha MM,Jeanette GL, Pepe Silva. Functional Performance in Community Living Older Adults. Journal of Geriatric Physical Therapy 2003;  26(3): 14--?22.  4. [address], Falls Screening and Referral Algorithm, [facility], [facility] Falls Prevention Consortium,June 2005        Stairs: reciprocal with railings, increase time      Balance:   Standing feet together: 16sec vs 10sec on eval    1/2 tandem: 6s both   Tandem stance: (R) 1 sec (L) 2 sec   SLS: R unable, L unable                                                                 MUSCLE STRENGTH  KEY                                                                             R                      L  0 - No Contraction                   Hip    flex                     4-                     4-  1 - Trace                                           ext                      2+                    3-  2 - Poor                                            abd                     4-                     3-  3 - Fair                                              add                     2+                    4     4 - Good                                  Knee flex                     4                      4  5 - Normal                                        ext                      4                      4                                                     Ankle DF                    3-                     3-                                                            GARCIA                     6                      3                                                            YAK                    4                      9+                                                            UT                     1+                    3        ANKLE                               AROM                         PROM                         MMT:    R L R L R L   Dorsiflexion (15)  5 5 8 10 -3 -3   Plantarflexion (50) 50  45     4 4   Inversion (35)  2 5     4 4+   Eversion (25) 10 5     3+ 4   Additional comments: Reports decreased sensation to light touch.         Five times sit to stand:  5 reps = 29 sec      Normative averages:  Clients younger than 61years old  £  10 seconds = Normal   Clients older than 61years old £ 14.2 seconds = Normal   Change of ³ 2.3 seconds shows a significant clinical improvement     Mohan FRANKEL. Reference values for the five repetition sit to stand test: a descriptive metaanalysis of data from elders. Percept Mot Skills 2006; 103(1):215-222.         Additional comments: tightness noted with hip flexion, hamstring stretch and hip ER (L>R).  Extensor lag with SLR        Ampac Score:  60.74% no change   Recommendations: resume therapy  [x]  Plan of care has been reviewed with PTA. Mechelle Lynch, PT, DPT 11/11/2021     ________________________________________________________________________     Please retain this original for your records.

## 2021-11-11 NOTE — PROGRESS NOTES
PT DAILY TREATMENT NOTE - [facility]     Patient Name: Clair Nascimento  SQAQ:  : 1965  [x]  Patient  Verified  Payor: /    Treatment Area: Other abnormalities of gait and mobility [R26.89]  Unsteadiness on feet [R26.81]   Next MD APPT:  In time: 1:02pm  Out time:1:48 pm  Total Treatment Time (min) 46  Total Timed Codes (min): 46  1:1 Treatment Time ( [address] only): 46   Visit #:   Visit count could not be calculated. Make sure you are using a visit which is associated with an episode. SUBJECTIVE  Pain Level (0-10 scale) pre treatment: 2/10 paraesthesia (has DM)  Pain Level (0-10 scale) post treatment: 1-2/10  Any medication changes, allergies to medications, adverse drug reactions, diagnosis change, or new procedure performed?:   [] No    [] Yes (see summary sheet for update)  Subjective functional status/changes:   [] No changes reported    Patient return to therapy after 2 months since his evaluation due to having some family issues and being our of town. Reports no change since we saw him reports mild pain in both feet and parasthesia and weakness on R>L LE. HE continues to report difficulty walking and since he was out of town so he did not get a change to do his exercises, stated he is afraid of falling and wants to be able to practice getting off the floor. Also stated that he feels likes he is not placing enough WB on the R leg because he scared he will fall. Patient has like a  \"Hernia like\" tissue growth  on his (L) flank stated he is planning to see MD about it and its been there for many years. OBJECTIVE      41 min Therapeutic Exercise:  [] See flow sheet :   Rationale: increase ROM, increase strength, improve coordination, improve balance and increase proprioception to improve the patients ability to ambulate with more stability and reduce fall risk.     5 min Gait Training:  _50x2__ feet with _none__ device on level surfaces with _csv__ level of assist and AFO   Rationale: increase ROM, increase strength, improve coordination, improve balance and increase proprioception  to improve the patients ability to ambulate with more stability       With   [x] TE   [] TA   [] Neuro   [] SC   [] other: Patient Education: [x] Review HEP    [] Progressed/Changed HEP based on:   [] positioning   [] body mechanics   [] transfers   [] Use of heat/ice    [] other:          Other Objective/Functional Measures: We reviewed his HEP    Trial ambulation with AFO on R foot    Reassessment 11/11  Gait and Functional Mobility:  Transfers:   Bed mobility: indep with increase effort/time -verbal cues required for log roll technique  Sit to/from Supine: short sit to supine independent supine to short sit with min A with trunk with increase effort   Sit to/from stands: indep with increase effort/time able to perform without UE Support    Gait: steppage gait pattern and slight R>L foot drop  Assistive device: none - But therapist recommending a cane. Gait speed: 0.5 m/s    Interpretation:   Less than 0.4 m/sec:   Household ambulator   0.4 to 0.8 m/sec:   Limited community ambulator   0.8 to 1.2 m/sec:   [employer]    1.2 m/sec and above:  Able to safely cross streets        TUG test: 26s - mod fall risk will benefit from a cane. Timed Up and GO (TUG) Test    Description: Measure of function with correlates to balance and fall risk        Interpretation:  ? 10 seconds = normal    ? 20seconds = good mobility,can go out alone, mobile without gait aid    ? 30 seconds = problems, cannot go outside alone, requires gait aid     A score of ? 14 seconds has been shown to indicate high risk of falls. Age  Matched  Norms: Age in years Mean  in  seconds    60-69       7.9 +/-0.9    70-79 7.7 +/-2.3    80-89   No  device:  11.0 +/-2.2    With  device:  19.9 +/-6.4     No  device:  14.7 +/- 7.9    With  Device: 19.9 +/-2.5         1. Ronald COLEY The Time Up &Go: A Test of Basic Functional Mobility for Frail Elderly Persons. Journal of [address] [place] [address]  1254;86(1): [id number].    [address] [facility] [address],Tom CUNNINGHAM. Predicting the Probability for Falls in Community Dwelling Older Adults Using the Timed Up & Go Test.  Physical Therapy 2000; 80(9): [id number].    3. Usha MM,Jeanette GL, April Hernandez. Functional Performance in Community Living Older Adults. Journal of Geriatric Physical Therapy 2003;  26(3): 14--?22.  4. [address], Falls Screening and Referral Algorithm, TUG, [address] [facility] Falls Prevention Consortium,June 2005      Stairs: reciprocal with railings, increase time     Balance:   Standing feet together: 16sec vs 10sec on eval    1/2 tandem: 6s both   Tandem stance: (R) 1 sec (L) 2 sec   SLS: R unable, L unable                                                                MUSCLE STRENGTH  KEY                                                                             R                      L  0 - No Contraction                   Hip    flex                     4-                     4-  1 - Trace                                           ext                      2+                    3-  2 - Poor                                            abd                     4-                     3-  3 - Fair                                              add                     2+                    4    4 - Good                                  Knee flex                     4                      4  5 - Normal                                        ext                      4                      4                                                    Ankle DF                    3-                     3-                                                            PF                     4                      4                                                            INV                    4                      4+ EV                     3+                    4      ANKLE                               AROM                     PROM                     MMT:   R L R L R L   Dorsiflexion (15)  5 5 8 10 -3 -3   Plantarflexion (50) 50  45   4 4   Inversion (35)  2 5   4 4+   Eversion (25) 10 5   3+ 4   Additional comments: Reports decreased sensation to light touch. Five times sit to stand:  5 reps = 29 sec     Normative averages:  Clients younger than 61years old  £  10 seconds = Normal   Clients older than 61years old £ 14.2 seconds = Normal   Change of ³ 2.3 seconds shows a significant clinical improvement    Mohan FRANKEL. Reference values for the five repetition sit to stand test: a descriptive metaanalysis of data from elders. Percept Mot Skills 2006; 103(1):215-222. Additional comments: tightness noted with hip flexion, hamstring stretch and hip ER (L>R). Extensor lag with SLR     Ampac Score:   60.74% ( no change)       ASSESSMENT/Changes in Function:   Patient return to [facility] almost 2 month since his first evalaution, stated he was out of town and had some family bussiness to take care of. Patient did not get a change to do his exercises when he was away, stated his condition is still the same, after this bypass surgery and continues to report weakness in BLEs R>L and gait impairments. Patient continues to present with decreased ankle ROM, decreased strength and function since initial evaluation. We trial some ambulation with an AFO during today session and patient reports some improvement in his gait and we also discussed the benefit of using a cane for stability due to poor balance and being a fall risk. All goals are still appropriate at this time. Pt will benefit from continued PT services to a ssist in increase activity tolerance and improve mobility.    Patient will continue to benefit from skilled PT services to modify and progress therapeutic interventions, address functional mobility deficits, address ROM deficits, address strength deficits, analyze and address soft tissue restrictions, analyze and cue movement patterns, analyze and modify body mechanics/ergonomics and assess and modify postural abnormalities to attain remaining goals. GOALS/Progress towards goals:  New goals starting assessment from  11/11/21     Short Term Goals: To be accomplished in 8 treatments. 1.  Indep with a HEP to assist in rehab progression. []? Met [x]? Not met []? Partially met  11/11    2.  1/2 - 1 grade improvement in LE strength to improve stability. [x]? Met []? Not met []? Partially met 11/11    3. 5-10 second tandem stance to reduce risk for falls. [x]? Met []? Not met []? Partially met      Long Term Goals: To be accomplished in 16 treatments. 1.  Pt will be able to stand >10 minutes without significant LE pain/weakness. []? Met []? Not met []? Partially met    2. TUG score </= 20 seconds to reduce risk for falls. []? Met []? Not met []?  Partially met    3. 10-20% improvement in AM-PAC score for improved functional     PLAN  [x]  Upgrade activities as tolerated     [x]  Continue plan of care  []  Update interventions per flow sheet       []  Discharge due to:_  []  Other:_      Mechelle Lynch, PT, DPT 11/11/2021

## 2021-11-15 ENCOUNTER — HOSPITAL ENCOUNTER (OUTPATIENT)
Dept: PHYSICAL THERAPY | Age: 56
Discharge: HOME | End: 2021-11-15
Payer: COMMERCIAL

## 2021-11-15 PROCEDURE — 97110 THERAPEUTIC EXERCISES: CPT

## 2021-11-15 NOTE — PROGRESS NOTES
PT DAILY TREATMENT NOTE - [facility]     Patient Name: Meg Joseph  Date:11/15/2021  : 1965  [x]  Patient  Verified  Payor: [address] / Plan: [address] / Product Type: Managed Care Medicaid /    Treatment Area: Other abnormalities of gait and mobility [R26.89]  Unsteadiness on feet [R26.81]   Next MD APPT:  In time: 9:04am Out time:9:47am  Total Treatment Time (min) 40  Total Timed Codes (min): 40  1:1 Treatment Time [facility] only):40  Visit #: 3/16      SUBJECTIVE  Pain Level (0-10 scale) pre treatment: 0/10 Pain Level (0-10 scale) post treatment: /10  Any medication changes, allergies to medications, adverse drug reactions, diagnosis change, or new procedure performed?:   [x] No    [] Yes (see summary sheet for update)  Subjective functional status/changes:   [] No changes reported     No new complaints. Pt states he is doing the exercises every now and then. OBJECTIVE    40 min Therapeutic Exercise:  [x] See flow sheet :   Rationale: increase ROM, increase strength, improve coordination, improve balance and increase proprioception to improve the patients ability to ambulate with more stability and reduce fall risk. min Gait Training:  _50x2__ feet with _none__ device on level surfaces with _csv__ level of assist and AFO   Rationale: increase ROM, increase strength, improve coordination, improve balance and increase proprioception  to improve the patients ability to ambulate with more stability       With   [x] TE   [] TA   [] Neuro   [] SC   [] other: Patient Education: [x] Review HEP    [] Progressed/Changed HEP based on:   [] positioning   [] body mechanics   [] transfers   [] Use of heat/ice    [] other:          Other Objective/Functional Measures:   Worked on LE stretching and strengthening exercises. ASSESSMENT/Changes in Function:   Pt with significant tightness in hips noted with passive hip flexion.   Pt reporting some pain in the R groin and cramping in the L hip with this stretch. Pt responded well to other exercises. Some fatigue noted with standing exercises. +extensor lag with SLR. Will continue current POC. Patient will continue to benefit from skilled PT services to modify and progress therapeutic interventions, address functional mobility deficits, address ROM deficits, address strength deficits, analyze and address soft tissue restrictions, analyze and cue movement patterns, analyze and modify body mechanics/ergonomics and assess and modify postural abnormalities to attain remaining goals. GOALS/Progress towards goals:  New goals starting assessment from  11/11/21     Short Term Goals: To be accomplished in 8 treatments. 1.  Indep with a HEP to assist in rehab progression. []? Met [x]? Not met []? Partially met  11/11    2.  1/2 - 1 grade improvement in LE strength to improve stability. [x]? Met []? Not met []? Partially met 11/11    3. 5-10 second tandem stance to reduce risk for falls. [x]? Met []? Not met []? Partially met      Long Term Goals: To be accomplished in 16 treatments. 1.  Pt will be able to stand >10 minutes without significant LE pain/weakness. []? Met []? Not met []? Partially met    2. TUG score </= 20 seconds to reduce risk for falls. []? Met []? Not met []? Partially met    3. 10-20% improvement in AM-PAC score for improved functional     PLAN  [x]  Upgrade activities as tolerated     [x]  Continue plan of care  []  Update interventions per flow sheet       []  Discharge due to:_  [x]  Other: work on floor transfers.      Yenni Ramirez, PT, DPT 11/15/2021

## 2021-11-16 NOTE — ANESTHESIA POSTPROCEDURE EVALUATION
Procedure(s):  CYSTOSCOPY WITH LEFT RETROGRADE PYELOGRAM, CLOT EVACUATION, LEFT URETERAL STENT INSERTION  . .    general    <BSHSIANPOST>    INITIAL Post-op Vital signs:   Vitals Value Taken Time   /60 06/06/21 1054   Temp 36.3 °C (97.4 °F) 06/06/21 1042   Pulse 78 06/06/21 1054   Resp 16 06/06/21 1054   SpO2 100 % 06/06/21 1044

## 2021-11-18 ENCOUNTER — HOSPITAL ENCOUNTER (OUTPATIENT)
Dept: PHYSICAL THERAPY | Age: 56
Discharge: HOME | End: 2021-11-18
Payer: COMMERCIAL

## 2021-11-18 ENCOUNTER — TELEPHONE (OUTPATIENT)
Dept: SURGERY | Age: 56
End: 2021-11-18

## 2021-11-18 PROCEDURE — 97110 THERAPEUTIC EXERCISES: CPT

## 2021-11-18 NOTE — TELEPHONE ENCOUNTER
Patient called he need a prescription refill on blood thinner for Pentoxifylline 400 mg. The pharmacy will be [facility].

## 2021-11-18 NOTE — PROGRESS NOTES
PT DAILY TREATMENT NOTE - [facility]     Patient Name: Genny Arnett  Date:2021  : 1965  [x]  Patient  Verified  Payor: [address] / Plan: [address] / Product Type: Managed Care Medicaid /    Treatment Area: Other abnormalities of gait and mobility [R26.89]  Unsteadiness on feet [R26.81]   Next MD APPT:  In time: 10:00 am Out time:10:45 am  Total Treatment Time (min) 45 min  Total Timed Codes (min): 45 min  1:1 Treatment Time ([facility] only):45 min  Visit #:       SUBJECTIVE  Pain Level (0-10 scale) pre treatment: 0/10  Pain Level (0-10 scale) post treatment:  /10  Any medication changes, allergies to medications, adverse drug reactions, diagnosis change, or new procedure performed?:   [x] No    [] Yes (see summary sheet for update)  Subjective functional status/changes:   [] No changes reported      Pt stated he is just afraid of falling and not being able to get up. \"I do feel better about getting up off the floor . I just want to get stronger. \" Patient was given prescription for AFO to take to [facility] . OBJECTIVE    45 min Therapeutic Exercise:  [x] See flow sheet :   Rationale: increase ROM, increase strength, improve coordination, improve balance and increase proprioception to improve the patients ability to ambulate with more stability and reduce fall risk.      min Gait Training:  _50x2__ feet with _none__ device on level surfaces with _csv__ level of assist and AFO   Rationale: increase ROM, increase strength, improve coordination, improve balance and increase proprioception  to improve the patients ability to ambulate with more stability       With   [x] TE   [] TA   [] Neuro   [] SC   [] other: Patient Education: [x] Review HEP    [] Progressed/Changed HEP based on:   [] positioning   [] body mechanics   [] transfers   [] Use of heat/ice    [x] other: Gave HO on floor transfers         Other Objective/Functional Measures:   Worked on floor transfers using blue mat and mat table. Patient did not have any difficulty getting into kneeling or high kneeling. Does have some limitation with ROM to R wrist.    ASSESSMENT/Changes in Function:   Worked of floor transfers with patient due to his fear of not being able to get back up. He has limited ROM to R wrist but did not have any difficulty. He was also given a HO of the process that he is going to hang up on his wall. He was given prescription to give to [facility] to set up an appointment to be fitted for an AFO. Patient has a fear of falling but does not want to use any AD. He does fatigue easily but does recover quickly. Patient will continue to benefit from skilled PT services to modify and progress therapeutic interventions, address functional mobility deficits, address ROM deficits, address strength deficits, analyze and address soft tissue restrictions, analyze and cue movement patterns, analyze and modify body mechanics/ergonomics and assess and modify postural abnormalities to attain remaining goals. GOALS/Progress towards goals:  New goals starting assessment from  11/11/21     Short Term Goals: To be accomplished in 8 treatments. 1.  Indep with a HEP to assist in rehab progression. []? Met [x]? Not met []? Partially met  11/11    2.  1/2 - 1 grade improvement in LE strength to improve stability. [x]? Met []? Not met []? Partially met 11/11    3. 5-10 second tandem stance to reduce risk for falls. [x]? Met []? Not met []? Partially met      Long Term Goals: To be accomplished in 16 treatments. 1.  Pt will be able to stand >10 minutes without significant LE pain/weakness. []? Met []? Not met []? Partially met    2. TUG score </= 20 seconds to reduce risk for falls. []? Met []? Not met []?  Partially met    3. 10-20% improvement in AM-PAC score for improved functional     PLAN  [x]  Upgrade activities as tolerated     [x]  Continue plan of care  []  Update interventions per flow sheet       []  Discharge due to:_  [x]  Other:ask about appointment for KEVEN AMES [facility], PTA 11/18/2021

## 2021-11-19 RX ORDER — PENTOXIFYLLINE 400 MG/1
400 TABLET, EXTENDED RELEASE ORAL 2 TIMES DAILY
Qty: 60 TABLET | Refills: 3 | Status: SHIPPED | OUTPATIENT
Start: 2021-11-19 | End: 2022-05-28

## 2021-11-22 ENCOUNTER — HOSPITAL ENCOUNTER (OUTPATIENT)
Dept: PHYSICAL THERAPY | Age: 56
Discharge: HOME | End: 2021-11-22
Payer: COMMERCIAL

## 2021-11-22 PROCEDURE — 97110 THERAPEUTIC EXERCISES: CPT

## 2021-11-22 NOTE — PROGRESS NOTES
PT DAILY TREATMENT NOTE - [facility]     Patient Name: Syed Morales  Date:2021  : 1965  [x]  Patient  Verified  Payor: [address] / Plan: [address] / Product Type: Managed Care Medicaid /    Treatment Area: Other abnormalities of gait and mobility [R26.89]  Unsteadiness on feet [R26.81]   Next MD APPT:  In time:9:05am     Out time: 9:45am  Total Treatment Time (min) 40 min  Total Timed Codes (min): 40 min  1:1 Treatment Time ([facility] only):40 min  Visit #: 16      SUBJECTIVE  Pain Level (0-10 scale) pre treatment: 0/10  Pain Level (0-10 scale) post treatment: 0 /10  Any medication changes, allergies to medications, adverse drug reactions, diagnosis change, or new procedure performed?:   [x] No    [] Yes (see summary sheet for update)  Subjective functional status/changes:   [] No changes reported     No new complaints. Pt states he feels more comfortable getting up from the floor but he would like to be able to do it without using his hands to push himself up. Pt states he can stand a little longer now before the leg start to get weak/painful. No more than about 5 minutes. OBJECTIVE    40 min Therapeutic Exercise:  [x] See flow sheet :   Rationale: increase ROM, increase strength, improve coordination, improve balance and increase proprioception to improve the patients ability to ambulate with more stability and reduce fall risk.      min Gait Training:  _50x2__ feet with _none__ device on level surfaces with _csv__ level of assist and AFO   Rationale: increase ROM, increase strength, improve coordination, improve balance and increase proprioception  to improve the patients ability to ambulate with more stability       With   [x] TE   [] TA   [] Neuro   [] SC   [] other: Patient Education: [x] Review HEP    [] Progressed/Changed HEP based on:   [] positioning   [] body mechanics   [] transfers   [] Use of heat/ice    [x] other: Gave HO on floor transfers Other Objective/Functional Measures: Added step ups and progressed resistance on fitter. Also lowered height of seat during sit to stands. ASSESSMENT/Changes in Function:   Pt responded well to tx. He was able to advance exercises without much difficulty. Still very tight in the hips. Recommended he do his stretches daily to assist in flexibility. He did require min A during sit to stands with lower surface. Will continue current POC as tolerated. Patient will continue to benefit from skilled PT services to modify and progress therapeutic interventions, address functional mobility deficits, address ROM deficits, address strength deficits, analyze and address soft tissue restrictions, analyze and cue movement patterns, analyze and modify body mechanics/ergonomics and assess and modify postural abnormalities to attain remaining goals. GOALS/Progress towards goals:  New goals starting assessment from  11/11/21     Short Term Goals: To be accomplished in 8 treatments. 1.  Indep with a HEP to assist in rehab progression. []? Met [x]? Not met []? Partially met  11/11    2.  1/2 - 1 grade improvement in LE strength to improve stability. [x]? Met []? Not met []? Partially met 11/11    3. 5-10 second tandem stance to reduce risk for falls. [x]? Met []? Not met []? Partially met      Long Term Goals: To be accomplished in 16 treatments. 1.  Pt will be able to stand >10 minutes without significant LE pain/weakness. []? Met []? Not met []? Partially met    2. TUG score </= 20 seconds to reduce risk for falls. []? Met []? Not met []?  Partially met    3. 10-20% improvement in AM-PAC score for improved functional     PLAN  [x]  Upgrade activities as tolerated     [x]  Continue plan of care  []  Update interventions per flow sheet       []  Discharge due to:_  [x]  Other: appt with [facility] next week   [address], PT, DPT 11/22/2021

## 2021-11-24 ENCOUNTER — HOSPITAL ENCOUNTER (OUTPATIENT)
Dept: PHYSICAL THERAPY | Age: 56
Discharge: HOME | End: 2021-11-24
Payer: COMMERCIAL

## 2021-11-24 PROCEDURE — 97110 THERAPEUTIC EXERCISES: CPT

## 2021-11-24 NOTE — PROGRESS NOTES
PT DAILY TREATMENT NOTE - [facility]     Patient Name: Aroldo Farnsworth  Date:2021  : 1965  [x]  Patient  Verified  Payor: [address] / Plan: [address] / Product Type: Managed Care Medicaid /    Treatment Area: Other abnormalities of gait and mobility [R26.89]  Unsteadiness on feet [R26.81]   Next MD APPT:  In time:9:05am     Out time: 9:50pm  Total Treatment Time (min) 45  Total Timed Codes (min): 45  1:1 Treatment Time [facility] only):45  Visit #: 16      SUBJECTIVE  Pain Level (0-10 scale) pre treatment: 0/10  Pain Level (0-10 scale) post treatment: 0 /10  Any medication changes, allergies to medications, adverse drug reactions, diagnosis change, or new procedure performed?:   [x] No    [] Yes (see summary sheet for update)  Subjective functional status/changes:   [x] No changes reported     No new complaints. OBJECTIVE    45 min Therapeutic Exercise:  [x] See flow sheet :   Rationale: increase ROM, increase strength, improve coordination, improve balance and increase proprioception to improve the patients ability to ambulate with more stability and reduce fall risk. min Gait Training:  _50x2__ feet with _none__ device on level surfaces with _csv__ level of assist and AFO   Rationale: increase ROM, increase strength, improve coordination, improve balance and increase proprioception  to improve the patients ability to ambulate with more stability       With   [x] TE   [] TA   [] Neuro   [] SC   [] other: Patient Education: [x] Review HEP    [] Progressed/Changed HEP based on:   [] positioning   [] body mechanics   [] transfers   [] Use of heat/ice    [] other:         Other Objective/Functional Measures: Added standing resisted hip abd/ext, sidestepping. Added tandem stance and SLS for balance. ASSESSMENT/Changes in Function:   Pt responded well to tx as he was able to advance exercises without difficulty.   Pt was able to perform a few standing exercises back to back without needing a rest break. He did require UE support with tandem stance >10s and with SLS. Pt is progressing well at this time with no new complaints. Will continue to focus on LE strength/endurance and balance training. Patient will continue to benefit from skilled PT services to modify and progress therapeutic interventions, address functional mobility deficits, address ROM deficits, address strength deficits, analyze and address soft tissue restrictions, analyze and cue movement patterns, analyze and modify body mechanics/ergonomics and assess and modify postural abnormalities to attain remaining goals. GOALS/Progress towards goals:  New goals starting assessment from  11/11/21     Short Term Goals: To be accomplished in 8 treatments. 1.  Indep with a HEP to assist in rehab progression. []? Met [x]? Not met []? Partially met  11/11    2.  1/2 - 1 grade improvement in LE strength to improve stability. [x]? Met []? Not met []? Partially met 11/11    3. 5-10 second tandem stance to reduce risk for falls. [x]? Met []? Not met []? Partially met      Long Term Goals: To be accomplished in 16 treatments. 1.  Pt will be able to stand >10 minutes without significant LE pain/weakness. []? Met []? Not met []? Partially met    2. TUG score </= 20 seconds to reduce risk for falls. []? Met []? Not met []?  Partially met    3. 10-20% improvement in AM-PAC score for improved functional     PLAN  [x]  Upgrade activities as tolerated     [x]  Continue plan of care  []  Update interventions per flow sheet       []  Discharge due to:_  [x]  Other: appt with [facility] next week   [address], PT, DPT 11/24/2021

## 2021-11-29 ENCOUNTER — HOSPITAL ENCOUNTER (OUTPATIENT)
Dept: PHYSICAL THERAPY | Age: 56
Discharge: HOME | End: 2021-11-29
Payer: COMMERCIAL

## 2021-11-29 PROCEDURE — 97110 THERAPEUTIC EXERCISES: CPT

## 2021-11-29 NOTE — PROGRESS NOTES
PT DAILY TREATMENT NOTE - [facility]     Patient Name: Meg Joseph  Date:2021  : 1965  [x]  Patient  Verified  Payor: [address] / Plan: [address] / Product Type: Managed Care Medicaid /    Treatment Area: Other abnormalities of gait and mobility [R26.89]  Unsteadiness on feet [R26.81]   Next MD APPT:  In time:10:30am     Out time: 11:00 am  Total Treatment Time (min) : 30 min  Total Timed Codes (min): 30 min  1:1 Treatment Time ([facility] only):30 min  Visit #:      SUBJECTIVE  Pain Level (0-10 scale) pre treatment: 0/10  Pain Level (0-10 scale) post treatment: 0 /10  Any medication changes, allergies to medications, adverse drug reactions, diagnosis change, or new procedure performed?:   [x] No    [] Yes (see summary sheet for update)  Subjective functional status/changes:   [x] No changes reported     No new complaints. Patient had called and stated he was going to be 10 min late but ended up being 30 min late due to transportation. Treatment was modified today. Patient has an appointment with [facility] to be fitted for his AFO. OBJECTIVE    30  min Therapeutic Exercise:  [x] See flow sheet :   Rationale: increase ROM, increase strength, improve coordination, improve balance and increase proprioception to improve the patients ability to ambulate with more stability and reduce fall risk.      min Gait Training:  _50x2__ feet with _none__ device on level surfaces with _csv__ level of assist and AFO   Rationale: increase ROM, increase strength, improve coordination, improve balance and increase proprioception  to improve the patients ability to ambulate with more stability       With   [x] TE   [] TA   [] Neuro   [] SC   [] other: Patient Education: [x] Review HEP    [] Progressed/Changed HEP based on:   [] positioning   [] body mechanics   [] transfers   [] Use of heat/ice    [] other:         Other Objective/Functional Measures: Due to transportation, treatment was modified  Due to time. Added NK table today       ASSESSMENT/Changes in Function:   Due to transportation bringing patient 30 min late treatment session was modified today. Added Nk table today with no difficulty. Patient will also be fitted for AFO on 11/30/21. Will resume exercises for balance on next session. Patient will continue to benefit from skilled PT services to modify and progress therapeutic interventions, address functional mobility deficits, address ROM deficits, address strength deficits, analyze and address soft tissue restrictions, analyze and cue movement patterns, analyze and modify body mechanics/ergonomics and assess and modify postural abnormalities to attain remaining goals. GOALS/Progress towards goals:  New goals starting assessment from  11/11/21     Short Term Goals: To be accomplished in 8 treatments. 1.  Indep with a HEP to assist in rehab progression. []? Met [x]? Not met []? Partially met  11/11    2.  1/2 - 1 grade improvement in LE strength to improve stability. [x]? Met []? Not met []? Partially met 11/11    3. 5-10 second tandem stance to reduce risk for falls. [x]? Met []? Not met []? Partially met      Long Term Goals: To be accomplished in 16 treatments. 1.  Pt will be able to stand >10 minutes without significant LE pain/weakness. []? Met []? Not met []? Partially met    2. TUG score </= 20 seconds to reduce risk for falls. []? Met []? Not met []?  Partially met    3. 10-20% improvement in AM-PAC score for improved functional     PLAN  [x]  Upgrade activities as tolerated     [x]  Continue plan of care  []  Update interventions per flow sheet       []  Discharge due to:_  [x]  Other: appt with [facility] 11/30/21   Silvia Yi, PTA 11/29/2021

## 2021-12-01 ENCOUNTER — HOSPITAL ENCOUNTER (OUTPATIENT)
Dept: PHYSICAL THERAPY | Age: 56
Discharge: HOME | End: 2021-12-01
Payer: COMMERCIAL

## 2021-12-01 PROCEDURE — 97110 THERAPEUTIC EXERCISES: CPT

## 2021-12-01 NOTE — PROGRESS NOTES
PT DAILY TREATMENT NOTE - [facility]     Patient Name: Felicita Prieto  Date:2021  : 1965  [x]  Patient  Verified  Payor: [address] / Plan: [address] / Product Type: Managed Care Medicaid /    Treatment Area: Other abnormalities of gait and mobility [R26.89]  Unsteadiness on feet [R26.81]   Next MD APPT:  In time: 9:05am     Out time: 9:47am  Total Treatment Time (min) : 42  Total Timed Codes (min): 42  1:1 Treatment Time [facility] only):42  Visit #:      SUBJECTIVE  Pain Level (0-10 scale) pre treatment: 0/10  Pain Level (0-10 scale) post treatment: 0 /10  Any medication changes, allergies to medications, adverse drug reactions, diagnosis change, or new procedure performed?:   [x] No    [] Yes (see summary sheet for update)  Subjective functional status/changes:   [x] No changes reported     Pt states he got fitted for his brace yesterday and it should be ready by . No new complaints. Pt states he has been able to stand longer before he gets really tired, 5-6 minutes. Pt states he has increased his exercises at home to 20 reps now. OBJECTIVE    42 min Therapeutic Exercise:  [x] See flow sheet :   Rationale: increase ROM, increase strength, improve coordination, improve balance and increase proprioception to improve the patients ability to ambulate with more stability and reduce fall risk.      min Gait Training:  _50x2__ feet with _none__ device on level surfaces with _csv__ level of assist and AFO   Rationale: increase ROM, increase strength, improve coordination, improve balance and increase proprioception  to improve the patients ability to ambulate with more stability       With   [x] TE   [] TA   [] Neuro   [] SC   [] other: Patient Education: [x] Review HEP    [] Progressed/Changed HEP based on:   [] positioning   [] body mechanics   [] transfers   [] Use of heat/ice    [] other:         Other Objective/Functional Measures:  Continued to work on standing LE strengthening and balance. Few rest breaks required. ASSESSMENT/Changes in Function:   Pt reporting compliance with HEP. Noted less assist during sit to stand this session. Pt stating he is able to stand longer but still reporting 5-6 minutes max before resting. Will continue to progress exercises as tolerated. Patient will continue to benefit from skilled PT services to modify and progress therapeutic interventions, address functional mobility deficits, address ROM deficits, address strength deficits, analyze and address soft tissue restrictions, analyze and cue movement patterns, analyze and modify body mechanics/ergonomics and assess and modify postural abnormalities to attain remaining goals. GOALS/Progress towards goals:  New goals starting assessment from  11/11/21     Short Term Goals: To be accomplished in 8 treatments. 1.  Indep with a HEP to assist in rehab progression. [x]? Met []? Not met []? Partially met  11/30    2.  1/2 - 1 grade improvement in LE strength to improve stability. [x]? Met []? Not met []? Partially met 11/11    3. 5-10 second tandem stance to reduce risk for falls. [x]? Met []? Not met []? Partially met      Long Term Goals: To be accomplished in 16 treatments. 1.  Pt will be able to stand >10 minutes without significant LE pain/weakness. []? Met []? Not met []? Partially met    2. TUG score </= 20 seconds to reduce risk for falls. []? Met []? Not met []? Partially met    3. 10-20% improvement in AM-PAC score for improved functional mobility.  [] Met [] Not met [] Partially met      PLAN  [x]  Upgrade activities as tolerated     [x]  Continue plan of care  []  Update interventions per flow sheet       []  Discharge due to:_   [x]  Other:    Acquanetta Spatz, PT, DPT 12/1/2021

## 2021-12-07 ENCOUNTER — TELEPHONE (OUTPATIENT)
Dept: ONCOLOGY | Age: 56
End: 2021-12-07

## 2021-12-07 NOTE — TELEPHONE ENCOUNTER
Called and spoke with patient today. Told the patient they were being referred to us by [facility] to schedule an appt for oncology. Patient stated he did not want the appt and that he also informed [facility] the same.

## 2021-12-09 ENCOUNTER — HOSPITAL ENCOUNTER (OUTPATIENT)
Dept: PHYSICAL THERAPY | Age: 56
Discharge: HOME | End: 2021-12-09
Payer: COMMERCIAL

## 2021-12-09 PROCEDURE — 97110 THERAPEUTIC EXERCISES: CPT

## 2021-12-09 NOTE — PROGRESS NOTES
PT DAILY TREATMENT NOTE - [facility]     Patient Name: Lucho Patel  Date:2021  : 1965  [x]  Patient  Verified  [facility] [address] / Plan: [address] / Product Type: Managed Care Medicaid /    Treatment Area: Other abnormalities of gait and mobility [R26.89]  Unsteadiness on feet [R26.81]   Next MD APPT:  In time: 9:15am     Out time: 10:00am  Total Treatment Time (min) : 45 min  Total Timed Codes (min): 45 min  1:1 Treatment Time ([facility] only):45 min  Visit #:      SUBJECTIVE  Pain Level (0-10 scale) pre treatment: 0/10  Pain Level (0-10 scale) post treatment: 0 /10  Any medication changes, allergies to medications, adverse drug reactions, diagnosis change, or new procedure performed?:   [x] No    [] Yes (see summary sheet for update)  Subjective functional status/changes:   [x] No changes reported     Pt was given script for cane but stated he does not feel he needs it anymore. \"At first I thought I was going to fall. \" Patient stated his R calf just stays tight and drops . Patient stated brace should be ready by . He has noticed he can do more than he used to . Patient stated he got in the habit of ER his LE going down the stair. OBJECTIVE    45 min Therapeutic Exercise:  [x] See flow sheet :   Rationale: increase ROM, increase strength, improve coordination, improve balance and increase proprioception to improve the patients ability to ambulate with more stability and reduce fall risk.      min Gait Training:  _50x2__ feet with _none__ device on level surfaces with _csv__ level of assist and AFO   Rationale: increase ROM, increase strength, improve coordination, improve balance and increase proprioception  to improve the patients ability to ambulate with more stability       With   [x] TE   [] TA   [] Neuro   [] SC   [] other: Patient Education: [x] Review HEP    [] Progressed/Changed HEP based on:   [] positioning   [] body mechanics   [] transfers   [] Use of heat/ice    [] other:         Other Objective/Functional Measures:  Worked     ASSESSMENT/Changes in Function:   Pt reporting compliance with HEP. No assist during sit to stand this session. Pt able to go up and down the stairs using reciprocal pattern and 1-2 handrails. He has more difficulty coming down and ER both LE. He is getting his AFO next week. He was given script for a cane but does not feel he needs it now. He still has difficulty with tandem stance. Patient saw MD , who wants him to continue with therapy. Patient will continue to benefit from skilled PT services to modify and progress therapeutic interventions, address functional mobility deficits, address ROM deficits, address strength deficits, analyze and address soft tissue restrictions, analyze and cue movement patterns, analyze and modify body mechanics/ergonomics and assess and modify postural abnormalities to attain remaining goals. GOALS/Progress towards goals:  New goals starting assessment from  11/11/21     Short Term Goals: To be accomplished in 8 treatments. 1.  Indep with a HEP to assist in rehab progression. [x]? Met []? Not met []? Partially met  11/30    2.  1/2 - 1 grade improvement in LE strength to improve stability. [x]? Met []? Not met []? Partially met 11/11    3. 5-10 second tandem stance to reduce risk for falls. [x]? Met []? Not met []? Partially met      Long Term Goals: To be accomplished in 16 treatments. 1.  Pt will be able to stand >10 minutes without significant LE pain/weakness. []? Met []? Not met []? Partially met    2. TUG score </= 20 seconds to reduce risk for falls. []? Met []? Not met []? Partially met    3. 10-20% improvement in AM-PAC score for improved functional mobility.  [] Met [] Not met [] Partially met      PLAN  [x]  Upgrade activities as tolerated     [x]  Continue plan of care  []  Update interventions per flow sheet       []  Discharge due to:_   [x]  Other: REasses for new POC   Silvia Kassidy, PTA 12/9/2021

## 2021-12-10 ENCOUNTER — HOSPITAL ENCOUNTER (OUTPATIENT)
Dept: PHYSICAL THERAPY | Age: 56
Discharge: HOME | End: 2021-12-10
Payer: COMMERCIAL

## 2021-12-10 PROCEDURE — 97110 THERAPEUTIC EXERCISES: CPT

## 2021-12-10 NOTE — PROGRESS NOTES
PT DAILY TREATMENT NOTE - [facility]     Patient Name: Mike Lemons  Date:12/10/2021  : 1965  [x]  Patient  Verified  Payor: [address] / Plan: [address] / Product Type: Managed Care Medicaid /    Treatment Area: Other abnormalities of gait and mobility [R26.89]  Unsteadiness on feet [R26.81]   Next MD APPT:  In time: 9:03 am     Out time: 10:00am  Total Treatment Time (min) : 55 min  Total Timed Codes (min): 55 min  1:1 Treatment Time ( only):55 min  Visit #:10 /16      SUBJECTIVE  Pain Level (0-10 scale) pre treatment: 0/10  Pain Level (0-10 scale) post treatment: 0 /10  Any medication changes, allergies to medications, adverse drug reactions, diagnosis change, or new procedure performed?:   [x] No    [] Yes (see summary sheet for update)  Subjective functional status/changes:   [x] No changes reported    Stated he went to the doctor and he wants him to continue with therapy. Now that he has been coming more consistent he noted improvement in his balance and he is planning to obtain his AFO this coming . Reports feeling no need to walk with a cane anymore and he can do more then he used to and he does not has the sensation of falling like he used to. Overall he reports about 30% improvement he wish his (R) ankle will get back to normal and he wants to get better with going up down stairs without the need of pulling too much with his hands and walking longer distances. OBJECTIVE    55 min Therapeutic Exercise:  [x] See flow sheet :   Rationale: increase ROM, increase strength, improve coordination, improve balance and increase proprioception to improve the patients ability to ambulate with more stability and reduce fall risk.      min Gait Training:  _50x2__ feet with _none__ device on level surfaces with _csv__ level of assist and AFO   Rationale: increase ROM, increase strength, improve coordination, improve balance and increase proprioception  to improve the patients ability to ambulate with more stability       With   [x] TE   [] TA   [] Neuro   [] SC   [] other: Patient Education: [x] Review HEP    [] Progressed/Changed HEP based on:   [] positioning   [] body mechanics   [] transfers   [] Use of heat/ice    [] other:         Other Objective/Functional Measures:    Reassessment 12/10/21  Gait and Functional Mobility:  Transfers:   Bed mobility: indep with increase time  Sit to/from Supine: short sit <0-> supine independent supine to short sit with min A with trunk with increase effort   Sit to/from stands: indep with increase effort/time able to perform without UE Support     Gait: steppage gait pattern and slight R>L foot drop  Assistive device: none - But therapist recommending a cane. Gait speed: 0.6 m/s-improved      Interpretation:  · Less than 0.4 m/sec:                     Household ambulator  · 0.4 to 0.8 m/sec:                           Limited community ambulator  · 0.8 to 1.2 m/sec:                           Community ambulator   · 1.2 m/sec and above:                   Able to safely cross streets           TUG test: 18s - mild to mod fall risk . - improved      Timed Up and GO (TUG) Test     Description: Measure of function with correlates to balance and fall risk           Interpretation:  ? 10 seconds = normal     ? 20seconds = good mobility,can go out alone, mobile without gait aid     ? 30 seconds = problems, cannot go outside alone, requires gait aid     · A score of ? 14 seconds has been shown to indicate high risk of falls.         Age  Matched  Norms: Age in years Mean  in  seconds     60-69          7.9 +/-0.9     70-79 7.7 +/-2.3     80-89    No  device:  11.0 +/-2.2     With  device:  19.9 +/-6.4      No  device:  14.7 +/- 7.9     With  Device: 19.9 +/-2. 5            1. Demetrius ALEXANDER. The Time Up &Go: A Test of Basic Functional Mobility for Frail Elderly Persons.  Journal of [address] [place] [address]  7696;56(6): 541680.     2. Scarlett Haley A,Aura S,Tom M. Predicting the Probability for Falls in Community Dwelling Older Adults Using the Timed Up & Go Test.  Physical Therapy 2000; 80(9): S2139743.     3. Usha MM,Jeanette GL, Daljit Bi. Functional Performance in Community Living Older Adults. Journal of Geriatric Physical Therapy 2003;  26(3): 14--?22.  4. [address], Falls Screening and Referral Algorithm, TUG, [facility] Falls Prevention Consortium,June 2005        Stairs: negotiated 4(6'') steps with 2 rails SOS ascending and STS decending was aware of keeping feet facing forward and avoiding ER          Balance:   Standing feet together: 30sec - improved   1/2 tandem:  R leg forward 14 sec / left leg forward 7 sec - both improved  Tandem stance: (R) 2  sec (L) 2 sec   SLS: R 1 sec , L 5sec                                                                  MUSCLE STRENGTH  KEY                                                                             R                      L  0 - No Contraction                   Hip    flex                     4                     4+   1 - Trace                                           ext                      2+                    3-  2 - Poor                                            abd                     4-                     3-  3 - Fair                                              add                     2+                    4     4 - Good                                  Knee flex                     4 +                   4+  5 - Normal                                        ext                      4                      4                                                     Ankle DF                    3-                     3+                                                            PT                     7                      8                                                            INV                    4 -                    4+                                                            UF                     8+                    3        ANKLE                               AROM                         PROM                         MMT:    R L R L R L   Dorsiflexion (15)  To neutral  To neutral  8 10 -3 -3   Plantarflexion (50) 55 60     4 4   Inversion (35)  2 12     4 4+   Eversion (25) 15 20     3+ 4   Additional comments: Reports decreased sensation to light touch.         Five times sit to stand:  5 reps = 22 sec  -improved from 29sec able to stand without UE support but required momentum with arms outwards      Normative averages:  Clients younger than 61years old  £  10 seconds = Normal   Clients older than 61years old £ 14.2 seconds = Normal   Change of ³ 2.3 seconds shows a significant clinical improvement     Mohan FRANKEL. Reference values for the five repetition sit to stand test: a descriptive metaanalysis of data from elders. Percept Mot Skills 2006; 103(1):215-222.         Additional comments: De  Extensor lag with SLR         [facility] Score:  51.03%  improved           ASSESSMENT/Changes in Function:   Patient has been progressing well in therapy he reports about 30% improvement since he started with his balance and gait. Also stated that he does not have the sensation of falling down when he walks or feel like he needs to use the cane at this time. Patient is planning to obtain the AFO 12/13 and he hopes that it will helps his walking better.   Functionally he wants to be able to do stairs with more ease without the need of pulling too much with his hands and being able to walk longer distances. During today's assessment we overall noted improvement in BLE strength, slightly with ankle AROM and balance during the Tandem and TUG scores. Also functionally his 5 reps sit to stand improved and he can stand without assistance and overall he got better and he is more stable with ambulation. Pt reports to be compliant with HEP he was given script for a cane but does not feel he needs it now, however was educated that he is still a fall risk. Patient will continue to benefit from skilled PT services to modify and progress therapeutic interventions, address functional mobility deficits, address ROM deficits, address strength deficits, analyze and address soft tissue restrictions, analyze and cue movement patterns, analyze and modify body mechanics/ergonomics and assess and modify postural abnormalities to attain remaining goals. GOALS/Progress towards goals:  New goals starting assessment from  11/11/21     Short Term Goals: To be accomplished in 8 treatments. 1.  Indep with a HEP to assist in rehab progression. [x]? Met []? Not met []? Partially met  11/30    2.  1/2 - 1 grade improvement in LE strength to improve stability. [x]? Met []? Not met []? Partially met 11/11    3. 5-10 second tandem stance to reduce risk for falls. [x]? Met []? Not met []? Partially met      Long Term Goals: To be accomplished in 16 treatments. 1.  Pt will be able to stand >10 minutes without significant LE pain/weakness. [x]? Met []? Not met []? Partially met  12/10  2. TUG score </= 20 seconds to reduce risk for falls. [x]? Met []? Not met []? Partially met  12/10   3. 10-20% improvement in AM-PAC score for improved functional mobility. [] Met [] Not met [x] Partially met      12/10 new goals:    4. Patient will be able to negotiate 4(6'') steps with 1 rail and STS ascending/descending without the need of pulling with his hands.  [] Met [] Not met [] Partially met     5. Patient will be able to perform a 6 min walk test to improve his endurance.  [] Met [] Not met [] Partially met         PLAN[x]  Upgrade activities as tolerated     [x]  Continue plan of care  []  Update interventions per flow sheet       []  Discharge due to:_   [x]  Other: REasses for new POC   Mechelle Lynch, PT, DPT 12/10/2021

## 2021-12-10 NOTE — PROGRESS NOTES
[address]  Ph: [phone]    Fax: [phone]  Plan of Care  Name: Donnie Mahan  : 1965   MD: Maddi Caro, NP     Medical/Treatment Diagnosis: Other abnormalities of gait and mobility [R26.89]  Unsteadiness on feet [R26.81]     Problem List/Impairments: pain affecting function, decrease ROM, decrease strength, impaired gait/ balance, decrease ADL/ functional abilitiies, decrease activity tolerance, decrease flexibility/ joint mobility and decrease transfer abilities    Start of Care: 21  Visits from Start of Care: 10  Missed Visits: 0    Certification Period: 12/10/21-3/10/21      Frequency/Duration: 2 times a week for 6-10 treatments   Treatment Plan may include any combination of the following: Therapeutic exercise, Neuromuscular re-education, Physical agent/modality, Gait/balance training, Manual therapy, Patient education, Functional mobility training and Stair training  [x]  Plan of care has been reviewed with PTA. Patient/ Caregiver education and instruction: exercises     Summary of Care/Goals:  Patient has been progressing well in therapy he reports about 30% improvement since he started with his balance and gait. Also stated that he does not have the sensation of falling down when he walks or feel like he needs to use the cane at this time. Patient is planning to obtain the AFO  and he hopes that it will helps his walking better. Functionally he wants to be able to do stairs with more ease without the need of pulling too much with his hands and being able to walk longer distances. During today's assessment we overall noted improvement in BLE strength, slightly with ankle AROM and balance during the Tandem and TUG scores. Also functionally his 5 reps sit to stand improved and he can stand without assistance and overall he got better and he is more stable with ambulation.  Pt reports to be compliant with HEP he was given script for a cane but does not feel he needs it now, however was educated that he is still a fall risk. Patient will continue to benefit from skilled PT services to modify and progress therapeutic interventions, address functional mobility deficits, address ROM deficits, address strength deficits, analyze and address soft tissue restrictions, analyze and cue movement patterns, analyze and modify body mechanics/ergonomics and assess and modify postural abnormalities to attain remaining goals.         GOALS/Progress towards goals:  New goals starting assessment from  11/11/21      Short Term Goals: To be accomplished in 8 treatments. 1.  Indep with a HEP to assist in rehab progression. [x]? ? Met []? ? Not met []? ? Partially met  11/30     2.  1/2 - 1 grade improvement in LE strength to improve stability. [x]? ? Met []? ? Not met []? ? Partially met 11/11     3. 5-10 second tandem stance to reduce risk for falls. [x]? ? Met []? ? Not met []? ? Partially met       Long Term Goals: To be accomplished in 16 treatments. 1.  Pt will be able to stand >10 minutes without significant LE pain/weakness. [x]? ? Met []? ? Not met []? ? Partially met  12/10  2. TUG score </= 20 seconds to reduce risk for falls. [x]? ? Met []? ? Not met []? ? Partially met  12/10   3. 10-20% improvement in AM-PAC score for improved functional mobility. []? Met []? Not met [x]? Partially met       12/10 new goals:     4. Patient will be able to negotiate 4(6'') steps with 1 rail and STS ascending/descending without the need of pulling with his hands. []? Met []? Not met []? Partially met      5. Patient will be able to perform a 6 min walk test to improve his endurance. []? Met []? Not met []?  Partially met             Reassessment 12/10/21  Gait and Functional Mobility:  Transfers:   Bed mobility: indep with increase time  Sit to/from Supine: short sit <0-> supine independent supine to short sit with min A with trunk with increase effort   Sit to/from stands: indep with increase effort/time able to perform without UE Support     Gait: steppage gait pattern and slight R>L foot drop  Assistive device: none - But therapist recommending a cane. Gait speed: 0.6 m/s-improved      Interpretation:  · Less than 0.4 m/sec:                     Household ambulator  · 0.4 to 0.8 m/sec:                           Limited community ambulator  · 0.8 to 1.2 m/sec:                           Community ambulator   · 1.2 m/sec and above:                   Able to safely cross streets           TUG test: 18s - mild to mod fall risk . - improved      Timed Up and GO (TUG) Test     Description: Measure of function with correlates to balance and fall risk           Interpretation:  ? 10 seconds = normal     ? 20seconds = good mobility,can go out alone, mobile without gait aid     ? 30 seconds = problems, cannot go outside alone, requires gait aid     · A score of ? 14 seconds has been shown to indicate high risk of falls.         Age  Matched  Norms: Age in years Mean  in  seconds     60-69          7.9 +/-0.9     70-79 7.7 +/-2.3     80-89    No  device:  11.0 +/-2.2     With  device:  19.9 +/-6.4      No  device:  14.7 +/- 7.9     With  Device: 19.9 +/-2. 5            1. Jaqui ALEXANDER. The Time Up &Go: A Test of Basic Functional Mobility for Frail Elderly Persons. Journal of [facility]  1379;40(3): [id number].     2. Zaid Ivey A,Aura S,Tom M. Predicting the Probability for Falls in Community Dwelling Older Adults Using the Timed Up & Go Test.  Physical Therapy 2000; 80(9): [id number].     3. Usha MM,Jeanette GL, Leif Joshi. Functional Performance in Community Living Older Adults. Journal of Geriatric Physical Therapy 2003;  26(3): 14--?22.  4. [address], Falls Screening and Referral Algorithm, TUG, [facility] Falls Prevention Consortium,June 2005        Stairs: negotiated 4(6'') steps with 2 rails SOS ascending and STS decending was aware of keeping feet facing forward and avoiding ER            Balance:   Standing feet together: 30sec - improved   1/2 tandem:  R leg forward 14 sec / left leg forward 7 sec - both improved  Tandem stance: (R) 2  sec (L) 2 sec   SLS: R 1 sec , L 5sec                                                                  MUSCLE STRENGTH  KEY                                                                             R                      L  0 - No Contraction                   Hip    flex                     4                     4+         1 - Trace                                           ext                      2+                    3-  2 - Poor                                            abd                     4-                     3-  3 - Fair                                              add                     2+                    4     4 - Good                                  Knee flex                     4 +                   4+  5 - Normal                                        ext                      4                      4                                                     Ankle DF                    3-                     3+                                                            VE                     4                      6                                                            INV                    4 -                    4+                                                            AS                     1+                    7        ANKLE                               AROM                         PROM                         MMT:    R L R L R L   Dorsiflexion (15)  To neutral  To neutral  8 10 -3 -3   Plantarflexion (50) 55 60     4 4   Inversion (35)  2 12     4 4+   Eversion (25) 15 20     3+ 4   Additional comments: Reports decreased sensation to light touch.         Five times sit to stand:  5 reps = 22 sec  -improved from 29sec able to stand without UE support but required momentum with arms outwards      Normative averages:  Clients younger than 61years old  £  10 seconds = Normal   Clients older than 61years old £ 14.2 seconds = Normal   Change of ³ 2.3 seconds shows a significant clinical improvement     Mohan FRANKEL. Reference values for the five repetition sit to stand test: a descriptive metaanalysis of data from elders. Percept Mot Skills 2006; 103(1):215-222.         Additional comments: De  Extensor lag with SLR         Ampac Score:  51.03%  improved         Recommendations:cont with therapy    Mechelle Lynch, PT, DPT 12/10/2021     Retain this original for your records. If you are unable to process this request in 24 hours, please contact our office.   ________________________________________________________________________  NOTE TO PHYSICIAN:  Please complete the following and fax to: [address] [place] [address]:  Fax: [phone]   ____ I have read the above report and request that my patient continue therapy. ____ I have read the above report and request that my patient continue therapy with the following changes/special instructions:    ____ I have read the above report and request that my patient be discharged from therapy.     [de-identified] Signature:_______________________________________________ Date:_____________Time:____________      Rowena Damon NP

## 2021-12-13 ENCOUNTER — HOSPITAL ENCOUNTER (OUTPATIENT)
Dept: PHYSICAL THERAPY | Age: 56
Discharge: HOME | End: 2021-12-13
Payer: COMMERCIAL

## 2021-12-13 PROCEDURE — 97110 THERAPEUTIC EXERCISES: CPT

## 2021-12-15 ENCOUNTER — HOSPITAL ENCOUNTER (OUTPATIENT)
Dept: PHYSICAL THERAPY | Age: 56
Discharge: HOME | End: 2021-12-15
Payer: COMMERCIAL

## 2021-12-15 PROCEDURE — 97110 THERAPEUTIC EXERCISES: CPT

## 2021-12-15 NOTE — PROGRESS NOTES
PT DAILY TREATMENT NOTE - [facility]     Patient Name: Syed Morales  Date:12/15/2021  : 1965  [x]  Patient  Verified  Payor: [address] / Plan: [address] / Product Type: Managed Care Medicaid /    Treatment Area: Other abnormalities of gait and mobility [R26.89]  Unsteadiness on feet [R26.81]   Next MD APPT:  In time: 9:25 am     Out time: 10:00 am  Total Treatment Time (min) :40 min  Total Timed Codes (min): 40 min  1:1 Treatment Time ([facility] only):40 min  Visit #:      SUBJECTIVE  Pain Level (0-10 scale) pre treatment: 0/10  Pain Level (0-10 scale) post treatment: 0 /10  Any medication changes, allergies to medications, adverse drug reactions, diagnosis change, or new procedure performed?:   [x] No    [] Yes (see summary sheet for update)  Subjective functional status/changes:   [x] No changes reported    Patient stated his leg feels better since last therapy session. He is concerned about missing session while awaiting authorization for more. He also still has not heard about authorization for AFO either. OBJECTIVE    40 min Therapeutic Exercise:  [x] See flow sheet :   Rationale: increase ROM, increase strength, improve coordination, improve balance and increase proprioception to improve the patients ability to ambulate with more stability and reduce fall risk.      min Gait Training:  _50x2__ feet with _none__ device on level surfaces with _csv__ level of assist and AFO   Rationale: increase ROM, increase strength, improve coordination, improve balance and increase proprioception  to improve the patients ability to ambulate with more stability       With   [x] TE   [] TA   [] Neuro   [] SC   [] other: Patient Education: [x] Review HEP    [] Progressed/Changed HEP based on:   [] positioning   [] body mechanics   [] transfers   [] Use of heat/ice    [] other:         Other Objective/Functional Measures:added cone step over and reviewed there-band 4 way SLR    ASSESSMENT/Changes in Function:   Patient still has difficulty with full alexandra bearing on R LE. He did well with cone step over with Min A x 1. Patient also reporting less stiffness in R LE from last session. Reviewed HEP with patient while he awaits authorization from insurance for more visits. Patient will continue to benefit from skilled PT services to modify and progress therapeutic interventions, address functional mobility deficits, address ROM deficits, address strength deficits, analyze and address soft tissue restrictions, analyze and cue movement patterns, analyze and modify body mechanics/ergonomics and assess and modify postural abnormalities to attain remaining goals. GOALS/Progress towards goals:  New goals starting assessment from  11/11/21     Short Term Goals: To be accomplished in 8 treatments. 1.  Indep with a HEP to assist in rehab progression. [x]? Met []? Not met []? Partially met  11/30    2.  1/2 - 1 grade improvement in LE strength to improve stability. [x]? Met []? Not met []? Partially met 11/11    3. 5-10 second tandem stance to reduce risk for falls. [x]? Met []? Not met []? Partially met      Long Term Goals: To be accomplished in 16 treatments. 1.  Pt will be able to stand >10 minutes without significant LE pain/weakness. [x]? Met []? Not met []? Partially met  12/10  2. TUG score </= 20 seconds to reduce risk for falls. [x]? Met []? Not met []? Partially met  12/10   3. 10-20% improvement in AM-PAC score for improved functional mobility. [] Met [] Not met [x] Partially met      12/10 new goals:    4. Patient will be able to negotiate 4(6'') steps with 1 rail and STS ascending/descending without the need of pulling with his hands. [] Met [] Not met [] Partially met     5. Patient will be able to perform a 6 min walk test to improve his endurance.  [] Met [] Not met [] Partially met         PLAN    Upgrade activities as tolerated     [x]  Continue plan of care  [x]  Update interventions per flow sheet       []  Discharge due to:_   []  Other:    Linden Crigler, PTA 12/15/2021

## 2022-01-04 ENCOUNTER — HOSPITAL ENCOUNTER (OUTPATIENT)
Dept: PHYSICAL THERAPY | Age: 57
Discharge: HOME | End: 2022-01-04
Payer: COMMERCIAL

## 2022-01-04 PROCEDURE — 97110 THERAPEUTIC EXERCISES: CPT

## 2022-01-04 NOTE — PROGRESS NOTES
PT DAILY TREATMENT NOTE - [facility]     Patient Name: Davina Chery  Date:2022  : 1965  [x]  Patient  Verified  Payor: [address] / Plan: [address] / Product Type: Managed Care Medicaid /    Treatment Area: Other abnormalities of gait and mobility [R26.89]  Unsteadiness on feet [R26.81]   Next MD APPT:  In time: 11:28am    Out time: 12:08am  Total Treatment Time (min) :40  Total Timed Codes (min): 40  1:1 Treatment Time [facility] only):40  Visit #:      SUBJECTIVE  Pain Level (0-10 scale) pre treatment: 4/10 soreness in R calf   Pain Level (0-10 scale) post treatment: 2/10  Any medication changes, allergies to medications, adverse drug reactions, diagnosis change, or new procedure performed?:   [x] No    [] Yes (see summary sheet for update)  Subjective functional status/changes:   [x] No changes reported    Pt states he received the R AFO last week. Pt states he is still having soreness in the R calf. Pt states his balance is much better than it use to be. Pt states in the past he would not have walked on snowy/icy grounds but he did it this morning. OBJECTIVE    40 min Therapeutic Exercise:  [x] See flow sheet :   Rationale: increase ROM, increase strength, improve coordination, improve balance and increase proprioception to improve the patients ability to ambulate with more stability and reduce fall risk.      min Gait Training:  _50x2__ feet with _none__ device on level surfaces with _csv__ level of assist and AFO   Rationale: increase ROM, increase strength, improve coordination, improve balance and increase proprioception  to improve the patients ability to ambulate with more stability       With   [x] TE   [] TA   [] Neuro   [] SC   [] other: Patient Education: [x] Review HEP    [] Progressed/Changed HEP based on:   [] positioning   [] body mechanics   [] transfers   [] Use of heat/ice    [] other:         Other Objective/Functional Measures: Assessed AFO and had pt practice forward/back walking with mirror cues. Resumed previous exercises and rest breaks required. ASSESSMENT/Changes in Function:   Noted pt excessively lifting the R LE during ambulation. Practiced gait pattern with mirror cueing to improve the smoothness of his gait. Pt with significant SLS instability bilaterally. Pt reporting reduction in R calf soreness by end of session. Recommended he work on R ankle ROM when resting to prevent stiffness in the R ankle with use of AFO. Will continue to progress exercises as tolerated. Patient will continue to benefit from skilled PT services to modify and progress therapeutic interventions, address functional mobility deficits, address ROM deficits, address strength deficits, analyze and address soft tissue restrictions, analyze and cue movement patterns, analyze and modify body mechanics/ergonomics and assess and modify postural abnormalities to attain remaining goals. GOALS/Progress towards goals:  New goals starting assessment from  11/11/21     Short Term Goals: To be accomplished in 8 treatments. 1.  Indep with a HEP to assist in rehab progression. [x]? Met []? Not met []? Partially met  11/30    2.  1/2 - 1 grade improvement in LE strength to improve stability. [x]? Met []? Not met []? Partially met 11/11    3. 5-10 second tandem stance to reduce risk for falls. [x]? Met []? Not met []? Partially met      Long Term Goals: To be accomplished in 16 treatments. 1.  Pt will be able to stand >10 minutes without significant LE pain/weakness. [x]? Met []? Not met []? Partially met  12/10  2. TUG score </= 20 seconds to reduce risk for falls. [x]? Met []? Not met []? Partially met  12/10   3. 10-20% improvement in AM-PAC score for improved functional mobility. [] Met [] Not met [x] Partially met      12/10 new goals:    4.  Patient will be able to negotiate 4(6'') steps with 1 rail and STS ascending/descending without the need of pulling with his hands. [] Met [] Not met [] Partially met     5. Patient will be able to perform a 6 min walk test to improve his endurance.  [] Met [] Not met [] Partially met         PLAN    Upgrade activities as tolerated     [x]  Continue plan of care  [x]  Update interventions per flow sheet       []  Discharge due to:_   []  Other:    Phil Arita, PT, DPT 1/4/2022

## 2022-01-07 ENCOUNTER — HOSPITAL ENCOUNTER (OUTPATIENT)
Dept: PHYSICAL THERAPY | Age: 57
Discharge: HOME | End: 2022-01-07
Payer: COMMERCIAL

## 2022-01-07 PROCEDURE — 97110 THERAPEUTIC EXERCISES: CPT

## 2022-01-07 NOTE — PROGRESS NOTES
PT DAILY TREATMENT NOTE - [facility]     Patient Name: Annita Quiles  [id number]  : 1965  [x]  Patient  Verified  Payor: [address] / Plan: [address] / Product Type: Managed Care Medicaid /    Treatment Area: Other abnormalities of gait and mobility [R26.89]  Unsteadiness on feet [R26.81]   Next MD APPT:  In time:09:15 am    Out time: 09:45 am  Total Treatment Time (min) :30 min  Total Timed Codes (min): 30 min  1:1 Treatment Time [facility] only):30 min  Visit #:      SUBJECTIVE  Pain Level (0-10 scale) pre treatment: 4/10  Moderate soreness in R calf   Pain Level (0-10 scale) post treatment: 4/10  Any medication changes, allergies to medications, adverse drug reactions, diagnosis change, or new procedure performed?:   [x] No    [] Yes (see summary sheet for update)  Subjective functional status/changes:   [x] No changes reported    Pt requested to leave early due to another appointment for his eyes. He does state when he takes the brace off his calf doesn't hurt. OBJECTIVE    30 min Therapeutic Exercise:  [x] See flow sheet :   Rationale: increase ROM, increase strength, improve coordination, improve balance and increase proprioception to improve the patients ability to ambulate with more stability and reduce fall risk.      min Gait Training:  _50x2__ feet with _none__ device on level surfaces with _csv__ level of assist and AFO   Rationale: increase ROM, increase strength, improve coordination, improve balance and increase proprioception  to improve the patients ability to ambulate with more stability       With   [x] TE   [] TA   [] Neuro   [] SC   [] other: Patient Education: [x] Review HEP    [] Progressed/Changed HEP based on:   [] positioning   [] body mechanics   [] transfers   [] Use of heat/ice    [] other:         Other Objective/Functional Measures: Decrease in exercises  ASSESSMENT/Changes in Function:   Decrease in exercises today due to patient having another appointment this morning and requesting to leave early. Patient has difficulty with tandem standing and decreasing HHA. Will resume exercises/bslsnce and gait on next visit. Patient will continue to benefit from skilled PT services to modify and progress therapeutic interventions, address functional mobility deficits, address ROM deficits, address strength deficits, analyze and address soft tissue restrictions, analyze and cue movement patterns, analyze and modify body mechanics/ergonomics and assess and modify postural abnormalities to attain remaining goals. GOALS/Progress towards goals:  New goals starting assessment from  11/11/21     Short Term Goals: To be accomplished in 8 treatments. 1.  Indep with a HEP to assist in rehab progression. [x]? Met []? Not met []? Partially met  11/30    2.  1/2 - 1 grade improvement in LE strength to improve stability. [x]? Met []? Not met []? Partially met 11/11    3. 5-10 second tandem stance to reduce risk for falls. [x]? Met []? Not met []? Partially met      Long Term Goals: To be accomplished in 16 treatments. 1.  Pt will be able to stand >10 minutes without significant LE pain/weakness. [x]? Met []? Not met []? Partially met  12/10  2. TUG score </= 20 seconds to reduce risk for falls. [x]? Met []? Not met []? Partially met  12/10   3. 10-20% improvement in AM-PAC score for improved functional mobility. [] Met [] Not met [x] Partially met      12/10 new goals:    4. Patient will be able to negotiate 4(6'') steps with 1 rail and STS ascending/descending without the need of pulling with his hands. [] Met [] Not met [] Partially met     5. Patient will be able to perform a 6 min walk test to improve his endurance.  [] Met [] Not met [] Partially met         PLAN    Upgrade activities as tolerated     [x]  Continue plan of care  [x]  Update interventions per flow sheet       []  Discharge due to:_   []  Other:    Caitlin Toure, PTA 1/7/2022

## 2022-01-10 ENCOUNTER — HOSPITAL ENCOUNTER (OUTPATIENT)
Dept: PREADMISSION TESTING | Age: 57
Discharge: HOME | End: 2022-01-10
Payer: COMMERCIAL

## 2022-01-10 LAB — SARS-COV-2, COV2: NORMAL

## 2022-01-10 PROCEDURE — U0005 INFEC AGEN DETEC AMPLI PROBE: HCPCS

## 2022-01-11 LAB
SARS-COV-2, XPLCVT: NOT DETECTED
SOURCE, COVRS: NORMAL

## 2022-01-14 ENCOUNTER — HOSPITAL ENCOUNTER (OUTPATIENT)
Age: 57
Setting detail: OUTPATIENT SURGERY
Discharge: HOME | End: 2022-01-14
Attending: INTERNAL MEDICINE | Admitting: INTERNAL MEDICINE
Payer: COMMERCIAL

## 2022-01-14 ENCOUNTER — APPOINTMENT (OUTPATIENT)
Dept: ENDOSCOPY | Age: 57
End: 2022-01-14
Attending: INTERNAL MEDICINE
Payer: COMMERCIAL

## 2022-01-14 ENCOUNTER — ANESTHESIA (OUTPATIENT)
Dept: ENDOSCOPY | Age: 57
End: 2022-01-14
Payer: COMMERCIAL

## 2022-01-14 ENCOUNTER — ANESTHESIA EVENT (OUTPATIENT)
Dept: ENDOSCOPY | Age: 57
End: 2022-01-14
Payer: COMMERCIAL

## 2022-01-14 VITALS
TEMPERATURE: 97.8 F | BODY MASS INDEX: 37.11 KG/M2 | DIASTOLIC BLOOD PRESSURE: 85 MMHG | HEART RATE: 80 BPM | HEIGHT: 73 IN | RESPIRATION RATE: 20 BRPM | OXYGEN SATURATION: 98 % | SYSTOLIC BLOOD PRESSURE: 152 MMHG | WEIGHT: 280 LBS

## 2022-01-14 LAB
GLUCOSE BLD STRIP.AUTO-MCNC: 232 MG/DL (ref 65–117)
PERFORMED BY, TECHID: ABNORMAL

## 2022-01-14 PROCEDURE — 2709999900 HC NON-CHARGEABLE SUPPLY: Performed by: INTERNAL MEDICINE

## 2022-01-14 PROCEDURE — 77030019988 HC FCPS ENDOSC DISP BSC -B: Performed by: INTERNAL MEDICINE

## 2022-01-14 PROCEDURE — 74011000250 HC RX REV CODE- 250: Performed by: NURSE ANESTHETIST, CERTIFIED REGISTERED

## 2022-01-14 PROCEDURE — 77030021593 HC FCPS BIOP ENDOSC BSC -A: Performed by: INTERNAL MEDICINE

## 2022-01-14 PROCEDURE — 76060000033 HC ANESTHESIA 1 TO 1.5 HR: Performed by: INTERNAL MEDICINE

## 2022-01-14 PROCEDURE — 82962 GLUCOSE BLOOD TEST: CPT

## 2022-01-14 PROCEDURE — 88305 TISSUE EXAM BY PATHOLOGIST: CPT

## 2022-01-14 PROCEDURE — 77030039825 HC MSK NSL PAP SUPERNO2VA VYRM -B: Performed by: INTERNAL MEDICINE

## 2022-01-14 PROCEDURE — 74011250636 HC RX REV CODE- 250/636: Performed by: INTERNAL MEDICINE

## 2022-01-14 PROCEDURE — 74011250636 HC RX REV CODE- 250/636: Performed by: NURSE ANESTHETIST, CERTIFIED REGISTERED

## 2022-01-14 PROCEDURE — 76040000008: Performed by: INTERNAL MEDICINE

## 2022-01-14 RX ORDER — FENTANYL CITRATE 50 UG/ML
INJECTION, SOLUTION INTRAMUSCULAR; INTRAVENOUS AS NEEDED
Status: DISCONTINUED | OUTPATIENT
Start: 2022-01-14 | End: 2022-01-14 | Stop reason: HOSPADM

## 2022-01-14 RX ORDER — LIDOCAINE HYDROCHLORIDE 20 MG/ML
INJECTION, SOLUTION EPIDURAL; INFILTRATION; INTRACAUDAL; PERINEURAL AS NEEDED
Status: DISCONTINUED | OUTPATIENT
Start: 2022-01-14 | End: 2022-01-14 | Stop reason: HOSPADM

## 2022-01-14 RX ORDER — SODIUM CHLORIDE 9 MG/ML
25 INJECTION, SOLUTION INTRAVENOUS CONTINUOUS
Status: DISCONTINUED | OUTPATIENT
Start: 2022-01-14 | End: 2022-01-14 | Stop reason: HOSPADM

## 2022-01-14 RX ORDER — PROPOFOL 10 MG/ML
INJECTION, EMULSION INTRAVENOUS AS NEEDED
Status: DISCONTINUED | OUTPATIENT
Start: 2022-01-14 | End: 2022-01-14 | Stop reason: HOSPADM

## 2022-01-14 RX ORDER — SODIUM CHLORIDE, SODIUM LACTATE, POTASSIUM CHLORIDE, CALCIUM CHLORIDE 600; 310; 30; 20 MG/100ML; MG/100ML; MG/100ML; MG/100ML
50 INJECTION, SOLUTION INTRAVENOUS CONTINUOUS
Status: DISCONTINUED | OUTPATIENT
Start: 2022-01-14 | End: 2022-01-14 | Stop reason: HOSPADM

## 2022-01-14 RX ORDER — MIDAZOLAM HYDROCHLORIDE 1 MG/ML
INJECTION, SOLUTION INTRAMUSCULAR; INTRAVENOUS AS NEEDED
Status: DISCONTINUED | OUTPATIENT
Start: 2022-01-14 | End: 2022-01-14 | Stop reason: HOSPADM

## 2022-01-14 RX ADMIN — PROPOFOL 50 MG: 10 INJECTION, EMULSION INTRAVENOUS at 09:57

## 2022-01-14 RX ADMIN — PROPOFOL 50 MG: 10 INJECTION, EMULSION INTRAVENOUS at 10:06

## 2022-01-14 RX ADMIN — PROPOFOL 50 MG: 10 INJECTION, EMULSION INTRAVENOUS at 10:00

## 2022-01-14 RX ADMIN — LIDOCAINE HYDROCHLORIDE 100 MG: 20 INJECTION, SOLUTION EPIDURAL; INFILTRATION; INTRACAUDAL; PERINEURAL at 09:49

## 2022-01-14 RX ADMIN — MIDAZOLAM HYDROCHLORIDE 2 MG: 2 INJECTION, SOLUTION INTRAMUSCULAR; INTRAVENOUS at 09:49

## 2022-01-14 RX ADMIN — PROPOFOL 50 MG: 10 INJECTION, EMULSION INTRAVENOUS at 10:03

## 2022-01-14 RX ADMIN — PROPOFOL 50 MG: 10 INJECTION, EMULSION INTRAVENOUS at 09:54

## 2022-01-14 RX ADMIN — PROPOFOL 50 MG: 10 INJECTION, EMULSION INTRAVENOUS at 09:49

## 2022-01-14 RX ADMIN — SODIUM CHLORIDE 25 ML/HR: 9 INJECTION, SOLUTION INTRAVENOUS at 08:15

## 2022-01-14 RX ADMIN — PROPOFOL 50 MG: 10 INJECTION, EMULSION INTRAVENOUS at 09:51

## 2022-01-14 RX ADMIN — FENTANYL CITRATE 50 MCG: 50 INJECTION, SOLUTION INTRAMUSCULAR; INTRAVENOUS at 09:49

## 2022-01-14 RX ADMIN — PROPOFOL 50 MG: 10 INJECTION, EMULSION INTRAVENOUS at 09:50

## 2022-01-14 RX ADMIN — FENTANYL CITRATE 50 MCG: 50 INJECTION, SOLUTION INTRAMUSCULAR; INTRAVENOUS at 09:52

## 2022-01-14 NOTE — ANESTHESIA PREPROCEDURE EVALUATION
Relevant Problems   RESPIRATORY SYSTEM   (+) Smoker      CARDIOVASCULAR   (+) Essential hypertension      RENAL FAILURE   (+) Clear cell renal cell carcinoma (HCC)   (+) Hydronephrosis      ENDOCRINE   (+) Arthritis   (+) Obesity due to excess calories without serious comorbidity   (+) Type 2 diabetes mellitus (HCC)      PERSONAL HX & FAMILY HX OF CANCER   (+) Clear cell renal cell carcinoma (HCC)       Anesthetic History   No history of anesthetic complications            Review of Systems / Medical History  Patient summary reviewed, nursing notes reviewed and pertinent labs reviewed    Pulmonary        Sleep apnea           Neuro/Psych   Within defined limits           Cardiovascular    Hypertension          CAD and hyperlipidemia      Comments: Pt states recent cardiac clearance for a different procedure  No Hx of CABG or cardiac stents  H/O peripheral vascular disease with stents in legs    [facility] May 2021   GI/Hepatic/Renal         Renal disease: CRI       Endo/Other    Diabetes: poorly controlled, type 2, using insulin    Obesity     Other Findings   Comments: Medical History  Diabetes ([address]  Hypertension  Hypercholesterolemia  HIV (human immunodeficiency virus infection) ([facility] [address]  PVD (peripheral vascular disease) ([facility] [address]  CAD (coronary artery disease)         Physical Exam    Airway  Mallampati: IV  TM Distance: 4 - 6 cm  Neck ROM: normal range of motion   Mouth opening: Normal     Cardiovascular  Regular rate and rhythm,  S1 and S2 normal,  no murmur, click, rub, or gallop             Dental    Dentition: Poor dentition  Comments: Multiple missing   Pulmonary  Breath sounds clear to auscultation               Abdominal         Other Findings   Comments: Results for Lay Rodriguez (MRN [id number]) as of 6/9/2021 16:05    6/7/2021 02:27  Sodium: 134 (L)  Potassium: 3.6  Chloride: 101  CO2: 26  Anion gap: 7  Glucose: 194 (H)  BUN: 21 (H)  Creatinine: 1.40 (H)  BUN/Creatinine ratio: 15  Calcium: 8.5  Magnesium: 1. 7  GFR est non-AA: 52 (L)  GFR est AA: >60    Results for Albino Knight (MRN [id number]) as of 6/9/2021 16:05    6/5/2021 21:30  WBC: 11.6 (H)  NRBC: 0.0  RBC: 5.89 (H)  HGB: 17.0  HCT: 52.4 (H)  MCV: 89.0  MCH: 28.9  MCHC: 32.4  RDW: 13.0  PLATELET: 285  MPV: 32.8  NEUTROPHILS: 75  LYMPHOCYTES: 14  MONOCYTES: 9  EOSINOPHILS: 0  BASOPHILS: 1  IMMATURE GRANULOCYTES: 1 (H)  DF: AUTOMATED  ABSOLUTE NRBC: 0.00  ABS. NEUTROPHILS: 8.5 (H)  ABS. IMM. GRANS.: 0.1 (H)  ABS. LYMPHOCYTES: 1.6  ABS. MONOCYTES: 1.1 (H)  ABS. EOSINOPHILS: 0.0  ABS.  BASOPHILS: 0.1           Anesthetic Plan    ASA: 3  Anesthesia type: total IV anesthesia and MAC          Induction: Intravenous  Anesthetic plan and risks discussed with: Patient and Family

## 2022-01-14 NOTE — DISCHARGE INSTRUCTIONS
No anticoagulants for 3 days including aspirin and plavix     Avoid Citrus juices, cigarettes, chocolate, tight fitting clothing, coffee and other caffeinated beverages, carbonated beverages, fatty and friend foods, anticholinergic medications, medications which decrease LES tone 9Ca channel blockers, theophyline perparations     Colonoscopy in 3 years

## 2022-01-14 NOTE — PERIOP NOTES
Patient alert and oriented x 4 with respirations even and unlabored on RA. Patient discharged home per physician's order. Patient verbalizes understanding of discharge instructions. Patient transported via wheelchair to front hospital entrance accompanied by patient's brother. Medical transport van at front hospital entrance to transport patient and patient's brother for discharge.

## 2022-01-14 NOTE — ANESTHESIA POSTPROCEDURE EVALUATION
Procedure(s):  COLONOSCOPY AND EGD  ESOPHAGOGASTRODUODENOSCOPY (EGD)  ESOPHAGOGASTRODUODENAL (EGD) BIOPSY. total IV anesthesia, MAC    Anesthesia Post Evaluation      Multimodal analgesia: multimodal analgesia not used between 6 hours prior to anesthesia start to PACU discharge  Patient location during evaluation: bedside (Endoscopy suite)  Patient participation: complete - patient cannot participate  Level of consciousness: sleepy but conscious  Pain score: 0  Pain management: adequate  Airway patency: patent  Anesthetic complications: no  Cardiovascular status: acceptable  Respiratory status: acceptable and nasal cannula  Hydration status: acceptable  Comments: This patient remained on the stretcher. The patient was handed off to the endoscopy nursing team.  All questions regarding pre-, intra-, and postoperative care were answered.   Post anesthesia nausea and vomiting:  none      INITIAL Post-op Vital signs:   Vitals Value Taken Time   /77 01/14/22 1011   Temp 36.3 °C (97.3 °F) 01/14/22 1011   Pulse 81 01/14/22 1011   Resp 22 01/14/22 1011   SpO2 100 % 01/14/22 1011

## 2022-01-24 ENCOUNTER — HOSPITAL ENCOUNTER (OUTPATIENT)
Dept: PHYSICAL THERAPY | Age: 57
Discharge: HOME | End: 2022-01-24
Payer: COMMERCIAL

## 2022-01-24 PROCEDURE — 97110 THERAPEUTIC EXERCISES: CPT

## 2022-01-24 NOTE — PROGRESS NOTES
[address] [address] [address] [address] [facility], [address] [place] [address]  Ph: [phone]    Fax: [phone]  Therapy Progress Report  Name: Kaylan Gonzalez  : 1965   MD: Herman Schmidt, NP     Medical Diagnosis: Other abnormalities of gait and mobility [R26.89]  Unsteadiness on feet [R26.81]  Start of Care: 12/10   Visits from Start of Care: 15    Missed Visits: 0  Certification MBREBN:/ to 3/10    Frequency/Duration: 2 times a week for 16 treatments   Summary of Care/Goals:                                                                    MUSCLE STRENGTH  KEY                                                                             R                      L  0 - No Contraction                   Hip    flex                     5                      5  1 - Trace                                           ext                      2+                    2+  2 - Poor                                            abd                     4                      4  3 - Fair                                              add                     2+                    2+  4 - Good                                  Knee flex                     5                      5  5 - Normal                                        ext                      5                      5                                                    Single leg stance : R/L patient unable to perform  Tandem stance: R/L patient unable to perform  5 sit to stands = 21 sec  TU sec no AD  6 MWT:  853 ft no AD wearing AFO.       ASSESSMENT/Changes in Function:   Patient was awaiting authorization to continue with therapy and missed 2 weeks. He was able to complete a 6 MWT for 853 feet. He  still uses UE to ascend and descend the stairs, but able to use reciprocal pattern. Slight improvement noted with MMT. Decrease noted with SLS and tandem stance for balance. He has noted improvement since receiving therapy.  He is currently ambulating with no AD ad wearing AFO. He is progressing towards new goals. Patient will continue to benefit from skilled PT services to modify and progress therapeutic interventions, address functional mobility deficits, address ROM deficits, address strength deficits, analyze and address soft tissue restrictions, analyze and cue movement patterns, analyze and modify body mechanics/ergonomics and assess and modify postural abnormalities to attain remaining goals.         GOALS/Progress towards goals:  New goals starting assessment from  11/11/21      Short Term Goals: To be accomplished in 8 treatments. 1.  Indep with a HEP to assist in rehab progression. [x]? ? Met []? ? Not met []? ? Partially met  11/30     2.  1/2 - 1 grade improvement in LE strength to improve stability. [x]? ? Met []? ? Not met []? ? Partially met 11/11     3. 5-10 second tandem stance to reduce risk for falls. [x]? ? Met []? ? Not met []? ? Partially met       Long Term Goals: To be accomplished in 16 treatments. 1.  Pt will be able to stand >10 minutes without significant LE pain/weakness. [x]? ? Met []? ? Not met []? ? Partially met  12/10     2. TUG score </= 20 seconds to reduce risk for falls. []?? Met [x]? ? Not met []? ? Partially met  12/10 , 1/24/22 TUG 21 sec     3. 10-20% improvement in AM-PAC score for improved functional mobility. []? Met []? Not met [x]? Partially met       12/10 new goals:     4. Patient will be able to negotiate 4(6'') steps with 1 rail and STS ascending/descending without the need of pulling with his hands. []? Met [x]? Not met []? Partially met      5. Patient will be able to perform a 6 min walk test to improve his endurance. [x]? Met []? Not met []? Partially met         [facility] Score: 49.81%  Recommendations:Continue with POC  [x]  Plan of care has been reviewed with PTA.      Silvia Yi, PTA 1/24/2022     ________________________________________________________________________     Please retain this original for your records.

## 2022-01-24 NOTE — PROGRESS NOTES
PT DAILY TREATMENT NOTE - [facility]     Patient Name: Nichole Moses  Date:2022  : 1965  [x]  Patient  Verified  Pay[address] [facility] / Plan: [address] / Product Type: Managed Care Medicaid /    Treatment Area: Other abnormalities of gait and mobility [R26.89]  Unsteadiness on feet [R26.81]   Next MD APPT:  In time:09:15 am    Out time:10:00 am  Total Treatment Time (min) :45 min  Total Timed Codes (min):45 min  1:1 Treatment Time ([facility] only):45 min  Visit #:15/16      SUBJECTIVE  Pain Level (0-10 scale) pre treatment: 0/10    Pain Level (0-10 scale) post treatment: 0/10  Any medication changes, allergies to medications, adverse drug reactions, diagnosis change, or new procedure performed?:   [x] No    [] Yes (see summary sheet for update)  Subjective functional status/changes:   [x] No changes reported    Pt reports 40-45 % improvement . He stated his goal is to walk to the [facility] ( 15 min from his house) and work out. He states the AFO does not bother him as much now but does not want to wear it all the time. He states he walks to the corner store ( 2 min away) . OBJECTIVE    45 min Therapeutic Exercise:  [x] See flow sheet :   Rationale: increase ROM, increase strength, improve coordination, improve balance and increase proprioception to improve the patients ability to ambulate with more stability and reduce fall risk.      min Gait Training:  _50x2__ feet with _none__ device on level surfaces with _csv__ level of assist and AFO   Rationale: increase ROM, increase strength, improve coordination, improve balance and increase proprioception  to improve the patients ability to ambulate with more stability       With   [x] TE   [] TA   [] Neuro   [] SC   [] other: Patient Education: [x] Review HEP    [] Progressed/Changed HEP based on:   [] positioning   [] body mechanics   [] transfers   [] Use of heat/ice    [] other:         Other Objective/Functional Measures: MUSCLE STRENGTH  KEY       R  L  0 - No Contraction  Hip    flex  5  5  1 - Trace            ext  2+  2+  2 - Poor            abd  4  4  3 - Fair             add  2+  2+  4 - Good   Knee flex  5  5  5 - Normal            ext  5  5        Single leg stance : R/L patient unable to perform  Tandem stance: R/L patient unable to perform  5 sit to stands = 21 sec  TU sec no AD  6 MWT:  853 ft no AD wearing AFO. ASSESSMENT/Changes in Function:   Patient was awaiting authorization to continue with therapy and missed 2 weeks. He was able to complete a 6 MWT for 853 feet. He  still uses UE to ascend and descend the stairs, but able to use reciprocal pattern. Slight improvement noted with MMT. Decrease noted with SLS and tandem stance for balance. He has noted improvement since receiving therapy. He is currently ambulating with no AD ad wearing AFO. He is progressing towards new goals. Patient will continue to benefit from skilled PT services to modify and progress therapeutic interventions, address functional mobility deficits, address ROM deficits, address strength deficits, analyze and address soft tissue restrictions, analyze and cue movement patterns, analyze and modify body mechanics/ergonomics and assess and modify postural abnormalities to attain remaining goals. GOALS/Progress towards goals:  New goals starting assessment from  21     Short Term Goals: To be accomplished in 8 treatments. 1.  Indep with a HEP to assist in rehab progression. [x]? Met []? Not met []? Partially met      2.  1/2 - 1 grade improvement in LE strength to improve stability. [x]? Met []? Not met []? Partially met     3. 5-10 second tandem stance to reduce risk for falls. [x]? Met []? Not met []? Partially met      Long Term Goals: To be accomplished in 16 treatments. 1.  Pt will be able to stand >10 minutes without significant LE pain/weakness. [x]? Met []? Not met []? Partially met  12/10    2.  TUG score </= 20 seconds to reduce risk for falls. []? Met [x]? Not met []? Partially met  12/10 , 1/24/22 TUG 21 sec    3. 10-20% improvement in AM-PAC score for improved functional mobility. [] Met [] Not met [x] Partially met      12/10 new goals:    4. Patient will be able to negotiate 4(6'') steps with 1 rail and STS ascending/descending without the need of pulling with his hands. [] Met [x] Not met [] Partially met     5. Patient will be able to perform a 6 min walk test to improve his endurance.  [x] Met [] Not met [] Partially met         PLAN    Upgrade activities as tolerated     [x]  Continue plan of care  [x]  Update interventions per flow sheet       []  Discharge due to:_   []  Other:    Destiny Dia, PTA 1/24/2022

## 2022-01-26 ENCOUNTER — HOSPITAL ENCOUNTER (OUTPATIENT)
Dept: PHYSICAL THERAPY | Age: 57
Discharge: HOME | End: 2022-01-26
Payer: COMMERCIAL

## 2022-01-26 PROCEDURE — 97110 THERAPEUTIC EXERCISES: CPT

## 2022-01-26 NOTE — PROGRESS NOTES
PT DAILY TREATMENT NOTE - MCR     Patient Name: Hector Dawson  Date:2022  : 1965  [x]  Patient  Verified  Payor: [address] / Plan: [address] / Product Type: Managed Care Medicaid /    Treatment Area: Other abnormalities of gait and mobility [R26.89]  Unsteadiness on feet [R26.81]   Next MD APPT:  In time:09:20 am    Out time:10:00 am  Total Treatment Time (min) :40 min  Total Timed Codes (min):40 min  1:1 Treatment Time ([facility] only):40 min  Visit #:      SUBJECTIVE  Pain Level (0-10 scale) pre treatment: 0/10    Pain Level (0-10 scale) post treatment: 0/10  Any medication changes, allergies to medications, adverse drug reactions, diagnosis change, or new procedure performed?:   [x] No    [] Yes (see summary sheet for update)  Subjective functional status/changes:   [x] No changes reported    Pt stated he is having his tonsils removed 22 and biopsy. He stated he does not understand why his R leg is so weak. He also reports every time he work his legs he seats a lot. OBJECTIVE    40 min Therapeutic Exercise:  [x] See flow sheet :   Rationale: increase ROM, increase strength, improve coordination, improve balance and increase proprioception to improve the patients ability to ambulate with more stability and reduce fall risk.      min Gait Training:  _50x2__ feet with _none__ device on level surfaces with _csv__ level of assist and AFO   Rationale: increase ROM, increase strength, improve coordination, improve balance and increase proprioception  to improve the patients ability to ambulate with more stability       With   [x] TE   [] TA   [] Neuro   [] SC   [] other: Patient Education: [x] Review HEP    [] Progressed/Changed HEP based on:   [] positioning   [] body mechanics   [] transfers   [] Use of heat/ice    [] other:         Other Objective/Functional Measures: added leg press and theraband       ASSESSMENT/Changes in Function:   Patient will be having outpatient surgery to remove his tonsils and get biopsy on 2/11/22, Patient has hyperhidrosis when exercising. Patient had difficulty with SLS using theraband. He wants to get LE stronger and balance better. No pain pre or post treatment session. Patient will continue to benefit from skilled PT services to modify and progress therapeutic interventions, address functional mobility deficits, address ROM deficits, address strength deficits, analyze and address soft tissue restrictions, analyze and cue movement patterns, analyze and modify body mechanics/ergonomics and assess and modify postural abnormalities to attain remaining goals. GOALS/Progress towards goals:  New goals starting assessment from  11/11/21     Short Term Goals: To be accomplished in 8 treatments. 1.  Indep with a HEP to assist in rehab progression. [x]? Met []? Not met []? Partially met  11/30    2.  1/2 - 1 grade improvement in LE strength to improve stability. [x]? Met []? Not met []? Partially met 11/11    3. 5-10 second tandem stance to reduce risk for falls. [x]? Met []? Not met []? Partially met      Long Term Goals: To be accomplished in 16 treatments. 1.  Pt will be able to stand >10 minutes without significant LE pain/weakness. [x]? Met []? Not met []? Partially met  12/10    2. TUG score </= 20 seconds to reduce risk for falls. []? Met [x]? Not met []? Partially met  12/10 , 1/24/22 TUG 21 sec    3. 10-20% improvement in AM-PAC score for improved functional mobility. [] Met [] Not met [x] Partially met      12/10 new goals:    4. Patient will be able to negotiate 4(6'') steps with 1 rail and STS ascending/descending without the need of pulling with his hands. [] Met [x] Not met [] Partially met     5. Patient will be able to perform a 6 min walk test to improve his endurance.  [x] Met [] Not met [] Partially met         PLAN    Upgrade activities as tolerated     [x]  Continue plan of care  [x]  Update interventions per flow sheet []  Discharge due to:_   []  Other:    Maycol Kimball, PTA 1/26/2022

## 2022-01-31 ENCOUNTER — APPOINTMENT (OUTPATIENT)
Dept: PHYSICAL THERAPY | Age: 57
End: 2022-01-31
Payer: COMMERCIAL

## 2022-02-02 ENCOUNTER — HOSPITAL ENCOUNTER (OUTPATIENT)
Dept: PHYSICAL THERAPY | Age: 57
Discharge: HOME | End: 2022-02-02
Payer: COMMERCIAL

## 2022-02-02 PROCEDURE — 97112 NEUROMUSCULAR REEDUCATION: CPT

## 2022-02-02 PROCEDURE — 97110 THERAPEUTIC EXERCISES: CPT

## 2022-02-02 NOTE — PROGRESS NOTES
PT DAILY TREATMENT NOTE - [facility]     Patient Name: Jimmy Guerra  Date:2022  : 1965  [x]  Patient  Verified  [facility] [address] / Plan: [place] [address] / Product Type: Managed Care Medicaid /    Treatment Area: Other abnormalities of gait and mobility [R26.89]  Unsteadiness on feet [R26.81]   Next MD APPT:  In time:09:10 am    Out time:09:55 am  Total Treatment Time (min) :45 min  Total Timed Codes (min):45 min  1:1 Treatment Time [facility] only):45min  Visit #: (ins)      SUBJECTIVE  Pain Level (0-10 scale) pre treatment: 0/10    Pain Level (0-10 scale) post treatment: 0/10  Any medication changes, allergies to medications, adverse drug reactions, diagnosis change, or new procedure performed?:   [x] No    [] Yes (see summary sheet for update)  Subjective functional status/changes:   [x] No changes reported    ( tonsils removed 22 and biopsy. ) He stated he has been having difficulty sleeping ever since he found out he has sleep apnea. \"I have to go get one more test done for my sleep apnea. I'll be sleeping there. \" Patient also complaining of R calf pain today that is hurting more today. \"Everytime I exercise this leg starts to bother me. \" Patient stated he thought his balance was getting better until today. \"Going backwards was hard. \"   OBJECTIVE    30 min Therapeutic Exercise:  [x] See flow sheet :   Rationale: increase ROM, increase strength, improve coordination, improve balance and increase proprioception to improve the patients ability to ambulate with more stability and reduce fall risk.      min Gait Training:  _50x2__ feet with _none__ device on level surfaces with _csv__ level of assist and AFO   Rationale: increase ROM, increase strength, improve coordination, improve balance and increase proprioception  to improve the patients ability to ambulate with more stability   15 min Neuromuscular Re-education:  []  See flow sheet :   Rationale: increase strength, improve coordination, improve balance and increase proprioception  to improve the patients ability to ambulate with more stability and reduce fall risk    With   [x] TE   [] TA   [] Neuro   [] SC   [] other: Patient Education: [x] Review HEP    [] Progressed/Changed HEP based on:   [] positioning   [] body mechanics   [] transfers   [] Use of heat/ice    [] other:         Other Objective/Functional Measures: added deon deon with bolster       ASSESSMENT/Changes in Function:   Patient concerned about his recent diagnosis of sleep apnea. He is still scheduled on 2/11/22 to get his tonsils removed. He reported increase in R LE pain, especially to his calf muscle. He had moderate difficulty with deon deon exercise especially moving LE backwards. Difficulty also noted with tandem on step today. Patient will continue to benefit from skilled PT services to modify and progress therapeutic interventions, address functional mobility deficits, address ROM deficits, address strength deficits, analyze and address soft tissue restrictions, analyze and cue movement patterns, analyze and modify body mechanics/ergonomics and assess and modify postural abnormalities to attain remaining goals. GOALS/Progress towards goals:  New goals starting assessment from  11/11/21     Short Term Goals: To be accomplished in 8 treatments. 1.  Indep with a HEP to assist in rehab progression. [x]? Met []? Not met []? Partially met  11/30    2.  1/2 - 1 grade improvement in LE strength to improve stability. [x]? Met []? Not met []? Partially met 11/11    3. 5-10 second tandem stance to reduce risk for falls. [x]? Met []? Not met []? Partially met      Long Term Goals: To be accomplished in 16 treatments. 1.  Pt will be able to stand >10 minutes without significant LE pain/weakness. [x]? Met []? Not met []? Partially met  12/10    2. TUG score </= 20 seconds to reduce risk for falls. []? Met [x]? Not met []?  Partially met  12/10 , 1/24/22 TUG 21 sec    3. 10-20% improvement in AM-PAC score for improved functional mobility. [] Met [] Not met [x] Partially met      12/10 new goals:    4. Patient will be able to negotiate 4(6'') steps with 1 rail and STS ascending/descending without the need of pulling with his hands. [] Met [x] Not met [] Partially met     5. Patient will be able to perform a 6 min walk test to improve his endurance.  [x] Met [] Not met [] Partially met     Treatment Plan may include any combination of the following: Therapeutic exercise, Neuromuscular re-education, Physical agent/modality, Gait/balance training, Manual therapy, Patient education, Functional mobility training and Stair training    PLAN    Upgrade activities as tolerated     [x]  Continue plan of care  [x]  Update interventions per flow sheet       []  Discharge due to:_   []  Other:    Destiny Dia, PTA 2/2/2022

## 2022-02-07 ENCOUNTER — TELEPHONE (OUTPATIENT)
Dept: ENDOCRINOLOGY | Age: 57
End: 2022-02-07

## 2022-02-07 ENCOUNTER — HOSPITAL ENCOUNTER (OUTPATIENT)
Dept: PHYSICAL THERAPY | Age: 57
Discharge: HOME | End: 2022-02-07
Payer: COMMERCIAL

## 2022-02-07 DIAGNOSIS — E11.65 UNCONTROLLED TYPE 2 DIABETES MELLITUS WITH HYPERGLYCEMIA (HCC): Primary | ICD-10-CM

## 2022-02-07 PROCEDURE — 97110 THERAPEUTIC EXERCISES: CPT

## 2022-02-07 PROCEDURE — 97112 NEUROMUSCULAR REEDUCATION: CPT

## 2022-02-07 RX ORDER — BLOOD-GLUCOSE CONTROL, NORMAL
EACH MISCELLANEOUS
Qty: 150 EACH | Refills: 3 | Status: SHIPPED | OUTPATIENT
Start: 2022-02-07

## 2022-02-07 NOTE — TELEPHONE ENCOUNTER
Requested Prescriptions     Signed Prescriptions Disp Refills    lancets (OneTouch Delica Plus Lancet) 30 gauge misc 150 Each 3     Sig: Use to check blood glucose level 4 times daily DX E11.65     Authorizing Provider: Goyo Chao     Ordering User: Aurelio Jeffries     Verbal order read back to Dr Diamond Mistry to send refill.

## 2022-02-07 NOTE — PROGRESS NOTES
PT DAILY TREATMENT NOTE - [facility]     Patient Name: Katrina Mike  IDFB:[id number]  : 1965  [x]  Patient  Verified  [facility] [address] / Plan: [address] / Product Type: Managed Care Medicaid /    Treatment Area: Other abnormalities of gait and mobility [R26.89]  Unsteadiness on feet [R26.81]   Next MD APPT:  In time:11:30 am    Out time:12:15 am  Total Treatment Time (min) :45 min  Total Timed Codes (min):45 min  1:1 Treatment Time [facility]):45min  Visit #: (ins)      SUBJECTIVE  Pain Level (0-10 scale) pre treatment: 3-4/10    Pain Level (0-10 scale) post treatment: 2/10  Any medication changes, allergies to medications, adverse drug reactions, diagnosis change, or new procedure performed?:   [x] No    [] Yes (see summary sheet for update)  Subjective functional status/changes:   [x] No changes reported    Patient stated he stopped by [facility] to get AFO rechecked due to pain in R calf. \"They told me to stretch my calf more. I thought it would just come back. \"  Patient did report it feels better after he stretches but he did not think he would have to continue. He will be having surgery on 22. Patient stated he had to get someone to carry his groceries up the stairs due to calf pain. OBJECTIVE    30 min Therapeutic Exercise:  [x] See flow sheet :   Rationale: increase ROM, increase strength, improve coordination, improve balance and increase proprioception to improve the patients ability to ambulate with more stability and reduce fall risk.      min Gait Training:  _50x2__ feet with _none__ device on level surfaces with _csv__ level of assist and AFO   Rationale: increase ROM, increase strength, improve coordination, improve balance and increase proprioception  to improve the patients ability to ambulate with more stability   15 min Neuromuscular Re-education:  []  See flow sheet :   Rationale: increase strength, improve coordination, improve balance and increase proprioception  to improve the patients ability to ambulate with more stability and reduce fall risk    With   [x] TE   [] TA   [] Neuro   [] SC   [] other: Patient Education: [x] Review HEP    [] Progressed/Changed HEP based on:   [] positioning   [] body mechanics   [] transfers   [] Use of heat/ice    [x] other: Reviewed stretches for calf with patient         Other Objective/Functional Measures: added deon deon with bolster       ASSESSMENT/Changes in Function:   Patient reporting tightness to R calf but decreases with stretching. Reviewed calf stretches with AFO on and off. Instructed patient to stretch more  Frequently. He had more difficulty and pain with exercises today. Decrease in height of steps due to difficulty. Patient ambulated on treadmill at .7 mph and shorten step lengths. Patient also reporting increase in tightness/pain with increase weight bearing on R LE. Patient may benefit from a cane due to pain on R LE with increase ambulation and weight bearing. Patient will also be having surgery on 2/11 (other health issue) and reminded to bring release to resume therapy. Patient also with decrease endurance today. Patient will continue to benefit from skilled PT services to modify and progress therapeutic interventions, address functional mobility deficits, address ROM deficits, address strength deficits, analyze and address soft tissue restrictions, analyze and cue movement patterns, analyze and modify body mechanics/ergonomics and assess and modify postural abnormalities to attain remaining goals. GOALS/Progress towards goals:  New goals starting assessment from  11/11/21     Short Term Goals: To be accomplished in 8 treatments. 1.  Indep with a HEP to assist in rehab progression. [x]? Met []? Not met []? Partially met  11/30    2.  1/2 - 1 grade improvement in LE strength to improve stability. [x]? Met []? Not met []?  Partially met 11/11    3. 5-10 second tandem stance to reduce risk for falls. [x]? Met []? Not met []? Partially met      Long Term Goals: To be accomplished in 16 treatments. 1.  Pt will be able to stand >10 minutes without significant LE pain/weakness. [x]? Met []? Not met []? Partially met  12/10    2. TUG score </= 20 seconds to reduce risk for falls. []? Met [x]? Not met []? Partially met  12/10 , 1/24/22 TUG 21 sec    3. 10-20% improvement in AM-PAC score for improved functional mobility. [] Met [] Not met [x] Partially met      12/10 new goals:    4. Patient will be able to negotiate 4(6'') steps with 1 rail and STS ascending/descending without the need of pulling with his hands. [] Met [x] Not met [] Partially met     5. Patient will be able to perform a 6 min walk test to improve his endurance.  [x] Met [] Not met [] Partially met     Treatment Plan may include any combination of the following: Therapeutic exercise, Neuromuscular re-education, Physical agent/modality, Gait/balance training, Manual therapy, Patient education, Functional mobility training and Stair training    PLAN    Upgrade activities as tolerated     [x]  Continue plan of care  [x]  Update interventions per flow sheet       []  Discharge due to:_   []  Other:    Pallavi Cardoso, PTA 2/7/2022

## 2022-02-09 LAB
ALBUMIN SERPL-MCNC: 5 G/DL (ref 3.8–4.9)
ALBUMIN/CREAT UR: 22 MG/G CREAT (ref 0–29)
ALBUMIN/GLOB SERPL: 1.9 {RATIO} (ref 1.2–2.2)
ALP SERPL-CCNC: 99 IU/L (ref 44–121)
ALT SERPL-CCNC: 27 IU/L (ref 0–44)
AST SERPL-CCNC: 32 IU/L (ref 0–40)
BASOPHILS # BLD AUTO: 0.1 X10E3/UL (ref 0–0.2)
BASOPHILS NFR BLD AUTO: 1 %
BILIRUB SERPL-MCNC: 0.6 MG/DL (ref 0–1.2)
BUN SERPL-MCNC: 30 MG/DL (ref 6–24)
BUN/CREAT SERPL: 14 (ref 9–20)
CALCIUM SERPL-MCNC: 10.1 MG/DL (ref 8.7–10.2)
CHLORIDE SERPL-SCNC: 98 MMOL/L (ref 96–106)
CHOLEST SERPL-MCNC: 128 MG/DL (ref 100–199)
CO2 SERPL-SCNC: 23 MMOL/L (ref 20–29)
CREAT SERPL-MCNC: 2.12 MG/DL (ref 0.76–1.27)
CREAT UR-MCNC: 265.4 MG/DL
EOSINOPHIL # BLD AUTO: 0.2 X10E3/UL (ref 0–0.4)
EOSINOPHIL NFR BLD AUTO: 3 %
ERYTHROCYTE [DISTWIDTH] IN BLOOD BY AUTOMATED COUNT: 12.6 % (ref 11.6–15.4)
EST. AVERAGE GLUCOSE BLD GHB EST-MCNC: 232 MG/DL
GLOBULIN SER CALC-MCNC: 2.6 G/DL (ref 1.5–4.5)
GLUCOSE SERPL-MCNC: 211 MG/DL (ref 65–99)
HBA1C MFR BLD: 9.7 % (ref 4.8–5.6)
HCT VFR BLD AUTO: 41 % (ref 37.5–51)
HDLC SERPL-MCNC: 31 MG/DL
HGB BLD-MCNC: 13.8 G/DL (ref 13–17.7)
IMM GRANULOCYTES # BLD AUTO: 0 X10E3/UL (ref 0–0.1)
IMM GRANULOCYTES NFR BLD AUTO: 0 %
IMP & REVIEW OF LAB RESULTS: NORMAL
INTERPRETATION: NORMAL
LDLC SERPL CALC-MCNC: 72 MG/DL (ref 0–99)
LYMPHOCYTES # BLD AUTO: 3.3 X10E3/UL (ref 0.7–3.1)
LYMPHOCYTES NFR BLD AUTO: 37 %
MCH RBC QN AUTO: 31.3 PG (ref 26.6–33)
MCHC RBC AUTO-ENTMCNC: 33.7 G/DL (ref 31.5–35.7)
MCV RBC AUTO: 93 FL (ref 79–97)
MICROALBUMIN UR-MCNC: 57.2 UG/ML
MONOCYTES # BLD AUTO: 0.8 X10E3/UL (ref 0.1–0.9)
MONOCYTES NFR BLD AUTO: 9 %
NEUTROPHILS # BLD AUTO: 4.4 X10E3/UL (ref 1.4–7)
NEUTROPHILS NFR BLD AUTO: 50 %
PLATELET # BLD AUTO: 316 X10E3/UL (ref 150–450)
POTASSIUM SERPL-SCNC: 4.7 MMOL/L (ref 3.5–5.2)
PROT SERPL-MCNC: 7.6 G/DL (ref 6–8.5)
RBC # BLD AUTO: 4.41 X10E6/UL (ref 4.14–5.8)
SODIUM SERPL-SCNC: 136 MMOL/L (ref 134–144)
TRIGL SERPL-MCNC: 141 MG/DL (ref 0–149)
VLDLC SERPL CALC-MCNC: 25 MG/DL (ref 5–40)
WBC # BLD AUTO: 8.8 X10E3/UL (ref 3.4–10.8)

## 2022-02-16 ENCOUNTER — APPOINTMENT (OUTPATIENT)
Dept: PHYSICAL THERAPY | Age: 57
End: 2022-02-16
Payer: COMMERCIAL

## 2022-02-18 ENCOUNTER — APPOINTMENT (OUTPATIENT)
Dept: PHYSICAL THERAPY | Age: 57
End: 2022-02-18
Payer: COMMERCIAL

## 2022-02-23 ENCOUNTER — TRANSCRIBE ORDER (OUTPATIENT)
Dept: SCHEDULING | Age: 57
End: 2022-02-23

## 2022-02-23 DIAGNOSIS — Q60.0 UNILATERAL AGENESIS OF KIDNEY: Primary | ICD-10-CM

## 2022-02-23 DIAGNOSIS — N18.30 CHRONIC KIDNEY DISEASE, STAGE III (MODERATE) (HCC): ICD-10-CM

## 2022-02-28 ENCOUNTER — HOSPITAL ENCOUNTER (OUTPATIENT)
Dept: ULTRASOUND IMAGING | Age: 57
Discharge: HOME | End: 2022-02-28
Attending: INTERNAL MEDICINE
Payer: COMMERCIAL

## 2022-02-28 ENCOUNTER — OFFICE VISIT (OUTPATIENT)
Dept: ENDOCRINOLOGY | Age: 57
End: 2022-02-28
Payer: COMMERCIAL

## 2022-02-28 VITALS
SYSTOLIC BLOOD PRESSURE: 123 MMHG | WEIGHT: 279.4 LBS | HEIGHT: 73 IN | HEART RATE: 72 BPM | BODY MASS INDEX: 37.03 KG/M2 | DIASTOLIC BLOOD PRESSURE: 67 MMHG | TEMPERATURE: 98.6 F | OXYGEN SATURATION: 97 %

## 2022-02-28 DIAGNOSIS — N18.30 CHRONIC KIDNEY DISEASE, STAGE III (MODERATE) (HCC): ICD-10-CM

## 2022-02-28 DIAGNOSIS — I10 ESSENTIAL HYPERTENSION: ICD-10-CM

## 2022-02-28 DIAGNOSIS — Q60.0 UNILATERAL AGENESIS OF KIDNEY: ICD-10-CM

## 2022-02-28 DIAGNOSIS — C64.2 RENAL CANCER, LEFT (HCC): ICD-10-CM

## 2022-02-28 DIAGNOSIS — E78.2 MIXED HYPERLIPIDEMIA: ICD-10-CM

## 2022-02-28 DIAGNOSIS — Z90.5 H/O LEFT NEPHRECTOMY: ICD-10-CM

## 2022-02-28 DIAGNOSIS — E11.65 UNCONTROLLED TYPE 2 DIABETES MELLITUS WITH HYPERGLYCEMIA (HCC): Primary | ICD-10-CM

## 2022-02-28 DIAGNOSIS — N18.32 STAGE 3B CHRONIC KIDNEY DISEASE (HCC): ICD-10-CM

## 2022-02-28 PROCEDURE — 76770 US EXAM ABDO BACK WALL COMP: CPT

## 2022-02-28 PROCEDURE — 99215 OFFICE O/P EST HI 40 MIN: CPT | Performed by: INTERNAL MEDICINE

## 2022-02-28 RX ORDER — AMLODIPINE BESYLATE 10 MG/1
10 TABLET ORAL DAILY
COMMUNITY
Start: 2022-02-23

## 2022-02-28 RX ORDER — INSULIN ASPART 100 [IU]/ML
INJECTION, SOLUTION INTRAVENOUS; SUBCUTANEOUS
Qty: 15 ML | Refills: 6 | Status: SHIPPED | OUTPATIENT
Start: 2022-02-28 | End: 2022-06-07 | Stop reason: SDUPTHER

## 2022-02-28 RX ORDER — METFORMIN HYDROCHLORIDE 500 MG/1
TABLET ORAL
Qty: 180 TABLET | Refills: 2 | Status: SHIPPED | OUTPATIENT
Start: 2022-02-28 | End: 2022-06-07 | Stop reason: SDUPTHER

## 2022-02-28 RX ORDER — SODIUM BICARBONATE 650 MG/1
650 TABLET ORAL 3 TIMES DAILY
COMMUNITY
Start: 2022-02-23

## 2022-02-28 NOTE — PATIENT INSTRUCTIONS
SPECIFIC INSTRUCTIONS BELOW         Follow the steps  to get started on  freestyle Michel 2  for Medicare patients       1. Keep a log of blood sugars by checking 4 times a day    2. you must be on insulin shots at least 3 times a day   3. Contact one of the following  DME suppliers  and find out who accepts your plan   4.  Give that supplier  our  office info  and fax number       [employer]  is best  for  freestyle MICHEL 2 - [phone] [facility]   [phone]      [facility]   [phone]   ext  1 [address]  [phone] [facility]   [phone] ( least preferred )       Alonso Velazquez [facility]  [phone] ( [facility] preferred )      [employer]   [phone]         ---------------------------------------------------------------------------------------------------------------------------------------------          DRINK water     Do not skip meals  Do not eat in between meals    Reduce carbs- pasta, rice, potatoes, bread   Try to avoid processed bread products like BISCUITS, CROISSANTS, MUFFINS    Do not drink juices or sodas, even if they are calorie zero or diet drinks and especially avoid using powders like crystalloids , JULIETTE-AIDS     Do not eat peanut butter     Do not eat sugar free cookies and cakes   Do not eat peaches, oranges, pineapples, raisins, grapes , canteloupe , honey dew, mangoes , watermelon  and fruit medleys      --------------------------------------------------------------------------------------------------------      Decrease to metformin er one pill twice a day       victoza at 1.2  Mg a day         Check blood sugars before each meal and at bed time     STAY ON LANTUS  50 UNITS AT NIGHT      increase  Novolog  7   units before meals  ( stop prandin )    Take additional Novolog   as follows with meals:    150-200 mg 1 units    201-250 mg 3 units    251-300 mg 5 units    301-350 mg 7 units    351-400 mg 9 units    401-450 mg 11 units    451-500 mg 13 units      Less than  70  - no insulin             -------------PAY ATTENTION TO THESE GENERAL INSTRUCTIONS -----------------      - The medications prescribed at this visit will not be available at pharmacy until 6 pm       - YOUR MED LIST IS NOT UP TO DATE AS SOME CHANGES ARE BEING MADE AFTER THE VISIT - FOLLOW SPECIFIC INSTRUCTIONS  ABOVE     -ANY tests other than blood work, which you opt to do  outside the  [facility] facilities, you are responsible for prior authorizations if  required    - [phone] [facility]- PLEASE IGNORE     Results     *Normal results will not be notified by a phone call starting January 1 2021   *If you have an upcoming visit, the results will be discussed at the visit   *Please sign up for MY CHART if you want access to your lab and test results  *Abnormal results which require immediate attention will be notified by phone call   *Abnormal results which do not require immediate assistance will be notified in 1-2 weeks       Refills    -    have your pharmacy send us a refill request . Refills are done max for one year and a visit is a must before refills are extended    Follow up appointments -  highly encourage you to make it when you are checking out. We can accommodate you into the schedule based on your clinical situation, but not for extending refills beyond a year. Labs are important to give refills and is important to get labs before the visit     Phone calls  -  Allow  24 hrs.  for non-urgent calls to be returned  Prior authorization - It may take 2-4 weeks to process  Forms  -  FMLA, DMV etc., will take up to 2 weeks to process  Cancellations - please notify the office 2 days in advance   Samples  - will only be dispensed at visits       If not showing for the appointments and cancelling appointments within 24 hours are kept track of and three  of such situations in  two consecutive years will likely be considered for termination from the practice    -------------------------------------------------------------------------------------------------------------------

## 2022-02-28 NOTE — LETTER
2/28/2022    Patient: Gretchen Pittman   YOB: 1965   Date of Visit: 2/28/2022     Gwendolyn Broderick, [facility] [address] [facility] [address]  Via Fax: [phone]    Dear Gwendolyn Broderick, NP,      Thank you for referring Mr. John Mann to [id number] [place] [address] for evaluation. My notes for this consultation are attached. If you have questions, please do not hesitate to call me. I look forward to following your patient along with you.       Sincerely,    Laura Silva MD

## 2022-02-28 NOTE — PROGRESS NOTES
[facility]  Cesar Kehr MD, FACE    Sindi Heading is a 60 y.o. 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  From [address] [facility]   In oct 2020   He stayed without meds for good 10 months , HE HAD FEW MEDS REFILLED on his visit in feb 2021   Per their notes, he used to be on actos 45 and nesina 25 mg  Current diabetic medications include  Metformin, bydureon, lantus 50 units . bydureon was started 2 months ago- not being covered   Current monitoring regimen: home blood tests - NOT INTERESTED          Review of Systems   None       Physical Exam   Constitutional: She is oriented to person, place, and time. She appears well-developed and well-nourished. Psychiatric: She has a normal mood and affect. Vitals:    02/28/22 1308   BP: 123/67   BP 1 Location: Left upper arm   BP Patient Position: Sitting   BP Cuff Size: Adult long   Pulse: 72   Temp: 98.6 °F (37 °C)   TempSrc: Temporal   Height: 6' 1\" (1.854 m)   Weight: 279 lb 6.4 oz (126.7 kg)   SpO2: 97%      Review of Systems   Fatigued      Physical Exam   Constitutional: oriented to person, place, and time. appears well-developed and well-nourished. Psychiatric:  has a normal mood and affect.           Lab Results   Component Value Date/Time    Hemoglobin A1c 9.7 (H) 02/08/2022 10:18 AM    Hemoglobin A1c 8.6 (H) 11/04/2021 12:00 AM    Hemoglobin A1c 9.6 (H) 09/08/2021 10:47 AM    Glucose 211 (H) 02/08/2022 10:18 AM    Glucose (POC) 232 (H) 01/14/2022 08:17 AM    Microalb/Creat ratio (ug/mg creat.) 22 02/08/2022 10:18 AM    LDL, calculated 72 02/08/2022 10:18 AM    LDL, calculated 71 05/03/2021 10:05 AM    Creatinine 2.12 (H) 02/08/2022 10:18 AM      Lab Results   Component Value Date/Time    Cholesterol, total 128 02/08/2022 10:18 AM    HDL Cholesterol 31 (L) 02/08/2022 10:18 AM    LDL, calculated 72 02/08/2022 10:18 AM    LDL, calculated 71 05/03/2021 10:05 AM    Triglyceride 141 02/08/2022 10:18 AM    CHOL/HDL Ratio 3.3 05/03/2021 10:05 AM     Lab Results   Component Value Date/Time    ALT (SGPT) 27 02/08/2022 10:18 AM    Alk. phosphatase 99 02/08/2022 10:18 AM    Bilirubin, total 0.6 02/08/2022 10:18 AM    Albumin 5.0 (H) 02/08/2022 10:18 AM    Protein, total 7.6 02/08/2022 10:18 AM    INR 0.9 05/17/2021 10:06 AM    Prothrombin time 12.3 05/17/2021 10:06 AM    PLATELET 458 67/85/2505 10:18 AM     Lab Results   Component Value Date/Time    GFR est non-AA 33 (L) 02/08/2022 10:18 AM    GFR est AA 38 (L) 02/08/2022 10:18 AM    Creatinine 2.12 (H) 02/08/2022 10:18 AM    BUN 30 (H) 02/08/2022 10:18 AM    Sodium 136 02/08/2022 10:18 AM    Potassium 4.7 02/08/2022 10:18 AM    Chloride 98 02/08/2022 10:18 AM    CO2 23 02/08/2022 10:18 AM    Magnesium 1.7 06/07/2021 02:27 AM    Phosphorus 2.6 06/16/2021 03:26 AM             ASSESSMENT AND  PLAN     1.  Type 2 DM uncontrolled :  a1c is   9.7 %    From feb 2022   Compared to    8.6 %     From   Nov 2021   Compared to  9.6 %   From  Sept 2021   Compared to    9.2 %       From July 2021     Compared to   10.2 %    From  May 2021  by POC    Compared to 11.6 %    From feb 2021 Feb 2022    He is upset that he had contrast  Ct and that hurt his kidney fuction   His egfr is @ 40 ml/min     He was told to stop metformin and that made it worse   AND   He has tonsil  surgery    Resumed it  3 weeks ago , but because of low egfr- recommending only one pill twice a day with meals   His sugars are high - over 200      I am recommending the CGM    Raghavendra Macdonald   is being seen for DM type 2 with renal insuff   Patient  performs 4 times of blood glucose checks a day utilizing the home BGM. Patient  uses  subcutaneous 4  Insulin  shots   to treat  diabetes. Patient does adjustments to the regimen by sliding scale or bolus wizard  on the basis of therapeutic CGM testing results            November 2021     Continue on basal bolus regimen   He is to check sugars 4 times a day   He is now off chemotherapy   Will start on metformin , inj incretins         August 2021  Some improvement   Discouraged use of inj incretins or metformin because of nausea likely from chemo agents   Suggesting starting on basal bolus regimen which could help if chemo and steroids are started in future   He Is agreeable and trained him on this   He is given printed insulin instructions         2. Hypoglycemia :  Educated on treating the hypoglycemia. 3. HTN : continue current care. 4.  Dyslipidemia : continue current meds. 5. On [place]  And  plavix     6. Diabetic complications :     A. Retinopathy-YES   educated on this complication,  regular f/u with ophthalmologist encouraged    B. Nephropathy - yes, stage 3 now  Compared to stage 2 (egfr was 50 ml/min  )        C. 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  Left sided nephrectomy for renal cancerous mass June 2021       High risk  Given a single kidney with lost control and decrease egfr     Reviewed results with patient and discussed the labs being ordered today/bnv  Patient voiced understanding of plan of care

## 2022-02-28 NOTE — PROGRESS NOTES
Jasmeet Panchal is a 60 y.o. male here for   Chief Complaint   Patient presents with    Diabetes       1. Have you been to the ER, urgent care clinic since your last visit? Hospitalized since your last visit? -February 11th Tonsillectomy [facility]      2. Have you seen or consulted any other health care providers outside of the [address] [place] [address] since your last visit?   Include any pap smears or colon screening.- Nephrologist

## 2022-03-10 ENCOUNTER — DOCUMENTATION ONLY (OUTPATIENT)
Dept: ENDOCRINOLOGY | Age: 57
End: 2022-03-10

## 2022-03-10 NOTE — PROGRESS NOTES
[facility] has sent a message asking - which CGM patient needs  Tamme 61 it -  Saw this  first time   He has medicaid plan   Will they do prior auth  ?           Call them  [phone]

## 2022-03-11 NOTE — PROGRESS NOTES
Called patient to inquire if he has checked with his insurance plan to see which CGM they will cover. Patient states that he was advised by [facility] [place] advised him that his insurance will cover both of them. Sent to Dr Adam Robles for further instruction.

## 2022-03-13 ENCOUNTER — DOCUMENTATION ONLY (OUTPATIENT)
Dept: ENDOCRINOLOGY | Age: 57
End: 2022-03-13

## 2022-03-13 NOTE — PROGRESS NOTES
JAVON,   To my knowledge the CGMs are generally mailed and Prior auths are taken care of by diabetic supply companies   My doubt if [facility] [facility] will do the prior auth  Or not     Lets send it and keep fingers crossed to see what and how they will take care of this  ?  ( may be we will learn from this experience )    Luisa Guillory MD

## 2022-03-14 DIAGNOSIS — E11.65 UNCONTROLLED TYPE 2 DIABETES MELLITUS WITH HYPERGLYCEMIA (HCC): Primary | ICD-10-CM

## 2022-03-14 RX ORDER — FLASH GLUCOSE SENSOR
KIT MISCELLANEOUS
Qty: 2 KIT | Refills: 5 | Status: SHIPPED | OUTPATIENT
Start: 2022-03-14 | End: 2022-09-08 | Stop reason: SDUPTHER

## 2022-03-14 RX ORDER — FLASH GLUCOSE SCANNING READER
EACH MISCELLANEOUS
Qty: 1 EACH | Refills: 0 | Status: SHIPPED | OUTPATIENT
Start: 2022-03-14 | End: 2022-09-08 | Stop reason: SDUPTHER

## 2022-03-14 NOTE — TELEPHONE ENCOUNTER
Requested Prescriptions     Signed Prescriptions Disp Refills    flash glucose sensor (FreeStyle Michel 2 Sensor) kit 2 Kit 5     Sig: Use to check blood glucose level 4 times daily. Change once every 14 days. DX E11.65     Authorizing Provider: Alyson Chung     Ordering User: Antonia Sandoval    flash glucose scanning reader (FreeStyle Michel 2 Isom) misc 1 Each 0     Sig: Use to check blood glucose level 4 times daily DX E11.65     Authorizing Provider: Alyson Chung     Ordering User: Antonia Sandoval     Verbal order read back to Dr Romero Herron to send refill.

## 2022-03-15 NOTE — PROGRESS NOTES
Definitly, medicaid covers the CGM but with necessary requirements based on my notation and  we use special diabetic suppliers for it and   Not a local pharmacy

## 2022-03-18 PROBLEM — E11.9 TYPE 2 DIABETES MELLITUS (HCC): Status: ACTIVE | Noted: 2020-03-26

## 2022-03-18 PROBLEM — K59.09 OTHER CONSTIPATION: Status: ACTIVE | Noted: 2021-08-04

## 2022-03-18 PROBLEM — N50.82 SCROTAL PAIN: Status: ACTIVE | Noted: 2021-06-28

## 2022-03-18 PROBLEM — I10 ESSENTIAL HYPERTENSION: Status: ACTIVE | Noted: 2020-03-26

## 2022-03-18 PROBLEM — A63.0 GENITAL WARTS: Status: ACTIVE | Noted: 2021-06-28

## 2022-03-19 PROBLEM — N28.89 RENAL MASS: Status: ACTIVE | Noted: 2021-06-06

## 2022-03-19 PROBLEM — R94.39 ABNORMAL STRESS TEST: Status: ACTIVE | Noted: 2021-05-17

## 2022-03-19 PROBLEM — M19.90 ARTHRITIS: Status: ACTIVE | Noted: 2020-06-26

## 2022-03-19 PROBLEM — M79.606 PAIN OF LOWER EXTREMITY: Status: ACTIVE | Noted: 2021-03-15

## 2022-03-19 PROBLEM — F17.200 SMOKER: Status: ACTIVE | Noted: 2020-03-27

## 2022-03-19 PROBLEM — I73.9 PERIPHERAL VASCULAR DISEASE (HCC): Status: ACTIVE | Noted: 2021-03-15

## 2022-03-19 PROBLEM — E78.5 HYPERLIPIDEMIA: Status: ACTIVE | Noted: 2020-03-26

## 2022-03-19 PROBLEM — Z90.5 SOLITARY KIDNEY, ACQUIRED: Status: ACTIVE | Noted: 2021-06-28

## 2022-03-19 PROBLEM — B20 HIV (HUMAN IMMUNODEFICIENCY VIRUS INFECTION) (HCC): Status: ACTIVE | Noted: 2020-03-27

## 2022-03-19 PROBLEM — N13.30 HYDRONEPHROSIS: Status: ACTIVE | Noted: 2021-06-06

## 2022-03-19 PROBLEM — L91.8 CUTANEOUS SKIN TAGS: Status: ACTIVE | Noted: 2021-11-09

## 2022-03-19 PROBLEM — C64.9 CLEAR CELL RENAL CELL CARCINOMA (HCC): Status: ACTIVE | Noted: 2021-06-28

## 2022-03-19 PROBLEM — Z21 HIV (HUMAN IMMUNODEFICIENCY VIRUS INFECTION) (HCC): Status: ACTIVE | Noted: 2020-03-27

## 2022-03-19 PROBLEM — N30.01 ACUTE CYSTITIS WITH HEMATURIA: Status: ACTIVE | Noted: 2021-08-02

## 2022-03-19 PROBLEM — E66.09 OBESITY DUE TO EXCESS CALORIES WITHOUT SERIOUS COMORBIDITY: Status: ACTIVE | Noted: 2020-04-27

## 2022-03-20 PROBLEM — R31.9 HEMATURIA: Status: ACTIVE | Noted: 2021-06-06

## 2022-03-20 PROBLEM — N41.1 PROSTATITIS, CHRONIC: Status: ACTIVE | Noted: 2021-08-04

## 2022-03-30 DIAGNOSIS — E11.65 UNCONTROLLED TYPE 2 DIABETES MELLITUS WITH HYPERGLYCEMIA (HCC): Primary | ICD-10-CM

## 2022-04-07 RX ORDER — BLOOD SUGAR DIAGNOSTIC
STRIP MISCELLANEOUS
Qty: 100 STRIP | Refills: 0 | Status: SHIPPED | OUTPATIENT
Start: 2022-04-07 | End: 2022-05-25

## 2022-04-12 ENCOUNTER — TRANSCRIBE ORDER (OUTPATIENT)
Dept: SCHEDULING | Age: 57
End: 2022-04-12

## 2022-04-12 DIAGNOSIS — F17.200 SMOKER: Primary | ICD-10-CM

## 2022-05-02 ENCOUNTER — HOSPITAL ENCOUNTER (OUTPATIENT)
Dept: CT IMAGING | Age: 57
Discharge: HOME | End: 2022-05-02
Attending: NURSE PRACTITIONER
Payer: MEDICAID

## 2022-05-02 DIAGNOSIS — F17.200 SMOKER: ICD-10-CM

## 2022-05-02 PROCEDURE — 71271 CT THORAX LUNG CANCER SCR C-: CPT

## 2022-05-11 ENCOUNTER — CLINICAL SUPPORT (OUTPATIENT)
Dept: DIABETES SERVICES | Age: 57
End: 2022-05-11
Payer: MEDICAID

## 2022-05-11 DIAGNOSIS — E11.65 UNCONTROLLED TYPE 2 DIABETES MELLITUS WITH HYPERGLYCEMIA (HCC): Primary | ICD-10-CM

## 2022-05-11 PROCEDURE — G0108 DIAB MANAGE TRN  PER INDIV: HCPCS | Performed by: DIETITIAN, REGISTERED

## 2022-05-11 RX ORDER — HYDROCHLOROTHIAZIDE 25 MG/1
25 TABLET ORAL DAILY
COMMUNITY

## 2022-05-11 NOTE — PROGRESS NOTES
[facility] Program for Diabetes Health  Diabetes Self-Management Education & Support Program    Reason for Referral: Diabetes Education  Referral Source: Gita Red MD  Services requested: DSMES       ASSESSMENT    From my perspective, the participant would benefit from [facility] specifically related to reducing risks, healthy eating, monitoring, taking medications, healthy coping and problem solving. Will adapt DSMES program to build on participant's strengths in preparedness score as noted in the Diabetes Skills, Confidence, and Preparedness Index. During the program, we will focus on providing DSMES that specifically addresses participant's interest in healthy eating, healthy coping and problem solving, as shown by their reported readiness to change. The participant would be best served by attending weekly group class series. Diabetes Self-Management Education Follow-up Visit: June 2022 [facility]       Clinical Presentation  Antoine Acosta is a 60 y.o.  male referred for diabetes self-management education. Participant has Type 2 DM on insulin for 11-20 years. Family history positivefor diabetes. Patient reports receiving DSMES services in the past. Most recent A1c value:   Lab Results   Component Value Date/Time    Hemoglobin A1c 9.7 (H) 02/08/2022 10:18 AM       Diabetes-related medical history:  Neurological complications  peripheral neuropathy  Microvascular disease  nephropathy  Macrovascular disease  CAD and peripheral vascular disease    Diabetes-related medications:  Current dosing:   Key Antihyperglycemic Medications             liraglutide (Victoza 3-Butch) 0.6 mg/0.1 mL (18 mg/3 mL) pnij INJECT 1.2 MG SUBCUTANEOUSLY ONCE DAILY. STOP BYDUREON/TRULICITY.     metFORMIN (GLUCOPHAGE) 500 mg tablet Decrease to one pill twice a day with meals    insulin aspart U-100 (NOVOLOG) 100 unit/mL (3 mL) inpn Inject 7 units before breakfast, lunch and dinner Plus sliding scale to 54 units daily    insulin glargine (Lantus Solostar U-100 Insulin) 100 unit/mL (3 mL) inpn 50 Units by SubCUTAneous route nightly. Blood Pressure Management  Key ACE/ARB Medications     Patient is on no ACE or ARB meds. Lipid Management  Key Antihyperlipidemia Meds             atorvastatin (LIPITOR) 40 mg tablet           Clot Prevention  Key Anti-Platelet Anticoagulant Meds             aspirin 81 mg chewable tablet Take 81 mg by mouth daily. clopidogreL (PLAVIX) 75 mg tab Take 75 mg by mouth daily. Learning Assessment  Learning objectives Educator assessment (5/11/2022)   Diabetes Disease Process  The participant can   A) describe diabetes in basic terms;   B) state the type of diabetes they have; &   C) state accepted blood glucose targets. Healthy Eating  The participant can   A) identify carbohydrate foods; &   B) accurately read food labels. Being Active  The participant can  A) state the benefits of physical activity;  B) report their current PA practices;  C) identify PA they would consider incorporating in their lives; &  D) develop an implementation plan. Monitoring  The participant can  A) operate their blood glucose meter; &  B) describe how they log their blood glucoses to share with their provider. Taking Medications  The participant can  A) name their diabetes medications;  B) state the purpose and dose;  C) note side effects; &  D) describe proper storage, disposal & transport (if appropriate). Healthy Coping  The participant can    A) describe their response to diabetes diagnosis; B) describe their specific coping mechanisms;  C) identify supportive people and/or other resources that positively support their diabetes self-care and health. Reducing Risks  The participant can describe the preventive measures used by providers to promote health and prevent diabetes complications.      Problem Solving  The participant can   A) identify signs, symptoms & treatment of hypoglycemia;    B) identify signs, symptoms & treatment of hyperglycemia;  C) describe their sick day plan; &  D) identify BG patterns to discuss with their provider. Yes  Yes  No        Yes  No        Yes  Yes  Yes  Yes        Yes  Yes        Yes  No  No  Yes        Yes  Yes  Yes        Yes          No  Yes  No  No     Characteristics to Learning   Barriers to Learning      Functional limitation and Pain     Favorite Ways to Learn   [x] Lecture  [] Slides  [] Reading [] Video-Internet  [] Cassettes/CDs/MP3's  [] Interactive Small Groups [] Other       Behavioral Assessment  Current self-care practices  Educator assessment (5/11/2022)   Healthy Eating   Current practices    24-hour Dietary Recall:  Breakfast: skipped  Lunch: chicken  Dinner: hotdogs x4 c regular bread; sauerkraut   Snacks: none  Beverages: water  Alcohol: none       Would benefit from DSMES related to Healthy Eating: Yes    Eats a carbohydrate controlled diet: No    Stage of change: Preparation      Being Active  Current practices  How many days during the past week have you performed physical activity where your heart beats faster and your breathing is harder than normal for 30 minutes or more? 4 day(s)    How many days in a typical week do you perform activity such as this?  3 day(s)     Would benefit from [facility] related to Being Active: Yes}      Exercises 150 minutes/week: No      Stage of change: Action     Monitoring  Current practices  Do you monitor your blood sugar? Yes    How often do you monitor? 4x/day    What are the range of readings? 168-438 mg/dL  Breakfast: 220 mg/dL   Lunch: 140-160 mg/dL   Dinner: 300-400 mg/dL  Bedtime: 300 mg/dL    Do you know your last A1c measurement? Yes    Do you know the meaning of the A1c?  Yes     Would benefit from [facility] related to Monitoring: Yes      Uses BG readings to establish trends and understand BG patterns: Yes      Stage of change: Action       Taking Medication  Current practices  Do you understand what your diabetes medications do? Yes    How often do you miss doses of your diabetes medications? never    Can you afford your diabetes medications? Yes   Would benefit from [facility] related to Taking Medication: Yes      Takes medications consistently to receive full benefit: Yes      Stage of change: Action       Healthy Coping   Current state  Diabetes Skills, Confidence and Preparedness Index:     Total score: 5.7  Skills Score: 5.3  Confidence Score: 5.6  Preparedness Score: 6.4       Would benefit from DSMES related to Healthy Coping: Yes      Identifies specific people, organizations,etc, that actively support their diabetes self-care efforts: Yes      Stage of change: Relapse     Reducing Risks  Current state  Vaccines:  Influenza:   Immunization History   Administered Date(s) Administered    Influenza Vaccine 04/01/2020    Influenza Vaccine (Quad) PF (>6 Mo Flulaval, Fluarix, and >3 Yrs Afluria, Fluzone [id number]) 10/14/2018, 11/27/2019, 09/25/2020       Pneumococcal:   Immunization History   Administered Date(s) Administered    Pneumococcal Conjugate (PCV-13) 02/17/2015    Pneumococcal Polysaccharide (PPSV-23) 01/31/2002, 11/08/2002, 04/16/2017, 01/31/2018        Hepatitis:   Immunization History   Administered Date(s) Administered    Hep A Vaccine (Adult) 11/02/2004, 07/19/2005       Examinations:  Diabetic Foot and Eye Exam HM Status   Topic Date Due    Diabetic Foot Care  Never done    Eye Exam  Never done        Dental exam: Upcoming to have teeth pulled    Foot exam: due    Heart Protection:  BP Readings from Last 2 Encounters:   02/28/22 123/67   01/14/22 (!) 152/85        Lab Results   Component Value Date/Time    LDL, calculated 72 02/08/2022 10:18 AM    LDL, calculated 71 05/03/2021 10:05 AM        Kidney Protection:  Lab Results   Component Value Date/Time    Microalb/Creat ratio (ug/mg creat.) 22 02/08/2022 10:18 AM        Would benefit from [facility] related to Reducing Risks: Yes      Actively participates in decision-making with provider regarding secondary prevention:  No      Stage of change: Action   Problem Solving  Current state  Hypoglycemia Management:  What are signs and symptoms of hypoglycemia that you experience: Dizziness/light-headedness    How do you prevent hypoglycemia: monitor blood sugar before exercise    How do you treat hypoglycemia: Patient is unaware of how to treat hypoglycemia    Hyperglycemia Management:  What are signs and symptoms of hyperglycemia that you experience: Pt reported being unaware of s/s of hyperglycemia    How can you prevent hyperglycemia: patient is unaware of how to prevent high blood sugars    Sick Day Management:  What do you do differently on sick days:  Pt reported being unaware of self-management on sick days    Pattern Management:  Do you notice blood glucose patterns when you look at the readings in your meter or logbook? No    How do you use the blood glucose readings from your meter or logbook? understand how body responds to meals     Would benefit from [facility] related to Problem Solving: Yes      Articulates appropriate strategies to address hypoglycemia, hyperglycemia, sick day care and BG pattern: No      Stage of change: Preparation       Note: Content derived from the American Association of Diabetes Educators' Diabetes Education Curriculum: A Guide to Successful Self-Management (3rd edition)      Jose Manuel Rangel RD on 5/11/2022 at 10:10 AM    I have personally reviewed the health record, including provider notes, laboratory data and current medications before making these care and education recommendations. The time spent in this effort is included in the total time. Total minutes: 30    Overall SCPI score: 5.7   Skills Score: 5.3  Confidence Score: 5.6  Preparedness Score: 6.4      Skills/Knowledge Questions  1.  I know how to plan meals that have the best balance between carbohydrates, proteins and vegetables. 6  2. I know how my diabetes medications (pills, injectables and/or insulin) work in my body. 6  3. I know when to check my blood sugar if I want to see how my body responded to a meal. 6  4. I know when to check my blood sugars to determine if my medication or insulin doses are correct. 6  5. I know what to do to prevent a low blood sugar when I exercise (either before, during, or after). 6  6. When I am sick, I know what to do differently with my diabetes management. 3  7. I know how stress can affect my diabetes management. 6  8. When I look at my blood sugars over a given week, I can explain what my blood sugar pattern is. 3  9. I know what my target levels are for A1c, blood pressure and cholesterol. 6  Confidence Questions  1. I am confident that I can plan balanced meals and snacks. 6  2. I am confident that I can manage my stress. 6  3. I am confident that I can prevent a low blood sugar during or after exercise. 6  4. I am confident that the next time I eat out, I will be able to choose foods that best keep my blood sugars in target. 6  5. I am confident I can include exercise into my schedule. 6  6. I am confident that I can use my daily blood sugars to adjust my diet, my activity, and/or my insulin. 6  7. When something out of my normal routine happens, I am confident that I can problem-solve and keep my diabetes on track. 3  Preparedness Questions  1. Within the next month, I will begin to eat more balanced meals and snacks. 6  2. Within the next month, I will choose an exercise activity and I will start fitting it into my schedule. 8  3. Within the next month, I will make a list of stress management options that work for me. 6  4. Within the next month, I will consistently plan ahead to prevent low blood sugars. 8  5. Within the next month, I will start adjusting my insulin doses on my own. 8  6.  Within the next month, I will begin making changes to my diabetes management based on my daily blood sugars (eg - eating, activity and/or insulin). 6  7. Within the next month, I will begin making changes to my diabetes management to meet my overall goals (eg - eating, activity and/or insulin).  6

## 2022-05-28 RX ORDER — PENTOXIFYLLINE 400 MG/1
TABLET, EXTENDED RELEASE ORAL
Qty: 60 TABLET | Refills: 0 | Status: SHIPPED | OUTPATIENT
Start: 2022-05-28 | End: 2022-06-09

## 2022-06-03 ENCOUNTER — CLINICAL SUPPORT (OUTPATIENT)
Dept: DIABETES SERVICES | Age: 57
End: 2022-06-03
Payer: MEDICAID

## 2022-06-03 DIAGNOSIS — E11.65 UNCONTROLLED TYPE 2 DIABETES MELLITUS WITH HYPERGLYCEMIA (HCC): Primary | ICD-10-CM

## 2022-06-03 PROCEDURE — G0109 DIAB MANAGE TRN IND/GROUP: HCPCS | Performed by: DIETITIAN, REGISTERED

## 2022-06-04 LAB
ALBUMIN SERPL-MCNC: 4.6 G/DL (ref 3.8–4.9)
ALBUMIN/CREAT UR: 57 MG/G CREAT (ref 0–29)
ALBUMIN/GLOB SERPL: 1.5 {RATIO} (ref 1.2–2.2)
ALP SERPL-CCNC: 112 IU/L (ref 44–121)
ALT SERPL-CCNC: 45 IU/L (ref 0–44)
AST SERPL-CCNC: 28 IU/L (ref 0–40)
BASOPHILS # BLD AUTO: 0.1 X10E3/UL (ref 0–0.2)
BASOPHILS NFR BLD AUTO: 1 %
BILIRUB SERPL-MCNC: 0.4 MG/DL (ref 0–1.2)
BUN SERPL-MCNC: 21 MG/DL (ref 6–24)
BUN/CREAT SERPL: 12 (ref 9–20)
CALCIUM SERPL-MCNC: 9.6 MG/DL (ref 8.7–10.2)
CHLORIDE SERPL-SCNC: 97 MMOL/L (ref 96–106)
CHOLEST SERPL-MCNC: 134 MG/DL (ref 100–199)
CO2 SERPL-SCNC: 24 MMOL/L (ref 20–29)
CREAT SERPL-MCNC: 1.77 MG/DL (ref 0.76–1.27)
CREAT UR-MCNC: 127.9 MG/DL
EGFR: 44 ML/MIN/1.73
EOSINOPHIL # BLD AUTO: 0.2 X10E3/UL (ref 0–0.4)
EOSINOPHIL NFR BLD AUTO: 3 %
ERYTHROCYTE [DISTWIDTH] IN BLOOD BY AUTOMATED COUNT: 12.9 % (ref 11.6–15.4)
EST. AVERAGE GLUCOSE BLD GHB EST-MCNC: 246 MG/DL
GLOBULIN SER CALC-MCNC: 3 G/DL (ref 1.5–4.5)
GLUCOSE SERPL-MCNC: 184 MG/DL (ref 65–99)
HBA1C MFR BLD: 10.2 % (ref 4.8–5.6)
HCT VFR BLD AUTO: 37.2 % (ref 37.5–51)
HDLC SERPL-MCNC: 35 MG/DL
HGB BLD-MCNC: 12.6 G/DL (ref 13–17.7)
IMM GRANULOCYTES # BLD AUTO: 0 X10E3/UL (ref 0–0.1)
IMM GRANULOCYTES NFR BLD AUTO: 0 %
IMP & REVIEW OF LAB RESULTS: NORMAL
INTERPRETATION: NORMAL
LDLC SERPL CALC-MCNC: 76 MG/DL (ref 0–99)
LYMPHOCYTES # BLD AUTO: 2.6 X10E3/UL (ref 0.7–3.1)
LYMPHOCYTES NFR BLD AUTO: 39 %
MCH RBC QN AUTO: 29.8 PG (ref 26.6–33)
MCHC RBC AUTO-ENTMCNC: 33.9 G/DL (ref 31.5–35.7)
MCV RBC AUTO: 88 FL (ref 79–97)
MICROALBUMIN UR-MCNC: 73 UG/ML
MONOCYTES # BLD AUTO: 0.6 X10E3/UL (ref 0.1–0.9)
MONOCYTES NFR BLD AUTO: 10 %
NEUTROPHILS # BLD AUTO: 3.2 X10E3/UL (ref 1.4–7)
NEUTROPHILS NFR BLD AUTO: 47 %
PLATELET # BLD AUTO: 330 X10E3/UL (ref 150–450)
POTASSIUM SERPL-SCNC: 4.3 MMOL/L (ref 3.5–5.2)
PROT SERPL-MCNC: 7.6 G/DL (ref 6–8.5)
RBC # BLD AUTO: 4.23 X10E6/UL (ref 4.14–5.8)
SODIUM SERPL-SCNC: 134 MMOL/L (ref 134–144)
TRIGL SERPL-MCNC: 130 MG/DL (ref 0–149)
VLDLC SERPL CALC-MCNC: 23 MG/DL (ref 5–40)
WBC # BLD AUTO: 6.8 X10E3/UL (ref 3.4–10.8)

## 2022-06-06 NOTE — PROGRESS NOTES
[facility] Program for Diabetes Health  Diabetes Self-Management Education & Support Program  Encounter Note    SUMMARY  Diabetes self-care management training was completed related to reducing risks. he participant will return on Eva 10 to continue DSMES related to healthy eating and monitoring. The participant did identify SMART Goal(s) and will practice knowledge and skills related to reducing risks to improve diabetes self-management. EVALUATION:  Mr. Martha Bañuelos participated in group discussions on eye health, foot and dental health. He recently had all of his teeth pulled out. He shared that for a few months, he was unable to obtain and take his medications as prescribed. He reports that he is back on track now that he has settled in [place]. He is taking his medications regularly, as prescribed as to achieve full benefit. He shared that he exercises three days per week to help prevent diabetes complications. He does not always share his problems. He attends multiple support groups and did not too much in class. This RDN called Mr. Martha Bañuelos after class, he reports taking the classes so he his insurance company will cover a [facility]. He plans to follow up with [employer] company re: a CPAP machine. He has JEREMY but is not using the CPAP. He has a follow up appointment with Dr. Joanna Richard next week. RECOMMENDATIONS:  1. Inquire about CPAP  2. Complete Personal Care Record  3. Monitor/Log meals, BG and activity on worksheet     TOPICS DISCUSSED TODAY:  WHAT IS DIABETES? Minutes: Myra Martin 56? 59      Next provider visit is scheduled for 6/7/2022 c Dr. Joanna Richard       SMART GOAL(S)  1. Increase physical activity by exercising (aerobic machines at [facility]) for 45 minutes 5 times over the next week. ACHIEVEMENT OF GOAL(S) : 0-24%         DATE DSMES TOPIC EVALUATION     6/6/2022 WHAT IS DIABETES?   a. Role of the normal pancreas in energy balance and blood glucose control   b.  The defect seen in diabetes   c. Signs & symptoms of diabetes   d. Diagnosis of diabetes   e. Types of diabetes   f. Blood glucose targets in non-pregnant & non-pregnant adults       The participant knows   Their type of diabetes: Yes   The basic physiologic defect: Yes   Blood glucose targets: Yes     DATE DSMES TOPIC EVALUATION     6/6/2022 HOW DO I STAY HEALTHY?   a. Prevention    Vaccinations    Preconception care (if applicable)  b. Examinations    Eye     Foot   c. Diabetic complications' prevention   [address]    Sleep health      The participant has a personal diabetes care record to keep abreast of diabetes health Yes     The participant needs to address Obtaining CPAP, completing Personal Care Record. Kimmie Franco RD on 6/6/2022 at 10:37 AM    I have personally reviewed the health record, including provider notes, laboratory data and current medications before making these care and education recommendations. The time spent in this effort is included in the total time. Total minutes: [address] [employer] for Telehealth:  Shereen Pittman, was evaluated through a synchronous (real-time) audio-video encounter. The patient (or guardian if applicable) is aware that this is a billable service, which includes applicable co-pays. This Virtual Visit was conducted with patient's (and/or legal guardian's) consent. The visit was conducted pursuant to the emergency declaration under the [facility], [address] [place] [address] waiver authority and the [employer] Act. Patient identification was verified, and a caregiver was present when appropriate. The patient was located in a state where the provider was licensed to provide care.

## 2022-06-07 ENCOUNTER — OFFICE VISIT (OUTPATIENT)
Dept: ENDOCRINOLOGY | Age: 57
End: 2022-06-07
Payer: MEDICAID

## 2022-06-07 VITALS
SYSTOLIC BLOOD PRESSURE: 134 MMHG | TEMPERATURE: 97.5 F | OXYGEN SATURATION: 94 % | BODY MASS INDEX: 37.29 KG/M2 | HEART RATE: 79 BPM | DIASTOLIC BLOOD PRESSURE: 90 MMHG | HEIGHT: 73 IN | WEIGHT: 281.4 LBS

## 2022-06-07 DIAGNOSIS — E11.65 UNCONTROLLED TYPE 2 DIABETES MELLITUS WITH HYPERGLYCEMIA (HCC): Primary | ICD-10-CM

## 2022-06-07 DIAGNOSIS — E78.2 MIXED HYPERLIPIDEMIA: ICD-10-CM

## 2022-06-07 DIAGNOSIS — I10 ESSENTIAL HYPERTENSION: ICD-10-CM

## 2022-06-07 PROCEDURE — 99215 OFFICE O/P EST HI 40 MIN: CPT | Performed by: INTERNAL MEDICINE

## 2022-06-07 PROCEDURE — 3046F HEMOGLOBIN A1C LEVEL >9.0%: CPT | Performed by: INTERNAL MEDICINE

## 2022-06-07 RX ORDER — CARVEDILOL 6.25 MG/1
6.25 TABLET ORAL 2 TIMES DAILY
COMMUNITY
Start: 2022-05-07

## 2022-06-07 RX ORDER — FAMOTIDINE 20 MG/1
20 TABLET, FILM COATED ORAL
COMMUNITY
Start: 2022-05-22

## 2022-06-07 RX ORDER — CYCLOBENZAPRINE HCL 5 MG
5 TABLET ORAL
COMMUNITY
Start: 2022-05-12

## 2022-06-07 NOTE — PROGRESS NOTES
1. Have you been to the ER, urgent care clinic since your last visit? Hospitalized since your last visit? no    2. Have you seen or consulted any other health care providers outside of the [address] [place] [address] since your last visit? Include any pap smears or colon screening.  No    Would like recommendations for a podiatrist.

## 2022-06-07 NOTE — LETTER
6/7/2022    Patient: Avis Douglass   YOB: 1965   Date of Visit: 6/7/2022     Brent Quirsoaaron [address] [facility] [address]  Via Fax: [phone]    Dear Piper Johnson NP,      Thank you for referring Mr. Ban Macias to [id number] [place] [address] for evaluation. My notes for this consultation are attached. If you have questions, please do not hesitate to call me. I look forward to following your patient along with you.       Sincerely,    Mary Josue MD

## 2022-06-07 NOTE — PROGRESS NOTES
[facility]  Fam Porter MD, FACE    Reji No is a 60 y.o. male presents today       Patient here for   follow up  Visit after   Last  visit of Type 2 diabetes mellitus from feb 2022     He got the meter  - sugars are over 250 mg   He had dental work done ,  Got teeth removed     He is going thru diab education class       Feb 2022     He underwent CT for renal cell cancer surveillance, developed contrast nephropathy   Off metformin for 3 weeks and he just resumed it   Developed tonsillitis -   Sugars are all up and high   He is not  Happy       Nov 2021      a good gap   He is now checking sugars , But forgot the meter         August 2021     S/p left nephrectomy  For  RCC -   He says it is metastatic to bones  he has high sugars still on the lot       May 2021      Referred : by  [facility]   H/o diabetes for 15  years   Current A1C is 11.6 %    From feb 2021  and symptoms/problems include none   Pt moved to [place]  From [address] [place] [address]   In oct 2020   He stayed without meds for good 10 months , HE HAD FEW MEDS REFILLED on his visit in feb 2021   Per their notes, he used to be on actos 45 and nesina 25 mg  Current diabetic medications include  Metformin, bydureon, lantus 50 units . bydureon was started 2 months ago- not being covered   Current monitoring regimen: home blood tests - NOT INTERESTED              Vitals:    06/07/22 0947   BP: (!) 134/90   BP 1 Location: Left upper arm   BP Patient Position: Sitting   BP Cuff Size: Large adult   Pulse: 79   Temp: 97.5 °F (36.4 °C)   TempSrc: Temporal   Height: 6' 1\" (1.854 m)   Weight: 281 lb 6.4 oz (127.6 kg)   SpO2: 94%          Review of Systems   None       Physical Exam   Constitutional: She is oriented to person, place, and time. She appears well-developed and well-nourished. Psychiatric: She has a normal mood and affect.      Diabetic feet exam :  June 2022     H/o partial or complete amputation of foot : N  H/o previous foot ulceration ; N  H/o pre - ulcerative callus : Y  H/o peripheral neuropathy and callus : Y  H/o poor circulation     BRIGIDO   : N  Foot deformity : Y, chronic onychomycosis                  Lab Results   Component Value Date/Time    Hemoglobin A1c 10.2 (H) 06/03/2022 11:39 AM    Hemoglobin A1c 9.7 (H) 02/08/2022 10:18 AM    Hemoglobin A1c 8.6 (H) 11/04/2021 12:00 AM    Glucose 184 (H) 06/03/2022 11:39 AM    Glucose (POC) 232 (H) 01/14/2022 08:17 AM    Microalb/Creat ratio (ug/mg creat.) 57 (H) 06/03/2022 11:39 AM    LDL, calculated 76 06/03/2022 11:39 AM    LDL, calculated 71 05/03/2021 10:05 AM    Creatinine 1.77 (H) 06/03/2022 11:39 AM      Lab Results   Component Value Date/Time    Cholesterol, total 134 06/03/2022 11:39 AM    HDL Cholesterol 35 (L) 06/03/2022 11:39 AM    LDL, calculated 76 06/03/2022 11:39 AM    LDL, calculated 71 05/03/2021 10:05 AM    Triglyceride 130 06/03/2022 11:39 AM    CHOL/HDL Ratio 3.3 05/03/2021 10:05 AM     Lab Results   Component Value Date/Time    ALT (SGPT) 45 (H) 06/03/2022 11:39 AM    Alk. phosphatase 112 06/03/2022 11:39 AM    Bilirubin, total 0.4 06/03/2022 11:39 AM    Albumin 4.6 06/03/2022 11:39 AM    Protein, total 7.6 06/03/2022 11:39 AM    INR 0.9 05/17/2021 10:06 AM    Prothrombin time 12.3 05/17/2021 10:06 AM    PLATELET 859 02/42/3531 11:39 AM     Lab Results   Component Value Date/Time    GFR est non-AA 33 (L) 02/08/2022 10:18 AM    GFR est AA 38 (L) 02/08/2022 10:18 AM    Creatinine 1.77 (H) 06/03/2022 11:39 AM    BUN 21 06/03/2022 11:39 AM    Sodium 134 06/03/2022 11:39 AM    Potassium 4.3 06/03/2022 11:39 AM    Chloride 97 06/03/2022 11:39 AM    CO2 24 06/03/2022 11:39 AM    Magnesium 1.7 06/07/2021 02:27 AM    Phosphorus 2.6 06/16/2021 03:26 AM             ASSESSMENT AND  PLAN     1.  Type 2 DM uncontrolled :  a1c is   10 .2 %    From  June 2022   Compared to    9.7 %    From feb 2022   Compared to    8.6 %     From   Nov 2021   Compared to  9.6 %   From  Sept 2021   Compared to 9.2 %       From July 2021     Compared to   10.2 %    From  May 2021  by POC    Compared to 11.6 %    From feb 2021 June 2022     Blood sugars are high still because of consuming high GI foods   He understands that  he can change diet after dental work   He will stay on basal bolus regimen , metformin  And increase victoza to 1.8 mg a day   I am recommending the CGM    Lynne Gerard   is being seen for DM type 2 with renal insuff   Patient  performs 4 times of blood glucose checks a day utilizing the home BGM. Patient  uses  subcutaneous 4  Insulin  shots   to treat  diabetes. Patient does adjustments to the regimen by sliding scale or bolus wizard  on the basis of therapeutic CGM testing results        Feb 2022  He is upset that he had contrast  Ct and that hurt his kidney fuction   His egfr is @ 40 ml/min   He was told to stop metformin and that made it worse   AND   He has tonsil  surgery    Resumed it  3 weeks ago , but because of low egfr- recommending only one pill twice a day with meals   His sugars are high - over 200        November 2021   Continue on basal bolus regimen   He is to check sugars 4 times a day   He is now off chemotherapy   Will start on metformin , inj incretins       August 2021  Some improvement   Discouraged use of inj incretins or metformin because of nausea likely from chemo agents   Suggesting starting on basal bolus regimen which could help if chemo and steroids are started in future   He Is agreeable and trained him on this   He is given printed insulin instructions         2. Hypoglycemia :  Educated on treating the hypoglycemia. 3. HTN : continue current care. 4.  Dyslipidemia : continue current meds. 5.  Diabetic complications :     A. Retinopathy-YES   educated on this complication,  regular f/u with ophthalmologist encouraged    B. Nephropathy - yes, stage 3 now  Compared to stage 2 (egfr was 50 ml/min  )        C. Peripheral Neuropathy - yes,       7.   HSV 2 positive - saw ID according to [facility] notes       9. Left sided nephrectomy for renal cancerous mass June 2021   High risk  Given a single kidney with lost control and decrease egfr         10.  Saw vascular surgeon for [facility]  And  plavix       Reviewed results with patient and discussed the labs being ordered today/bnv  Patient voiced understanding of plan of care

## 2022-06-07 NOTE — PATIENT INSTRUCTIONS
SPECIFIC INSTRUCTIONS BELOW         DRINK water     Do not skip meals  Do not eat in between meals    Reduce carbs- pasta, rice, potatoes, bread   Try to avoid processed bread products like BISCUITS, CROISSANTS, MUFFINS    Do not drink juices or sodas, even if they are calorie zero or diet drinks and especially avoid using powders like crystalloids , JULIETTE-AIDS     Do not eat peanut butter     Do not eat sugar free cookies and cakes   Do not eat peaches, oranges, pineapples, raisins, grapes , canteloupe , honey dew, mangoes , watermelon  and fruit medleys      --------------------------------------------------------------------------------------------------------      Stay on  metformin er one pill twice a day       Increase  victoza at 1.8  Mg a day         Check blood sugars before each meal and at bed time     Increase  LANTUS  To  60    UNITS AT NIGHT      increase   Novolog  9    units before meals  ( stop prandin )    Take additional Novolog   as follows with meals:    150-200 mg 1 units    201-250 mg 3 units    251-300 mg 5 units    301-350 mg 7 units    351-400 mg 9 units    401-450 mg 11 units    451-500 mg 13 units      Less than  70  - no insulin                 -------------PAY ATTENTION TO THESE GENERAL INSTRUCTIONS -----------------      - The medications prescribed at this visit will not be available at pharmacy until 6 pm       - YOUR MED LIST IS NOT UP TO DATE AS SOME CHANGES ARE BEING MADE AFTER THE VISIT - FOLLOW SPECIFIC INSTRUCTIONS  ABOVE     -ANY tests other than blood work, which you opt to do  outside the  [facility] imaging facilities, you are responsible for prior authorizations if  required    - 06 26 [facility]- PLEASE IGNORE     Results     *Normal results will not be notified by a phone call starting January 1 2021   *If you have an upcoming visit, the results will be discussed at the visit   *Please sign up for MY CHART if you want access to your lab and test results  *Abnormal results which require immediate attention will be notified by phone call   *Abnormal results which do not require immediate assistance will be notified in 1-2 weeks       Refills    -    have your pharmacy send us a refill request . Refills are done max for one year and a visit is a must before refills are extended    Follow up appointments -  highly encourage you to make it when you are checking out. We can accommodate you into the schedule based on your clinical situation, but not for extending refills beyond a year. Labs are important to give refills and is important to get labs before the visit     Phone calls  -  Allow  24 hrs.  for non-urgent calls to be returned  Prior authorization - It may take 2-4 weeks to process  Forms  -  FMLA, DMV etc., will take up to 2 weeks to process  Cancellations - please notify the office 2 days in advance   Samples  - will only be dispensed at visits       If not showing for the appointments and cancelling appointments within 24 hours are kept track of and three  of such situations in  two consecutive years will likely be considered for termination from the practice    -------------------------------------------------------------------------------------------------------------------

## 2022-06-09 RX ORDER — PENTOXIFYLLINE 400 MG/1
TABLET, EXTENDED RELEASE ORAL
Qty: 60 TABLET | Refills: 0 | Status: SHIPPED | OUTPATIENT
Start: 2022-06-09 | End: 2022-07-15

## 2022-06-10 ENCOUNTER — CLINICAL SUPPORT (OUTPATIENT)
Dept: DIABETES SERVICES | Age: 57
End: 2022-06-10
Payer: MEDICAID

## 2022-06-10 DIAGNOSIS — E11.65 UNCONTROLLED TYPE 2 DIABETES MELLITUS WITH HYPERGLYCEMIA (HCC): Primary | ICD-10-CM

## 2022-06-10 PROCEDURE — G0109 DIAB MANAGE TRN IND/GROUP: HCPCS | Performed by: DIETITIAN, REGISTERED

## 2022-06-10 NOTE — PROGRESS NOTES
[address] [place] [address] for Diabetes Health  Diabetes Self-Management Education & Support Program  Encounter Note    SUMMARY  Diabetes self-care management training was completed related to healthy eating. he participant will return on June 17 to continue DSMES related to monitoring, taking medications and physical activity. The participant did identify SMART Goal(s) and will practice knowledge and skills related to reducing risks and healthy eating to improve diabetes self-management. EVALUATION:  Mr Analisa Jacinto does not participate in group discussions. He did mention to educator that he \"cannot eat anything\" reviewed today because he has to \"eat for his kidneys\". Mr. Analisa Jacinto sits in the back of class with his arms crossed and often looks away from the educator. He did not have his book with him this visit. He did share that he has been working on his SMART goal and increased how many times he used the pool to exercise. RECOMMENDATIONS:  1. Practice building 45-60 g CHO meals: B/L/D  2. Ask questions during class to help identify his specific needs  3. One on One visit for MNT: Consistent CHO/Renal dietary pattern      TOPICS DISCUSSED TODAY:  WHAT CAN I EAT? 60 x2 units  (120 minutes)      Next provider visit is scheduled for 8/5/2022 c Dr. Mary Dwyer for PVD       SMART GOAL(S)  1. 1.Increase physical activity by exercising (aerobic machines at [facility]) for 45 minutes 5 times over the next week  ACHIEVEMENT OF GOAL(S) : 50-74%           DATE DSMES TOPIC EVALUATION     6/10/2022 WHAT CAN I EAT?   a. General principles   b. Determining a healthy weight   c. Nutritional terms & tools    Healthy Plate method    Carbohydrate Counting    Reading food labels    Free apps   d. Pregnancy recommendations      The participant    Uses Healthy Plate principles in constructing meals:  Yes   Reads food labels in choosing acceptable foods: Yes    The participant needs to address asking specific questions regarding his Diabetes and Renal  Dietary options. Sharon Benoit RD on 6/10/2022 at 4:13 PM    I have personally reviewed the health record, including provider notes, laboratory data and current medications before making these care and education recommendations. The time spent in this effort is included in the total time.   Total minutes: 120

## 2022-06-21 NOTE — PROGRESS NOTES
[address], [address]  Ph: [phone]  Fax: [phone]    Medicaid Discharge Summary 2-15    Patient name: Marcos Hoyt  : 1965  Provider#: [id number]  Referral source: Arash Olivo NP      Medical/Treatment Diagnosis: Other abnormalities of gait and mobility [R26.89]  Unsteadiness on feet [R26.81]     Prior Hospitalization: see medical history     Comorbidities: See Plan of Care  Prior Level of Function: See Plan of Care  Medications: Verified on Patient Summary List  Start of Care: 12/10/22   Onset Date: (see initial plan of care)  Visits from Start of Care: 18  Missed Visits: 0    Certification Period : 12/10/22 to 3/10/22  Assessment/Summary of care/GOALS:   Patient did not return for follow up visits. He was scheduled to have a surgery on 22 and did not return following the surgery. The last treatment was on 22 and his POC ended on 3/10/22, and so the patient will be discharged at this time. Goals were not re-assessed. Thank you for the referral.  Ampac Score:  NT    RECOMMENDATIONS:  [x]Discontinue therapy:   []Patient has reached or is progressing toward set goals and is independent with HEP   [x]Patient is non-compliant or has abdicated   []Due to lack of appreciable progress towards set goals   []Patient was hospitalized or suffered illness that impacted ability to continue therapy   []Other:  Carina Cook, PT, DPT 2022   Retain this original for your records. If you are unable to process this request in 24 hours, please contact our office.   ________________________________________________________________________  NOTE TO PHYSICIAN:  Please complete the following and FAX to: [address] [facility]:  Fax: [phone]   ____ I have read the above report and request that my patient be discharged from therapy.     [address] [place] [address] Signature:_____________________________________________________ Date:_____________Time:_____________     John Phlegm, NP

## 2022-06-24 ENCOUNTER — DOCUMENTATION ONLY (OUTPATIENT)
Dept: ENDOCRINOLOGY | Age: 57
End: 2022-06-24

## 2022-06-24 ENCOUNTER — CLINICAL SUPPORT (OUTPATIENT)
Dept: DIABETES SERVICES | Age: 57
End: 2022-06-24
Payer: MEDICAID

## 2022-06-24 ENCOUNTER — PATIENT MESSAGE (OUTPATIENT)
Dept: ENDOCRINOLOGY | Age: 57
End: 2022-06-24

## 2022-06-24 DIAGNOSIS — E11.65 UNCONTROLLED TYPE 2 DIABETES MELLITUS WITH HYPERGLYCEMIA (HCC): Primary | ICD-10-CM

## 2022-06-24 PROCEDURE — G0109 DIAB MANAGE TRN IND/GROUP: HCPCS | Performed by: DIETITIAN, REGISTERED

## 2022-06-24 NOTE — PROGRESS NOTES
[facility] for [facility]  Diabetes Self-Management Education & Support Program  Encounter Note    SUMMARY  Diabetes self-care management training was completed related to healthy coping and problem solving. he participant will return on July 1 to continue DSMES related to taking medications and physical activity. The participant did not identify SMART Goal(s) and will practice knowledge and skills related to reducing risks, healthy eating and monitoring and healthy coping and problem solving to improve diabetes self-management. EVALUATION:  Mr. Adam Al set quietly in class. He completed worksheet activity where he identified feelings of sadness, anger and hopeful about his diabetes. He reports not having anyone to support him. He enjoys cleaning and watching T.V. as stress management techniques. He stated that he needs to complete the [facility] before his endocrinologist will write an order for CGM. Mr. Adam Al shared with this RDN after class that he would be interested in meeting 1:1 for MNT re: dietary intake guidance for supporting CKD and DM. He reports meeting his exercise goals. RECOMMENDATIONS:  1. Continue with current exercise routine  2. Monitor BG before meals  3. Track/log BG, meal and activity to identify BG anomolies    TOPICS DISCUSSED TODAY:  HOW DO I FIND SUPPORT TO TACKLE THIS CONDITION? 61  HOW DO I FIGURE OUT WHAT'S INFLUENCING MY BLOOD GLUCOSES? 60      Next provider visit is scheduled for 8/5/2022 c Dr. Nate Díaz re: PVD and 10/7/2022 c Dr. Ernesto Maldonado re: DM       SMART GOAL(S)  1. Increase physical activity by exercising (aerobic machines at [facility]) for 45 minutes 5 times over the next week  ACHIEVEMENT OF GOAL(S) : %         DATE DSMES TOPIC EVALUATION     6/24/2022 HOW DO I FIND SUPPORT TO TACKLE THIS CONDITION?   a.  Normal responses to diabetes diagnosis or complication    Shock    Anger & resentment    Guilt/self-blame    Sadness & worry    Depression     Anxiety  Pregnancy   b. Constructive strategies to normal responses     Exploring feelings & attitudes    Motivation: Cost versus benefits analysis    Problem-solving: Chain analysis    Obtaining support: Communicating   i. Family & friends   ii. Health team   iii. Community   c. Stress    Symptoms    Managing stress    Fill your tool kit        The participant can identify people that support them in caring for their diabetes health: patient and clinicians      The participant would like to pursue the following in keeping themselves healthy after completing the program:  He did not voice         DATE DSMES TOPIC EVALUATION     6/24/2022 HOW DO I FIGURE OUT WHAT'S INFLUENCING MY BLOOD GLUCOSES?   a. Problem solving using SOAR    Set goals    Sort options    Arrive at a plan    Reaffirm plan   b. Common problems in diabetes self-care    Hypoglycemia    Hyperglycemia    Sick days   c. Pattern management   d. Disaster preparedness plan        The participant has an action plan for    Hypoglycemia: Yes   Hyperglycemia: Yes   Sick days: Yes   During disasters: No    The participant needs to address preparing a diabetes disaster care plan and emergency kit. Sunshine Wheatley RD on 6/24/2022 at 3:18 PM    I have personally reviewed the health record, including provider notes, laboratory data and current medications before making these care and education recommendations. The time spent in this effort is included in the total time.   Total minutes: 120

## 2022-07-01 ENCOUNTER — CLINICAL SUPPORT (OUTPATIENT)
Dept: DIABETES SERVICES | Age: 57
End: 2022-07-01
Payer: MEDICAID

## 2022-07-01 DIAGNOSIS — Z79.4 TYPE 2 DIABETES MELLITUS WITHOUT COMPLICATION, WITH LONG-TERM CURRENT USE OF INSULIN (HCC): Primary | ICD-10-CM

## 2022-07-01 DIAGNOSIS — E11.9 TYPE 2 DIABETES MELLITUS WITHOUT COMPLICATION, WITH LONG-TERM CURRENT USE OF INSULIN (HCC): Primary | ICD-10-CM

## 2022-07-01 PROCEDURE — G0108 DIAB MANAGE TRN  PER INDIV: HCPCS

## 2022-07-01 NOTE — PROGRESS NOTES
[facility] for [facility]  Diabetes Self-Management Education & Support Program  Encounter Note    SUMMARY  Diabetes self-care management training was completed related to taking medications and physical activity. he participant will return on August 17 for 6 week follow up as he has now completed the program. The participant did not identify SMART Goal(s) and will practice knowledge and skills related to reducing risks, healthy eating and monitoring, being active and medications and healthy coping and problem solving to improve diabetes self-management. EVALUATION:  Participant was engaged in learning and asked appropriate questions. States that he has experienced hypoglycemia and was able to give examples of Rule of 15 when treating. Participant is currently walking to and from [facility] where he exercises for 45 min per day 3 days per week. He states that he enjoys this and plans to continue this practice. Participant will follow up in 6 weeks and he also was given our contact information should he need anything prior to that time. RECOMMENDATIONS:  Continue current exercise program as well as all skills obtained in DSMES classes. TOPICS DISCUSSED TODAY:  HOW DO MY DIABETES MEDICATIONS WORK? 30  HOW DOES PHYSICAL ACTIVITY AFFECT MY DIABETES? 30      Next provider visit is scheduled for August 5, 2022       SMART GOAL(S)  1. Increase physical activity by exercising (aerobic machines at [facility]) for 45 minutes 3 times over the next week  ACHIEVEMENT OF GOAL(S) : %           DATE DSMES TOPIC EVALUATION     7/1/2022 HOW DO MY DIABETES MEDICATIONS WORK?   a. Type 1 medications & methods    Insulin injections    Injection sites   b. Type 2 medications    Oral agents    GLP-1 agonists   c. Hypoglycemia symptoms & treatment    Glucagon emergency kits   d.  General guidance regarding insulin use whether Type 1, 2 or gestational diabetes    Storage of insulin    Disposal     Traveling with medications   e. Barriers to medication adherence      The participant    Can describe the expected action & side effects of prescribed diabetes medications: Yes   Can demonstrate injection technique (if applicable): Yes       DATE DSMES TOPIC EVALUATION     7/1/2022 HOW DOES PHYSICAL ACTIVITY AFFECT MY DIABETES?   a. Benefits of physical activity   b. Beginning a program of physical activity    Walking    Pedometers    Goal setting   c. Structured physical activity program    Aerobic activity    Resistance    Flexibility    Balance   d. Physical activity program progression   e. Safety issues   f. Barriers to physical activity   g. Facilitators of physical activity        The participant has established a regular physical activity plan: Yes               Sandra Pittman RN on 7/1/2022 at 9:30 AM    I have personally reviewed the health record, including provider notes, laboratory data and current medications before making these care and education recommendations. The time spent in this effort is included in the total time.   Total minutes: 60

## 2022-07-15 RX ORDER — PENTOXIFYLLINE 400 MG/1
TABLET, EXTENDED RELEASE ORAL
Qty: 60 TABLET | Refills: 0 | Status: SHIPPED | OUTPATIENT
Start: 2022-07-15 | End: 2022-08-20

## 2022-08-05 ENCOUNTER — OFFICE VISIT (OUTPATIENT)
Dept: SURGERY | Age: 57
End: 2022-08-05
Payer: MEDICAID

## 2022-08-05 VITALS
RESPIRATION RATE: 24 BRPM | SYSTOLIC BLOOD PRESSURE: 99 MMHG | WEIGHT: 278 LBS | DIASTOLIC BLOOD PRESSURE: 69 MMHG | HEART RATE: 71 BPM | OXYGEN SATURATION: 97 % | HEIGHT: 73 IN | BODY MASS INDEX: 36.84 KG/M2 | TEMPERATURE: 98.2 F

## 2022-08-05 DIAGNOSIS — I87.303 CHRONIC VENOUS HYPERTENSION INVOLVING BOTH SIDES: ICD-10-CM

## 2022-08-05 DIAGNOSIS — I73.9 PERIPHERAL VASCULAR DISEASE (HCC): Primary | ICD-10-CM

## 2022-08-05 PROCEDURE — 99213 OFFICE O/P EST LOW 20 MIN: CPT | Performed by: SURGERY

## 2022-08-05 NOTE — PROGRESS NOTES
Chief Complaint   Patient presents with    Follow-up     1 year - PVD     Visit Vitals  BP 99/69 (BP 1 Location: Left upper arm, BP Patient Position: Sitting, BP Cuff Size: Adult)   Pulse 71   Temp 98.2 °F (36.8 °C) (Temporal)   Resp 24   Ht 6' 1\" (1.854 m)   Wt 278 lb (126.1 kg)   SpO2 97%   BMI 36.68 kg/m²

## 2022-08-06 PROBLEM — I87.303 CHRONIC VENOUS HYPERTENSION INVOLVING BOTH SIDES: Status: ACTIVE | Noted: 2022-08-06

## 2022-08-06 NOTE — PROGRESS NOTES
VASCULAR FOLLOW UP      Subjective:   CHIEF COMPLAINTS:  Follow-up peripheral vascular disease  PRESENTATION OF ILLNESS:  Syed Morales is a 60 y.o. very pleasant man is here today for follow-up. Patient has known history of peripheral vascular disease. Also chronic venous insufficiency with leg swelling. Patient complaining of mild swelling in both legs. However denies any claudication symptoms. Last BRIGIDO examination discussed with the patient. Past Medical History:   Diagnosis Date    Burning with urination     CAD (coronary artery disease)     Cancer (HCC)     kidney Left     Chronic kidney disease     Chronic obstructive pulmonary disease (HCC)     Diabetes ([facility]     Herpes     HIV (human immunodeficiency virus infection) ([facility]     Hypercholesterolemia     Hypertension     PVD (peripheral vascular disease) ([facility]     Sleep apnea       Past Surgical History:   Procedure Laterality Date    COLONOSCOPY N/A 2022    COLONOSCOPY AND EGD performed by Eunice Whalen MD at [address] [place] [address] ENDOSCOPY    HX HEENT      boil removal    HX NEPHRECTOMY  2021    left laparoscopic radical nephrectomy,     HX UROLOGICAL  2021    ureteral stent removal    NC CARDIAC SURG PROCEDURE UNLIST      bipass in right leg    NC CARDIAC SURG PROCEDURE UNLIST      stent in left leg     VASCULAR SURGERY PROCEDURE UNLIST       Family History   Problem Relation Age of Onset    Hypertension Mother     Cancer Mother     Hypertension Father     Heart Disease Father       Social History     Tobacco Use    Smoking status: Former     Packs/day: 0.25     Years: 45.00     Pack years: 11.25     Types: Cigarettes     Quit date:      Years since quittin.5    Smokeless tobacco: Never    Tobacco comments:     quit 1 year ago   Substance Use Topics    Alcohol use: Not Currently       Prior to Admission medications    Medication Sig Start Date End Date Taking?  Authorizing Provider   pentoxifylline CR (TRENTAL) 400 mg CR tablet TAKE 1 TABLET BY MOUTH TWICE DAILY. 7/15/22  Yes CheleZoë MD   carvediloL (COREG) 6.25 mg tablet Take 6.25 mg by mouth two (2) times a day. 5/7/22  Yes Provider, Historical   cyclobenzaprine (FLEXERIL) 5 mg tablet Take 5 mg by mouth three (3) times daily as needed. 5/12/22  Yes Provider, Historical   famotidine (PEPCID) 20 mg tablet Take 20 mg by mouth daily as needed. 5/22/22  Yes Provider, Historical   metFORMIN (GLUCOPHAGE) 500 mg tablet Decrease to one pill twice a day with meals 6/7/22  Yes Dorothy Fitzgerald MD   liraglutide (Victoza 3-Butch) 0.6 mg/0.1 mL (18 mg/3 mL) pnij INJECT 1.8 MG SUBCUTANEOUSLY ONCE DAILY. STOP BYDUREON/TRULICITY. 6/7/22  Yes Dorothy Fitzgerald MD   Insulin Needles, Disposable, (Funmilayo Pen Needle) 32 gauge x 5/32\" ndle Use to inject insulin four times daily. Dx. Code E11.65 6/7/22  Yes Dorothy Fitzgerald MD   insulin aspart U-100 (NOVOLOG) 100 unit/mL (3 mL) inpn Inject 9 units before breakfast, lunch and dinner Plus sliding scale to 66 units daily 6/7/22  Yes Dorothy Fitzgerald MD   OneTouch Ultra Test strip USE TO CHECK BLOOD SUGAR 4 TIMES A DAY. 5/25/22  Yes Dorothy Fitzgerald MD   hydroCHLOROthiazide (HYDRODIURIL) 25 mg tablet Take 25 mg by mouth daily. Yes Provider, Historical   amLODIPine (NORVASC) 10 mg tablet Take 10 mg by mouth daily. 2/23/22  Yes Provider, Historical   sodium bicarbonate 650 mg tablet Take 650 mg by mouth three (3) times daily. 2/23/22  Yes Provider, Historical   lancets (OneTouch Delica Plus Lancet) 30 gauge misc Use to check blood glucose level 4 times daily DX E11.65 2/7/22  Yes Dorothy Fitzgerald MD   simethicone 180 mg cap Take 1 Capsule by mouth daily as needed. 10/28/21  Yes Provider, Historical   sucralfate (CARAFATE) 1 gram tablet Take 1 g by mouth as needed. 10/28/21  Yes Provider, Historical   Anoro Ellipta 62.5-25 mcg/actuation inhaler Take 1 Puff by inhalation daily. 6/30/21  Yes Provider, Historical   aspirin 81 mg chewable tablet Take 81 mg by mouth daily.  1/5/21  Yes Provider, Historical   atorvastatin (LIPITOR) 40 mg tablet Take 40 mg by mouth daily. 2/24/21  Yes Provider, Historical   clopidogreL (PLAVIX) 75 mg tab Take 75 mg by mouth daily. 2/24/21  Yes Provider, Historical   Tivicay 50 mg tab tablet Take 50 mg by mouth daily. 2/17/21  Yes Provider, Historical   Odefsey 200-25-25 mg tab Take 1 Tablet by mouth daily. 2/17/21  Yes Provider, Historical   Lantus Solostar U-100 Insulin 100 unit/mL (3 mL) inpn INJECT 50 UNITS SQ AT BEDTIME. Patient taking differently: 60 Units. 6/8/22   Brooke Rojas MD   flash glucose sensor (FreeStyle Michel 2 Sensor) kit Use to check blood glucose level 4 times daily. Change once every 14 days. DX E11.65  Patient not taking: No sig reported 3/14/22   Brooke Rojas MD   flash glucose scanning reader (FreeStyle Michel 2 [place]) misc Use to check blood glucose level 4 times daily DX E11.65  Patient not taking: No sig reported 3/14/22   Brooke Rojas MD     Allergies   Allergen Reactions    Podofilox Other (comments)     Skin burning        Review of Systems:  I reviewed the rest of organ systems personally and they were negative signed by Dr. Carolina Bernal    Objective:   Visit Vitals  BP 99/69 (BP 1 Location: Left upper arm, BP Patient Position: Sitting, BP Cuff Size: Adult)   Pulse 71   Temp 98.2 °F (36.8 °C) (Temporal)   Resp 24   Ht 6' 1\" (1.854 m)   Wt 278 lb (126.1 kg)   SpO2 97%   BMI 36.68 kg/m²     VITAL SIGNS REVIEWED. Physical Exam:  Patient is well-nourished pleasant in conversation is appropriate. Head and neck examination atraumatic, normocephalic. Gaze appropriate. Conversation appropriate. Neck examination shows supple. No mass. No obvious carotid bruit. Chest examination shows lungs are clear bilaterally well-expanded, no crackles or wheezes. Cardiovascular system regular rate, no obvious murmur. Skin warm to touch  and moist, no skin lesions. Abdomen is soft ,not tender or distended bowel sounds present.   No palpable mass.  Neurological examinations, no focal neuro deficits moving all 4 extremities. Cranial nerves intact. Sensation is intact as well. Hematologic: No obvious bruise or swelling or obvious lymphadenopathy. Psychosocial: Appropriate. Has good effect. Musculoskeletal system: No muscle wasting, appropriate movements upper and lower extremity. Vascular examination: Vascular assessment shows biphasic right DP and triphasic right PT left side also has triphasic normal PT and biphasic on the left DP. Swelling is mild to moderate. Data Review:   No visits with results within 1 Month(s) from this visit. Latest known visit with results is:   Office Visit on 02/28/2022   Component Date Value Ref Range Status    Hemoglobin A1c 06/03/2022 10.2 (A) 4.8 - 5.6 % Final    Estimated average glucose 06/03/2022 246  mg/dL Final    Cholesterol, total 06/03/2022 134  100 - 199 mg/dL Final    Triglyceride 06/03/2022 130  0 - 149 mg/dL Final    HDL Cholesterol 06/03/2022 35 (A) >39 mg/dL Final    VLDL, calculated 06/03/2022 23  5 - 40 mg/dL Final    LDL, calculated 06/03/2022 76  0 - 99 mg/dL Final    Glucose 06/03/2022 184 (A) 65 - 99 mg/dL Final    BUN 06/03/2022 21  6 - 24 mg/dL Final    Creatinine 06/03/2022 1.77 (A) 0.76 - 1.27 mg/dL Final    eGFR 06/03/2022 44 (A) >59 mL/min/1.73 Final    BUN/Creatinine ratio 06/03/2022 12  9 - 20 Final    Sodium 06/03/2022 134  134 - 144 mmol/L Final    Potassium 06/03/2022 4.3  3.5 - 5.2 mmol/L Final    Chloride 06/03/2022 97  96 - 106 mmol/L Final    CO2 06/03/2022 24  20 - 29 mmol/L Final    Calcium 06/03/2022 9.6  8.7 - 10.2 mg/dL Final    Protein, total 06/03/2022 7.6  6.0 - 8.5 g/dL Final    Albumin 06/03/2022 4.6  3.8 - 4.9 g/dL Final    GLOBULIN, TOTAL 06/03/2022 3.0  1.5 - 4.5 g/dL Final    A-G Ratio 06/03/2022 1.5  1.2 - 2.2 Final    Bilirubin, total 06/03/2022 0.4  0.0 - 1.2 mg/dL Final    Alk.  phosphatase 06/03/2022 112  44 - 121 IU/L Final    AST (SGOT) 06/03/2022 28 0 - 40 IU/L Final    ALT (SGPT) 06/03/2022 45 (A) 0 - 44 IU/L Final    Creatinine, urine 06/03/2022 127.9  Not Estab. mg/dL Final    Microalbumin, urine 06/03/2022 73.0  Not Estab. ug/mL Final    Microalb/Creat ratio (ug/mg creat.) 06/03/2022 57 (A) 0 - 29 mg/g creat Final    WBC 06/03/2022 6.8  3.4 - 10.8 x10E3/uL Final    RBC 06/03/2022 4.23  4. 14 - 5.80 x10E6/uL Final    HGB 06/03/2022 12.6 (A) 13.0 - 17.7 g/dL Final    HCT 06/03/2022 37.2 (A) 37.5 - 51.0 % Final    MCV 06/03/2022 88  79 - 97 fL Final    MCH 06/03/2022 29.8  26.6 - 33.0 pg Final    MCHC 06/03/2022 33.9  31.5 - 35.7 g/dL Final    RDW 06/03/2022 12.9  11.6 - 15.4 % Final    PLATELET 88/81/0418 701  150 - 450 x10E3/uL Final    NEUTROPHILS 06/03/2022 47  Not Estab. % Final    Lymphocytes 06/03/2022 39  Not Estab. % Final    MONOCYTES 06/03/2022 10  Not Estab. % Final    EOSINOPHILS 06/03/2022 3  Not Estab. % Final    BASOPHILS 06/03/2022 1  Not Estab. % Final    ABS. NEUTROPHILS 06/03/2022 3.2  1.4 - 7.0 x10E3/uL Final    Abs Lymphocytes 06/03/2022 2.6  0.7 - 3.1 x10E3/uL Final    ABS. MONOCYTES 06/03/2022 0.6  0.1 - 0.9 x10E3/uL Final    ABS. EOSINOPHILS 06/03/2022 0.2  0.0 - 0.4 x10E3/uL Final    ABS. BASOPHILS 06/03/2022 0.1  0.0 - 0.2 x10E3/uL Final    IMMATURE GRANULOCYTES 06/03/2022 0  Not Estab. % Final    ABS. IMM. GRANS. 06/03/2022 0.0  0.0 - 0.1 x10E3/uL Final    INTERPRETATION 06/03/2022 Note   Final    Interpretation 06/03/2022 Note   Final        Assessment:     Problem List Items Addressed This Visit          Circulatory    Peripheral vascular disease ([address] - Primary    Chronic venous hypertension involving both sides           Plan:     I did recommend a compression stocking with pressure system of 15 to 20 mmHg. Patient was encouraged to continue to be active and exercising. Patient was advised to check a cholesterol level this year through primary care physician.   Patient will be reassessed for comprehensive vascular examination 1 year follow-up.         Katei Montiel MD

## 2022-08-17 ENCOUNTER — CLINICAL SUPPORT (OUTPATIENT)
Dept: DIABETES SERVICES | Age: 57
End: 2022-08-17
Payer: MEDICAID

## 2022-08-17 DIAGNOSIS — Z79.4 TYPE 2 DIABETES MELLITUS WITH HYPEROSMOLARITY WITHOUT COMA, WITH LONG-TERM CURRENT USE OF INSULIN (HCC): Primary | ICD-10-CM

## 2022-08-17 DIAGNOSIS — E11.00 TYPE 2 DIABETES MELLITUS WITH HYPEROSMOLARITY WITHOUT COMA, WITH LONG-TERM CURRENT USE OF INSULIN (HCC): Primary | ICD-10-CM

## 2022-08-17 PROCEDURE — G0108 DIAB MANAGE TRN  PER INDIV: HCPCS

## 2022-08-17 NOTE — PROGRESS NOTES
[employer] [facility] [employer] [facility]  Diabetes Self-Management Education & Support Program  Post-program Evaluation    EVALUATION @ COMPLETION OF THE PROGRAM    Jo-Ann Godinez completed 8 hours of diabetes self-management education. The participant acquired new knowledge and demonstrated new skills during the program.     The participant worked on the following SMART GOAL(s) to improve their diabetes self-management:    Increase physical activity by exercising (aerobic machines at [facility]) for 45 minutes 5 times over the next week  ACHIEVEMENT OF GOAL(S) : %    The participant's pre-program A1c was   Lab Results   Component Value Date/Time    Hemoglobin A1c 10.2 (H) 06/03/2022 11:39 AM   . The post-evaluation A1c is unknown at this time    The participant improved their Diabetes Skills, Confidence and Preparedness Index (scored out of 7): Total score: 5.8  Skills: 5.6  Confidence: 6.1  Preparedness: 5.9    FINAL RECOMMENDATIONS:  Participant is now using CPAP at night for diagnosed sleep apnea. States that his BG levels are much better in the am.  He is also using [place] Raven and states that he feels like he is managing his blood sugar better with this device. He has lost 10 pounds of weight and states that he feels good. Next provider visit is scheduled for October 7, 2022       oLla Aguilera RN on 8/17/2022     Metric Patient's responses (8/17/2022) Areas for improvement     Healthy Eating       The participant is using the Healthy Plate to control carbohydrate intake - Yes    The participant reads food labels accurately - Yes          Being Active       The participant performs physical activity where your heart beats faster and your breathing is harder than normal for 30 minutes or more? 3 day(s)     In a typical week, the participant performs physical activity 3 day(s)          Monitoring   The participant is monitoring their blood sugars?  Yes    The participant is monitoring 4x/day    Blood sugar ranges are 112-258 mg/dL  The participant improved their A1c - Yes    The participant understands the meaning of the A1c - Yes          Taking Medications   The participant understands what their diabetes medications do Yes    The participant can afford your diabetes medications Yes    The participant does not miss doses of their diabetes medications - never          Healthy Coping     The participant feels supported by family, friends and others related to their diabetes self-care practices - Yes    The participant plans on utilizing the following community resources after completion of the program: ADA Nutrition        Reducing Risks   Vaccines:  Influenza:   Immunization History   Administered Date(s) Administered    Influenza Vaccine 04/01/2020    Influenza, FLUARIX, FLULAVAL, (age 10 mo+) AND AFLURIA, FLUZONE (age 1 y+), PF 10/14/2018, 11/27/2019, 09/25/2020       Pneumococcal:   Immunization History   Administered Date(s) Administered    Pneumococcal Conjugate (PCV-13) 02/17/2015    Pneumococcal Polysaccharide (PPSV-23) 01/31/2002, 11/08/2002, 04/16/2017, 01/31/2018        Hepatitis:   Immunization History   Administered Date(s) Administered    Hep A Vaccine (Adult) 11/02/2004, 07/19/2005       Examinations:  Diabetic Foot and Eye Exam HM Status   Topic Date Due    Diabetic Foot Care  Never done    Eye Exam  Never done        Dental exam:  this week    Foot exam:  up to date -     Heart Protection:  BP Readings from Last 2 Encounters:   08/05/22 99/69   06/07/22 (!) 134/90        Lab Results   Component Value Date/Time    LDL, calculated 76 06/03/2022 11:39 AM    LDL, calculated 71 05/03/2021 10:05 AM        Key Anti-Platelet Anticoagulant Meds               aspirin 81 mg chewable tablet Take 81 mg by mouth daily. clopidogreL (PLAVIX) 75 mg tab Take 75 mg by mouth daily.              Kidney Protection:  Lab Results   Component Value Date/Time    Microalb/Creat ratio (ug/mg creat.) 57 (H) 06/03/2022 11:39 AM      The participant would benefit from additional attention to:    Vaccines:  [] Influenza  [] Pneumococcal  [] Hepatitis    Examinations:  [] Dilated eye exam  [] Dental exam  [] Foot exam    Other:  [] Reviewing long-term complications     Problem Solving   Hypoglycemia Management:  The participant knows the signs and symptoms of hypoglycemia: Shaking/trembling, Dizziness/light-headedness, Weakness, Sweat/clammy skin    The participant knows how to prevent hypoglycemia: consistent meals/snack time, take medications as instructed, and monitor blood sugar before exercise    The participant knows how to treat hypoglycemia: Rule of 15    Hyperglycemia Management:  The participant knows their signs and symptoms of hyperglycemia: Extreme thirst, Frequent urination, Fatigue     The participant knows how to prevent hyperglycemia: take medications as instructed, focus on carbohydrate counting/meal planning, maintain a healthy weight, and engage in regular physical activity    Sick Day Management:  The participant knows what to do differently on sick days: Stay hydrated, Check blood glucose every 2-4 hours, Eat meals, soft foods, or drink caloric beverages every 4 hours, Take diabetes medication as instructed by provider     Pattern Management:  The participant can notice blood glucose patterns when looking at their blood glucose readings: Yes    How do you use the blood glucose readings from your meter or logbook: understand how body responds to medications and/or insulin      Note: Content derived from the American Association of Diabetes Educators' Diabetes Education Curriculum: A Guide to Successful Self-Management (3rd edition)      I have personally reviewed the health record, including provider notes, laboratory data and current medications before making these care and education recommendations. The time spent in this effort is included in the total time.   Total minutes: 30        Diabetes Skills, Confidence & Preparedness Index (SCPI) ©  Thank you for completing the Skills, Confidence & Preparedness Index! Below are your scores. All scales and questions are out of 7. If you would like these results emailed, please enter your email address along with some identifying patient information.   Email:    Patient Identifier:        Overall SCPI score: 5.8 Skills Score: 5.6  Low: Blood Sugar Monitoring(Q4),Blood Sugar Monitoring(Q8) Confidence Score: 6.1  Low: Healthy Eating(Q1),Reducing Risks(Q3),Healthy Eating(Q4),Being Active(Q5),Blood Sugar Monitoring(Q6),Problem WLKMSAJ(X5) Preparedness Score: 5.9  Low: Taking Medication(Q5)  Healthy Eating Score: 6.3  Low: Skills(Q1),Confidence(Q1),Confidence(Q4) Taking Medication Score: 5.0  Low: Preparedness(Q5) Blood Sugar Monitoring Score: 5.0  Low: Skills(Q4),Skills(Q8) Reducing Risks Score: 6.0  Low: Skills(Q5),Skills(Q9),Confidence(Q3),Preparedness(Q4)  Problem Solving Score: 6.0  Low: Skills(Q6),Confidence(Q7),Preparedness(Q7) Healthy Coping Score: 6.3  Low: Skills(Q7),Preparedness(Q3) Being Active Score: 6.5

## 2022-08-20 RX ORDER — PENTOXIFYLLINE 400 MG/1
TABLET, EXTENDED RELEASE ORAL
Qty: 60 TABLET | Refills: 0 | Status: SHIPPED | OUTPATIENT
Start: 2022-08-20 | End: 2022-09-15

## 2022-09-08 ENCOUNTER — PATIENT MESSAGE (OUTPATIENT)
Dept: ENDOCRINOLOGY | Age: 57
End: 2022-09-08

## 2022-09-08 DIAGNOSIS — E11.65 UNCONTROLLED TYPE 2 DIABETES MELLITUS WITH HYPERGLYCEMIA (HCC): ICD-10-CM

## 2022-09-08 RX ORDER — FLASH GLUCOSE SENSOR
KIT MISCELLANEOUS
Qty: 2 KIT | Refills: 5 | Status: SHIPPED | OUTPATIENT
Start: 2022-09-08

## 2022-09-08 RX ORDER — FLASH GLUCOSE SCANNING READER
EACH MISCELLANEOUS
Qty: 1 EACH | Refills: 0 | Status: SHIPPED | OUTPATIENT
Start: 2022-09-08

## 2022-09-08 NOTE — TELEPHONE ENCOUNTER
Requested Prescriptions     Signed Prescriptions Disp Refills    flash glucose scanning reader (FreeStyle Michel 2 [place]) misc 1 Each 0     Sig: Use as directed for continuous glucose monitoring DX E11.65     Authorizing Provider: Елена Almeida     Ordering User: Luis Ferrari    flash glucose sensor (FreeStyle Michel 2 Sensor) kit 2 Kit 5     Sig: Use as directed for continuous glucose monitoring. Change every 14 days DX E11.65     Authorizing Provider: Елена Almeida     Ordering User: Luis Ferrari     Verbal order read back to Dr Anupama Lay to send refill.

## 2022-09-15 RX ORDER — PENTOXIFYLLINE 400 MG/1
TABLET, EXTENDED RELEASE ORAL
Qty: 60 TABLET | Refills: 0 | Status: SHIPPED | OUTPATIENT
Start: 2022-09-15 | End: 2022-10-20

## 2022-10-01 LAB
ALBUMIN SERPL-MCNC: 4.5 G/DL (ref 3.8–4.9)
ALBUMIN/CREAT UR: 82 MG/G CREAT (ref 0–29)
ALBUMIN/GLOB SERPL: 1.6 {RATIO} (ref 1.2–2.2)
ALP SERPL-CCNC: 97 IU/L (ref 44–121)
ALT SERPL-CCNC: 29 IU/L (ref 0–44)
AST SERPL-CCNC: 26 IU/L (ref 0–40)
BILIRUB SERPL-MCNC: 0.5 MG/DL (ref 0–1.2)
BUN SERPL-MCNC: 19 MG/DL (ref 6–24)
BUN/CREAT SERPL: 11 (ref 9–20)
CALCIUM SERPL-MCNC: 9.5 MG/DL (ref 8.7–10.2)
CHLORIDE SERPL-SCNC: 100 MMOL/L (ref 96–106)
CHOLEST SERPL-MCNC: 118 MG/DL (ref 100–199)
CO2 SERPL-SCNC: 22 MMOL/L (ref 20–29)
CREAT SERPL-MCNC: 1.78 MG/DL (ref 0.76–1.27)
CREAT UR-MCNC: 96.4 MG/DL
EGFR: 43 ML/MIN/1.73
EST. AVERAGE GLUCOSE BLD GHB EST-MCNC: 203 MG/DL
GLOBULIN SER CALC-MCNC: 2.9 G/DL (ref 1.5–4.5)
GLUCOSE SERPL-MCNC: 192 MG/DL (ref 70–99)
HBA1C MFR BLD: 8.7 % (ref 4.8–5.6)
HDLC SERPL-MCNC: 32 MG/DL
LDLC SERPL CALC-MCNC: 66 MG/DL (ref 0–99)
MICROALBUMIN UR-MCNC: 78.8 UG/ML
POTASSIUM SERPL-SCNC: 4.1 MMOL/L (ref 3.5–5.2)
PROT SERPL-MCNC: 7.4 G/DL (ref 6–8.5)
SODIUM SERPL-SCNC: 140 MMOL/L (ref 134–144)
TRIGL SERPL-MCNC: 110 MG/DL (ref 0–149)
VLDLC SERPL CALC-MCNC: 20 MG/DL (ref 5–40)

## 2022-10-07 ENCOUNTER — OFFICE VISIT (OUTPATIENT)
Dept: ENDOCRINOLOGY | Age: 57
End: 2022-10-07
Payer: MEDICAID

## 2022-10-07 VITALS
BODY MASS INDEX: 37.43 KG/M2 | DIASTOLIC BLOOD PRESSURE: 64 MMHG | RESPIRATION RATE: 17 BRPM | OXYGEN SATURATION: 97 % | HEART RATE: 69 BPM | HEIGHT: 73 IN | SYSTOLIC BLOOD PRESSURE: 108 MMHG | TEMPERATURE: 97.1 F | WEIGHT: 282.4 LBS

## 2022-10-07 DIAGNOSIS — I10 ESSENTIAL HYPERTENSION: ICD-10-CM

## 2022-10-07 DIAGNOSIS — C64.2 RENAL CANCER, LEFT (HCC): ICD-10-CM

## 2022-10-07 DIAGNOSIS — N18.32 STAGE 3B CHRONIC KIDNEY DISEASE (HCC): ICD-10-CM

## 2022-10-07 DIAGNOSIS — E78.2 MIXED HYPERLIPIDEMIA: ICD-10-CM

## 2022-10-07 DIAGNOSIS — E11.65 UNCONTROLLED TYPE 2 DIABETES MELLITUS WITH HYPERGLYCEMIA (HCC): Primary | ICD-10-CM

## 2022-10-07 DIAGNOSIS — Z90.5 H/O LEFT NEPHRECTOMY: ICD-10-CM

## 2022-10-07 PROCEDURE — 99214 OFFICE O/P EST MOD 30 MIN: CPT | Performed by: INTERNAL MEDICINE

## 2022-10-07 PROCEDURE — 95251 CONT GLUC MNTR ANALYSIS I&R: CPT | Performed by: INTERNAL MEDICINE

## 2022-10-07 PROCEDURE — 3052F HG A1C>EQUAL 8.0%<EQUAL 9.0%: CPT | Performed by: INTERNAL MEDICINE

## 2022-10-07 RX ORDER — ROSUVASTATIN CALCIUM 20 MG/1
20 TABLET, COATED ORAL
Qty: 90 TABLET | Refills: 3 | Status: SHIPPED | OUTPATIENT
Start: 2022-10-07

## 2022-10-07 NOTE — PATIENT INSTRUCTIONS
SPECIFIC INSTRUCTIONS BELOW         DRINK water     Do not skip meals  Do not eat in between meals    Reduce carbs- pasta, rice, potatoes, bread   Try to avoid processed bread products like BISCUITS, CROISSANTS, MUFFINS    Do not drink juices or sodas, even if they are calorie zero or diet drinks and especially avoid using powders like crystalloids , JULIETTE-AIDS     Do not eat peanut butter     Do not eat sugar free cookies and cakes   Do not eat peaches, oranges, pineapples, raisins, grapes , canteloupe , honey dew, mangoes , watermelon  and fruit medleys      --------------------------------------------------------------------------------------------------------      Stay on  metformin er one pill twice a day       Increase  victoza at 1.8  Mg a day         Check blood sugars before each meal and at bed time     Stay on   LANTUS  To  60    UNITS AT NIGHT      increase   Novolog 12     units before meals  ( stop prandin )    Take additional Novolog   as follows with meals:    150-200 mg 2 units    201-250 mg 5 units    251-300 mg 8 units    301-350 mg 11 units    351-400 mg 14  units    401-450 mg 17 units    451-500 mg 20 units      Less than  70  - no insulin                         -------------PAY ATTENTION TO THESE GENERAL INSTRUCTIONS -----------------      - The medications prescribed at this visit will not be available at pharmacy until 6 pm       - YOUR MED LIST IS NOT UP TO DATE AS SOME CHANGES ARE BEING MADE AFTER THE VISIT - FOLLOW SPECIFIC INSTRUCTIONS  ABOVE     -ANY tests other than blood work, which you opt to do  outside the  [facility] imaging facilities, you are responsible for prior authorizations if  required    - [phone] [facility]- PLEASE IGNORE     Results     *Normal results will not be notified by a phone call starting January 1 2021   *If you have an upcoming visit, the results will be discussed at the visit   *Please sign up for MY CHART if you want access to your lab and test results  *Abnormal results which require immediate attention will be notified by phone call   *Abnormal results which do not require immediate assistance will be notified in 1-2 weeks       Refills    -    have your pharmacy send us a refill request . Refills are done max for one year and a visit is a must before refills are extended    Follow up appointments -  highly encourage you to make it when you are checking out. We can accommodate you into the schedule based on your clinical situation, but not for extending refills beyond a year. Labs are important to give refills and is important to get labs before the visit     Phone calls  -  Allow  24 hrs.  for non-urgent calls to be returned  Prior authorization - It may take 2-4 weeks to process  Forms  -  FMLA, DMV etc., will take up to 2 weeks to process  Cancellations - please notify the office 2 days in advance   Samples  - will only be dispensed at visits       If not showing for the appointments and cancelling appointments within 24 hours are kept track of and three  of such situations in  two consecutive years will likely be considered for termination from the practice    -------------------------------------------------------------------------------------------------------------------

## 2022-10-07 NOTE — PROGRESS NOTES
Verified Name and  of the patient. Chief Complaint   Patient presents with    Diabetes     3 month follow-up. Health maintenance will be addressed with Primary Care Provider at next visit. Vitals:    10/07/22 0859   BP: 108/64   Pulse: 69   Resp: 17   Temp: 97.1 °F (36.2 °C)   TempSrc: Oral   SpO2: 97%   Weight: 282 lb 6.4 oz (128.1 kg)   Height: 6' 1\" (1.854 m)   PainSc:   0 - No pain       1. Have you been to the ER, urgent care clinic since your last visit? Hospitalized since your last visit? No    2. Have you seen or consulted any other health care providers outside of the [address] [place] [address] since your last visit? Include any pap smears or colon screening.  No

## 2022-10-07 NOTE — PROGRESS NOTES
[facility]  Claritza Adams MD, FACE    Davina Chery is a 59 y.o. male presents today       Patient here for   follow up  Visit after   Last  visit of Type 2 diabetes mellitus from June 2022       He got dentures now , and he is able to chew ,enjoying foods which he could not for 7 months       June 2022     He got the meter  - sugars are over 250 mg   He had dental work done ,  Got teeth removed   He is going thru diab education class       Feb 2022     He underwent CT for renal cell cancer surveillance, developed contrast nephropathy   S/p left nephrectomy  For  RCC -         May 2021      Referred : by  [facility]   H/o diabetes for 15  years   Current A1C is 11.6 %    From feb 2021  and symptoms/problems include none   Pt moved to [place]  From [address] [place] [address]   In oct 2020   He stayed without meds for good 10 months , HE HAD FEW MEDS REFILLED on his visit in feb 2021   Per their notes, he used to be on actos 45 and nesina 25 mg  Current diabetic medications include  Metformin, bydureon, lantus 50 units . bydureon was started 2 months ago- not being covered   Current monitoring regimen: home blood tests - NOT INTERESTED         Review of Systems   None       Physical Exam   Constitutional:  oriented to person, place, and time. well-developed and well-nourished. Psychiatric:  normal mood and affect.      Diabetic feet exam :  June 2022     H/o partial or complete amputation of foot : N  H/o previous foot ulceration ; N  H/o pre - ulcerative callus : Y  H/o peripheral neuropathy and callus : Y  H/o poor circulation     BRIGIDO   : N  Foot deformity : Y, chronic onychomycosis                  Lab Results   Component Value Date/Time    Hemoglobin A1c 8.7 (H) 09/30/2022 09:28 AM    Hemoglobin A1c 10.2 (H) 06/03/2022 11:39 AM    Hemoglobin A1c 9.7 (H) 02/08/2022 10:18 AM    Glucose 192 (H) 09/30/2022 09:28 AM    Glucose (POC) 232 (H) 01/14/2022 08:17 AM    Microalb/Creat ratio (ug/mg creat.) 82 (H) 09/30/2022 09:28 AM    LDL, calculated 66 09/30/2022 09:28 AM    LDL, calculated 71 05/03/2021 10:05 AM    Creatinine 1.78 (H) 09/30/2022 09:28 AM      Lab Results   Component Value Date/Time    Cholesterol, total 118 09/30/2022 09:28 AM    HDL Cholesterol 32 (L) 09/30/2022 09:28 AM    LDL, calculated 66 09/30/2022 09:28 AM    LDL, calculated 71 05/03/2021 10:05 AM    Triglyceride 110 09/30/2022 09:28 AM    CHOL/HDL Ratio 3.3 05/03/2021 10:05 AM     Lab Results   Component Value Date/Time    ALT (SGPT) 29 09/30/2022 09:28 AM    Alk. phosphatase 97 09/30/2022 09:28 AM    Bilirubin, total 0.5 09/30/2022 09:28 AM    Albumin 4.5 09/30/2022 09:28 AM    Protein, total 7.4 09/30/2022 09:28 AM    INR 0.9 05/17/2021 10:06 AM    Prothrombin time 12.3 05/17/2021 10:06 AM    PLATELET 999 28/06/0897 11:39 AM     Lab Results   Component Value Date/Time    GFR est non-AA 33 (L) 02/08/2022 10:18 AM    GFR est AA 38 (L) 02/08/2022 10:18 AM    Creatinine 1.78 (H) 09/30/2022 09:28 AM    BUN 19 09/30/2022 09:28 AM    Sodium 140 09/30/2022 09:28 AM    Potassium 4.1 09/30/2022 09:28 AM    Chloride 100 09/30/2022 09:28 AM    CO2 22 09/30/2022 09:28 AM    Magnesium 1.7 06/07/2021 02:27 AM    Phosphorus 2.6 06/16/2021 03:26 AM             ASSESSMENT AND  PLAN     1.  Type 2 DM uncontrolled :  a1c is   8.7 %  from sept 2022   compared to   10 .2 %    From  June 2022   Compared to    9.7 %    From feb 2022   Compared to    8.6 %     From   Nov 2021   Compared to  9.6 %   From  Sept 2021   Compared to    9.2 %       From July 2021     Compared to   10.2 %    From  May 2021  by POC    Compared to 11.6 %    From feb 2021 October 2022   Blood sugars are high still because of consuming high GI foods - just got dentures   He understands to  change diet after dental work   Increased  insulin doses ,  on  basal bolus regimen , metformin  And stay on  victoza to 1.8 mg a day   I am recommending the CGM    Reviewed   [place] AGLANI  report for 14 days and discussed with pt    - 14 day average glucose 214   - 31  % for   very high ,  22 %  high and 1 %  low sugars and % in Target  Range  47 %    - percent of utiization 98.5 %  - CV% 29.7%          June 2022     Blood sugars are high still because of consuming high GI foods   He understands that  he can change diet after dental work   He will stay on basal bolus regimen , metformin  And increase victoza to 1.8 mg a day   I am recommending the CGM        Feb 2022  He is upset that he had contrast  Ct and that hurt his kidney fuction   His egfr is @ 40 ml/min   He was told to stop metformin and that made it worse   AND   He has tonsil  surgery    Resumed it  3 weeks ago , but because of low egfr- recommending only one pill twice a day with meals   His sugars are high - over 200        2. Hypoglycemia :  Educated on treating the hypoglycemia. 3. HTN : continue current care. 4.  Dyslipidemia : continue current meds. 5.  Diabetic complications :     A. Retinopathy-YES   educated on this complication,  regular f/u with ophthalmologist encouraged    B. Nephropathy - yes, stage 3 now  Compared to stage 2 (egfr was 50 ml/min  )        C. 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 Left sided nephrectomy for renal cancerous mass June 2021   High risk  Given a single kidney with lost control and decrease egfr         10.  Saw vascular surgeon for PAD On Trental  And  plavix       Reviewed results with patient and discussed the labs being ordered today/bnv  Patient voiced understanding of plan of care

## 2022-10-08 RX ORDER — INSULIN ASPART 100 [IU]/ML
INJECTION, SOLUTION INTRAVENOUS; SUBCUTANEOUS
Qty: 30 ML | Refills: 6 | Status: SHIPPED | OUTPATIENT
Start: 2022-10-08

## 2022-10-08 RX ORDER — INSULIN GLARGINE 100 [IU]/ML
INJECTION, SOLUTION SUBCUTANEOUS
Qty: 30 ML | Refills: 3 | Status: SHIPPED | OUTPATIENT
Start: 2022-10-08 | End: 2022-10-11

## 2022-10-08 RX ORDER — PEN NEEDLE, DIABETIC 31 GX3/16"
NEEDLE, DISPOSABLE MISCELLANEOUS
Qty: 200 PEN NEEDLE | Refills: 11 | Status: SHIPPED | OUTPATIENT
Start: 2022-10-08

## 2022-10-11 RX ORDER — LIRAGLUTIDE 6 MG/ML
INJECTION SUBCUTANEOUS
Qty: 9 ML | Refills: 3 | Status: SHIPPED | OUTPATIENT
Start: 2022-10-11

## 2022-10-11 RX ORDER — INSULIN GLARGINE 100 [IU]/ML
INJECTION, SOLUTION SUBCUTANEOUS
Qty: 15 ML | Refills: 3 | Status: SHIPPED | OUTPATIENT
Start: 2022-10-11

## 2022-10-20 RX ORDER — PENTOXIFYLLINE 400 MG/1
TABLET, EXTENDED RELEASE ORAL
Qty: 60 TABLET | Refills: 0 | Status: SHIPPED | OUTPATIENT
Start: 2022-10-20

## 2022-11-14 RX ORDER — PENTOXIFYLLINE 400 MG/1
TABLET, EXTENDED RELEASE ORAL
Qty: 60 TABLET | Refills: 0 | Status: SHIPPED | OUTPATIENT
Start: 2022-11-14

## 2022-12-20 RX ORDER — PENTOXIFYLLINE 400 MG/1
TABLET, EXTENDED RELEASE ORAL
Qty: 60 TABLET | Refills: 0 | Status: SHIPPED | OUTPATIENT
Start: 2022-12-20

## 2023-01-03 DIAGNOSIS — Z90.5 H/O LEFT NEPHRECTOMY: ICD-10-CM

## 2023-01-03 DIAGNOSIS — I10 ESSENTIAL HYPERTENSION: ICD-10-CM

## 2023-01-03 DIAGNOSIS — C64.2 RENAL CANCER, LEFT (HCC): ICD-10-CM

## 2023-01-03 DIAGNOSIS — E11.65 UNCONTROLLED TYPE 2 DIABETES MELLITUS WITH HYPERGLYCEMIA (HCC): Primary | ICD-10-CM

## 2023-01-03 DIAGNOSIS — N18.32 STAGE 3B CHRONIC KIDNEY DISEASE (HCC): ICD-10-CM

## 2023-01-03 DIAGNOSIS — E78.2 MIXED HYPERLIPIDEMIA: ICD-10-CM

## 2023-01-03 RX ORDER — FLASH GLUCOSE SENSOR
KIT MISCELLANEOUS
Qty: 6 KIT | Refills: 3 | Status: SHIPPED | OUTPATIENT
Start: 2023-01-03

## 2023-01-03 RX ORDER — PEN NEEDLE, DIABETIC 31 GX3/16"
NEEDLE, DISPOSABLE MISCELLANEOUS
Qty: 500 PEN NEEDLE | Refills: 3 | Status: SHIPPED | OUTPATIENT
Start: 2023-01-03

## 2023-01-03 RX ORDER — ROSUVASTATIN CALCIUM 20 MG/1
20 TABLET, COATED ORAL
Qty: 90 TABLET | Refills: 3 | Status: SHIPPED | OUTPATIENT
Start: 2023-01-03

## 2023-01-03 RX ORDER — INSULIN GLARGINE 100 [IU]/ML
INJECTION, SOLUTION SUBCUTANEOUS
Qty: 30 ML | Refills: 3 | Status: SHIPPED | OUTPATIENT
Start: 2023-01-03

## 2023-01-03 RX ORDER — INSULIN ASPART 100 [IU]/ML
INJECTION, SOLUTION INTRAVENOUS; SUBCUTANEOUS
Qty: 60 ML | Refills: 3 | Status: SHIPPED | OUTPATIENT
Start: 2023-01-03

## 2023-01-03 RX ORDER — LIRAGLUTIDE 6 MG/ML
INJECTION SUBCUTANEOUS
Qty: 9 ML | Refills: 3 | Status: SHIPPED | OUTPATIENT
Start: 2023-01-03

## 2023-01-03 RX ORDER — METFORMIN HYDROCHLORIDE 500 MG/1
TABLET ORAL
Qty: 180 TABLET | Refills: 3 | Status: SHIPPED | OUTPATIENT
Start: 2023-01-03

## 2023-02-06 ENCOUNTER — TELEPHONE (OUTPATIENT)
Dept: ENDOCRINOLOGY | Age: 58
End: 2023-02-06

## 2023-02-06 NOTE — TELEPHONE ENCOUNTER
Patients insurance called and stated he is at the max dose on metformin because he takes tivicay. Just wanted to communicate with you in ref to this.  Thank you

## 2023-02-06 NOTE — TELEPHONE ENCOUNTER
Call the number we got call from  and  double check on TIVICAY -  I do not know what this is -       His egfr is  43 ml  per min  from sept 2022   and that means,  I can continue him on full dose of metformin     I am not sure if this has changed since - did he get labs done  ? Did Courtney Kaur mean creatinine  ?     Alen Lopez MD

## 2023-02-07 NOTE — TELEPHONE ENCOUNTER
Thank you for checking that out for me Kathia Cohn - I appreciate that   I looked up  drug interaction with metformin   Yes, he can lower the dose of metformin to 500 mg one pill twice a day     Plus  I need  new labs on renal function - check on Tati Danielle MD

## 2023-03-20 ENCOUNTER — TELEPHONE (OUTPATIENT)
Dept: SURGERY | Age: 58
End: 2023-03-20

## 2023-03-20 NOTE — TELEPHONE ENCOUNTER
Gladis Ganser called in requesting a refill on pentoxifylline CR (TRENTAL) 400 mg CR tablet. Patient has a new pharmacy which is [place] [address]. Patient also wanted to know if it was possible if he could get a 90 day supply. Mr. Ce Box can be reached at [phone].

## 2023-03-23 RX ORDER — PENTOXIFYLLINE 400 MG/1
400 TABLET, EXTENDED RELEASE ORAL 2 TIMES DAILY
Qty: 180 TABLET | Refills: 0 | Status: SHIPPED | OUTPATIENT
Start: 2023-03-23 | End: 2023-06-21

## 2023-03-29 LAB
ALBUMIN SERPL-MCNC: 4.6 G/DL (ref 3.8–4.9)
ALBUMIN/GLOB SERPL: 1.8 {RATIO} (ref 1.2–2.2)
ALP SERPL-CCNC: 92 IU/L (ref 44–121)
ALT SERPL-CCNC: 30 IU/L (ref 0–44)
AST SERPL-CCNC: 31 IU/L (ref 0–40)
BILIRUB SERPL-MCNC: 0.5 MG/DL (ref 0–1.2)
BUN SERPL-MCNC: 16 MG/DL (ref 6–24)
BUN/CREAT SERPL: 9 (ref 9–20)
CALCIUM SERPL-MCNC: 9.2 MG/DL (ref 8.7–10.2)
CHLORIDE SERPL-SCNC: 103 MMOL/L (ref 96–106)
CHOLEST SERPL-MCNC: 104 MG/DL (ref 100–199)
CO2 SERPL-SCNC: 24 MMOL/L (ref 20–29)
CREAT SERPL-MCNC: 1.8 MG/DL (ref 0.76–1.27)
EGFRCR SERPLBLD CKD-EPI 2021: 43 ML/MIN/1.73
EST. AVERAGE GLUCOSE BLD GHB EST-MCNC: 197 MG/DL
GLOBULIN SER CALC-MCNC: 2.6 G/DL (ref 1.5–4.5)
GLUCOSE SERPL-MCNC: 176 MG/DL (ref 70–99)
HBA1C MFR BLD: 8.5 % (ref 4.8–5.6)
HDLC SERPL-MCNC: 28 MG/DL
IMP & REVIEW OF LAB RESULTS: NORMAL
LDLC SERPL CALC-MCNC: 59 MG/DL (ref 0–99)
POTASSIUM SERPL-SCNC: 4.5 MMOL/L (ref 3.5–5.2)
PROT SERPL-MCNC: 7.2 G/DL (ref 6–8.5)
REPORT: NORMAL
SODIUM SERPL-SCNC: 139 MMOL/L (ref 134–144)
SPECIMEN STATUS REPORT, ROLRST: NORMAL
TRIGL SERPL-MCNC: 87 MG/DL (ref 0–149)
VLDLC SERPL CALC-MCNC: 17 MG/DL (ref 5–40)

## 2023-04-07 ENCOUNTER — OFFICE VISIT (OUTPATIENT)
Dept: ENDOCRINOLOGY | Age: 58
End: 2023-04-07

## 2023-04-25 ENCOUNTER — TELEPHONE (OUTPATIENT)
Dept: ENDOCRINOLOGY | Age: 58
End: 2023-04-25

## 2023-04-25 DIAGNOSIS — E11.65 UNCONTROLLED TYPE 2 DIABETES MELLITUS WITH HYPERGLYCEMIA (HCC): Primary | ICD-10-CM

## 2023-05-01 RX ORDER — BLOOD SUGAR DIAGNOSTIC
STRIP MISCELLANEOUS
Qty: 400 STRIP | Refills: 3 | Status: SHIPPED | OUTPATIENT
Start: 2023-05-01

## 2023-05-01 RX ORDER — IBUPROFEN 200 MG
CAPSULE ORAL
Qty: 400 STRIP | Refills: 3 | Status: SHIPPED | OUTPATIENT
Start: 2023-05-01

## 2023-05-01 RX ORDER — LANCETS
EACH MISCELLANEOUS
Qty: 400 EACH | Refills: 3 | Status: SHIPPED | OUTPATIENT
Start: 2023-05-01

## 2023-06-01 DIAGNOSIS — E11.65 TYPE 2 DIABETES MELLITUS WITH HYPERGLYCEMIA, WITH LONG-TERM CURRENT USE OF INSULIN (HCC): Primary | ICD-10-CM

## 2023-06-01 DIAGNOSIS — Z79.4 TYPE 2 DIABETES MELLITUS WITH HYPERGLYCEMIA, WITH LONG-TERM CURRENT USE OF INSULIN (HCC): Primary | ICD-10-CM

## 2023-06-01 RX ORDER — LIRAGLUTIDE 6 MG/ML
1.8 INJECTION SUBCUTANEOUS DAILY
Qty: 27 ML | Refills: 3 | Status: SHIPPED | OUTPATIENT
Start: 2023-06-01

## 2023-06-09 DIAGNOSIS — E11.65 TYPE 2 DIABETES MELLITUS WITH HYPERGLYCEMIA, WITH LONG-TERM CURRENT USE OF INSULIN (HCC): Primary | ICD-10-CM

## 2023-06-09 DIAGNOSIS — Z79.4 TYPE 2 DIABETES MELLITUS WITH HYPERGLYCEMIA, WITH LONG-TERM CURRENT USE OF INSULIN (HCC): Primary | ICD-10-CM

## 2023-06-11 RX ORDER — PENTOXIFYLLINE 400 MG/1
TABLET, EXTENDED RELEASE ORAL
Qty: 60 TABLET | Refills: 2 | Status: SHIPPED | OUTPATIENT
Start: 2023-06-11

## 2023-06-27 DIAGNOSIS — I73.9 PERIPHERAL VASCULAR DISEASE, UNSPECIFIED (HCC): Primary | ICD-10-CM

## 2023-07-27 DIAGNOSIS — E11.65 TYPE 2 DIABETES MELLITUS WITH HYPERGLYCEMIA, WITH LONG-TERM CURRENT USE OF INSULIN (HCC): Primary | ICD-10-CM

## 2023-07-27 DIAGNOSIS — Z79.4 TYPE 2 DIABETES MELLITUS WITH HYPERGLYCEMIA, WITH LONG-TERM CURRENT USE OF INSULIN (HCC): Primary | ICD-10-CM

## 2023-07-27 RX ORDER — INSULIN ASPART 100 [IU]/ML
INJECTION, SOLUTION INTRAVENOUS; SUBCUTANEOUS
Qty: 30 ML | Refills: 3 | Status: SHIPPED | OUTPATIENT
Start: 2023-07-27

## 2023-07-28 DIAGNOSIS — E11.65 TYPE 2 DIABETES MELLITUS WITH HYPERGLYCEMIA, WITH LONG-TERM CURRENT USE OF INSULIN (HCC): Primary | ICD-10-CM

## 2023-07-28 DIAGNOSIS — Z79.4 TYPE 2 DIABETES MELLITUS WITH HYPERGLYCEMIA, WITH LONG-TERM CURRENT USE OF INSULIN (HCC): Primary | ICD-10-CM

## 2023-07-28 RX ORDER — INSULIN GLARGINE 100 [IU]/ML
60 INJECTION, SOLUTION SUBCUTANEOUS NIGHTLY
Qty: 60 ML | Refills: 3 | Status: SHIPPED | OUTPATIENT
Start: 2023-07-28

## 2023-08-07 ENCOUNTER — OFFICE VISIT (OUTPATIENT)
Age: 58
End: 2023-08-07
Payer: MEDICAID

## 2023-08-07 VITALS
OXYGEN SATURATION: 95 % | SYSTOLIC BLOOD PRESSURE: 130 MMHG | WEIGHT: 274.5 LBS | BODY MASS INDEX: 36.38 KG/M2 | HEART RATE: 77 BPM | RESPIRATION RATE: 15 BRPM | HEIGHT: 73 IN | TEMPERATURE: 97.5 F | DIASTOLIC BLOOD PRESSURE: 82 MMHG

## 2023-08-07 DIAGNOSIS — I87.309 CHRONIC VENOUS HYPERTENSION, UNSPECIFIED LATERALITY: Primary | ICD-10-CM

## 2023-08-07 DIAGNOSIS — I73.9 PERIPHERAL VASCULAR DISEASE (HCC): ICD-10-CM

## 2023-08-07 PROCEDURE — 99213 OFFICE O/P EST LOW 20 MIN: CPT | Performed by: SURGERY

## 2023-08-07 PROCEDURE — 3078F DIAST BP <80 MM HG: CPT | Performed by: SURGERY

## 2023-08-07 PROCEDURE — 3074F SYST BP LT 130 MM HG: CPT | Performed by: SURGERY

## 2023-08-07 ASSESSMENT — PATIENT HEALTH QUESTIONNAIRE - PHQ9
SUM OF ALL RESPONSES TO PHQ QUESTIONS 1-9: 0
1. LITTLE INTEREST OR PLEASURE IN DOING THINGS: 0
SUM OF ALL RESPONSES TO PHQ QUESTIONS 1-9: 0
2. FEELING DOWN, DEPRESSED OR HOPELESS: 0
SUM OF ALL RESPONSES TO PHQ QUESTIONS 1-9: 0
SUM OF ALL RESPONSES TO PHQ QUESTIONS 1-9: 0
SUM OF ALL RESPONSES TO PHQ9 QUESTIONS 1 & 2: 0

## 2023-08-10 PROBLEM — I87.309 CHRONIC VENOUS HYPERTENSION: Status: ACTIVE | Noted: 2022-08-06

## 2023-08-10 NOTE — PROGRESS NOTES
Vascular History and Physical    Patient: Jackie Partida  MRN: [id number]    YOB: 1965  Age: 59 y.o. Sex: male     Chief Complaint:  Chief Complaint   Patient presents with    Follow-up     1 year follow up  for PVD       History of Present Illness: Jackie Partida is a 59 y.o. very pleasant gentleman is is here today follow-up. Patient has known history of PAD and chronic venous hypertension. Patient doing well. Patient is very active daily. Patient had a cholesterol level checked recently. HDL is 28, LDL 59 and total cholesterol 109. Patient denies any worsening leg symptoms. Patient also wears compression stocking as well. Social History:  Social Connections: Not on file       Past Medical History:  Past Medical History:   Diagnosis Date    Burning with urination     CAD (coronary artery disease)     Cancer ([address])     kidney Left     Chronic kidney disease     Chronic obstructive pulmonary disease ([address])     Diabetes ([address])     Herpes     HIV (human immunodeficiency virus infection) ([address])     Hypercholesterolemia     Hypertension     PVD (peripheral vascular disease) ([address])     Sleep apnea        Surgical History:  Past Surgical History:   Procedure Laterality Date    COLONOSCOPY N/A 1/14/2022    COLONOSCOPY AND EGD performed by Liam Garrido MD at [address] [place] [address]      boil removal    KIDNEY REMOVAL  06/09/2021    left laparoscopic radical nephrectomy,     [place] UNLISTED PROCEDURE CARDIAC SURGERY      stent in left leg     [place] UNLISTED PROCEDURE CARDIAC SURGERY      bipass in right leg    UROLOGICAL SURGERY  06/09/2021    ureteral stent removal    VASCULAR SURGERY         Allergies:   Allergies   Allergen Reactions    Podofilox Other (See Comments)     Skin burning       Current Meds:  Current Outpatient Medications   Medication Sig Dispense Refill    insulin glargine (LANTUS SOLOSTAR) 100 UNIT/ML injection pen Inject 60 Units into the skin nightly 60 mL 3    NOVOLOG FLEXPEN 100 UNIT/ML

## 2023-08-24 RX ORDER — PENTOXIFYLLINE 400 MG/1
TABLET, EXTENDED RELEASE ORAL
Qty: 60 TABLET | Refills: 2 | Status: SHIPPED | OUTPATIENT
Start: 2023-08-24

## 2023-10-05 LAB
ALBUMIN SERPL-MCNC: 4.6 G/DL (ref 3.8–4.9)
ALBUMIN/CREAT UR: 101 MG/G CREAT (ref 0–29)
ALBUMIN/GLOB SERPL: 1.7 {RATIO} (ref 1.2–2.2)
ALP SERPL-CCNC: 88 IU/L (ref 44–121)
ALT SERPL-CCNC: 23 IU/L (ref 0–44)
AST SERPL-CCNC: 28 IU/L (ref 0–40)
BILIRUB SERPL-MCNC: 0.5 MG/DL (ref 0–1.2)
BUN SERPL-MCNC: 27 MG/DL (ref 8–27)
BUN/CREAT SERPL: 15 (ref 10–24)
CALCIUM SERPL-MCNC: 9 MG/DL (ref 8.6–10.2)
CHLORIDE SERPL-SCNC: 100 MMOL/L (ref 96–106)
CHOLEST SERPL-MCNC: 103 MG/DL (ref 100–199)
CO2 SERPL-SCNC: 20 MMOL/L (ref 20–29)
CREAT SERPL-MCNC: 1.78 MG/DL (ref 0.76–1.27)
CREAT UR-MCNC: 146.2 MG/DL
EGFRCR SERPLBLD CKD-EPI 2021: 43 ML/MIN/1.73
EST. AVERAGE GLUCOSE BLD GHB EST-MCNC: 194 MG/DL
GLOBULIN SER CALC-MCNC: 2.7 G/DL (ref 1.5–4.5)
GLUCOSE SERPL-MCNC: 188 MG/DL (ref 70–99)
HBA1C MFR BLD: 8.4 % (ref 4.8–5.6)
HDLC SERPL-MCNC: 34 MG/DL
IMP & REVIEW OF LAB RESULTS: NORMAL
LDLC SERPL CALC-MCNC: 52 MG/DL (ref 0–99)
MICROALBUMIN UR-MCNC: 147.4 UG/ML
POTASSIUM SERPL-SCNC: 4.1 MMOL/L (ref 3.5–5.2)
PROT SERPL-MCNC: 7.3 G/DL (ref 6–8.5)
REPORT: NORMAL
SODIUM SERPL-SCNC: 138 MMOL/L (ref 134–144)
TRIGL SERPL-MCNC: 85 MG/DL (ref 0–149)
VLDLC SERPL CALC-MCNC: 17 MG/DL (ref 5–40)

## 2023-10-11 ENCOUNTER — OFFICE VISIT (OUTPATIENT)
Age: 58
End: 2023-10-11

## 2023-10-11 VITALS
HEART RATE: 70 BPM | BODY MASS INDEX: 37.46 KG/M2 | TEMPERATURE: 97.4 F | DIASTOLIC BLOOD PRESSURE: 71 MMHG | HEIGHT: 73 IN | SYSTOLIC BLOOD PRESSURE: 105 MMHG | WEIGHT: 282.6 LBS | RESPIRATION RATE: 20 BRPM | OXYGEN SATURATION: 96 %

## 2023-10-11 DIAGNOSIS — I10 ESSENTIAL (PRIMARY) HYPERTENSION: ICD-10-CM

## 2023-10-11 DIAGNOSIS — E11.65 TYPE 2 DIABETES MELLITUS WITH HYPERGLYCEMIA, WITH LONG-TERM CURRENT USE OF INSULIN (HCC): Primary | ICD-10-CM

## 2023-10-11 DIAGNOSIS — Z90.5 ACQUIRED ABSENCE OF KIDNEY: ICD-10-CM

## 2023-10-11 DIAGNOSIS — N18.32 CHRONIC KIDNEY DISEASE, STAGE 3B (HCC): ICD-10-CM

## 2023-10-11 DIAGNOSIS — E78.2 MIXED HYPERLIPIDEMIA: ICD-10-CM

## 2023-10-11 DIAGNOSIS — Z79.4 TYPE 2 DIABETES MELLITUS WITH HYPERGLYCEMIA, WITH LONG-TERM CURRENT USE OF INSULIN (HCC): Primary | ICD-10-CM

## 2023-10-11 RX ORDER — ALBUTEROL SULFATE 90 UG/1
AEROSOL, METERED RESPIRATORY (INHALATION)
COMMUNITY
Start: 2023-10-03

## 2023-10-11 RX ORDER — BICTEGRAVIR SODIUM, EMTRICITABINE, AND TENOFOVIR ALAFENAMIDE FUMARATE 50; 200; 25 MG/1; MG/1; MG/1
1 TABLET ORAL
COMMUNITY
Start: 2023-07-25

## 2023-10-11 RX ORDER — INSULIN ASPART 100 [IU]/ML
INJECTION, SOLUTION INTRAVENOUS; SUBCUTANEOUS
Qty: 60 ML | Refills: 3 | Status: SHIPPED | OUTPATIENT
Start: 2023-10-11

## 2023-10-11 NOTE — PROGRESS NOTES
Matt Griffin MD Kianna Baig is a 59 y.o.  male presents today          Patient here for   follow up  Visit after   Last  visit of Type 2 diabetes mellitus from April 2023    He underwent CT for renal cell cancer surveillance, developed contrast nephropathy    S/p left nephrectomy  For  RCC -         October 2023    He saw the podiatrist   He has a dead nail coming loose   He is talking about   discrepancy between [place] sensor  and  finger stick blood sugars   He seems to be focusing on DM management well           April 2023    Patient lives with her daughter who does not cook at home    Depends on outside meals    Lost 16 lbs     He stopped using victoza            May 2021        Referred : by  [facility]    H/o diabetes for 15  years    Current A1C is 11.6 %    From feb 2021  and symptoms/problems include none    Pt moved to [place]  From [address] [place] [address] Road   In oct 2020    He stayed without meds for good 10 months , HE HAD FEW MEDS REFILLED on his visit in feb 2021    Per their notes, he used to be on actos 45 and nesina 25 mg   Current diabetic medications include  Metformin, bydureon, lantus 50 units . bydureon was started 2 months ago- not being covered    Current monitoring regimen: home blood tests - NOT INTERESTED             Review of Systems   As above          Physical Exam    Constitutional:  oriented to person, place, and time. well-developed and well-nourished. Psychiatric:  normal mood and affect.         Labs    Diabetes    Lab Results   Component Value Date    LABA1C 8.4 (H) 10/04/2023    LABA1C 8.5 (H) 03/23/2023    LABA1C 8.7 (H) 09/30/2022       Lab Results   Component Value Date     10/04/2023    K 4.1 10/04/2023     10/04/2023    CO2 20 10/04/2023    BUN 27 10/04/2023    CREATININE 1.78 (H) 10/04/2023    GLUCOSE 188 (H) 10/04/2023    CALCIUM 9.0 10/04/2023    PROT 7.3 10/04/2023    LABALBU 4.6 10/04/2023    LABALBU

## 2023-10-11 NOTE — PATIENT INSTRUCTIONS
SPECIFIC INSTRUCTIONS BELOW     Check blood sugars immediately before each meal and at bedtime        Metformin 500 mg twice a day with meals       Victoza 1.8 mg a day     Take  lantus  insulin  60  units  at bed time    Take novolog  insulin 12 units before breakfast, 14  units before lunch and 14  units before dinner. Also, add additional novolog as follows with meals  If blood sugars are[de-identified]    150-200 mg 3 units    201-250 mg 6 units    251-300 mg 9 units    301-350 mg 12 units    351-400 mg 15 units    401-450 mg 18 units    451-500 mg 21 units     Less than 70 mg NO INSULIN          -------------PAY ATTENTION TO THESE GENERAL INSTRUCTIONS -----------------      - The medications prescribed at this visit will not be available at pharmacy until 6 pm       - YOUR MED LIST IS NOT UP TO DATE AS SOME CHANGES ARE BEING MADE AFTER THE VISIT - FOLLOW SPECIFIC INSTRUCTIONS  ABOVE     -ANY tests other than blood work, which you opt to do  outside the  [facility] imaging facilities, you are responsible for prior authorizations if  required    - [address] UP TO DATE ON YOUR AVS- PLEASE IGNORE     Results     *Normal results will not be notified by a phone call starting January 1 2021   *If you have an upcoming visit, the results will be discussed at the visit   *Please sign up for MY CHART if you want access to your lab and test results  *Abnormal results which require immediate attention will be notified by phone call   *Abnormal results which do not require immediate assistance will be notified in 1-2 weeks       Refills    -    have your pharmacy send us a refill request . Refills are done max for one year and a visit is a must before refills are extended    Follow up appointments -  highly encourage you to make it when you are checking out. We can accommodate you into the schedule based on your clinical situation, but not for extending refills beyond a year.  Labs are important to give

## 2023-10-11 NOTE — PROGRESS NOTES
Soraya Barlow is a 59 y.o. male here for   Chief Complaint   Patient presents with    Diabetes     6 month follow up       All medication reviewed. Vitals:    10/11/23 1103   Weight: 282 lb 9.6 oz (128.2 kg)   Height: 6' 1\" (1.854 m)        1. Have you been to the ER, urgent care clinic since your last visit? Hospitalized since your last visit? -no    2. Have you seen or consulted any other health care providers outside of the [address] [place] [address] since your last visit?   Include any pap smears or colon screening.-Seen by Oncology and Infectious Disease

## 2023-10-12 RX ORDER — PEN NEEDLE, DIABETIC 32GX 5/32"
NEEDLE, DISPOSABLE MISCELLANEOUS
Qty: 150 EACH | Refills: 11 | Status: SHIPPED | OUTPATIENT
Start: 2023-10-12

## 2023-10-30 DIAGNOSIS — E11.65 TYPE 2 DIABETES MELLITUS WITH HYPERGLYCEMIA, WITH LONG-TERM CURRENT USE OF INSULIN (HCC): Primary | ICD-10-CM

## 2023-10-30 DIAGNOSIS — Z79.4 TYPE 2 DIABETES MELLITUS WITH HYPERGLYCEMIA, WITH LONG-TERM CURRENT USE OF INSULIN (HCC): Primary | ICD-10-CM

## 2023-10-30 DIAGNOSIS — E78.2 MIXED HYPERLIPIDEMIA: ICD-10-CM

## 2023-10-31 RX ORDER — ROSUVASTATIN CALCIUM 20 MG/1
TABLET, COATED ORAL
Qty: 90 TABLET | Refills: 3 | Status: SHIPPED | OUTPATIENT
Start: 2023-10-31

## 2023-12-15 DIAGNOSIS — I73.9 PERIPHERAL VASCULAR DISEASE, UNSPECIFIED (HCC): ICD-10-CM

## 2023-12-22 ENCOUNTER — HOSPITAL ENCOUNTER (EMERGENCY)
Facility: HOSPITAL | Age: 58
Discharge: HOME | End: 2023-12-22
Payer: COMMERCIAL

## 2023-12-22 ENCOUNTER — HOSPITAL ENCOUNTER (OUTPATIENT)
Facility: HOSPITAL | Age: 58
Discharge: HOME | End: 2023-12-25
Payer: COMMERCIAL

## 2023-12-22 VITALS
RESPIRATION RATE: 16 BRPM | DIASTOLIC BLOOD PRESSURE: 76 MMHG | WEIGHT: 270 LBS | TEMPERATURE: 98.1 F | SYSTOLIC BLOOD PRESSURE: 120 MMHG | OXYGEN SATURATION: 96 % | BODY MASS INDEX: 35.78 KG/M2 | HEIGHT: 73 IN | HEART RATE: 75 BPM

## 2023-12-22 DIAGNOSIS — G89.29 ACUTE EXACERBATION OF CHRONIC LOW BACK PAIN: Primary | ICD-10-CM

## 2023-12-22 DIAGNOSIS — M54.50 LUMBAR PAIN: ICD-10-CM

## 2023-12-22 DIAGNOSIS — M54.9 DORSALGIA: ICD-10-CM

## 2023-12-22 DIAGNOSIS — M54.50 ACUTE EXACERBATION OF CHRONIC LOW BACK PAIN: Primary | ICD-10-CM

## 2023-12-22 PROCEDURE — 99283 EMERGENCY DEPT VISIT LOW MDM: CPT

## 2023-12-22 PROCEDURE — 72100 X-RAY EXAM L-S SPINE 2/3 VWS: CPT

## 2023-12-22 PROCEDURE — 72070 X-RAY EXAM THORAC SPINE 2VWS: CPT

## 2023-12-22 RX ORDER — ACETAMINOPHEN 500 MG
500 TABLET ORAL 4 TIMES DAILY PRN
Qty: 120 TABLET | Refills: 0 | Status: SHIPPED | OUTPATIENT
Start: 2023-12-22

## 2023-12-22 RX ORDER — LIDOCAINE 50 MG/G
1 PATCH TOPICAL DAILY
Qty: 10 PATCH | Refills: 0 | Status: SHIPPED | OUTPATIENT
Start: 2023-12-22 | End: 2024-01-01

## 2023-12-22 RX ORDER — METHOCARBAMOL 750 MG/1
750 TABLET, FILM COATED ORAL 4 TIMES DAILY
Qty: 40 TABLET | Refills: 0 | Status: SHIPPED | OUTPATIENT
Start: 2023-12-22 | End: 2024-01-01

## 2023-12-22 NOTE — ED PROVIDER NOTES
[facility] EMERGENCY DEPT  EMERGENCY DEPARTMENT HISTORY AND PHYSICAL EXAM      Date: 12/22/2023  Patient Name: Daquan Garza  MRN: [id number]  YOB: 1965  Date of evaluation: 12/22/2023  Provider: Ct Wang PA-C   Note Started: 11:27 AM EST 12/22/23    HISTORY OF PRESENT ILLNESS     Chief Complaint   Patient presents with    Back Pain       History Provided By: Patient    HPI: Daquan Garza is a 59 y.o. male with past medical history listed below, presents for acute on chronic back pain. Patient states he has had back pain for years, however approximately 1 week ago worked out at [employer] causing increasing of his symptoms. Patient states he tried taking a muscle relaxer that was given to him by his primary care doctor with minimal relief. Has not tried any additional over-the-counter medications. He denies any fevers, chills, bowel or bladder incontinence, saddle anesthesia, numbness or tingling in his legs, history of intravenous drug use.     PAST MEDICAL HISTORY   Past Medical History:  Past Medical History:   Diagnosis Date    Burning with urination     CAD (coronary artery disease)     Cancer (HCC)     kidney Left     Chronic kidney disease     Chronic obstructive pulmonary disease (HCC)     Diabetes ([address])     Herpes     HIV (human immunodeficiency virus infection) ([address])     Hypercholesterolemia     Hypertension     PVD (peripheral vascular disease) ([address])     Sleep apnea        Past Surgical History:  Past Surgical History:   Procedure Laterality Date    COLONOSCOPY N/A 1/14/2022    COLONOSCOPY AND EGD performed by Lella Fabry, MD at [address] [place] [address]      boil removal    KIDNEY REMOVAL  06/09/2021    left laparoscopic radical nephrectomy,     [place] UNLISTED PROCEDURE CARDIAC SURGERY      stent in left leg     [place] UNLISTED PROCEDURE CARDIAC SURGERY      bipass in right leg    UROLOGICAL SURGERY  06/09/2021    ureteral stent removal    VASCULAR SURGERY         Family History:  Family History signs, symptoms, diagnosis, and discharge instructions have been discussed, understanding conveyed, and agreed upon. The patient is to follow up as recommended or return to ER should their symptoms worsen.       PATIENT REFERRED TO:  JOVON Obrien NP  [facility] [address] [place], [place] [address]guido Hernández, [address] [facility] [address]  [phone]      Orthopedics- spine        DISCHARGE MEDICATIONS:     Medication List        START taking these medications      acetaminophen 500 MG tablet  Commonly known as: TYLENOL  Take 1 tablet by mouth 4 times daily as needed for Pain     lidocaine 5 %  Commonly known as: LIDODERM  Place 1 patch onto the skin daily for 10 days 12 hours on, 12 hours off.     methocarbamol 750 MG tablet  Commonly known as: ROBAXIN  Take 1 tablet by mouth 4 times daily for 10 days            CONTINUE taking these medications      BD Pen Needle Henna 2nd Gen 32G X 4 MM Misc  Generic drug: Insulin Pen Needle  USE TO INJECT INSULIN FIVE TIMES DAILY     FreeStyle Pablo 2 Sensor Misc            ASK your doctor about these medications      albuterol sulfate  (90 Base) MCG/ACT inhaler  Commonly known as: PROVENTIL;VENTOLIN;PROAIR     amLODIPine 10 MG tablet  Commonly known as: NORVASC     Anoro Ellipta 62.5-25 MCG/ACT inhaler  Generic drug: umeclidinium-vilanterol     aspirin 81 MG chewable tablet     Biktarvy -25 MG Tabs per tablet  Generic drug: bictegravir-emtricitab-tenofovir alafenamide     carvedilol 6.25 MG tablet  Commonly known as: COREG     clopidogrel 75 MG tablet  Commonly known as: PLAVIX     cyclobenzaprine 5 MG tablet  Commonly known as: FLEXERIL     hydroCHLOROthiazide 25 MG tablet  Commonly known as: HYDRODIURIL     Lantus SoloStar 100 UNIT/ML injection pen  Generic drug: insulin glargine  Inject 60 Units into the skin nightly     metFORMIN 500 MG tablet  Commonly known as: GLUCOPHAGE  Decrease to one pill twice a day

## 2023-12-26 RX ORDER — PENTOXIFYLLINE 400 MG/1
TABLET, EXTENDED RELEASE ORAL
Qty: 60 TABLET | Refills: 2 | Status: SHIPPED | OUTPATIENT
Start: 2023-12-26

## 2024-01-24 DIAGNOSIS — Z79.4 TYPE 2 DIABETES MELLITUS WITH HYPERGLYCEMIA, WITH LONG-TERM CURRENT USE OF INSULIN (HCC): ICD-10-CM

## 2024-01-24 DIAGNOSIS — E11.65 TYPE 2 DIABETES MELLITUS WITH HYPERGLYCEMIA, WITH LONG-TERM CURRENT USE OF INSULIN (HCC): ICD-10-CM

## 2024-01-24 DIAGNOSIS — E78.2 MIXED HYPERLIPIDEMIA: ICD-10-CM

## 2024-01-24 RX ORDER — ROSUVASTATIN CALCIUM 20 MG/1
TABLET, COATED ORAL
Qty: 90 TABLET | Refills: 3 | Status: SHIPPED | OUTPATIENT
Start: 2024-01-24

## 2024-03-11 DIAGNOSIS — I73.9 PERIPHERAL VASCULAR DISEASE, UNSPECIFIED (HCC): ICD-10-CM

## 2024-03-13 RX ORDER — PENTOXIFYLLINE 400 MG/1
TABLET, EXTENDED RELEASE ORAL
Qty: 60 TABLET | Refills: 2 | Status: SHIPPED | OUTPATIENT
Start: 2024-03-13

## 2024-04-10 ENCOUNTER — OFFICE VISIT (OUTPATIENT)
Age: 59
End: 2024-04-10
Payer: COMMERCIAL

## 2024-04-10 VITALS
RESPIRATION RATE: 20 BRPM | DIASTOLIC BLOOD PRESSURE: 68 MMHG | OXYGEN SATURATION: 95 % | HEIGHT: 73 IN | BODY MASS INDEX: 35.86 KG/M2 | SYSTOLIC BLOOD PRESSURE: 116 MMHG | TEMPERATURE: 98.8 F | HEART RATE: 70 BPM | WEIGHT: 270.6 LBS

## 2024-04-10 DIAGNOSIS — Z79.4 TYPE 2 DIABETES MELLITUS WITH HYPERGLYCEMIA, WITH LONG-TERM CURRENT USE OF INSULIN (HCC): Primary | ICD-10-CM

## 2024-04-10 DIAGNOSIS — E78.2 MIXED HYPERLIPIDEMIA: ICD-10-CM

## 2024-04-10 DIAGNOSIS — Z79.4 ENCOUNTER FOR LONG-TERM (CURRENT) USE OF INSULIN (HCC): ICD-10-CM

## 2024-04-10 DIAGNOSIS — I10 ESSENTIAL (PRIMARY) HYPERTENSION: ICD-10-CM

## 2024-04-10 DIAGNOSIS — E11.65 TYPE 2 DIABETES MELLITUS WITH HYPERGLYCEMIA, WITH LONG-TERM CURRENT USE OF INSULIN (HCC): Primary | ICD-10-CM

## 2024-04-10 DIAGNOSIS — N18.32 CHRONIC KIDNEY DISEASE, STAGE 3B (HCC): ICD-10-CM

## 2024-04-10 PROCEDURE — 95251 CONT GLUC MNTR ANALYSIS I&R: CPT | Performed by: INTERNAL MEDICINE

## 2024-04-10 PROCEDURE — 99214 OFFICE O/P EST MOD 30 MIN: CPT | Performed by: INTERNAL MEDICINE

## 2024-04-10 PROCEDURE — 3074F SYST BP LT 130 MM HG: CPT | Performed by: INTERNAL MEDICINE

## 2024-04-10 PROCEDURE — 3078F DIAST BP <80 MM HG: CPT | Performed by: INTERNAL MEDICINE

## 2024-04-10 RX ORDER — ERGOCALCIFEROL 1.25 MG/1
1.25 CAPSULE ORAL WEEKLY
COMMUNITY
Start: 2024-01-30 | End: 2024-04-23

## 2024-04-10 RX ORDER — TRIAMCINOLONE ACETONIDE 1 MG/G
1 CREAM TOPICAL
COMMUNITY
Start: 2024-02-27

## 2024-04-10 RX ORDER — METHOCARBAMOL 750 MG/1
750 TABLET, FILM COATED ORAL 3 TIMES DAILY
COMMUNITY
Start: 2024-01-04

## 2024-04-10 RX ORDER — LIDOCAINE 50 MG/G
PATCH TOPICAL
COMMUNITY
Start: 2024-02-06

## 2024-04-10 NOTE — PATIENT INSTRUCTIONS
SPECIFIC INSTRUCTIONS BELOW     Check blood sugars immediately before each meal and at bedtime      Metformin 500 mg twice a day with meals       Victoza 1.8 mg a day     Take  lantus  insulin  60  units  at bed time    Take novolog  insulin 12 units before breakfast, 14  units before lunch and 14  units before dinner.    Also, add additional novolog as follows with meals  If blood sugars are::    150-200 mg 3 units    201-250 mg 6 units    251-300 mg 9 units    301-350 mg 12 units    351-400 mg 15 units    401-450 mg 18 units    451-500 mg 21 units     Less than 70 mg NO INSULIN          -------------PAY ATTENTION TO THESE GENERAL INSTRUCTIONS -----------------      - The medications prescribed at this visit will not be available at pharmacy until 6 pm       - YOUR MED LIST IS NOT UP TO DATE AS SOME CHANGES ARE BEING MADE AFTER THE VISIT - FOLLOW SPECIFIC INSTRUCTIONS  ABOVE     -ANY tests other than blood work, which you opt to do  outside the  [facility] imaging facilities, you are responsible for prior authorizations if  required    - HEALTH MAINTENANCE IS NOT GOING TO BE UP TO DATE ON YOUR AVS- PLEASE IGNORE     Results     *Normal results will not be notified by a phone call starting January 1 2021   *If you have an upcoming visit, the results will be discussed at the visit   *Please sign up for MY CHART if you want access to your lab and test results  *Abnormal results which require immediate attention will be notified by phone call   *Abnormal results which do not require immediate assistance will be notified in 1-2 weeks       Refills    -    have your pharmacy send us a refill request . Refills are done max for one year and a visit is a must before refills are extended    Follow up appointments -  highly encourage you to make it when you are checking out. We can accommodate you into the schedule based on your clinical situation, but not for extending refills beyond a year. Labs are important to give

## 2024-04-10 NOTE — PROGRESS NOTES
Matthew Bynum is a 58 y.o. male here for   Chief Complaint   Patient presents with    Diabetes     6 month follow up       All medication reviewed.     Vitals:    04/10/24 0919   Weight: 122.7 kg (270 lb 9.6 oz)   Height: 1.854 m (6' 1\")        1. Have you been to the ER, urgent care clinic since your last visit?  Hospitalized since your last visit? -No    2. Have you seen or consulted any other health care providers outside of the [facility] since your last visit?  Include any pap smears or colon screening.-No      
by POC    Compared to 11.6 %    From feb 2021 April 2024       Baby steps towards right goal  gladly   He is conscientious   , understands the  physiology of DM better   Stay on   basal bolus regimen , metformin  ( 500 mg bid )    On   victoza to 1.8 mg a day  2 months ago      False lows ( cross check with [facility] )     Reviewed   Libreview AGP  report for 14 days and discussed with pt     -  GMI 6.7  %   - 14 day average glucose 142     - 3 % for   very high , 17   %  high and  4  %  low sugars and % in Target  Range  75   %     - percent of utiization 77  %   - CV% 35  %        October 2023      Blood sugars are high   He is conscientious   , understands the  physiology of DM better     Adjusting basal bolus regimen , metformin ; no longer  at daughter's place   I raised the doses of humalog  He resumed   victoza to 1.8 mg a day  2 months ago          Reviewed   Libreview AGP  report for 14 days and discussed with pt     -GMI 8.1 %   - 14 day average glucose 199    - 24% for   very high , 25 %  high and 1 %  low sugars and % in Target  Range  50  %     - percent of utiization 81 %   - CV% 41 %         2. Hypoglycemia :  Educated on treating the hypoglycemia.       3. HTN : continue current care.       4.  Dyslipidemia / Low HDL :  lipids are good   continue crestor       5.  Diabetic complications :       A. Retinopathy-YES   educated on this complication,  regular f/u with ophthalmologist encouraged      B. Nephropathy - yes, stage 3 now    creat is 1.8  (egfr was 41 ml/min  )        C. Peripheral Neuropathy - yes,       D - CAD  -  his cardiologist is Chaitanya Gtz       7.  HSV 2 positive - saw ID according to [facility] notes       9. Left sided nephrectomy for renal cancerous mass June 2021    High risk  Given a single kidney with lost control and decrease egfr           10. Followed by vascular surgeon, Dr. Bui  for PAD On Trental  And  plavix          Reviewed results with patient and discussed

## 2024-05-09 DIAGNOSIS — Z79.4 TYPE 2 DIABETES MELLITUS WITH HYPERGLYCEMIA, WITH LONG-TERM CURRENT USE OF INSULIN (HCC): ICD-10-CM

## 2024-05-09 DIAGNOSIS — E11.65 TYPE 2 DIABETES MELLITUS WITH HYPERGLYCEMIA, WITH LONG-TERM CURRENT USE OF INSULIN (HCC): ICD-10-CM

## 2024-05-09 RX ORDER — LIRAGLUTIDE 6 MG/ML
1.8 INJECTION SUBCUTANEOUS DAILY
Qty: 9 ML | Refills: 11 | Status: SHIPPED | OUTPATIENT
Start: 2024-05-09

## 2024-05-21 DIAGNOSIS — E11.65 TYPE 2 DIABETES MELLITUS WITH HYPERGLYCEMIA, WITH LONG-TERM CURRENT USE OF INSULIN (HCC): Primary | ICD-10-CM

## 2024-05-21 DIAGNOSIS — Z79.4 TYPE 2 DIABETES MELLITUS WITH HYPERGLYCEMIA, WITH LONG-TERM CURRENT USE OF INSULIN (HCC): Primary | ICD-10-CM

## 2024-05-21 NOTE — TELEPHONE ENCOUNTER
Patient states insurance denied llibre II he has further questions on what to do; spoke with Ruby appeal is being  done.     Need test strips and lancets filled in meantime per pt one touch ultra II to [facility] pharmacy on file

## 2024-05-22 RX ORDER — LANCETS
EACH MISCELLANEOUS
Qty: 300 EACH | Refills: 3 | Status: SHIPPED | OUTPATIENT
Start: 2024-05-22

## 2024-06-04 DIAGNOSIS — I73.9 PERIPHERAL VASCULAR DISEASE, UNSPECIFIED (HCC): ICD-10-CM

## 2024-06-06 RX ORDER — PENTOXIFYLLINE 400 MG/1
TABLET, EXTENDED RELEASE ORAL
Qty: 60 TABLET | Refills: 2 | Status: SHIPPED | OUTPATIENT
Start: 2024-06-06

## 2024-06-10 DIAGNOSIS — E11.65 TYPE 2 DIABETES MELLITUS WITH HYPERGLYCEMIA, WITH LONG-TERM CURRENT USE OF INSULIN (HCC): ICD-10-CM

## 2024-06-10 DIAGNOSIS — Z79.4 TYPE 2 DIABETES MELLITUS WITH HYPERGLYCEMIA, WITH LONG-TERM CURRENT USE OF INSULIN (HCC): ICD-10-CM

## 2024-06-10 RX ORDER — INSULIN ASPART 100 [IU]/ML
INJECTION, SOLUTION INTRAVENOUS; SUBCUTANEOUS
Qty: 60 ML | Refills: 3 | Status: SHIPPED | OUTPATIENT
Start: 2024-06-10

## 2024-07-02 DIAGNOSIS — I73.9 PERIPHERAL VASCULAR DISEASE, UNSPECIFIED (HCC): ICD-10-CM

## 2024-07-06 RX ORDER — PENTOXIFYLLINE 400 MG/1
TABLET, EXTENDED RELEASE ORAL
Qty: 60 TABLET | Refills: 2 | Status: SHIPPED | OUTPATIENT
Start: 2024-07-06

## 2024-07-31 DIAGNOSIS — E11.65 TYPE 2 DIABETES MELLITUS WITH HYPERGLYCEMIA, WITH LONG-TERM CURRENT USE OF INSULIN (HCC): ICD-10-CM

## 2024-07-31 DIAGNOSIS — Z79.4 TYPE 2 DIABETES MELLITUS WITH HYPERGLYCEMIA, WITH LONG-TERM CURRENT USE OF INSULIN (HCC): ICD-10-CM

## 2024-08-01 RX ORDER — INSULIN GLARGINE 100 [IU]/ML
INJECTION, SOLUTION SUBCUTANEOUS
Qty: 60 ML | Refills: 3 | Status: SHIPPED | OUTPATIENT
Start: 2024-08-01

## 2024-08-05 ENCOUNTER — OFFICE VISIT (OUTPATIENT)
Age: 59
End: 2024-08-05
Payer: COMMERCIAL

## 2024-08-05 VITALS
BODY MASS INDEX: 36.11 KG/M2 | RESPIRATION RATE: 16 BRPM | HEIGHT: 73 IN | DIASTOLIC BLOOD PRESSURE: 87 MMHG | WEIGHT: 272.5 LBS | OXYGEN SATURATION: 95 % | TEMPERATURE: 98.1 F | SYSTOLIC BLOOD PRESSURE: 133 MMHG | HEART RATE: 73 BPM

## 2024-08-05 DIAGNOSIS — I87.303 CHRONIC VENOUS HYPERTENSION INVOLVING BOTH SIDES: ICD-10-CM

## 2024-08-05 DIAGNOSIS — I73.9 PERIPHERAL VASCULAR DISEASE (HCC): Primary | ICD-10-CM

## 2024-08-05 PROCEDURE — 99212 OFFICE O/P EST SF 10 MIN: CPT | Performed by: SURGERY

## 2024-08-05 PROCEDURE — 3075F SYST BP GE 130 - 139MM HG: CPT | Performed by: SURGERY

## 2024-08-05 PROCEDURE — 3079F DIAST BP 80-89 MM HG: CPT | Performed by: SURGERY

## 2024-08-05 ASSESSMENT — PATIENT HEALTH QUESTIONNAIRE - PHQ9
SUM OF ALL RESPONSES TO PHQ9 QUESTIONS 1 & 2: 0
SUM OF ALL RESPONSES TO PHQ QUESTIONS 1-9: 0
2. FEELING DOWN, DEPRESSED OR HOPELESS: NOT AT ALL
SUM OF ALL RESPONSES TO PHQ QUESTIONS 1-9: 0
SUM OF ALL RESPONSES TO PHQ QUESTIONS 1-9: 0
1. LITTLE INTEREST OR PLEASURE IN DOING THINGS: NOT AT ALL
SUM OF ALL RESPONSES TO PHQ QUESTIONS 1-9: 0

## 2024-08-08 NOTE — PROGRESS NOTES
Identified pt with two pt identifiers (name and ). Reviewed chart in preparation for visit and have obtained necessary documentation.    Matthew Bynum is a 58 y.o. male  Chief Complaint   Patient presents with    Follow-up     Leg pain and leg swelling      /87 (Site: Right Upper Arm, Position: Sitting, Cuff Size: Large Adult)   Pulse 73   Temp 98.1 °F (36.7 °C) (Oral)   Resp 16   Ht 1.854 m (6' 1\")   Wt 123.6 kg (272 lb 8 oz)   SpO2 95%   BMI 35.95 kg/m²     1. Have you been to the ER, urgent care clinic since your last visit?  Hospitalized since your last visit?NO    2. Have you seen or consulted any other health care providers outside of the [facility] since your last visit?  Include any pap smears or colon screening. Yes   
   Potassium 10/04/2023 4.1  3.5 - 5.2 mmol/L Final    Chloride 10/04/2023 100  96 - 106 mmol/L Final    CO2 10/04/2023 20  20 - 29 mmol/L Final    Calcium 10/04/2023 9.0  8.6 - 10.2 mg/dL Final    Total Protein 10/04/2023 7.3  6.0 - 8.5 g/dL Final    Albumin 10/04/2023 4.6  3.8 - 4.9 g/dL Final    Globulin, Total 10/04/2023 2.7  1.5 - 4.5 g/dL Final    Albumin/Globulin Ratio 10/04/2023 1.7  1.2 - 2.2 Final    Total Bilirubin 10/04/2023 0.5  0.0 - 1.2 mg/dL Final    Alkaline Phosphatase 10/04/2023 88  44 - 121 IU/L Final    AST 10/04/2023 28  0 - 40 IU/L Final    ALT 10/04/2023 23  0 - 44 IU/L Final    Cholesterol 10/04/2023 103  100 - 199 mg/dL Final    Triglycerides 10/04/2023 85  0 - 149 mg/dL Final    HDL 10/04/2023 34 (L)  >39 mg/dL Final    VLDL Cholesterol Calculated 10/04/2023 17  5 - 40 mg/dL Final    LDL Calculated 10/04/2023 52  0 - 99 mg/dL Final    Hemoglobin A1C 10/04/2023 8.4 (H)  4.8 - 5.6 % Final    Comment:          Prediabetes: 5.7 - 6.4           Diabetes: >6.4           Glycemic control for adults with diabetes: <7.0      Estimated Avg Glucose 10/04/2023 194  mg/dL Final    Creatinine, Ur 10/04/2023 146.2  Not Estab. mg/dL Final    Albumin, U 10/04/2023 147.4  Not Estab. ug/mL Final    Microalb/Creat Ratio 10/04/2023 101 (H)  0 - 29 mg/g creat Final    Comment:                        Normal:                0 -  29                         Moderately increased: 30 - 300                         Severely increased:       >300      Interpretation 10/04/2023 Note   Final    Supplemental report is available.    Interpretation 10/04/2023 Note   Final    Supplemental report is available.         Images:      Hao Brown MD    Assessments:  Patient Active Problem List   Diagnosis    Essential hypertension    Scrotal pain    Genital warts    Other constipation    Type 2 diabetes mellitus (HCC)    Acute cystitis with hematuria    Obesity due to excess calories without serious comorbidity    HIV (human

## 2024-08-28 NOTE — Clinical Note
08/28/24 1225   PHQ-2/9 Depression Screening   Little interest or pleasure in activity? 2   Feeling down, depressed or hopeless? 3   Initial depression screening score: 5   PHQ2 Interpretation Further screening needed   Trouble falling or staying asleep or sleeping all the time? 2   Feeling tired or having little energy? 3   Poor appetite or overeating? 1   Feeling bad about yourself or that you are a failure or have let yourself or family down? 3   Trouble concentrating on things such as reading the newspaper or watching TV? 0   Moving or speaking slowly that other people have noticed or the opposite - being so fidgety or restless that you have been moving around a lot more than usual? 0   Thoughts that you would be better off dead or of hurting yourself in some way? 0   Total depressive symptoms score (PHQ9):  14   PHQ9 Interpretation Moderate Depression   If you reported any problems, how difficult have these problems made it to do your work, take care of things at home, or get along with other people? Very difficult   PHQ-2/9 Depression Screening   Able to Complete PHQ2/9 Questionnaire Yes     Britta Villanueva LCSW     Catheter used: Left 4. Catheter inserted.

## 2024-09-03 DIAGNOSIS — E11.65 TYPE 2 DIABETES MELLITUS WITH HYPERGLYCEMIA, WITH LONG-TERM CURRENT USE OF INSULIN (HCC): Primary | ICD-10-CM

## 2024-09-03 DIAGNOSIS — Z79.4 TYPE 2 DIABETES MELLITUS WITH HYPERGLYCEMIA, WITH LONG-TERM CURRENT USE OF INSULIN (HCC): Primary | ICD-10-CM

## 2024-09-04 RX ORDER — PEN NEEDLE, DIABETIC 31 GX5/16"
NEEDLE, DISPOSABLE MISCELLANEOUS
Qty: 400 EACH | Refills: 3 | Status: SHIPPED | OUTPATIENT
Start: 2024-09-04

## 2024-10-03 LAB
ALBUMIN SERPL-MCNC: 4.3 G/DL (ref 3.9–4.9)
ALBUMIN/CREAT UR: 88 MG/G CREAT (ref 0–29)
ALP SERPL-CCNC: 86 IU/L (ref 44–121)
ALT SERPL-CCNC: 33 IU/L (ref 0–44)
AST SERPL-CCNC: 30 IU/L (ref 0–40)
BILIRUB SERPL-MCNC: 0.5 MG/DL (ref 0–1.2)
BUN SERPL-MCNC: 28 MG/DL (ref 8–27)
BUN/CREAT SERPL: 14 (ref 10–24)
CALCIUM SERPL-MCNC: 9 MG/DL (ref 8.6–10.2)
CHLORIDE SERPL-SCNC: 97 MMOL/L (ref 96–106)
CHOLEST SERPL-MCNC: 96 MG/DL (ref 100–199)
CO2 SERPL-SCNC: 21 MMOL/L (ref 20–29)
CREAT SERPL-MCNC: 2.05 MG/DL (ref 0.76–1.27)
CREAT UR-MCNC: 155.4 MG/DL
EGFRCR SERPLBLD CKD-EPI 2021: 36 ML/MIN/1.73
GLOBULIN SER CALC-MCNC: 2.8 G/DL (ref 1.5–4.5)
GLUCOSE SERPL-MCNC: 263 MG/DL (ref 70–99)
HBA1C MFR BLD: 8 % (ref 4.8–5.6)
HDLC SERPL-MCNC: 30 MG/DL
IMP & REVIEW OF LAB RESULTS: NORMAL
LDLC SERPL CALC-MCNC: 46 MG/DL (ref 0–99)
Lab: NORMAL
MICROALBUMIN UR-MCNC: 136.6 UG/ML
POTASSIUM SERPL-SCNC: 4.2 MMOL/L (ref 3.5–5.2)
PROT SERPL-MCNC: 7.1 G/DL (ref 6–8.5)
REPORT: NORMAL
REPORT: NORMAL
SODIUM SERPL-SCNC: 135 MMOL/L (ref 134–144)
TRIGL SERPL-MCNC: 105 MG/DL (ref 0–149)
VLDLC SERPL CALC-MCNC: 20 MG/DL (ref 5–40)

## 2024-10-09 ENCOUNTER — OFFICE VISIT (OUTPATIENT)
Age: 59
End: 2024-10-09
Payer: COMMERCIAL

## 2024-10-09 VITALS
SYSTOLIC BLOOD PRESSURE: 130 MMHG | HEIGHT: 73 IN | HEART RATE: 69 BPM | BODY MASS INDEX: 36.21 KG/M2 | OXYGEN SATURATION: 81 % | WEIGHT: 273.2 LBS | DIASTOLIC BLOOD PRESSURE: 83 MMHG | TEMPERATURE: 97.4 F

## 2024-10-09 DIAGNOSIS — E78.2 MIXED HYPERLIPIDEMIA: ICD-10-CM

## 2024-10-09 DIAGNOSIS — N18.32 CHRONIC KIDNEY DISEASE, STAGE 3B (HCC): ICD-10-CM

## 2024-10-09 DIAGNOSIS — I10 ESSENTIAL (PRIMARY) HYPERTENSION: ICD-10-CM

## 2024-10-09 DIAGNOSIS — Z90.5 ACQUIRED ABSENCE OF KIDNEY: ICD-10-CM

## 2024-10-09 DIAGNOSIS — Z79.4 TYPE 2 DIABETES MELLITUS WITH HYPERGLYCEMIA, WITH LONG-TERM CURRENT USE OF INSULIN (HCC): Primary | ICD-10-CM

## 2024-10-09 DIAGNOSIS — Z79.4 ENCOUNTER FOR LONG-TERM (CURRENT) USE OF INSULIN (HCC): ICD-10-CM

## 2024-10-09 DIAGNOSIS — E11.65 TYPE 2 DIABETES MELLITUS WITH HYPERGLYCEMIA, WITH LONG-TERM CURRENT USE OF INSULIN (HCC): Primary | ICD-10-CM

## 2024-10-09 PROCEDURE — 3075F SYST BP GE 130 - 139MM HG: CPT | Performed by: INTERNAL MEDICINE

## 2024-10-09 PROCEDURE — 99214 OFFICE O/P EST MOD 30 MIN: CPT | Performed by: INTERNAL MEDICINE

## 2024-10-09 PROCEDURE — 3079F DIAST BP 80-89 MM HG: CPT | Performed by: INTERNAL MEDICINE

## 2024-10-09 PROCEDURE — 95251 CONT GLUC MNTR ANALYSIS I&R: CPT | Performed by: INTERNAL MEDICINE

## 2024-10-09 PROCEDURE — 3052F HG A1C>EQUAL 8.0%<EQUAL 9.0%: CPT | Performed by: INTERNAL MEDICINE

## 2024-10-09 RX ORDER — EMPAGLIFLOZIN 10 MG/1
10 TABLET, FILM COATED ORAL EVERY OTHER DAY
COMMUNITY

## 2024-10-09 NOTE — PROGRESS NOTES
[facility] DIABETES AND ENDOCRINOLOGY              Jessica Nguyen MD FACE        Matthew Bynum is a 59 y.o.  male presents today       Patient here for   follow up  Visit after   Last  visit of Type 2 diabetes mellitus from April 2024     He underwent CT for renal cell cancer surveillance, developed contrast nephropathy    S/p left nephrectomy  For  RCC -         No medical issues or questions    Had labs       April 2024   He is doing well , using igor       October 2023    He saw the podiatrist   He has a dead nail coming loose   He is talking about   discrepancy between igor sensor  and  finger stick blood sugars   He seems to be focusing on DM management well         May 2021        Referred : by  [facility]    H/o diabetes for 15  years    Current A1C is 11.6 %    From feb 2021  and symptoms/problems include none    [place] moved to [place]  [place]   In oct 2020    He stayed without meds for good 10 months , HE HAD FEW MEDS REFILLED on his visit in feb 2021    Per their notes, he used to be on actos 45 and nesina 25 mg   Current diabetic medications include  Metformin, bydureon, lantus 50 units .    bydureon was started 2 months ago- not being covered    Current monitoring regimen: home blood tests - NOT INTERESTED             Review of Systems   As above          Physical Exam    Constitutional:  oriented to person, place, and time.   well-developed and well-nourished.    Psychiatric:  normal mood and affect.        Labs       Lab Results   Component Value Date    LABA1C 8.0 (H) 10/02/2024    LABA1C 8.4 (H) 10/04/2023    LABA1C 8.5 (H) 03/23/2023       Lab Results   Component Value Date     10/02/2024    K 4.2 10/02/2024    CL 97 10/02/2024    CO2 21 10/02/2024    BUN 28 (H) 10/02/2024    CREATININE 2.05 (H) 10/02/2024    GLUCOSE 263 (H) 10/02/2024    CALCIUM 9.0 10/02/2024    LABALBU 136.6 10/02/2024    BILITOT 0.5 10/02/2024    ALKPHOS 86 10/02/2024    AST 30 10/02/2024    ALT 33 10/02/2024

## 2024-10-09 NOTE — PATIENT INSTRUCTIONS
SPECIFIC INSTRUCTIONS BELOW           DRINK water   Do not skip meals  Do not eat in between meals    Reduce carbs- pasta, rice, potatoes, bread   Try to avoid processed bread products like BISCUITS, CROISSANTS, MUFFINS    Do not drink juices or sodas, even if they are calorie zero or diet drinks and especially avoid using powders like crystalloids , STEPHANIE-AIDS     Do not eat peanut butter     Do not eat sugar free cookies and cakes     Do not eat peaches, oranges, pineapples, raisins, grapes , canteloupe , honey dew, mangoes , watermelon  and fruit medleys                  Check blood sugars immediately before each meal and at bedtime      Metformin 500 mg twice a day with meals       Victoza 1.8 mg a day       Check sugars before each meal  and at bed time    Take  lantus  insulin  60  units  at bed time    Take novolog  insulin 12 units before breakfast, 14  units before lunch and 14  units before dinner.    Also, add additional novolog as follows with meals  If blood sugars are::    150-200 mg 3 units    201-250 mg 6 units    251-300 mg 9 units    301-350 mg 12 units    351-400 mg 15 units    401-450 mg 18 units    451-500 mg 21 units     Less than 70 mg NO INSULIN          -------------PAY ATTENTION TO THESE GENERAL INSTRUCTIONS -----------------      - The medications prescribed at this visit will not be available at pharmacy until 6 pm       - YOUR MED LIST IS NOT UP TO DATE AS SOME CHANGES ARE BEING MADE AFTER THE VISIT - FOLLOW SPECIFIC INSTRUCTIONS  ABOVE     -ANY tests other than blood work, which you opt to do  outside the  [facility] facilities, you are responsible for prior authorizations if  required    - HEALTH MAINTENANCE IS NOT GOING TO BE UP TO DATE ON YOUR AVS- PLEASE IGNORE     Results     *Normal results will not be notified by a phone call starting January 1 2021   *If you have an upcoming visit, the results will be discussed at the visit   *Please sign up for MY CHART if you want

## 2024-10-09 NOTE — PROGRESS NOTES
Matthew Bynum is a 59 y.o. male here for   Chief Complaint   Patient presents with    Diabetes    Follow-up       1. Have you been to the ER, urgent care clinic since your last visit?  Hospitalized since your last visit? -No    2. Have you seen or consulted any other health care providers outside of the [facility] since your last visit?  Include any pap smears or colon screening.-No      /83 (Site: Left Upper Arm, Position: Sitting, Cuff Size: Large Adult)   Pulse 69   Temp 97.4 °F (36.3 °C) (Temporal)   Ht 1.854 m (6' 1\")   Wt 123.9 kg (273 lb 3.2 oz)   SpO2 (!) 81%   BMI 36.04 kg/m²

## 2024-11-08 DIAGNOSIS — Z79.4 TYPE 2 DIABETES MELLITUS WITH HYPERGLYCEMIA, WITH LONG-TERM CURRENT USE OF INSULIN (HCC): ICD-10-CM

## 2024-11-08 DIAGNOSIS — E11.65 TYPE 2 DIABETES MELLITUS WITH HYPERGLYCEMIA, WITH LONG-TERM CURRENT USE OF INSULIN (HCC): ICD-10-CM

## 2024-11-10 RX ORDER — INSULIN ASPART 100 [IU]/ML
INJECTION, SOLUTION INTRAVENOUS; SUBCUTANEOUS
Qty: 90 ML | Refills: 3 | Status: SHIPPED | OUTPATIENT
Start: 2024-11-10

## 2024-11-10 RX ORDER — LANCETS
EACH MISCELLANEOUS
Qty: 300 EACH | Refills: 3 | Status: SHIPPED | OUTPATIENT
Start: 2024-11-10

## 2024-11-13 ENCOUNTER — TELEPHONE (OUTPATIENT)
Age: 59
End: 2024-11-13

## 2024-11-13 NOTE — TELEPHONE ENCOUNTER
Patient called this afternoon he wanted to know if it was ok that his cardiologist order a venosus duplex scan of his legs but the patient wants to know if it was ok to do this test he does not like the fact the his heart doctor is also a vascular surgeon too he told  the doctor that he already has a vascular surgeon. So he has canceled the test so he called and wanted to know if it was ok with  if he gets this test done. Please advise call back number is [phone].

## 2024-11-14 NOTE — TELEPHONE ENCOUNTER
Per Hao it is appropriate for the cardiologist to do the scan and cardiologist also have knowledge of vascular stuff.

## 2024-11-20 DIAGNOSIS — I73.9 PERIPHERAL VASCULAR DISEASE, UNSPECIFIED (HCC): ICD-10-CM

## 2024-11-25 DIAGNOSIS — E11.65 TYPE 2 DIABETES MELLITUS WITH HYPERGLYCEMIA, WITH LONG-TERM CURRENT USE OF INSULIN (HCC): ICD-10-CM

## 2024-11-25 DIAGNOSIS — Z79.4 TYPE 2 DIABETES MELLITUS WITH HYPERGLYCEMIA, WITH LONG-TERM CURRENT USE OF INSULIN (HCC): ICD-10-CM

## 2024-11-26 RX ORDER — PENTOXIFYLLINE 400 MG/1
TABLET, EXTENDED RELEASE ORAL
Qty: 60 TABLET | Refills: 2 | Status: SHIPPED | OUTPATIENT
Start: 2024-11-26

## 2024-12-26 ENCOUNTER — HOSPITAL ENCOUNTER (OUTPATIENT)
Facility: HOSPITAL | Age: 59
Discharge: HOME | End: 2024-12-29
Payer: COMMERCIAL

## 2024-12-26 DIAGNOSIS — M51.360 DEGENERATION OF INTERVERTEBRAL DISC OF LUMBAR REGION WITH DISCOGENIC BACK PAIN: ICD-10-CM

## 2024-12-26 DIAGNOSIS — G89.29 CHRONIC LOW BACK PAIN, UNSPECIFIED BACK PAIN LATERALITY, UNSPECIFIED WHETHER SCIATICA PRESENT: ICD-10-CM

## 2024-12-26 DIAGNOSIS — M54.50 CHRONIC LOW BACK PAIN, UNSPECIFIED BACK PAIN LATERALITY, UNSPECIFIED WHETHER SCIATICA PRESENT: ICD-10-CM

## 2024-12-26 PROCEDURE — 72148 MRI LUMBAR SPINE W/O DYE: CPT

## 2025-02-03 ENCOUNTER — OFFICE VISIT (OUTPATIENT)
Age: 60
End: 2025-02-03
Payer: COMMERCIAL

## 2025-02-03 VITALS
HEIGHT: 73 IN | RESPIRATION RATE: 18 BRPM | SYSTOLIC BLOOD PRESSURE: 103 MMHG | BODY MASS INDEX: 34.92 KG/M2 | DIASTOLIC BLOOD PRESSURE: 68 MMHG | WEIGHT: 263.5 LBS | OXYGEN SATURATION: 92 % | HEART RATE: 71 BPM | TEMPERATURE: 97.5 F

## 2025-02-03 DIAGNOSIS — I73.9 PERIPHERAL VASCULAR DISEASE (HCC): Primary | ICD-10-CM

## 2025-02-03 PROCEDURE — 99212 OFFICE O/P EST SF 10 MIN: CPT | Performed by: SURGERY

## 2025-02-03 PROCEDURE — 3074F SYST BP LT 130 MM HG: CPT | Performed by: SURGERY

## 2025-02-03 PROCEDURE — 3078F DIAST BP <80 MM HG: CPT | Performed by: SURGERY

## 2025-02-03 ASSESSMENT — PATIENT HEALTH QUESTIONNAIRE - PHQ9
2. FEELING DOWN, DEPRESSED OR HOPELESS: NOT AT ALL
SUM OF ALL RESPONSES TO PHQ9 QUESTIONS 1 & 2: 0
SUM OF ALL RESPONSES TO PHQ QUESTIONS 1-9: 0
SUM OF ALL RESPONSES TO PHQ QUESTIONS 1-9: 0
1. LITTLE INTEREST OR PLEASURE IN DOING THINGS: NOT AT ALL
SUM OF ALL RESPONSES TO PHQ QUESTIONS 1-9: 0
SUM OF ALL RESPONSES TO PHQ QUESTIONS 1-9: 0

## 2025-02-13 DIAGNOSIS — I73.9 PERIPHERAL VASCULAR DISEASE, UNSPECIFIED (HCC): ICD-10-CM

## 2025-02-13 RX ORDER — PENTOXIFYLLINE 400 MG/1
TABLET, EXTENDED RELEASE ORAL
Qty: 60 TABLET | Refills: 2 | Status: SHIPPED | OUTPATIENT
Start: 2025-02-13 | End: 2025-02-13 | Stop reason: SDUPTHER

## 2025-02-13 RX ORDER — PENTOXIFYLLINE 400 MG/1
400 TABLET, EXTENDED RELEASE ORAL 2 TIMES DAILY
Qty: 60 TABLET | Refills: 2 | Status: SHIPPED | OUTPATIENT
Start: 2025-02-13

## 2025-02-17 DIAGNOSIS — E11.65 TYPE 2 DIABETES MELLITUS WITH HYPERGLYCEMIA, WITH LONG-TERM CURRENT USE OF INSULIN (HCC): Primary | ICD-10-CM

## 2025-02-17 DIAGNOSIS — Z79.4 TYPE 2 DIABETES MELLITUS WITH HYPERGLYCEMIA, WITH LONG-TERM CURRENT USE OF INSULIN (HCC): Primary | ICD-10-CM

## 2025-02-17 RX ORDER — ACYCLOVIR 400 MG/1
TABLET ORAL
Qty: 3 EACH | Refills: 11 | Status: SHIPPED | OUTPATIENT
Start: 2025-02-17

## 2025-02-26 ENCOUNTER — TELEPHONE (OUTPATIENT)
Age: 60
End: 2025-02-26

## 2025-03-05 DIAGNOSIS — E11.65 TYPE 2 DIABETES MELLITUS WITH HYPERGLYCEMIA, WITH LONG-TERM CURRENT USE OF INSULIN (HCC): Primary | ICD-10-CM

## 2025-03-05 DIAGNOSIS — E78.2 MIXED HYPERLIPIDEMIA: ICD-10-CM

## 2025-03-05 DIAGNOSIS — Z79.4 TYPE 2 DIABETES MELLITUS WITH HYPERGLYCEMIA, WITH LONG-TERM CURRENT USE OF INSULIN (HCC): Primary | ICD-10-CM

## 2025-03-06 LAB
ALBUMIN SERPL-MCNC: 4.7 G/DL (ref 3.9–4.9)
ALBUMIN/CREAT UR: 75 MG/G CREAT (ref 0–29)
ALP SERPL-CCNC: 95 IU/L (ref 44–121)
ALT SERPL-CCNC: 20 IU/L (ref 0–44)
AST SERPL-CCNC: 25 IU/L (ref 0–40)
BILIRUB SERPL-MCNC: 0.5 MG/DL (ref 0–1.2)
BUN SERPL-MCNC: 18 MG/DL (ref 8–27)
BUN/CREAT SERPL: 10 (ref 10–24)
CALCIUM SERPL-MCNC: 9.5 MG/DL (ref 8.6–10.2)
CHLORIDE SERPL-SCNC: 102 MMOL/L (ref 96–106)
CHOLEST SERPL-MCNC: 95 MG/DL (ref 100–199)
CO2 SERPL-SCNC: 22 MMOL/L (ref 20–29)
CREAT SERPL-MCNC: 1.79 MG/DL (ref 0.76–1.27)
CREAT UR-MCNC: 103.6 MG/DL
EGFRCR SERPLBLD CKD-EPI 2021: 43 ML/MIN/1.73
GLOBULIN SER CALC-MCNC: 2.6 G/DL (ref 1.5–4.5)
GLUCOSE SERPL-MCNC: 88 MG/DL (ref 70–99)
HBA1C MFR BLD: 8.1 % (ref 4.8–5.6)
HDLC SERPL-MCNC: 34 MG/DL
IMP & REVIEW OF LAB RESULTS: NORMAL
LDLC SERPL CALC-MCNC: 44 MG/DL (ref 0–99)
Lab: NORMAL
MICROALBUMIN UR-MCNC: 77.2 UG/ML
POTASSIUM SERPL-SCNC: 3.5 MMOL/L (ref 3.5–5.2)
PROT SERPL-MCNC: 7.3 G/DL (ref 6–8.5)
REPORT: NORMAL
REPORT: NORMAL
SODIUM SERPL-SCNC: 140 MMOL/L (ref 134–144)
TRIGL SERPL-MCNC: 86 MG/DL (ref 0–149)
VLDLC SERPL CALC-MCNC: 17 MG/DL (ref 5–40)

## 2025-04-08 ENCOUNTER — TELEPHONE (OUTPATIENT)
Age: 60
End: 2025-04-08

## 2025-04-08 DIAGNOSIS — Z79.4 TYPE 2 DIABETES MELLITUS WITH HYPERGLYCEMIA, WITH LONG-TERM CURRENT USE OF INSULIN (HCC): ICD-10-CM

## 2025-04-08 DIAGNOSIS — E11.65 TYPE 2 DIABETES MELLITUS WITH HYPERGLYCEMIA, WITH LONG-TERM CURRENT USE OF INSULIN (HCC): ICD-10-CM

## 2025-04-08 NOTE — TELEPHONE ENCOUNTER
Patient apt had to be rescheduled with Dr. Nguyen out. Patient needing rx for alcohol pads stating the insurance will cover for him if sent by script. Also he had called some time ago per patient that he has not been able to get victoza and wants to discuss alternative which he may need to wait til June at this time but would check. He also wants to discuss lab results

## 2025-04-09 RX ORDER — BLOOD PRESSURE TEST KIT
KIT MISCELLANEOUS
Qty: 300 EACH | Refills: 3 | Status: SHIPPED | OUTPATIENT
Start: 2025-04-09

## 2025-04-09 NOTE — TELEPHONE ENCOUNTER
As long as not have low glucoses (below 80s), ok to increase Lantus to 65 and Novolog to 16 units with meals + SS when stopping Victoza.     Diabetes general guidelines:   Target glucose  Fastin-130  2 hours after meals: less than 180    Check glucose levels as directed   Have a small snack if glucose is less than 80 at bedtime    Seek urgent care if glucose levels above 300s and not coming down, especially if associated with excessive thirst, urination, confusion or generally feeling unwell.     For Blood Glucose < 70 mg/dl treat with 4 ounces of juice or 4 glucose tablets (15 g). Recheck Blood Glucose in 15 minutes. Repeat until blood glucose is > 70.

## 2025-04-09 NOTE — TELEPHONE ENCOUNTER
Pt states the pharmacist told him they are no longer making Victoza. Pt would like to know what he can take in place of it.

## 2025-04-10 NOTE — TELEPHONE ENCOUNTER
Informed pt of Dr. Ramey's note. Pt verbalized understanding with no further questions or concerns at this time. Also sent message by [employer]

## 2025-04-18 ENCOUNTER — TELEPHONE (OUTPATIENT)
Age: 60
End: 2025-04-18

## 2025-04-18 NOTE — TELEPHONE ENCOUNTER
Hi,    Pt wants Dr. Nguyen to know he's continuing his \"low-dose insulin\" as opposed to the increased dose she recently put him on.    Thank you.

## 2025-05-22 DIAGNOSIS — Z79.4 TYPE 2 DIABETES MELLITUS WITH HYPERGLYCEMIA, WITH LONG-TERM CURRENT USE OF INSULIN (HCC): ICD-10-CM

## 2025-05-22 DIAGNOSIS — E11.65 TYPE 2 DIABETES MELLITUS WITH HYPERGLYCEMIA, WITH LONG-TERM CURRENT USE OF INSULIN (HCC): ICD-10-CM

## 2025-05-22 RX ORDER — LIRAGLUTIDE 6 MG/ML
1.8 INJECTION SUBCUTANEOUS DAILY
Qty: 27 ML | Refills: 0 | Status: ACTIVE | OUTPATIENT
Start: 2025-05-22

## 2025-05-27 DIAGNOSIS — E11.65 TYPE 2 DIABETES MELLITUS WITH HYPERGLYCEMIA, WITH LONG-TERM CURRENT USE OF INSULIN (HCC): ICD-10-CM

## 2025-05-27 DIAGNOSIS — Z79.4 TYPE 2 DIABETES MELLITUS WITH HYPERGLYCEMIA, WITH LONG-TERM CURRENT USE OF INSULIN (HCC): ICD-10-CM

## 2025-05-27 RX ORDER — PEN NEEDLE, DIABETIC 31 GX5/16"
NEEDLE, DISPOSABLE MISCELLANEOUS
Qty: 400 EACH | Refills: 3 | Status: SHIPPED | OUTPATIENT
Start: 2025-05-27

## 2025-06-04 ENCOUNTER — TELEPHONE (OUTPATIENT)
Age: 60
End: 2025-06-04

## 2025-06-04 NOTE — TELEPHONE ENCOUNTER
Spoke with pt and informed him that Dr. Nguyen does not need him to have labs drawn before his visit next week

## 2025-06-06 DIAGNOSIS — I73.9 PERIPHERAL VASCULAR DISEASE, UNSPECIFIED: ICD-10-CM

## 2025-06-09 RX ORDER — PENTOXIFYLLINE 400 MG/1
400 TABLET, EXTENDED RELEASE ORAL 2 TIMES DAILY
Qty: 60 TABLET | Refills: 2 | OUTPATIENT
Start: 2025-06-09

## 2025-06-10 ENCOUNTER — OFFICE VISIT (OUTPATIENT)
Age: 60
End: 2025-06-10
Payer: COMMERCIAL

## 2025-06-10 VITALS
BODY MASS INDEX: 35.57 KG/M2 | TEMPERATURE: 97.7 F | SYSTOLIC BLOOD PRESSURE: 110 MMHG | HEART RATE: 64 BPM | OXYGEN SATURATION: 93 % | WEIGHT: 268.4 LBS | HEIGHT: 73 IN | DIASTOLIC BLOOD PRESSURE: 62 MMHG

## 2025-06-10 DIAGNOSIS — E78.2 MIXED HYPERLIPIDEMIA: ICD-10-CM

## 2025-06-10 DIAGNOSIS — E11.65 TYPE 2 DIABETES MELLITUS WITH HYPERGLYCEMIA, WITH LONG-TERM CURRENT USE OF INSULIN (HCC): Primary | ICD-10-CM

## 2025-06-10 DIAGNOSIS — N18.32 CHRONIC KIDNEY DISEASE, STAGE 3B (HCC): ICD-10-CM

## 2025-06-10 DIAGNOSIS — Z79.4 ENCOUNTER FOR LONG-TERM (CURRENT) USE OF INSULIN (HCC): ICD-10-CM

## 2025-06-10 DIAGNOSIS — I10 ESSENTIAL (PRIMARY) HYPERTENSION: ICD-10-CM

## 2025-06-10 DIAGNOSIS — Z90.5 ACQUIRED ABSENCE OF KIDNEY: ICD-10-CM

## 2025-06-10 DIAGNOSIS — Z79.4 TYPE 2 DIABETES MELLITUS WITH HYPERGLYCEMIA, WITH LONG-TERM CURRENT USE OF INSULIN (HCC): Primary | ICD-10-CM

## 2025-06-10 PROCEDURE — 95251 CONT GLUC MNTR ANALYSIS I&R: CPT | Performed by: INTERNAL MEDICINE

## 2025-06-10 PROCEDURE — 99214 OFFICE O/P EST MOD 30 MIN: CPT | Performed by: INTERNAL MEDICINE

## 2025-06-10 PROCEDURE — 3078F DIAST BP <80 MM HG: CPT | Performed by: INTERNAL MEDICINE

## 2025-06-10 PROCEDURE — 3052F HG A1C>EQUAL 8.0%<EQUAL 9.0%: CPT | Performed by: INTERNAL MEDICINE

## 2025-06-10 PROCEDURE — 3074F SYST BP LT 130 MM HG: CPT | Performed by: INTERNAL MEDICINE

## 2025-06-10 RX ORDER — INSULIN GLARGINE 100 [IU]/ML
INJECTION, SOLUTION SUBCUTANEOUS
Qty: 60 ML | Refills: 3 | Status: SHIPPED | OUTPATIENT
Start: 2025-06-10

## 2025-06-10 RX ORDER — BLOOD-GLUCOSE METER
1 EACH MISCELLANEOUS DAILY
COMMUNITY
Start: 2025-03-20

## 2025-06-10 RX ORDER — INSULIN ASPART 100 [IU]/ML
INJECTION, SOLUTION INTRAVENOUS; SUBCUTANEOUS
Qty: 90 ML | Refills: 3 | Status: SHIPPED | OUTPATIENT
Start: 2025-06-10

## 2025-06-10 NOTE — PROGRESS NOTES
Matthew Bynum is a 59 y.o. male here for   Chief Complaint   Patient presents with    Diabetes       1. Have you been to the ER, urgent care clinic since your last visit?  Hospitalized since your last visit? - no    2. Have you seen or consulted any other health care providers outside of the [facility] since your last visit?  Include any pap smears or colon screening.- no

## 2025-06-10 NOTE — PATIENT INSTRUCTIONS
*Please sign up for MY CHART if you want access to your lab and test results  *Abnormal results which require immediate attention will be notified by phone call   *Abnormal results which do not require immediate assistance will be notified in 1-2 weeks       Refills    -    have your pharmacy send us a refill request . Refills are done max for one year and a visit is a must before refills are extended    Follow up appointments -  highly encourage you to make it when you are checking out. We can accommodate you into the schedule based on your clinical situation, but not for extending refills beyond a year. Labs are important to give refills and is important to get labs before the visit     Phone calls  -  Allow  24 hrs. for non-urgent calls to be returned  Prior authorization - It may take 2-4 weeks to process  Forms  -  FMLA, DMV etc., will take up to 2 weeks to process  Cancellations - please notify the office 2 days in advance   Samples  - will only be dispensed at visits       If not showing for the appointments and cancelling appointments within 24 hours are kept track of and three  of such situations in  two consecutive years will likely be considered for termination from the practice    -------------------------------------------------------------------------------------------------------------------

## 2025-06-10 NOTE — PROGRESS NOTES
[facility] DIABETES AND ENDOCRINOLOGY              Jessica Nguyen MD FACE        Matthew Bynum is a 59 y.o.  male presents today       Patient here for   follow up  Visit after   Last  visit of Type 2 diabetes mellitus from  October 2024       He underwent CT for renal cell cancer surveillance, developed contrast nephropathy    S/p left nephrectomy  For  RCC -     He has back issues and is planning for surgery   rather than steroids       October 2024     No medical issues or questions    Had labs       April 2024   He is doing well , using igor       October 2023    He saw the podiatrist   He has a dead nail coming loose   He is talking about   discrepancy between igor sensor  and  finger stick blood sugars   He seems to be focusing on DM management well         May 2021        Referred : by  [facility]    H/o diabetes for 15  years    Current A1C is 11.6 %    From feb 2021  and symptoms/problems include none    [place] moved to [place]  [place]   In oct 2020    He stayed without meds for good 10 months , HE HAD FEW MEDS REFILLED on his visit in feb 2021    Per their notes, he used to be on actos 45 and nesina 25 mg   Current diabetic medications include  Metformin, bydureon, lantus 50 units .    bydureon was started 2 months ago- not being covered    Current monitoring regimen: home blood tests - NOT INTERESTED             Review of Systems   As above          Physical Exam    Constitutional:  oriented to person, place, and time.   well-developed and well-nourished.    Psychiatric:  normal mood and affect.        Labs       Lab Results   Component Value Date    LABA1C 8.1 (H) 03/05/2025    LABA1C 8.0 (H) 10/02/2024    LABA1C 8.4 (H) 10/04/2023       Lab Results   Component Value Date     03/05/2025    K 3.5 03/05/2025     03/05/2025    CO2 22 03/05/2025    BUN 18 03/05/2025    CREATININE 1.79 (H) 03/05/2025    GLUCOSE 88 03/05/2025    CALCIUM 9.5 03/05/2025    LABALBU 136.6 10/02/2024    BILITOT

## 2025-07-02 ENCOUNTER — TRANSCRIBE ORDERS (OUTPATIENT)
Facility: HOSPITAL | Age: 60
End: 2025-07-02

## 2025-07-02 ENCOUNTER — HOSPITAL ENCOUNTER (OUTPATIENT)
Facility: HOSPITAL | Age: 60
Discharge: HOME | End: 2025-07-05
Payer: COMMERCIAL

## 2025-07-02 DIAGNOSIS — M54.16 LUMBAR RADICULOPATHY: ICD-10-CM

## 2025-07-02 DIAGNOSIS — M54.16 LUMBAR RADICULOPATHY: Primary | ICD-10-CM

## 2025-07-02 PROCEDURE — 72110 X-RAY EXAM L-2 SPINE 4/>VWS: CPT

## 2025-07-07 DIAGNOSIS — I73.9 PERIPHERAL VASCULAR DISEASE, UNSPECIFIED: ICD-10-CM

## 2025-07-15 RX ORDER — PENTOXIFYLLINE 400 MG/1
400 TABLET, EXTENDED RELEASE ORAL 2 TIMES DAILY
Qty: 60 TABLET | Refills: 2 | Status: SHIPPED | OUTPATIENT
Start: 2025-07-15

## 2025-08-26 DIAGNOSIS — E11.65 TYPE 2 DIABETES MELLITUS WITH HYPERGLYCEMIA, WITH LONG-TERM CURRENT USE OF INSULIN (HCC): ICD-10-CM

## 2025-08-26 DIAGNOSIS — Z79.4 TYPE 2 DIABETES MELLITUS WITH HYPERGLYCEMIA, WITH LONG-TERM CURRENT USE OF INSULIN (HCC): ICD-10-CM

## (undated) DEVICE — SET SATELLITE SUPERNO2VA AD LG --

## (undated) DEVICE — LAPAROSCOPIC CHOLE PACK: Brand: MEDLINE INDUSTRIES, INC.

## (undated) DEVICE — SUT MONOCRYL PLUS UD 4-0 --

## (undated) DEVICE — BASIC SINGLE BASIN-LF: Brand: MEDLINE INDUSTRIES, INC.

## (undated) DEVICE — SOLUTION IRRIG 500ML 0.9% SOD CHL USP POUR PLAS BTL

## (undated) DEVICE — SUTURE PDS II SZ 0 L60IN ABSRB VLT L48MM CTX 1/2 CIR Z990G

## (undated) DEVICE — STERILE POLYISOPRENE POWDER-FREE SURGICAL GLOVES WITH EMOLLIENT COATING: Brand: PROTEXIS

## (undated) DEVICE — GUIDEWIRE VASC L260CM DIA0.035IN RAD 3MM J TIP L7CM PTFE

## (undated) DEVICE — PREP PAD BNS: Brand: CONVERTORS

## (undated) DEVICE — SURGICAL PROCEDURE TRAY CRD CATH 3 PRT

## (undated) DEVICE — 2, DISPOSABLE SUCTION/IRRIGATOR WITHOUT DISPOSABLE TIP: Brand: STRYKEFLOW

## (undated) DEVICE — SPONGE LAP 18X18IN STRL -- 5/PK

## (undated) DEVICE — ENDO KIT 1: Brand: MEDLINE INDUSTRIES, INC.

## (undated) DEVICE — TUBING SUCTION UNIV 3/16 INX20 FT W/ SCALLOPED CONN STRL

## (undated) DEVICE — CUTTER ENDOSCP L340MM LIN ARTC SGL STROKE FIRING ENDOPATH

## (undated) DEVICE — TUBING PRESS INJ FLX SH 30IN --

## (undated) DEVICE — LAMINECTOMY ARM CRADLE FOAM POSITIONER: Brand: CARDINAL HEALTH

## (undated) DEVICE — CYSTO PACK: Brand: MEDLINE INDUSTRIES, INC.

## (undated) DEVICE — SUTURE PERMAHAND SZ 3-0 L18IN NONABSORBABLE BLK L26MM SH C013D

## (undated) DEVICE — VISUALIZATION SYSTEM: Brand: CLEARIFY

## (undated) DEVICE — VINYL ACETATE UROLOGICAL DRAINAGE BAG FOR GE/OEC SYSTEMS W/ 8MM PLUG - NON-STERILE: Brand: CUSTOM MEDICAL SPECIALTIES, INC.

## (undated) DEVICE — BAG DRNGE 4000ML CONT IRRIG ROUNDED TEARDROP SHP DISP

## (undated) DEVICE — GARMENT,MEDLINE,DVT,INT,CALF,MED, GEN2: Brand: MEDLINE

## (undated) DEVICE — GLOVE SURG SZ 7.5 L11.73IN FNGR THK9.8MIL STRW LTX POLYMER

## (undated) DEVICE — ACCESS PLATFORM FOR MINIMALLY INVASIVE SURGERY.: Brand: GELPORT® LAPAROSCOPIC  SYSTEM

## (undated) DEVICE — INTENDED FOR TISSUE SEPARATION, AND OTHER PROCEDURES THAT REQUIRE A SHARP SURGICAL BLADE TO PUNCTURE OR CUT.: Brand: BARD-PARKER ® CARBON RIB-BACK BLADES

## (undated) DEVICE — LAPAROSCOPIC SCISSORS: Brand: EPIX LAPAROSCOPIC SCISSORS

## (undated) DEVICE — PREP SKN CHLRAPRP APL 26ML STR --

## (undated) DEVICE — GOWN,PREVENTION PLUS,XLN/XL,ST,24/CS: Brand: MEDLINE

## (undated) DEVICE — SOLUTION SCRB 4OZ 4% CHG H2O AIDED FOR PREOPERATIVE SKIN

## (undated) DEVICE — CLIP INT L POLYMER LOK LIG HEM O LOK

## (undated) DEVICE — FORCEPS BX L240CM JAW DIA2.8MM L CAP W/ NDL MIC MESH TOOTH

## (undated) DEVICE — LULU RADIAL/FEMORAL ANGIOGRAPHY SHEET: Brand: CONVERTORS

## (undated) DEVICE — TRAY URIN CATH PED 16FR BLLN 5CC INDWL STR TIP INF CTRL

## (undated) DEVICE — [HIGH FLOW INSUFFLATOR,  DO NOT USE IF PACKAGE IS DAMAGED,  KEEP DRY,  KEEP AWAY FROM SUNLIGHT,  PROTECT FROM HEAT AND RADIOACTIVE SOURCES.]: Brand: PNEUMOSURE

## (undated) DEVICE — RELOAD STPL H1-2.5X45MM VASC THN TISS WHT 6 ROW B FRM SGL

## (undated) DEVICE — TOWEL SURG W17XL27IN STD BLU COT NONFENESTRATED PREWASHED

## (undated) DEVICE — MOUTHPIECE ENDOSCP 20X27MM --

## (undated) DEVICE — DRAPE,REIN 53X77,STERILE: Brand: MEDLINE

## (undated) DEVICE — WASTEBAG DRIP/ADAPTER: Brand: MEDLINE INDUSTRIES, INC.

## (undated) DEVICE — SOLUTION IRRIG 500ML STRL H2O NONPYROGENIC

## (undated) DEVICE — KIT INCLUDES: GTB17, ALEXIS CONTAINED EXTRACTION SYSTEM C0R47, 12X100 KII BALLOON BLUNT TIP TROCAR: Brand: ALEXIS CONTAINED EXTRACTION SYSTEM WITH KII BALLOON BLUNT TIP SYSTEM

## (undated) DEVICE — COPE MANDRIL WIRE GUIDE STAINLESS STEEL: Brand: COPE

## (undated) DEVICE — YANKAUER,BULB TIP,W/O VENT,RIGID,STERILE: Brand: MEDLINE

## (undated) DEVICE — Device: Brand: JELCO

## (undated) DEVICE — TROCAR: Brand: KII FIOS FIRST ENTRY

## (undated) DEVICE — MASK ANES INF SZ 2 PREM TAIL VLV INFL PRT UNSCENTED SGL PT

## (undated) DEVICE — SOL INJ SOD CL 0.9% 1000ML BG --

## (undated) DEVICE — TAPE,CLOTH/SILK,CURAD,3"X10YD,LF,40/CS: Brand: CURAD

## (undated) DEVICE — SYRINGE MED 10ML PUR GAM COMPATIBLE POLYCARB FIX M LUER CONN

## (undated) DEVICE — CATHETER URETH 18FR BLLN 30CC 3 W F SPEC INF CTRL BARDX

## (undated) DEVICE — SPONGE GZ W4XL4IN COT 12 PLY TYP VII WVN C FLD DSGN

## (undated) DEVICE — Device

## (undated) DEVICE — PERCUTANEOUS ENTRY THINWALL NEEDLE  ONE-PART: Brand: COOK

## (undated) DEVICE — BAND COMPR L29CM XLN CLR PLAS HEMSTAT EXT HK AND LOOP RETEN

## (undated) DEVICE — SYR LR LCK 1ML GRAD NSAF 30ML --

## (undated) DEVICE — SOLUTION IRRIG 3000ML STRL H2O USP UROMATIC PLAS CONT

## (undated) DEVICE — DERMABOND SKIN ADH 0.7ML -- DERMABOND ADVANCED 12/BX

## (undated) DEVICE — FCPS RAD JAW 4LC 240CM W/NDL -- BX/20 RADIAL JAW 4

## (undated) DEVICE — CATHETER ETER DIAG JR 4 JL 4 PGTL 52FR SUP TORQ + MULTIPAC

## (undated) DEVICE — SOUTHSIDE TURNOVER: Brand: MEDLINE INDUSTRIES, INC.

## (undated) DEVICE — SHEARS ENDOSCP L36CM DIA5MM ULTRASONIC CRV TIP HARM

## (undated) DEVICE — GLIDESHEATH SLENDER ACCESS KIT: Brand: GLIDESHEATH SLENDER